# Patient Record
Sex: FEMALE | Race: WHITE | NOT HISPANIC OR LATINO | Employment: UNEMPLOYED | URBAN - METROPOLITAN AREA
[De-identification: names, ages, dates, MRNs, and addresses within clinical notes are randomized per-mention and may not be internally consistent; named-entity substitution may affect disease eponyms.]

---

## 2017-01-11 ENCOUNTER — OFFICE VISIT (OUTPATIENT)
Dept: ENDOCRINOLOGY | Facility: CLINIC | Age: 22
End: 2017-01-11
Payer: COMMERCIAL

## 2017-01-11 VITALS
RESPIRATION RATE: 18 BRPM | HEIGHT: 66 IN | SYSTOLIC BLOOD PRESSURE: 106 MMHG | WEIGHT: 142 LBS | DIASTOLIC BLOOD PRESSURE: 62 MMHG | BODY MASS INDEX: 22.82 KG/M2 | HEART RATE: 74 BPM

## 2017-01-11 DIAGNOSIS — E10.9 TYPE 1 DIABETES MELLITUS WITHOUT COMPLICATION (H): Primary | ICD-10-CM

## 2017-01-11 DIAGNOSIS — Z96.41 INSULIN PUMP IN PLACE: ICD-10-CM

## 2017-01-11 DIAGNOSIS — Z79.4 ENCOUNTER FOR LONG-TERM (CURRENT) USE OF INSULIN (H): ICD-10-CM

## 2017-01-11 DIAGNOSIS — O09.613 HIGH-RISK PREGNANCY, YOUNG PRIMIGRAVIDA, THIRD TRIMESTER: ICD-10-CM

## 2017-01-11 PROCEDURE — 99214 OFFICE O/P EST MOD 30 MIN: CPT | Performed by: CLINICAL NURSE SPECIALIST

## 2017-01-11 PROCEDURE — 99207 C FOOT EXAM  NO CHARGE: CPT | Performed by: CLINICAL NURSE SPECIALIST

## 2017-01-11 NOTE — PROGRESS NOTES
Name: Fransisca Avila  F/u for Diabetes (Last seen 11/18/2016).  HPI:  Fransisca Avila is a 21 year old female who presents for the evaluation/management of type 1 diabetes.     1. Type 1 DM:  Originally diagnosed at the age of 2.   Induced at 34 weeks on 11/5/2014, preeclampsia.  Baby girl, initially in NICU. Daughter now 2 years old, has also been diagnosed with type 1 diabetes.    Current Regimen:   Insulin pump - Medtronic 630-G  Time Rate (U/hr)   0000 0.85   05:00 0.80             Carbohydrate Ratio -    Time Ratio   0000 12         Sensitivity   47   Active Insulin Time   hours   Basal  %   Bolus   %   Total Carbohydrates/day    Total Insulin/day     Average Blood Sugar    BS Checks  times a day   Care Link Username-   Password-     MA was unable to download pump, was able to download the meter.  .  Average Meter Download: BG avg 214, BG range .    Average BG by time of day:   morning (12a-8a) - avg 233, 76.9% above target range, 0% below.    Midday (8a-4p) - avg 171, 38% above target, 7% below target  Evening (4p-12a) - avg 263, 86% above target, 0% below target    Complications:   Diabetes Complications  Description / Detail    Diabetic Retinopathy  No retinopathy, last exam 2/2016   CAD / PAD  No   Neuropathy  No   Nephropathy / Microalbuminuria  No   Gastroparesis  No   Hypoglycemia Unawareness  No     PMH/PSH:  Past Medical History   Diagnosis Date     Type 1 diabetes (H) 1998     three years at diagnosis     Varicella 1998     Past Surgical History   Procedure Laterality Date     Dental surgery       C root canal therapy 2 canals       Family Hx:  Family History   Problem Relation Age of Onset     DIABETES Father 35     Type 1      Family History Negative Mother      Thyroid disease: No         DM2: NO         Autoimmune: DM1, SLE, RA, Vitiligo Yes - Father has type 1 DM    Social Hx:  Social History     Social History     Marital Status:      Spouse Name: Teja     Number of Children: 1      "Years of Education: N/A     Occupational History           Social History Main Topics     Smoking status: Never Smoker      Smokeless tobacco: Never Used     Alcohol Use: No     Drug Use: No     Sexual Activity:     Partners: Male     Other Topics Concern     Blood Transfusions No     Social History Narrative    Fransisca lives with her spouse           MEDICATIONS:  has a current medication list which includes the following prescription(s): insulin pump, insulin lispro, blood glucose monitoring, insulin glargine, insulin syringe-needle u-100, insulin pen needle, insulin glargine, insulin syringe-needle u-100, insulin lispro, blood glucose monitoring, relion ultima glucose system, relion lancets micro-thin 33g, insulin syringe-needle u-100, blood glucose monitoring, acetone (urine) test, and prenatal multivit-min-fe-fa.    ROS     ROS: 10 point ROS neg other than the symptoms noted above in the HPI.    Physical Exam   VS: /62 mmHg  Pulse 74  Resp 18  Ht 1.676 m (5' 6\")  Wt 64.411 kg (142 lb)  BMI 22.93 kg/m2  GENERAL: NAD, well dressed, answering questions appropriately, appears stated age.  HEENT: OP clear, no exophthalmos, no proptosis, EOMI, no lig lag, no retraction, no scleral icterus  RESPIRATORY: Normal respiratory effort.  CARDIOVASCULAR: No peripheral edema.  EXTREMITIES: no edema, feet with 2+ pulses, 10-gram plantar sensation 6/6 bilaterally, no open lesions or heavy calluses noted.  NEUROLOGY: CN grossly intact, no tremors  MSK: grossly intact, No digital cyanosis. Normal gait and station.  SKIN:warm and dry with good turgor;  no rashes, no lesions, no ulcers  PSYCH: Intact judgment and insight. A&OX3 with a cordial affect.    LABS:  A1c  Component  Latest Ref Rng 11/6/2014 12/2/2015 11/18/2016   Hemoglobin A1C  4.3 - 6.0 % 9.3 (H) >15.0 (H) . . 10.8 (H)     BMP:  !COMPREHENSIVE Latest Ref Rng 11/18/2016   SODIUM 133 - 144 mmol/L 138   POTASSIUM 3.4 - 5.3 mmol/L 4.3   CHLORIDE 94 - 109 mmol/L " 104   BUN 7 - 30 mg/dL 9   Creatinine 0.52 - 1.04 mg/dL 0.61   Glucose 70 - 99 mg/dL 220 (H)   ANION GAP 3 - 14 mmol/L 7   CALCIUM 8.5 - 10.1 mg/dL 9.0   ALBUMIN 3.4 - 5.0 g/dL 3.9     !LIPID/HEPATIC Latest Ref Rng 11/8/2014 12/2/2015 11/18/2016   AST 0 - 45 U/L 15 6 5   ALT 0 - 50 U/L 10 22 15     Urine microalbumin:  Component   Latest Ref Rng 12/2/2015   Creatinine Urine 128   Microalbumin Urine 84   Microalbumin mg/g Cr  0 - 25 mg/g Cr 66.02 (H)     JOSH/C-peptide:  Component    Latest Ref Rng 11/10/2014   Glutamic Acid Decarboxylase Antibody     <5.0    Reference range: 0.0 to 5.0      C-Peptide    0.9 - 6.9 ng/mL <0.1 (L)     TFT:  !THYROID Latest Ref Rng 11/18/2016 12/2/2015 11/13/2014   TSH 0.40 - 4.00 mU/L 1.42 1.89 1.58   T4 FREE 0.76 - 1.46 ng/dL  1.16 1.16     TTG Abs:  Component    Latest Ref Rng 11/13/2014   Tissue Transglutaminase Antibody IgA    0 - 3.9 U/mL 1.1   Tissue Transglutaminase Rosina IgG    0 - 5.9 U/mL 1.0     All pertinent notes, labs, and images personally reviewed by me.     A/P  Ms.Jennica Avila is a 21 year old here for the evaluation/management of diabetes:    1. DM1 - Uncontrolled - substantially improved since resuming pump therapy.  She's been in regular contact with Jenn, diabetes ed for pump adjustments.  Last changes made on 12/23 - Jenn increased the overnight basal from 0.8-->0.85 and decrease correction from 45-->47 due to some post-correction lows.  It's difficult to make pump adjustments without the pump upload however, her blood sugars are definitely running highest in the evening from 4p-12a.  She also notes that blood sugars remain high following corrections during that time.  I'm changing her correction to 1:45 from 6pm to 12a, will continue 1:47 from 12a to 6p.  She has a follow up with Jenn next week for CGM start - further pump adjustments can be made at that time.  RX for glucagon emergency kit and urine ketone strips provided.  RX for lantus and syringes  provided in the event of insulin pump failure.  Prevention  Flu Shot- 10/2016-recommend annually  Pneumovax- no - recommended, she wants to make sure it will be covered by her insurance first  Opthalmology-Yes last exam 3/2016-recommend annual exams  Dental-Yes  ASA-No  Smoking- No    Labs ordered today:   Orders Placed This Encounter   Procedures     FOOT EXAM       Radiology/Consults ordered today: C FOOT EXAM  NO CHARGE    No orders of the defined types were placed in this encounter.       Follow-up:  follow up in 3 months, prn sooner if needed.      Maryann Hammonds NP  Endocrinology  [facility]  CC: No primary care provider on file.      More than 50% of face to face time spent with Ms. Avila on counseling / coordinating her care.  Total face to face time was 15 minutes.      All questions were answered.  The patient indicates understanding of the above issues and agrees with the plan set forth.

## 2017-01-11 NOTE — NURSING NOTE
"Chief Complaint   Patient presents with     Diabetes     recheck       Initial /62 mmHg  Pulse 74  Resp 18  Ht 1.676 m (5' 6\")  Wt 64.411 kg (142 lb)  BMI 22.93 kg/m2 Estimated body mass index is 22.93 kg/(m^2) as calculated from the following:    Height as of this encounter: 1.676 m (5' 6\").    Weight as of this encounter: 64.411 kg (142 lb).  BP completed using cuff size: alfredo Doherty CMA        "

## 2017-01-13 ENCOUNTER — TELEPHONE (OUTPATIENT)
Dept: EDUCATION SERVICES | Facility: CLINIC | Age: 22
End: 2017-01-13

## 2017-01-13 NOTE — TELEPHONE ENCOUNTER
Fransisca is scheduled to start the sensor on 1/19/17    Recommended initial sensor settings:  Alert on Low 70, suspend on low - snooze 20 minutes  Alert on high 240 snooze 60 minutes  Patient may adjust settings after initial start.    Please let me know if you agree with recomeneded settings or indicate alternative plan.    Jenn Chua RN,CDE

## 2017-01-14 PROBLEM — E10.9 TYPE 1 DIABETES MELLITUS WITHOUT COMPLICATION (H): Status: ACTIVE | Noted: 2017-01-14

## 2017-01-14 PROBLEM — Z96.41 INSULIN PUMP IN PLACE: Status: ACTIVE | Noted: 2017-01-14

## 2017-01-14 RX ORDER — BLOOD SUGAR DIAGNOSTIC
STRIP MISCELLANEOUS
Qty: 100 EACH | Refills: 3 | Status: SHIPPED | OUTPATIENT
Start: 2017-01-14 | End: 2017-04-12

## 2017-01-14 RX ORDER — INSULIN GLARGINE 100 [IU]/ML
INJECTION, SOLUTION SUBCUTANEOUS
Qty: 10 ML | Refills: 1 | Status: SHIPPED | OUTPATIENT
Start: 2017-01-14 | End: 2017-04-12

## 2017-01-19 ENCOUNTER — ALLIED HEALTH/NURSE VISIT (OUTPATIENT)
Dept: EDUCATION SERVICES | Facility: CLINIC | Age: 22
End: 2017-01-19
Payer: COMMERCIAL

## 2017-01-19 DIAGNOSIS — E10.9 TYPE 1 DIABETES MELLITUS WITHOUT COMPLICATION (H): Primary | ICD-10-CM

## 2017-01-19 PROCEDURE — G0108 DIAB MANAGE TRN  PER INDIV: HCPCS

## 2017-01-19 NOTE — PROGRESS NOTES
Diabetes Self Management Training: Insulin Pump and Continuous Glucose Monitor    SUBJECTIVE:  Fransisca Cabello presents today for Initiation of personal continuous glucose monitoring related to  Type 1 diabetes   She is accompanied by spouse and daughter  Patient is being treated with insulin pump Medtronic Minimed 630G with Enlite Sensor    Patient concerns: has no specific complaints and has been feeling well.    Pump Report:         Current pump settings:      Symptoms of low blood sugar (hypoglycemia:sweating, shaky, weak, dizzy, blurred vision, confusion)? Yes, not very often, she did have a low today because she did not eat all her lunch that she took insulin for.  Patient carries a carbohydrate source with them regularly: Yes      OBJECTIVE:  A1C     10.8   11/18/2016  GLC      220   11/18/2016    ASSESSMENT:  Pump: average glucose since last week when she saw Maryann is 188mg/dl, average of 2.3 tests a day. Not enough data to make adjustments to settings.      CGM:  Patient completed homework (online training and/or Getting started with CGM guide)  See 1/13/17 telephone encounter for sensor settings orders  Instruction provided on:  CGM system- Enlite sensor, Guardian link transmitter, 630G insulin pump, Keys to success- sensor glucose/blood glucose, trends and graphs, Alarms and Alerts,  Suspend on low, Enlite Sensor Insertion, Taping, calibrating, Carelink software.    CGM initial settings:   Sensor alert? on  Low Alerts;  Suspend on low on 70 mg/dl  Alert on low on set at 70 mg/dl   Alert before low off   Low snooze: 20 minutes  High Alerts:  Alert on high on 240 mg/dl  Alert before high off   Rise alert off   High snooze: 60 minutes  Patient was able to insert sensor and start sensor without difficulty.     Signed orders for Continuous Glucose Monitoring Initiation settings sent to be abstracted into the patient chart.    PLAN:  Upload pump by 1/26/17 and send [employer] message to CDE after  upload.    Jenn Chua RN,CDE   Time Spent: 60 minutes  Encounter Type: Individual    Signed orders for Continuous Glucose Monitoring Initiation settings sent to be abstracted into the patient chart.    Any diabetes medication dose changes were made via the CDE Protocol and Collaborative Practice Agreement with the patient's referring provider. A copy of this encounter was shared with the provider.

## 2017-01-25 ENCOUNTER — MYC MEDICAL ADVICE (OUTPATIENT)
Dept: EDUCATION SERVICES | Facility: CLINIC | Age: 22
End: 2017-01-25

## 2017-01-25 DIAGNOSIS — E10.9 TYPE 1 DIABETES MELLITUS WITHOUT COMPLICATION (H): Primary | ICD-10-CM

## 2017-01-26 ENCOUNTER — VIRTUAL VISIT (OUTPATIENT)
Dept: EDUCATION SERVICES | Facility: CLINIC | Age: 22
End: 2017-01-26

## 2017-01-26 DIAGNOSIS — E10.9 TYPE 1 DIABETES MELLITUS WITHOUT COMPLICATION (H): Primary | ICD-10-CM

## 2017-01-26 NOTE — TELEPHONE ENCOUNTER
Diabetes Self-Management Training: Insulin Pump Telephone Visit    Current Diabetes Management per Patient:  Comments/concerns: see mychart message    Insulin Pump Type: Medtronic Minimed 630G with Enlite Sensor    Taking other diabetes medications? no    Blood Glucose Results and Insulin Use:             Current Pump Settings: See Insulin Pump - Outpatient in Medication List for complete list of settings.     Assessment:  BG values are: Not in goal    Patient would benefit from decrease in basal rate daytime to 0.8.      Intervention/Plan:  See mychart message      Jenn Chua RN,CDE     Any diabetes medication dose changes were made via the CDE Protocol and Collaborative Practice Agreement with the patient's referring provider. A copy of this encounter was shared with the provider.

## 2017-02-01 ENCOUNTER — TELEPHONE (OUTPATIENT)
Dept: ENDOCRINOLOGY | Facility: CLINIC | Age: 22
End: 2017-02-01

## 2017-02-01 NOTE — TELEPHONE ENCOUNTER
[facility] requesting PA for contour test strips, PA initiated. PA approved through 2/1/2018. Any questions call plan @[phone], ref #[id number].Renee BURDEN MA

## 2017-04-12 ENCOUNTER — TELEPHONE (OUTPATIENT)
Dept: FAMILY MEDICINE | Facility: CLINIC | Age: 22
End: 2017-04-12

## 2017-04-12 ENCOUNTER — OFFICE VISIT (OUTPATIENT)
Dept: ENDOCRINOLOGY | Facility: CLINIC | Age: 22
End: 2017-04-12
Payer: COMMERCIAL

## 2017-04-12 VITALS
HEART RATE: 107 BPM | BODY MASS INDEX: 23.72 KG/M2 | SYSTOLIC BLOOD PRESSURE: 108 MMHG | HEIGHT: 66 IN | WEIGHT: 147.6 LBS | DIASTOLIC BLOOD PRESSURE: 68 MMHG

## 2017-04-12 DIAGNOSIS — O09.613 HIGH-RISK PREGNANCY, YOUNG PRIMIGRAVIDA, THIRD TRIMESTER: ICD-10-CM

## 2017-04-12 DIAGNOSIS — Z79.4 ENCOUNTER FOR LONG-TERM (CURRENT) USE OF INSULIN (H): ICD-10-CM

## 2017-04-12 DIAGNOSIS — Z96.41 INSULIN PUMP IN PLACE: ICD-10-CM

## 2017-04-12 DIAGNOSIS — E10.9 TYPE 1 DIABETES MELLITUS WITHOUT COMPLICATION (H): Primary | ICD-10-CM

## 2017-04-12 LAB — HBA1C MFR BLD: 7.6 % (ref 4.3–6)

## 2017-04-12 PROCEDURE — 83036 HEMOGLOBIN GLYCOSYLATED A1C: CPT | Performed by: CLINICAL NURSE SPECIALIST

## 2017-04-12 PROCEDURE — 99214 OFFICE O/P EST MOD 30 MIN: CPT | Performed by: CLINICAL NURSE SPECIALIST

## 2017-04-12 PROCEDURE — 36415 COLL VENOUS BLD VENIPUNCTURE: CPT | Performed by: CLINICAL NURSE SPECIALIST

## 2017-04-12 RX ORDER — INSULIN GLARGINE 100 [IU]/ML
INJECTION, SOLUTION SUBCUTANEOUS
Qty: 10 ML | Refills: 1 | Status: SHIPPED | OUTPATIENT
Start: 2017-04-12 | End: 2018-05-17

## 2017-04-12 RX ORDER — LANCETS
EACH MISCELLANEOUS
Qty: 600 EACH | Refills: 1 | Status: SHIPPED | OUTPATIENT
Start: 2017-04-12 | End: 2018-05-17

## 2017-04-12 RX ORDER — BLOOD SUGAR DIAGNOSTIC
STRIP MISCELLANEOUS
Qty: 100 EACH | Refills: 3 | Status: SHIPPED | OUTPATIENT
Start: 2017-04-12 | End: 2018-05-17

## 2017-04-12 NOTE — TELEPHONE ENCOUNTER
Dr. Hammonds would like the pa done for this brand test strips due to pt's insulin pump    PA NEEDED ON: Latest Medical Contour Next Test Strips  INS IS: Paid/[employer]  Bin: [id number]   Pcn: CALLIE  Group: [id number]  PHONE # IS: [phone]  ID # IS: [id number]  PLEASE LET US KNOW WHEN PA IS GRANTED/DENIED.  THANK YOU!  Arlen Alba, Pharmacy [facility]

## 2017-04-12 NOTE — NURSING NOTE
"Chief Complaint   Patient presents with     Follow Up For     Diabetes       Initial /68 (BP Location: Left arm, Cuff Size: Adult Regular)  Pulse 107  Ht 1.676 m (5' 6\")  Wt 67 kg (147 lb 9.6 oz)  BMI 23.82 kg/m2 Estimated body mass index is 23.82 kg/(m^2) as calculated from the following:    Height as of this encounter: 1.676 m (5' 6\").    Weight as of this encounter: 67 kg (147 lb 9.6 oz).  Medication Reconciliation: rizwana Cates CMA    4/12/2017  10:27 AM      "

## 2017-04-12 NOTE — MR AVS SNAPSHOT
After Visit Summary   4/12/2017    Fransisca Cabello    MRN: [id number]           Patient Information     Date Of Birth          1995        Visit Information        Provider Department      4/12/2017 10:30 AM Maryann Hammonds APRN [facility]        Today's Diagnoses     Type 1 diabetes mellitus without complication (H)    -  1      Care Instructions        Component      Latest Ref Rng & Units 9/24/2014 10/20/2014 11/6/2014 12/2/2015   Hemoglobin A1C      4.3 - 6.0 % 10.5 (H) 9.6 (H) 9.3 (H) >15.0 (H) . . .     Component      Latest Ref Rng & Units 11/18/2016 4/12/2017   Hemoglobin A1C      4.3 - 6.0 % 10.8 (H) 7.6 (H)       Insulin pump - Medtronic 630-G  Time Rate (U/hr)   0000 0.850--> 0.900   08:00 0.800-->0.850   18:00 0.850-->0.900         Carbohydrate Ratio -    Time Ratio   0000 12         Sensitivity   00:00 - 47-->50  17:00 - 45     Contact Jenn as often as once weekly with pump upload/carelink - for pump setting adjustments.    Follow up with me in 3 months.    Maryann Hammonds NP  Endocrinology          Follow-ups after your visit        Additional Services     DIABETES EDUCATOR REFERRAL       DIABETES SELF MANAGEMENT TRAINING (DSMT)      Your provider has referred you to Diabetes Education: [facility]: Diabetes Education - [facility] [phone]   [email or web address]    Type of training and number of hours: Previous Diagnosis: Follow-up DSMT - 2 hours.    Medicare covers: 10 hours of initial DSMT in 12 month period from the time of first visit, plus 2 hours of follow-up DSMT annually, and additional hours as requested for insulin training.    Diabetes Type: Type 1             Diabetes Co-Morbidities: none               A1C Goal:  <7.0       A1C is: Lab Results       Component                Value               Date                       A1C                      7.6                 04/12/2017              If  an urgent visit is needed or A1C is above 12, Care Team to call the Diabetes Education Team at [phone] or send an In Basket message to the Diabetes Education Pool (P DIAB ED-PATIENT CARE).    Diabetes Education Topics:  Insulin Pump Therapy: Current Pump User    Special Educational Needs Requiring Individual DSMT: Additional Insulin Training       MEDICAL NUTRITION THERAPY (MNT) for Diabetes    Medical Nutrition Therapy with a Registered Dietitian can be provided in coordination with Diabetes Self-Management Training to assist in achieving optimal diabetes management.    MNT Type and Hours: Do not initiate MNT at this time - continued insulin pump setting titrations                       Medicare will cover: 3 hours initial MNT in 12 month period after first visit, plus 2 hours of follow-up MNT annually    Please be aware that coverage of these services is subject to the terms and limitations of your health insurance plan.  Call member services at your health plan to determine Diabetes Self-Management Training benefits and ask which blood glucose monitor brands are covered by your plan.      Please bring the following with you to your appointment:    (1)  List of current medications   (2)  List of Blood Glucose Monitor brands that are covered by your insurance plan  (3)  Blood Glucose Monitor and log book  (4)   Food records for the 3 days prior to your visit    The Certified Diabetes Educator may make diabetes medication adjustments per the CDE Protocol and Collaborative Practice Agreement.                  Who to contact     If you have questions or need follow up information about today's clinic visit or your schedule please contact [facility] directly at [phone].  Normal or non-critical lab and imaging results will be communicated to you by MyChart, letter or phone within 4 business days after the clinic has received the results. If you do not hear from us within 7 days, please  "contact the clinic through [employer] or phone. If you have a critical or abnormal lab result, we will notify you by phone as soon as possible.  Submit refill requests through [employer] or call your pharmacy and they will forward the refill request to us. Please allow 3 business days for your refill to be completed.          Additional Information About Your Visit        [facility] Information     [employer] gives you secure access to your electronic health record. If you see a primary care provider, you can also send messages to your care team and make appointments. If you have questions, please call your primary care clinic.  If you do not have a primary care provider, please call [phone] and they will assist you.        Care EveryWhere ID     This is your Care EveryWhere ID. This could be used by other organizations to access your [facility] medical records  [id number]        Your Vitals Were     Pulse Height BMI (Body Mass Index)             107 1.676 m (5' 6\") 23.82 kg/m2          Blood Pressure from Last 3 Encounters:   04/12/17 108/68   01/11/17 106/62   11/18/16 121/85    Weight from Last 3 Encounters:   04/12/17 67 kg (147 lb 9.6 oz)   01/11/17 64.4 kg (142 lb)   11/18/16 64 kg (141 lb)              We Performed the Following     DIABETES EDUCATOR REFERRAL     Hemoglobin A1c        Primary Care Provider    None Specified       No primary provider on file.        Thank you!     Thank you for choosing [facility]  for your care. Our goal is always to provide you with excellent care. Hearing back from our patients is one way we can continue to improve our services. Please take a few minutes to complete the written survey that you may receive in the mail after your visit with us. Thank you!             Your Updated Medication List - Protect others around you: Learn how to safely use, store and throw away your medicines at www.disposemymeds.org.          This list is accurate as of: 4/12/17 11:06 AM. " " Always use your most recent med list.                   Brand Name Dispense Instructions for use    acetone (Urine) test Strp     100 each    Use as directed to test urine ketone if blood sugars are >250 or during illness with fever, nausea, vomiting, or abdominal pain.       blood glucose monitoring test strip    RAYMUNDO CONTOUR NEXT    200 each    Use to test blood sugar 6 times daily or as directed.       glucagon 1 MG kit    GLUCAGON EMERGENCY    1 each    Inject 1 mg into the muscle once for 1 dose       insulin glargine 100 UNIT/ML injection    LANTUS VIAL    10 mL    Inject 19 u sq every 24 hours in the event of insulin pump therapy.  DO NOT FILL RX until requested by patient.       * insulin lispro 100 UNIT/ML injection    humaLOG    6 vial    To be used in insulin pump total daily dose 60 units       * insulin lispro 100 UNIT/ML injection    humaLOG    20 mL    Sq via insulin pump.  Total daily dose 60 units.       insulin pump infusion      Date last updated:  12/23/16 Medtronic Minimed: Model 630G BASAL RATES and times: 12   AM (midnight): 0.85 units/hour   8     AM: 0.8 units/hour  6pm: 0.85 Basal Pattern A:  Fransisca Irineo will use when NA. CARB RATIO and times: 12   AM (midnight): 12 Corection Factor (Sensitivity) and times: 12   AM (midnight): 47 mg/dL BLOOD GLUCOSE TARGET and times: 12   AM (midnight): 100 - 120 5     AM:  90 - 110 9:30    PM:  100 - 120 Active Insulin Time:  3 hours Sensor:  No Carelink / Diasend username: [place] Carelink / Diasend Password:  [email or web address]       insulin syringe-needle U-100 31G X 5/16\" 0.3 ML    BD insulin syringe ultrafine    100 each    Use one syringe 4 daily or as directed in the event of insulin pump failure.  DO NOT DISPENSE until requested by patient       PRENATAL VITAMINS PO          * Notice:  This list has 2 medication(s) that are the same as other medications prescribed for you. Read the directions carefully, and ask your doctor or other care " provider to review them with you.

## 2017-04-12 NOTE — PROGRESS NOTES
Name: Fransisca Avila  F/u for Diabetes (Last seen 1/11/2017).  HPI:  Fransisca Avila is a 22 year old female who presents for the evaluation/management of type 1 diabetes.     1. Type 1 DM:  Originally diagnosed at the age of 2.   Induced at 34 weeks on 11/5/2014, preeclampsia.  Baby girl, initially in NICU. Daughter now 2 years old, has also been diagnosed with type 1 diabetes.    Current Regimen:   Insulin pump - Medtronic 630-G  Time Rate (U/hr)   0000 0.850   08:00 0.800   18:00 0.850         Carbohydrate Ratio -    Time Ratio   0000 12         Sensitivity   00:00 - 47  17:00 - 45   Active Insulin Time   hours   Basal  44% (19.7 units)   Bolus   56% (24.86 units)   Total Carbohydrates/day 232   Total Insulin/day  44.56   Average Blood Sugar  Sensor Average  226  199   BS Checks  3.8 times a day   Care Link Username-   Password-   Target range:  0:00  100-120  5:00     21:30  100-120  MA was unable to download pump, was able to download the meter.  .  Average Meter Download: BG avg 214, BG range .    Average BG by time of day:   morning (12a-8a) - avg 233, 76.9% above target range, 0% below.    Midday (8a-4p) - avg 171, 38% above target, 7% below target  Evening (4p-12a) - avg 263, 86% above target, 0% below target    Complications:   Diabetes Complications  Description / Detail    Diabetic Retinopathy  No retinopathy, last exam 2/2016   CAD / PAD  No   Neuropathy  No   Nephropathy / Microalbuminuria  No   Gastroparesis  No   Hypoglycemia Unawareness  No     PMH/PSH:  Past Medical History:   Diagnosis Date     Type 1 diabetes (H) 1998    three years at diagnosis     Varicella 1998     Past Surgical History:   Procedure Laterality Date     C ROOT CANAL THERAPY 2 CANALS       DENTAL SURGERY       Family Hx:  Family History   Problem Relation Age of Onset     DIABETES Father 35     Type 1      Family History Negative Mother      Thyroid disease: No         DM2: NO         Autoimmune: DM1, SLE, RA,  "Vitiligo Yes - Father has type 1 DM    Social Hx:  Social History     Social History     Marital status:      Spouse name: Teja     Number of children: 1     Years of education: N/A     Occupational History      [employer]     Social History Main Topics     Smoking status: Never Smoker     Smokeless tobacco: Never Used     Alcohol use No     Drug use: No     Sexual activity: Yes     Partners: Male     Other Topics Concern     Blood Transfusions No     Social History Narrative    Fransisca lives with her spouse           MEDICATIONS:  has a current medication list which includes the following prescription(s): blood glucose monitoring, insulin lispro, microlet lancets, insulin syringe-needle u-100, acetone (urine) test, glucagon, insulin glargine, insulin pump, and prenatal multivit-min-fe-fa.    ROS     ROS: 10 point ROS neg other than the symptoms noted above in the HPI.    Physical Exam   VS: /68 (BP Location: Left arm, Cuff Size: Adult Regular)  Pulse 107  Ht 1.676 m (5' 6\")  Wt 67 kg (147 lb 9.6 oz)  BMI 23.82 kg/m2  GENERAL: NAD, well dressed, answering questions appropriately, appears stated age.  HEENT: OP clear, no exophthalmos, no proptosis, EOMI, no lig lag, no retraction, no scleral icterus  RESPIRATORY: Normal respiratory effort.  CARDIOVASCULAR: No peripheral edema.  EXTREMITIES: no edema  NEUROLOGY: CN grossly intact, no tremors  MSK: grossly intact, No digital cyanosis. Normal gait and station.  SKIN:warm and dry with good turgor;  no rashes, no lesions, no ulcers  PSYCH: Intact judgment and insight. A&OX3 with a cordial affect.    LABS:  A1c  Component      Latest Ref Rng & Units 11/6/2014 12/2/2015 11/18/2016 4/12/2017   Hemoglobin A1C      4.3 - 6.0 % 9.3 (H) >15.0 (H) . . . 10.8 (H) 7.6 (H)     BMP:  !COMPREHENSIVE Latest Ref Rng 11/18/2016   SODIUM 133 - 144 mmol/L 138   POTASSIUM 3.4 - 5.3 mmol/L 4.3   CHLORIDE 94 - 109 mmol/L 104   BUN 7 - 30 mg/dL 9   Creatinine 0.52 - 1.04 mg/dL " 0.61   Glucose 70 - 99 mg/dL 220 (H)   ANION GAP 3 - 14 mmol/L 7   CALCIUM 8.5 - 10.1 mg/dL 9.0   ALBUMIN 3.4 - 5.0 g/dL 3.9     !LIPID/HEPATIC Latest Ref Rng 11/8/2014 12/2/2015 11/18/2016   AST 0 - 45 U/L 15 6 5   ALT 0 - 50 U/L 10 22 15     Urine microalbumin:  Component   Latest Ref Rng 12/2/2015   Creatinine Urine 128   Microalbumin Urine 84   Microalbumin mg/g Cr  0 - 25 mg/g Cr 66.02 (H)     JOSH/C-peptide:  Component    Latest Ref Rng 11/10/2014   Glutamic Acid Decarboxylase Antibody     <5.0    Reference range: 0.0 to 5.0      C-Peptide    0.9 - 6.9 ng/mL <0.1 (L)     TFT:  !THYROID Latest Ref Rng 11/18/2016 12/2/2015 11/13/2014   TSH 0.40 - 4.00 mU/L 1.42 1.89 1.58   T4 FREE 0.76 - 1.46 ng/dL  1.16 1.16     TTG Abs:  Component    Latest Ref Rng 11/13/2014   Tissue Transglutaminase Antibody IgA    0 - 3.9 U/mL 1.1   Tissue Transglutaminase Rosina IgG    0 - 5.9 U/mL 1.0     All pertinent notes, labs, and images personally reviewed by me.     A/P  Ms.Jennica Avila is a 21 year old here for the evaluation/management of diabetes:    1. DM1 - Uncontrolled - substantially improved since resuming pump therapy.  Today's A1c improved to 7.6%.    She appears to need more basal insulin.  She also notes a consistent blood sugar drop [id number] while she's at work.  She generally wakes up with elevated blood sugars and does a correction only in the morning (usually doesn't eat breakfast).  Her sensor starts alarming about 2 hours after the correction while she is at work.  I'll start by changing correction 47-->50 to see if that helps.  It might be the increased activity and not the correction that's causing the BG drop but it's difficult to assess right now because BG is always high every morning requiring a correction dose.  Reassess once fasting am blood sugars are not requiring a correction.  Insulin pump - Lolaboxtronic 630-G.  Changes made and confirmed in clinic  Time Rate (U/hr)   0000 0.850--> 0.900   08:00  "0.800-->0.850   18:00 0.850-->0.900         Sensitivity   00:00 - 47-->50  17:00 - 45     RX for glucagon emergency kit and urine ketone strips provided.  RX for lantus and syringes provided in the event of insulin pump failure.  She and her  would like to have another baby.  I recommend she get her A1c below 7.0% before becoming pregnant.  Diabetes referral provided for continued insulin pump adjustments, she'll upload to [employer] and contact Jenn with diabetes ed up to once weekly until blood sugars are in optimal control.  Prevention  Flu Shot- 10/2016-recommend annually  Pneumovax- no - recommended, she wants to make sure it will be covered by her insurance first  Opthalmology-Yes last exam 3/2016-recommend annual exams  Dental-Yes  ASA-No  Smoking- No    Labs ordered today:   Orders Placed This Encounter   Procedures     Hemoglobin A1c     DIABETES EDUCATOR REFERRAL       Radiology/Consults ordered today: DIABETES EDUCATOR REFERRAL    Orders Placed This Encounter   Medications     blood glucose monitoring (RAYMUNDO CONTOUR NEXT) test strip     Sig: Use to test blood sugar 6 times daily or as directed.     Dispense:  550 each     Refill:  1     insulin lispro (HUMALOG) 100 UNIT/ML injection     Sig: To be used in insulin pump total daily dose 60 units     Dispense:  6 vial     Refill:  1     MICROLET LANCETS MISC     Sig: Use one lancet 6 times daily for blood sugar testing     Dispense:  600 each     Refill:  1     insulin syringe-needle U-100 (BD INSULIN SYRINGE ULTRAFINE) 31G X 5/16\" 0.3 ML     Sig: Use one syringe 4 daily or as directed in the event of insulin pump failure.  DO NOT DISPENSE until requested by patient     Dispense:  100 each     Refill:  3     acetone, Urine, test STRP     Sig: Use as directed to test urine ketone if blood sugars are >250 or during illness with fever, nausea, vomiting, or abdominal pain.     Dispense:  100 each     Refill:  prn     Do not dispense, keep on file     " glucagon (GLUCAGON EMERGENCY) 1 MG kit     Sig: Inject 1 mg into the muscle once for 1 dose     Dispense:  1 each     Refill:  0     Do not dispense, keep on file     insulin glargine (LANTUS VIAL) 100 UNIT/ML injection     Sig: Inject 19 u sq every 24 hours in the event of insulin pump therapy.  DO NOT FILL RX until requested by patient.     Dispense:  10 mL     Refill:  1       Follow-up:  follow up in 3 months, prn sooner if needed.      Maryann Hammonds NP  Endocrinology  [facility]  CC: No primary care provider on file.      More than 50% of face to face time spent with Ms. Avila on counseling / coordinating her care.  Total face to face time was 25 minutes.      All questions were answered.  The patient indicates understanding of the above issues and agrees with the plan set forth.

## 2017-04-12 NOTE — PATIENT INSTRUCTIONS
Component      Latest Ref Rng & Units 9/24/2014 10/20/2014 11/6/2014 12/2/2015   Hemoglobin A1C      4.3 - 6.0 % 10.5 (H) 9.6 (H) 9.3 (H) >15.0 (H) . . .     Component      Latest Ref Rng & Units 11/18/2016 4/12/2017   Hemoglobin A1C      4.3 - 6.0 % 10.8 (H) 7.6 (H)       Insulin pump - CHEQROOM 630-G  Time Rate (U/hr)   0000 0.850--> 0.900   08:00 0.800-->0.850   18:00 0.850-->0.900         Carbohydrate Ratio -    Time Ratio   0000 12         Sensitivity   00:00 - 47-->50  17:00 - 45     Contact Jenn as often as once weekly with pump upload/carelink - for pump setting adjustments.    Follow up with me in 3 months.    Maryann Hammonds NP  Endocrinology

## 2017-04-13 NOTE — PROGRESS NOTES
Fransisca,  Here's a copy of your most recent A1c for your records.  Congratulations again!! Keep up the good work.  Please contact Jenn regularly for continued pump adjustments.  Maryann Hammonds NP  Endocrinology

## 2017-04-17 NOTE — TELEPHONE ENCOUNTER
Please submit a PA - this is the only meter that communicates with her insulin pump (medtronic).  Maryann Hammonds NP  Endocrinology

## 2017-04-18 NOTE — TELEPHONE ENCOUNTER
PA approved from 4/18/17 - 4/18/18. Pharmacy informed.  They will inform pt. Shari Schoenberger, CMA

## 2017-04-24 ENCOUNTER — MYC MEDICAL ADVICE (OUTPATIENT)
Dept: ENDOCRINOLOGY | Facility: CLINIC | Age: 22
End: 2017-04-24

## 2017-05-09 ENCOUNTER — MYC MEDICAL ADVICE (OUTPATIENT)
Dept: ENDOCRINOLOGY | Facility: CLINIC | Age: 22
End: 2017-05-09

## 2017-05-11 ENCOUNTER — VIRTUAL VISIT (OUTPATIENT)
Dept: EDUCATION SERVICES | Facility: CLINIC | Age: 22
End: 2017-05-11

## 2017-05-11 DIAGNOSIS — E10.9 TYPE 1 DIABETES MELLITUS WITHOUT COMPLICATION (H): Primary | ICD-10-CM

## 2017-05-11 NOTE — PROGRESS NOTES
Diabetes Self-Management Training: Insulin Pump Telephone Visit    Current Diabetes Management per Patient:  Comments/concerns: blood sugar starts to rise in the morning even when she isn't eating ,     Insulin Pump Type: Medtronic Minimed 630G with Enlite Sensor    Taking other diabetes medications? no    Blood Glucose Results and Insulin Use:           Current Pump Settings: See Insulin Pump - Outpatient in Medication List for complete list of settings.     Assessment:  BG values are: Not in goal  Some overnight low and early morning rise. She did have a day of high readings, recommend she carry extra infusion set use twice in doubt rule. Some highs are rebound from low, lows when working or more active.    Patient would benefit from decrease in basal rate overnight and increase basal in the morning  Make sure to use temp basal for activity.      Intervention/Plan:  Changes made to pump settings:  basal rate:   12am: 0.9  6am: 0.95  10am: 0.85  6pm: 0.9    Make sure to use temp basal for activity.    Follow-up:  Chart routed to referring provider.      Jenn Chua RN,CDE     Any diabetes medication dose changes were made via the CDE Protocol and Collaborative Practice Agreement with the patient's referring provider. A copy of this encounter was shared with the provider.

## 2017-05-11 NOTE — MR AVS SNAPSHOT
After Visit Summary   5/11/2017    Fransisca Cabello    MRN: [id number]           Patient Information     Date Of Birth          1995        Visit Information        Provider Department      5/11/2017 10:30 AM Jenn Chua RN [facility]        Today's Diagnoses     Type 1 diabetes mellitus without complication (H)    -  1       Follow-ups after your visit        Your next 10 appointments already scheduled     Jul 12, 2017 10:30 AM CDT   Return Visit with HORTENSIA Rosales CNP   [facility]    [address] [facility] [id number] [phone]              Who to contact     If you have questions or need follow up information about today's clinic visit or your schedule please contact [facility] directly at [phone].  Normal or non-critical lab and imaging results will be communicated to you by MyChart, letter or phone within 4 business days after the clinic has received the results. If you do not hear from us within 7 days, please contact the clinic through bMobilized[employer] or phone. If you have a critical or abnormal lab result, we will notify you by phone as soon as possible.  Submit refill requests through [employer] or call your pharmacy and they will forward the refill request to us. Please allow 3 business days for your refill to be completed.          Additional Information About Your Visit        MyChart Information     [employer] gives you secure access to your electronic health record. If you see a primary care provider, you can also send messages to your care team and make appointments. If you have questions, please call your primary care clinic.  If you do not have a primary care provider, please call [phone] and they will assist you.        Care EveryWhere ID     This is your Care EveryWhere ID. This could be used by other organizations to access your [facility]  records  [id number]         Blood Pressure from Last 3 Encounters:   04/12/17 108/68   01/11/17 106/62   11/18/16 121/85    Weight from Last 3 Encounters:   04/12/17 67 kg (147 lb 9.6 oz)   01/11/17 64.4 kg (142 lb)   11/18/16 64 kg (141 lb)              Today, you had the following     No orders found for display       Primary Care Provider    None Specified       No primary provider on file.        Thank you!     Thank you for choosing [facility]  for your care. Our goal is always to provide you with excellent care. Hearing back from our patients is one way we can continue to improve our services. Please take a few minutes to complete the written survey that you may receive in the mail after your visit with us. Thank you!             Your Updated Medication List - Protect others around you: Learn how to safely use, store and throw away your medicines at www.disposemymeds.org.          This list is accurate as of: 5/11/17 11:59 PM.  Always use your most recent med list.                   Brand Name Dispense Instructions for use    acetone (Urine) test Strp     100 each    Use as directed to test urine ketone if blood sugars are >250 or during illness with fever, nausea, vomiting, or abdominal pain.       blood glucose monitoring test strip    RAYMUNDO CONTOUR NEXT    550 each    Use to test blood sugar 6 times daily or as directed.       insulin glargine 100 UNIT/ML injection    LANTUS VIAL    10 mL    Inject 19 u sq every 24 hours in the event of insulin pump therapy.  DO NOT FILL RX until requested by patient.       insulin lispro 100 UNIT/ML injection    humaLOG    6 vial    To be used in insulin pump total daily dose 60 units       insulin pump infusion      Date last updated:  12/23/16 Medtronic Minimed: Model 630G BASAL RATES and times: 12   AM (midnight): 0.85 units/hour   8     AM: 0.8 units/hour  6pm: 0.85 Basal Pattern A:  Fransisca Cabello will use when NA. CARB RATIO and times: 12    "AM (midnight): 12 Corection Factor (Sensitivity) and times: 12   AM (midnight): 47 mg/dL BLOOD GLUCOSE TARGET and times: 12   AM (midnight): 100 - 120 5     AM:  90 - 110 9:30    PM:  100 - 120 Active Insulin Time:  3 hours Sensor:  No Carelink / Diasend username: [email or web address] Carelink / Diasend Password:  [email or web address]       insulin syringe-needle U-100 31G X 5/16\" 0.3 ML    BD insulin syringe ultrafine    100 each    Use one syringe 4 daily or as directed in the event of insulin pump failure.  DO NOT DISPENSE until requested by patient       MICROLET LANCETS Misc     600 each    Use one lancet 6 times daily for blood sugar testing       PRENATAL VITAMINS PO            "

## 2017-05-22 ENCOUNTER — TELEPHONE (OUTPATIENT)
Dept: FAMILY MEDICINE | Facility: CLINIC | Age: 22
End: 2017-05-22

## 2017-05-22 NOTE — TELEPHONE ENCOUNTER
Pt needs pa due to having insulin pump    PA NEEDED ON: Andrew Contour Next Strips  INS IS: Paid/[employer]  Bin: [id number]  Id: [id number]  Group: [id number]  Pcn: marixa  PHONE # IS: [phone]    PLEASE LET US KNOW WHEN PA IS GRANTED/DENIED.  THANK YOU!  Arlen Alba, Pharmacy [facility]

## 2017-05-22 NOTE — TELEPHONE ENCOUNTER
Please submit PA.  Andrew contour test strips necessary due to pt being on an insulin pump and this is the only meter that communicates with her insulin pump, there are no alternative.  Maryann Hammonds NP  Endocrinology

## 2017-05-23 NOTE — TELEPHONE ENCOUNTER
LAUREEN - GERALDINEI  - After being transferred and on hold and the call dropped and starting over, I tried again.  Only to have the same exact process repeated.  I was given the # [phone] to call for a PA.  Arlen or anyone in pharmacy, would you be able to give this a try to see if you can get through to someone??  If not, I will try to come back to this again this afternoon if I get time.  Thanks. Shari Schoenberger, [facility]

## 2017-05-30 NOTE — TELEPHONE ENCOUNTER
LMOM for pt to cb to [employer]. Need to verify which insurance co this pt has. I called # below and got [employer] [place]!  Pt has [employer].  I attempted calling that # and was on hold for a very long time, finally had to give up and hang up. I spoke w/ Arlen in Pharmacy and was told that pt did get her test strips and that we can let this be for the time being.   Shari Schoenberger, [place]    Pt did call back and told me that she only has [employer] insurance, which is the insurance that covered the test strips.  In speaking with Arlen though, Arlen states that pt does have 2 insurances and that we will probably have to re-attempt the PA the next time that pt is due for a refill.  Due to the EXTREME frustration of trying to get through to the correct insurance co and not being able to, I will wait and try to attempt this next month if need be.  Arlen was just trying to be proactive. Shari Schoenberger, CMA

## 2017-06-05 DIAGNOSIS — Z32.01 PREGNANCY TEST POSITIVE: Primary | ICD-10-CM

## 2017-06-15 ENCOUNTER — PRENATAL OFFICE VISIT (OUTPATIENT)
Dept: NURSING | Facility: CLINIC | Age: 22
End: 2017-06-15
Payer: COMMERCIAL

## 2017-06-15 DIAGNOSIS — Z32.01 PREGNANCY TEST POSITIVE: ICD-10-CM

## 2017-06-15 DIAGNOSIS — Z34.80 SUPERVISION OF OTHER NORMAL PREGNANCY, ANTEPARTUM: Primary | ICD-10-CM

## 2017-06-15 LAB
ABO + RH BLD: NORMAL
ABO + RH BLD: NORMAL
BETA HCG QUAL IFA URINE: POSITIVE
BLD GP AB SCN SERPL QL: NORMAL
BLOOD BANK CMNT PATIENT-IMP: NORMAL
ERYTHROCYTE [DISTWIDTH] IN BLOOD BY AUTOMATED COUNT: 13.1 % (ref 10–15)
HCT VFR BLD AUTO: 39.6 % (ref 35–47)
HGB BLD-MCNC: 13.2 G/DL (ref 11.7–15.7)
MCH RBC QN AUTO: 30.2 PG (ref 26.5–33)
MCHC RBC AUTO-ENTMCNC: 33.3 G/DL (ref 31.5–36.5)
MCV RBC AUTO: 91 FL (ref 78–100)
PLATELET # BLD AUTO: 278 10E9/L (ref 150–450)
RBC # BLD AUTO: 4.37 10E12/L (ref 3.8–5.2)
SPECIMEN EXP DATE BLD: NORMAL
WBC # BLD AUTO: 8.7 10E9/L (ref 4–11)

## 2017-06-15 PROCEDURE — 86780 TREPONEMA PALLIDUM: CPT | Performed by: OBSTETRICS & GYNECOLOGY

## 2017-06-15 PROCEDURE — 87389 HIV-1 AG W/HIV-1&-2 AB AG IA: CPT | Performed by: OBSTETRICS & GYNECOLOGY

## 2017-06-15 PROCEDURE — 86762 RUBELLA ANTIBODY: CPT | Performed by: OBSTETRICS & GYNECOLOGY

## 2017-06-15 PROCEDURE — 85027 COMPLETE CBC AUTOMATED: CPT | Performed by: OBSTETRICS & GYNECOLOGY

## 2017-06-15 PROCEDURE — 99207 ZZC NO CHARGE NURSE ONLY: CPT

## 2017-06-15 PROCEDURE — 84703 CHORIONIC GONADOTROPIN ASSAY: CPT | Performed by: OBSTETRICS & GYNECOLOGY

## 2017-06-15 PROCEDURE — 87086 URINE CULTURE/COLONY COUNT: CPT | Performed by: OBSTETRICS & GYNECOLOGY

## 2017-06-15 PROCEDURE — 36415 COLL VENOUS BLD VENIPUNCTURE: CPT | Performed by: OBSTETRICS & GYNECOLOGY

## 2017-06-15 PROCEDURE — 86901 BLOOD TYPING SEROLOGIC RH(D): CPT | Performed by: OBSTETRICS & GYNECOLOGY

## 2017-06-15 PROCEDURE — 87340 HEPATITIS B SURFACE AG IA: CPT | Performed by: OBSTETRICS & GYNECOLOGY

## 2017-06-15 PROCEDURE — 86900 BLOOD TYPING SEROLOGIC ABO: CPT | Performed by: OBSTETRICS & GYNECOLOGY

## 2017-06-15 PROCEDURE — 86850 RBC ANTIBODY SCREEN: CPT | Performed by: OBSTETRICS & GYNECOLOGY

## 2017-06-15 NOTE — MR AVS SNAPSHOT
After Visit Summary   6/15/2017    Fransisca Cabello    MRN: [id number]           Patient Information     Date Of Birth          1995        Visit Information        Provider Department      6/15/2017 9:00 AM RI PRENATAL NURSE [facility]        Today's Diagnoses     Supervision of other normal pregnancy, antepartum    -  1    Pregnancy test positive           Follow-ups after your visit        Your next 10 appointments already scheduled     Jun 29, 2017  2:00 PM CDT   US OB < 14 WEEKS WITH TRANSVAGINAL SINGLE with RIUS1   [facility]    [address]  [address]  [facility] [id number] [phone]           Please bring a list of your medicines (including vitamins, minerals and over-the-counter drugs). Also, tell your doctor about any allergies you may have. Wear comfortable clothes and leave your valuables at home.  If you re less than 20 weeks drink four 8-ounce glasses of fluid an hour before your exam. If you need to empty your bladder before your exam, try to release only a little urine. Then, drink another glass of fluid.  You may have up to two family members in the exam room. If you bring a small child, an adult must be there to care for him or her.  Please call the Imaging Department at your exam site with any questions.            Jun 30, 2017  9:00 AM CDT   New Prenatal with Cheyanne Silva MD   [facility] ([facility])    [address] [place] [id number]   [phone]            Jul 12, 2017 10:30 AM CDT   Return Visit with HORTENSIA Rosales [facility] ([facility])    [address] [place] [address]   [phone]              Future tests that were ordered for you today     Open Future Orders        Priority Expected Expires Ordered    US OB <14 Weeks w Transvaginal Single Routine   6/15/2018 6/15/2017            Who to contact     If you have questions or need follow up information about today's clinic visit or your schedule please contact [facility] directly at [phone].  Normal or non-critical lab and imaging results will be communicated to you by MyChart, letter or phone within 4 business days after the clinic has received the results. If you do not hear from us within 7 days, please contact the clinic through "[employer]hart or phone. If you have a critical or abnormal lab result, we will notify you by phone as soon as possible.  Submit refill requests through [employer] or call your pharmacy and they will forward the refill request to us. Please allow 3 business days for your refill to be completed.          Additional Information About Your Visit        "[employer] Information     [employer] gives you secure access to your electronic health record. If you see a primary care provider, you can also send messages to your care team and make appointments. If you have questions, please call your primary care clinic.  If you do not have a primary care provider, please call [phone] and they will assist you.        Care EveryWhere ID     This is your Care EveryWhere ID. This could be used by other organizations to access your [facility] medical records  [id number]        Your Vitals Were     Last Period                   04/21/2017 (Exact Date)            Blood Pressure from Last 3 Encounters:   04/12/17 108/68   01/11/17 106/62   11/18/16 121/85    Weight from Last 3 Encounters:   04/12/17 147 lb 9.6 oz (67 kg)   01/11/17 142 lb (64.4 kg)   11/18/16 141 lb (64 kg)              We Performed the Following     ABO/Rh type and screen     Anti Treponema     Beta HCG qual IFA urine     CBC with platelets     Hepatitis B surface antigen     HIV Antigen Antibody Combo     Rubella Antibody IgG Quantitative     Urine Culture Aerobic Bacterial        Primary Care Provider    None Specified       No  primary provider on file.        Thank you!     Thank you for choosing [facility]  for your care. Our goal is always to provide you with excellent care. Hearing back from our patients is one way we can continue to improve our services. Please take a few minutes to complete the written survey that you may receive in the mail after your visit with us. Thank you!             Your Updated Medication List - Protect others around you: Learn how to safely use, store and throw away your medicines at www.disposemymeds.org.          This list is accurate as of: 6/15/17 10:53 AM.  Always use your most recent med list.                   Brand Name Dispense Instructions for use    acetone (Urine) test Strp     100 each    Use as directed to test urine ketone if blood sugars are >250 or during illness with fever, nausea, vomiting, or abdominal pain.       blood glucose monitoring test strip    RAYMUNDO CONTOUR NEXT    550 each    Use to test blood sugar 6 times daily or as directed.       glucagon 1 MG kit    GLUCAGON EMERGENCY    1 each    Inject 1 mg into the muscle once for 1 dose       insulin glargine 100 UNIT/ML injection    LANTUS VIAL    10 mL    Inject 19 u sq every 24 hours in the event of insulin pump therapy.  DO NOT FILL RX until requested by patient.       insulin lispro 100 UNIT/ML injection    humaLOG    6 vial    To be used in insulin pump total daily dose 60 units       insulin pump infusion      Date last updated:  12/23/16 Drivy Minimed: Model 630G BASAL RATES and times: 12   AM (midnight): 0.85 units/hour   8     AM: 0.8 units/hour  6pm: 0.85 Basal Pattern A:  Fransisca Perdomokimi will use when NA. CARB RATIO and times: 12   AM (midnight): 12 Corection Factor (Sensitivity) and times: 12   AM (midnight): 47 mg/dL BLOOD GLUCOSE TARGET and times: 12   AM (midnight): 100 - 120 5     AM:  90 - 110 9:30    PM:  100 - 120 Active Insulin Time:  3 hours Sensor:  No Carelink / Diasend username:  "[email or web address] Carelink / Diasend Password:  [email or web address]       insulin syringe-needle U-100 31G X 5/16\" 0.3 ML    BD insulin syringe ultrafine    100 each    Use one syringe 4 daily or as directed in the event of insulin pump failure.  DO NOT DISPENSE until requested by patient       MICROLET LANCETS Misc     600 each    Use one lancet 6 times daily for blood sugar testing       PRENATAL VITAMINS PO            "

## 2017-06-15 NOTE — NURSING NOTE
NPN nurse visit. 1st dr visit scheduled for 6/30/17 with Cheyanne Silva M.D. Pap due. Last pap unsure.  7w6d    MADELEINE Nayak, RN

## 2017-06-16 LAB
BACTERIA SPEC CULT: NORMAL
HBV SURFACE AG SERPL QL IA: NONREACTIVE
HIV 1+2 AB+HIV1 P24 AG SERPL QL IA: NORMAL
MICRO REPORT STATUS: NORMAL
RUBV IGG SERPL IA-ACNC: 42 IU/ML
SPECIMEN SOURCE: NORMAL
T PALLIDUM IGG+IGM SER QL: NEGATIVE

## 2017-06-29 ENCOUNTER — RADIANT APPOINTMENT (OUTPATIENT)
Dept: ULTRASOUND IMAGING | Facility: CLINIC | Age: 22
End: 2017-06-29
Attending: OBSTETRICS & GYNECOLOGY
Payer: COMMERCIAL

## 2017-06-29 DIAGNOSIS — Z34.80 SUPERVISION OF OTHER NORMAL PREGNANCY, ANTEPARTUM: ICD-10-CM

## 2017-06-29 PROCEDURE — 76815 OB US LIMITED FETUS(S): CPT | Performed by: OBSTETRICS & GYNECOLOGY

## 2017-06-30 ENCOUNTER — PRENATAL OFFICE VISIT (OUTPATIENT)
Dept: OBGYN | Facility: CLINIC | Age: 22
End: 2017-06-30
Payer: COMMERCIAL

## 2017-06-30 VITALS
WEIGHT: 159.6 LBS | SYSTOLIC BLOOD PRESSURE: 120 MMHG | TEMPERATURE: 97.9 F | BODY MASS INDEX: 25.76 KG/M2 | DIASTOLIC BLOOD PRESSURE: 80 MMHG

## 2017-06-30 DIAGNOSIS — O09.891 SUPERVISION OF OTHER HIGH RISK PREGNANCIES, FIRST TRIMESTER: Primary | ICD-10-CM

## 2017-06-30 DIAGNOSIS — E10.9 TYPE 1 DIABETES MELLITUS WITHOUT COMPLICATION (H): ICD-10-CM

## 2017-06-30 DIAGNOSIS — O09.891 SUPERVISION OF OTHER HIGH RISK PREGNANCY, ANTEPARTUM, FIRST TRIMESTER: ICD-10-CM

## 2017-06-30 DIAGNOSIS — Z96.41 INSULIN PUMP IN PLACE: ICD-10-CM

## 2017-06-30 PROBLEM — O09.899 SUPERVISION OF OTHER HIGH RISK PREGNANCY, ANTEPARTUM: Status: ACTIVE | Noted: 2017-06-30

## 2017-06-30 LAB
CREAT UR-MCNC: 123 MG/DL
HBA1C MFR BLD: 6.8 % (ref 4.3–6)
PROT UR-MCNC: 0.18 G/L
PROT/CREAT 24H UR: 0.14 G/G CR (ref 0–0.2)

## 2017-06-30 PROCEDURE — 83036 HEMOGLOBIN GLYCOSYLATED A1C: CPT | Performed by: OBSTETRICS & GYNECOLOGY

## 2017-06-30 PROCEDURE — 82565 ASSAY OF CREATININE: CPT | Performed by: OBSTETRICS & GYNECOLOGY

## 2017-06-30 PROCEDURE — 99207 ZZC FIRST OB VISIT: CPT | Performed by: OBSTETRICS & GYNECOLOGY

## 2017-06-30 PROCEDURE — 84460 ALANINE AMINO (ALT) (SGPT): CPT | Performed by: OBSTETRICS & GYNECOLOGY

## 2017-06-30 PROCEDURE — 87491 CHLMYD TRACH DNA AMP PROBE: CPT | Performed by: OBSTETRICS & GYNECOLOGY

## 2017-06-30 PROCEDURE — 87591 N.GONORRHOEAE DNA AMP PROB: CPT | Performed by: OBSTETRICS & GYNECOLOGY

## 2017-06-30 PROCEDURE — 36415 COLL VENOUS BLD VENIPUNCTURE: CPT | Performed by: OBSTETRICS & GYNECOLOGY

## 2017-06-30 PROCEDURE — 84156 ASSAY OF PROTEIN URINE: CPT | Performed by: OBSTETRICS & GYNECOLOGY

## 2017-06-30 PROCEDURE — 84443 ASSAY THYROID STIM HORMONE: CPT | Performed by: OBSTETRICS & GYNECOLOGY

## 2017-06-30 PROCEDURE — G0145 SCR C/V CYTO,THINLAYER,RESCR: HCPCS | Performed by: OBSTETRICS & GYNECOLOGY

## 2017-06-30 PROCEDURE — 84450 TRANSFERASE (AST) (SGOT): CPT | Performed by: OBSTETRICS & GYNECOLOGY

## 2017-06-30 NOTE — PROGRESS NOTES
This is a 22 year old female patient,   who presents for her first obstetrical visit. This pregnancy is Planned, Desired. Her medical history is significant for type I DM since age 2, now well-controlled on an insulin pump. She follows regularly with CDE and endocrinology.  Reports BS values between  since diagnosis of pregnancy. She is taking a PNV.    EDC 2018 by Previous US which makes her 9w1d today.  Her cycles are regular but her last LMP was late. Given the 6 day discrepancy between her US and LMP dates, I chose to use the US dates as she will very likely be delivered early, certainly at/by 39 weeks gestation.  Since her LMP, she has had no complaints.  She denies emesis, abdominal pain, vaginal discharge and vaginal bleeding. Ultrasound in the 1st trimester showed EDC inconsistent with dates by LMP.     She has a history of  pre-term delivery at 34 weeks and preeclampsia    Since her last LMP she denies use of alcohol, tobacco and street drugs.    HISTORY:  Obstetric History       T0      L1     SAB0   TAB0   Ectopic0   Multiple0   Live Births1       # Outcome Date GA Lbr Marcellus/2nd Weight Sex Delivery Anes PTL Lv   2 Current            1  14 34w2d 07:13 / :56 7 lb 9.7 oz (3.45 kg) F Vag-Spont EPI  VINNY      Name: Cinthia      Complications: Preeclampsia/Hypertension      Apgar1:  7                Apgar5: 8        Past Medical History:   Diagnosis Date     Type 1 diabetes (H)     three years at diagnosis     Varicella      Past Surgical History:   Procedure Laterality Date     C ROOT CANAL THERAPY 2 CANALS       DENTAL SURGERY       Family History   Problem Relation Age of Onset     DIABETES Father 35     Type 1      Family History Negative Mother      Social History     Social History     Marital status:      Spouse name: Teja     Number of children: 1     Years of education: N/A     Occupational History      [employer]     Social History Main Topics  "    Smoking status: Never Smoker     Smokeless tobacco: Never Used     Alcohol use No     Drug use: No     Sexual activity: Yes     Partners: Male     Other Topics Concern     Blood Transfusions No     Social History Narrative    Fransisca lives with her spouse      Current Outpatient Prescriptions   Medication Sig     blood glucose monitoring (RAYMUNDO CONTOUR NEXT) test strip Use to test blood sugar 6 times daily or as directed.     insulin lispro (HUMALOG) 100 UNIT/ML injection To be used in insulin pump total daily dose 60 units     MICROLET LANCETS MISC Use one lancet 6 times daily for blood sugar testing     INSULIN PUMP - OUTPATIENT Date last updated:  12/23/16  Medtronic Minimed: Model 630G  BASAL RATES and times:  12   AM (midnight): 0.85 units/hour    8     AM: 0.8 units/hour   6pm: 0.85  Basal Pattern A:  Fransisca Cabello will use when NA.  CARB RATIO and times:  12   AM (midnight): 12  Corection Factor (Sensitivity) and times:  12   AM (midnight): 47 mg/dL  BLOOD GLUCOSE TARGET and times:  12   AM (midnight): 100 - 120  5     AM:  90 - 110  9:30    PM:  100 - 120  Active Insulin Time:  3 hours  Sensor:  No  Carelink / Diasend username: [email or web address]  Carelink / Diasend Password:  [email or web address]     Prenatal Multivit-Min-Fe-FA (PRENATAL VITAMINS PO)      insulin syringe-needle U-100 (BD INSULIN SYRINGE ULTRAFINE) 31G X 5/16\" 0.3 ML Use one syringe 4 daily or as directed in the event of insulin pump failure.  DO NOT DISPENSE until requested by patient (Patient not taking: Reported on 6/30/2017)     acetone, Urine, test STRP Use as directed to test urine ketone if blood sugars are >250 or during illness with fever, nausea, vomiting, or abdominal pain. (Patient not taking: Reported on 6/30/2017)     glucagon (GLUCAGON EMERGENCY) 1 MG kit Inject 1 mg into the muscle once for 1 dose     insulin glargine (LANTUS VIAL) 100 UNIT/ML injection Inject 19 u sq every 24 hours in the event of insulin pump therapy.  DO NOT FILL " RX until requested by patient. (Patient not taking: Reported on 6/30/2017)     No current facility-administered medications for this visit.      Allergies   Allergen Reactions     Cefzil [Cefprozil] Swelling     Phenergan [Promethazine] Anxiety and Swelling       Past medical, surgical, social and family history were reviewed and updated in EPIC.    ROS:   12 point review of systems negative other than symptoms noted below.    EXAM:  /80 (BP Location: Left arm, Patient Position: Chair, Cuff Size: Adult Regular)  Temp 97.9  F (36.6  C) (Oral)  Wt 159 lb 9.6 oz (72.4 kg)  LMP 04/21/2017 (Exact Date)  BMI 25.76 kg/m2   BMI: Body mass index is 25.76 kg/(m^2).    EXAM:  Constitutional: Appearance: Well nourished, well developed alert, in no acute distress  Chest:  Respiratory Effort:  Breathing unlabored  Cardiovascular:Heart    Auscultation:  Regular rate, normal rhythm, no murmurs present  Gastrointestinal:  Abdominal Examination:  Abdomen nontender to palpation, tone normal without     rigidity or guarding, no masses present, umbilicus without lesions    Liver and speen:  No hepatomegaly present, liver nontender to palpation    Hernias:  No hernias present  Skin:  General Inspection:  No rashes present, no lesions present, no areas of  discoloration.  Neurologic/Psychiatric:    Mental Status:  Oriented X3       Pelvis: External genitalia, Bartholin, urethral, and Loma glands within normal limits. Urethra is without lesion and nontender to palpation. Bladder is nontender. On speculum exam, cervix is without lesion and vagina is normal without lesion or discharge. Pap smear obtained without incident.    ASSESSMENT:      ICD-10-CM    1. Supervision of other high risk pregnancies O09.891 PAP imaged thin layer screen     Chlamydia trachomatis PCR     Neisseria gonorrhoeae PCR     Creatinine     AST     ALT     Protein  random urine     Hemoglobin A1c     TSH with free T4 reflex     Creatinine urine calculation  only   2. Type 1 diabetes mellitus without complication (H) E10.9    3. Insulin pump in place Z96.41    4. Supervision of other high risk pregnancy, antepartum, first trimester O09.891        PLAN:    Prenatal labs reviewed. She has no questions.    Given her diabetes, will check A1C today, TSH, and baseline liver and kidney function due to history of preeclampsia at 34 weeks. She will start a baby ASA daily at 12 weeks for prevention of preeclampsia. She is due for eye exam and will schedule. Understands that Level II US will be scheduled at 18-20 weeks, and fetal echo after that. She will need close follow up with endocrine team and will need  surveillance in the third trimester as well.    Discussed options for screening for and diagnosis of chromosomal anomalies, including first trimester screen, noninvasive prenatal testing/cell-free fetal DNA testing, CVS/amniocentesis, quad screen, and ultrasound or comprehensive Level II US at 18-20 weeks. She is electing ultrasound at 18-20 weeks only.    Reviewed early pregnancy education, diet, exercise, prenatal vitamins, intercourse. Reviewed the call schedule, labor and delivery, and the schedule of prenatal visits.    Follow up in 4 weeks. She is encouraged to call sooner with questions or concerns.      Cheyanne Silva MD

## 2017-06-30 NOTE — NURSING NOTE
"Chief Complaint   Patient presents with     Prenatal Care     10 weeks 0 days, no questions or concerns       Initial /80 (BP Location: Left arm, Patient Position: Chair, Cuff Size: Adult Regular)  Temp 97.9  F (36.6  C) (Oral)  Wt 159 lb 9.6 oz (72.4 kg)  LMP 04/21/2017 (Exact Date)  BMI 25.76 kg/m2 Estimated body mass index is 25.76 kg/(m^2) as calculated from the following:    Height as of 4/12/17: 5' 6\" (1.676 m).    Weight as of this encounter: 159 lb 9.6 oz (72.4 kg).  Medication Reconciliation: complete     Emily Yin CMA      "

## 2017-06-30 NOTE — MR AVS SNAPSHOT
After Visit Summary   6/30/2017    Fransisca Cabello    MRN: [id number]           Patient Information     Date Of Birth          1995        Visit Information        Provider Department      6/30/2017 9:00 AM Cheyanne Silva MD [facility]        Today's Diagnoses     Supervision of other high risk pregnancies    -  1    Type 1 diabetes mellitus without complication (H)        Insulin pump in place        Supervision of other high risk pregnancy, antepartum, first trimester           Follow-ups after your visit        Your next 10 appointments already scheduled     Jul 12, 2017 10:30 AM CDT   Return Visit with HORTENSIA Rosales [facility] ([facility])    [address] [place] [id number] [phone]            Jul 28, 2017 11:45 AM CDT   ESTABLISHED PRENATAL with Cheyanne Silva MD   [facility] ([facility])    [address] [place] [id number] [phone]              Who to contact     If you have questions or need follow up information about today's clinic visit or your schedule please contact [facility] directly at [phone].  Normal or non-critical lab and imaging results will be communicated to you by MyChart, letter or phone within 4 business days after the clinic has received the results. If you do not hear from us within 7 days, please contact the clinic through [employer] or phone. If you have a critical or abnormal lab result, we will notify you by phone as soon as possible.  Submit refill requests through [employer] or call your pharmacy and they will forward the refill request to us. Please allow 3 business days for your refill to be completed.          Additional Information About Your Visit        MyChart Information     [employer] gives you secure access to your electronic health record. If you see a primary care provider,  you can also send messages to your care team and make appointments. If you have questions, please call your primary care clinic.  If you do not have a primary care provider, please call [phone] and they will assist you.        Care EveryWhere ID     This is your Care EveryWhere ID. This could be used by other organizations to access your [facility] medical records  [id number]        Your Vitals Were     Temperature Last Period BMI (Body Mass Index)             97.9  F (36.6  C) (Oral) 04/21/2017 (Exact Date) 25.76 kg/m2          Blood Pressure from Last 3 Encounters:   06/30/17 120/80   04/12/17 108/68   01/11/17 106/62    Weight from Last 3 Encounters:   06/30/17 159 lb 9.6 oz (72.4 kg)   04/12/17 147 lb 9.6 oz (67 kg)   01/11/17 142 lb (64.4 kg)              We Performed the Following     ALT     AST     Chlamydia trachomatis PCR     Creatinine urine calculation only     Creatinine     Hemoglobin A1c     Neisseria gonorrhoeae PCR     PAP imaged thin layer screen     Protein  random urine     TSH with free T4 reflex        Primary Care Provider    None Specified       No primary provider on file.        Equal Access to Services     EDWIGE VALLADARES : Hadcarri Quintana, martha rivera, hernan lundberg . So [facility] [phone].    ATENCIÓN: Si habla español, tiene a boateng disposición servicios gratuitos de asistencia lingüística. Llame al [phone].    We comply with applicable federal civil rights laws and [place] laws. We do not discriminate on the basis of race, color, national origin, age, disability sex, sexual orientation or gender identity.            Thank you!     Thank you for choosing [facility]  for your care. Our goal is always to provide you with excellent care. Hearing back from our patients is one way we can continue to improve our services. Please take a few minutes to complete the written survey that you may receive  in the mail after your visit with us. Thank you!             Your Updated Medication List - Protect others around you: Learn how to safely use, store and throw away your medicines at www.disposemymeds.org.          This list is accurate as of: 6/30/17  2:39 PM.  Always use your most recent med list.                   Brand Name Dispense Instructions for use Diagnosis    acetone (Urine) test Strp     100 each    Use as directed to test urine ketone if blood sugars are >250 or during illness with fever, nausea, vomiting, or abdominal pain.    High-risk pregnancy, young primigravida, third trimester       blood glucose monitoring test strip    RAYMUNDO CONTOUR NEXT    550 each    Use to test blood sugar 6 times daily or as directed.    Uncontrolled type 1 diabetes mellitus without complication (H), Encounter for long-term (current) use of insulin (H)       glucagon 1 MG kit    GLUCAGON EMERGENCY    1 each    Inject 1 mg into the muscle once for 1 dose    Type 1 diabetes mellitus without complication (H), Insulin pump in place       insulin glargine 100 UNIT/ML injection    LANTUS VIAL    10 mL    Inject 19 u sq every 24 hours in the event of insulin pump therapy.  DO NOT FILL RX until requested by patient.    Type 1 diabetes mellitus without complication (H), Insulin pump in place, Encounter for long-term (current) use of insulin (H), High-risk pregnancy, young primigravida, third trimester       insulin lispro 100 UNIT/ML injection    humaLOG    6 vial    To be used in insulin pump total daily dose 60 units    Uncontrolled type 1 diabetes mellitus without complication (H), Encounter for long-term (current) use of insulin (H)       insulin pump infusion      Date last updated:  12/23/16 Medtronic Minimed: Model 630G BASAL RATES and times: 12   AM (midnight): 0.85 units/hour   8     AM: 0.8 units/hour  6pm: 0.85 Basal Pattern A:  Fransisca Cabello will use when NA. CARB RATIO and times: 12   AM (midnight): 12 Corection  "Factor (Sensitivity) and times: 12   AM (midnight): 47 mg/dL BLOOD GLUCOSE TARGET and times: 12   AM (midnight): 100 - 120 5     AM:  90 - 110 9:30    PM:  100 - 120 Active Insulin Time:  3 hours Sensor:  No Carelink / Diasend username: [email or web address] Carelink / Diasend Password:  [email or web address]        insulin syringe-needle U-100 31G X 5/16\" 0.3 ML    BD insulin syringe ultrafine    100 each    Use one syringe 4 daily or as directed in the event of insulin pump failure.  DO NOT DISPENSE until requested by patient    Encounter for long-term (current) use of insulin (H)       MICROLET LANCETS Misc     600 each    Use one lancet 6 times daily for blood sugar testing    Type 1 diabetes mellitus without complication (H), Insulin pump in place       PRENATAL VITAMINS PO             "

## 2017-07-01 LAB
ALT SERPL W P-5'-P-CCNC: 20 U/L (ref 0–50)
AST SERPL W P-5'-P-CCNC: 12 U/L (ref 0–45)
CREAT SERPL-MCNC: 0.59 MG/DL (ref 0.52–1.04)
GFR SERPL CREATININE-BSD FRML MDRD: NORMAL ML/MIN/1.7M2
TSH SERPL DL<=0.005 MIU/L-ACNC: 1.46 MU/L (ref 0.4–4)

## 2017-07-02 LAB
C TRACH DNA SPEC QL NAA+PROBE: NORMAL
N GONORRHOEA DNA SPEC QL NAA+PROBE: NORMAL
SPECIMEN SOURCE: NORMAL
SPECIMEN SOURCE: NORMAL

## 2017-07-07 LAB
COPATH REPORT: NORMAL
PAP: NORMAL

## 2017-07-12 ENCOUNTER — OFFICE VISIT (OUTPATIENT)
Dept: ENDOCRINOLOGY | Facility: CLINIC | Age: 22
End: 2017-07-12
Payer: COMMERCIAL

## 2017-07-12 VITALS
BODY MASS INDEX: 25.89 KG/M2 | DIASTOLIC BLOOD PRESSURE: 87 MMHG | RESPIRATION RATE: 16 BRPM | SYSTOLIC BLOOD PRESSURE: 126 MMHG | OXYGEN SATURATION: 98 % | TEMPERATURE: 98.1 F | HEART RATE: 100 BPM | WEIGHT: 160.4 LBS

## 2017-07-12 DIAGNOSIS — Z79.4 ENCOUNTER FOR LONG-TERM (CURRENT) USE OF INSULIN (H): ICD-10-CM

## 2017-07-12 DIAGNOSIS — Z96.41 INSULIN PUMP IN PLACE: ICD-10-CM

## 2017-07-12 DIAGNOSIS — O24.011 PRE-EXISTING TYPE 1 DIABETES MELLITUS DURING PREGNANCY IN FIRST TRIMESTER: Primary | ICD-10-CM

## 2017-07-12 PROCEDURE — 99214 OFFICE O/P EST MOD 30 MIN: CPT | Performed by: CLINICAL NURSE SPECIALIST

## 2017-07-12 NOTE — MR AVS SNAPSHOT
After Visit Summary   7/12/2017    Fransisca Cabello    MRN: [id number]           Patient Information     Date Of Birth          1995        Visit Information        Provider Department      7/12/2017 10:30 AM Maryann Hammonds APRN [facility]        Today's Diagnoses     Pre-existing type 1 diabetes mellitus during pregnancy in first trimester    -  1    Uncontrolled type 1 diabetes mellitus without complication (H)        Insulin pump in place        Encounter for long-term (current) use of insulin (H)           Follow-ups after your visit        Additional Services     DIABETES EDUCATOR REFERRAL       DIABETES SELF MANAGEMENT TRAINING (DSMT)      Your provider has referred you to Diabetes Education: FMG: Diabetes Education - All [facility] [phone]   [email or web address]    Type of training and number of hours: Previous Diagnosis: Follow-up DSMT - 2 hours.    Medicare covers: 10 hours of initial DSMT in 12 month period from the time of first visit, plus 2 hours of follow-up DSMT annually, and additional hours as requested for insulin training.    Diabetes Type: Type 1 and Pregnant             Diabetes Co-Morbidities: none               A1C Goal:  < 6.0%       A1C is: Lab Results       Component                Value               Date                       A1C                      6.8                 06/30/2017              If an urgent visit is needed or A1C is above 12, Care Team to call the Diabetes Education Team at [phone] or send an In Basket message to the Diabetes Education Pool (P DIAB ED-PATIENT CARE).    Diabetes Education Topics: Insulin Pump Therapy: Current Pump User and Other: pregnant, follow up weekly until blood sugars controlled in target range for pregnancy.    Special Educational Needs Requiring Individual DSMT: None       MEDICAL NUTRITION THERAPY (MNT) for Diabetes    Medical  Nutrition Therapy with a Registered Dietitian can be provided in coordination with Diabetes Self-Management Training to assist in achieving optimal diabetes management.    MNT Type and Hours: Do not initiate MNT at this time.                       Medicare will cover: 3 hours initial MNT in 12 month period after first visit, plus 2 hours of follow-up MNT annually    Please be aware that coverage of these services is subject to the terms and limitations of your health insurance plan.  Call member services at your health plan to determine Diabetes Self-Management Training benefits and ask which blood glucose monitor brands are covered by your plan.      Please bring the following with you to your appointment:    (1)  List of current medications   (2)  List of Blood Glucose Monitor brands that are covered by your insurance plan  (3)  Blood Glucose Monitor and log book  (4)   Food records for the 3 days prior to your visit    The Certified Diabetes Educator may make diabetes medication adjustments per the CDE Protocol and Collaborative Practice Agreement.            OPHTHALMOLOGY ADULT REFERRAL       Your provider has referred you to:  [facility]: [facility] - [place]  [phone]  [email or web address]      Please be aware that coverage of these services is subject to the terms and limitations of your health insurance plan.  Call member services at your health plan with any benefit or coverage questions.      Please bring the following to your appointment:  >>   Any x-rays, CTs or MRIs which have been performed.  Contact the facility where they were done to arrange for  prior to your scheduled appointment.  Any new CT, MRI or other procedures ordered by your specialist must be performed at a [facility] facility or coordinated by your clinic's referral office.    >>   List of current medications   >>   This referral request   >>   Any documents/labs given to you for this referral                   Your next 10 appointments already scheduled     Jul 13, 2017 10:30 AM CDT   Diabetic Education with Jenn Chua RN   [facility]    [id number] [place] [id number] [phone]            Jul 28, 2017 11:45 AM CDT   ESTABLISHED PRENATAL with Cheyanne Silva MD   [facility]    [address] [facility] [address] [phone]              Future tests that were ordered for you today     Open Standing Orders        Priority Remaining Interval Expires Ordered    Hemoglobin A1c Routine 10/10 monthly 7/12/2018 7/12/2017            Who to contact     If you have questions or need follow up information about today's clinic visit or your schedule please contact [facility] directly at [phone].  Normal or non-critical lab and imaging results will be communicated to you by MyChart, letter or phone within 4 business days after the clinic has received the results. If you do not hear from us within 7 days, please contact the clinic through [facility] or phone. If you have a critical or abnormal lab result, we will notify you by phone as soon as possible.  Submit refill requests through Click Contact or call your pharmacy and they will forward the refill request to us. Please allow 3 business days for your refill to be completed.          Additional Information About Your Visit        MyChart Information     Click Contact gives you secure access to your electronic health record. If you see a primary care provider, you can also send messages to your care team and make appointments. If you have questions, please call your primary care clinic.  If you do not have a primary care provider, please call [phone] and they will assist you.        Care EveryWhere ID     This is your Care EveryWhere ID. This could be used by other organizations to access your [facility]  records  [id number]        Your Vitals Were     Pulse Temperature Respirations Last Period Pulse Oximetry BMI (Body Mass Index)    100 98.1  F (36.7  C) (Oral) 16 04/21/2017 (Exact Date) 98% 25.89 kg/m2       Blood Pressure from Last 3 Encounters:   07/12/17 126/87   06/30/17 120/80   04/12/17 108/68    Weight from Last 3 Encounters:   07/12/17 72.8 kg (160 lb 6.4 oz)   06/30/17 72.4 kg (159 lb 9.6 oz)   04/12/17 67 kg (147 lb 9.6 oz)              We Performed the Following     DIABETES EDUCATOR REFERRAL     OPHTHALMOLOGY ADULT REFERRAL          Today's Medication Changes          These changes are accurate as of: 7/12/17 11:17 AM.  If you have any questions, ask your nurse or doctor.               These medicines have changed or have updated prescriptions.        Dose/Directions    insulin lispro 100 UNIT/ML injection   Commonly known as:  humaLOG   This may have changed:  additional instructions   Used for:  Uncontrolled type 1 diabetes mellitus without complication (H), Encounter for long-term (current) use of insulin (H)   Changed by:  Maryann Hammonds APRN CNP        As directed via insulin pump - up to 100 units daily, adjusting pump settings due to increased insulin requirements and pregnancy   Quantity:  3 vial   Refills:  5            Where to get your medicines      These medications were sent to [facility] [address] [address]     Phone:  [phone]     insulin lispro 100 UNIT/ML injection                Primary Care Provider    None Specified       No primary provider on file.        Equal Access to Services     EDWIGE VALLADARES : Hadcarri Quintana, waisma luqadaha, qaybta kaalmahernan yu. So [facility] [phone].    ATENCIÓN: Si habla español, tiene a boateng disposición servicios gratuitos de asistencia lingüística. Llame al [phone].    We comply with applicable federal  civil rights laws and [place] laws. We do not discriminate on the basis of race, color, national origin, age, disability sex, sexual orientation or gender identity.            Thank you!     Thank you for choosing [facility]  for your care. Our goal is always to provide you with excellent care. Hearing back from our patients is one way we can continue to improve our services. Please take a few minutes to complete the written survey that you may receive in the mail after your visit with us. Thank you!             Your Updated Medication List - Protect others around you: Learn how to safely use, store and throw away your medicines at www.disposemymeds.org.          This list is accurate as of: 7/12/17 11:17 AM.  Always use your most recent med list.                   Brand Name Dispense Instructions for use Diagnosis    acetone (Urine) test Strp     100 each    Use as directed to test urine ketone if blood sugars are >250 or during illness with fever, nausea, vomiting, or abdominal pain.    High-risk pregnancy, young primigravida, third trimester       blood glucose monitoring test strip    RAYMUNDO CONTOUR NEXT    550 each    Use to test blood sugar 6 times daily or as directed.    Uncontrolled type 1 diabetes mellitus without complication (H), Encounter for long-term (current) use of insulin (H)       glucagon 1 MG kit    GLUCAGON EMERGENCY    1 each    Inject 1 mg into the muscle once for 1 dose    Type 1 diabetes mellitus without complication (H), Insulin pump in place       insulin glargine 100 UNIT/ML injection    LANTUS VIAL    10 mL    Inject 19 u sq every 24 hours in the event of insulin pump therapy.  DO NOT FILL RX until requested by patient.    Type 1 diabetes mellitus without complication (H), Insulin pump in place, Encounter for long-term (current) use of insulin (H), High-risk pregnancy, young primigravida, third trimester       insulin lispro 100 UNIT/ML injection    humaLOG    3 vial  "   As directed via insulin pump - up to 100 units daily, adjusting pump settings due to increased insulin requirements and pregnancy    Uncontrolled type 1 diabetes mellitus without complication (H), Encounter for long-term (current) use of insulin (H)       insulin pump infusion      Date last updated:  12/23/16 MedTrovix Minimed: Model 630G BASAL RATES and times: 12   AM (midnight): 0.85 units/hour   8     AM: 0.8 units/hour  6pm: 0.85 Basal Pattern A:  Fransisca Irineo will use when NA. CARB RATIO and times: 12   AM (midnight): 12 Corection Factor (Sensitivity) and times: 12   AM (midnight): 47 mg/dL BLOOD GLUCOSE TARGET and times: 12   AM (midnight): 100 - 120 5     AM:  90 - 110 9:30    PM:  100 - 120 Active Insulin Time:  3 hours Sensor:  No Carelink / Diasend username: [email or web address] Carelink / Diasend Password:  [email or web address]        insulin syringe-needle U-100 31G X 5/16\" 0.3 ML    BD insulin syringe ultrafine    100 each    Use one syringe 4 daily or as directed in the event of insulin pump failure.  DO NOT DISPENSE until requested by patient    Encounter for long-term (current) use of insulin (H)       MICROLET LANCETS Misc     600 each    Use one lancet 6 times daily for blood sugar testing    Type 1 diabetes mellitus without complication (H), Insulin pump in place       PRENATAL VITAMINS PO             "

## 2017-07-12 NOTE — NURSING NOTE
"Chief Complaint   Patient presents with     Diabetes     12 weeks pregnant       Initial /87 (BP Location: Left arm, Patient Position: Chair, Cuff Size: Adult Regular)  Pulse 100  Temp 98.1  F (36.7  C) (Oral)  Resp 16  Wt 160 lb 6.4 oz (72.8 kg)  LMP 04/21/2017 (Exact Date)  SpO2 98%  BMI 25.89 kg/m2 Estimated body mass index is 25.89 kg/(m^2) as calculated from the following:    Height as of 4/12/17: 5' 6\" (1.676 m).    Weight as of this encounter: 160 lb 6.4 oz (72.8 kg).  Medication Reconciliation: complete  Emely Mullins M.A.  "

## 2017-07-12 NOTE — PROGRESS NOTES
Name: Fransisca Avila  F/u for Diabetes (Last seen 4/12/2017).  HPI:  Fransisca Avila is a 22 year old female who presents for the evaluation/management of type 1 diabetes.     1. Type 1 DM:  Originally diagnosed at the age of 2.   Induced at 34 weeks on 11/5/2014, preeclampsia.  Baby girl, initially in NICU. Daughter now 2 years old, has also been diagnosed with type 1 diabetes.    Currently pregnant, 10w6d.    LMP - 4/19/2017    Current Regimen: humalog  Insulin pump - Medtronic 630-G.  Has the 670 but waiting for the sensors.  Time Rate (U/hr)   00:00 1.00   03:00 0.975   06:00 1.05   10:00 1.15   14:00 1.45   18:00 1.35   21:30 1.05     Carbohydrate Ratio -    Time Ratio   0000 11         Sensitivity  00:00 - 50  17:00 - 45   Active Insulin Time 3.00 hours   Basal  51% (27.46 units)   Bolus   49% (26.25 units)   Total Carbohydrates/day 273   Total Insulin/day  53.70   Average Blood Sugar  Sensor Average 135  199   BS Checks  5.1 times a day   Care Link Username-   Password-   Target range:  0:00  100-120  5:00     21:30  100-120    Complications:   Diabetes Complications  Description / Detail    Diabetic Retinopathy  No retinopathy, last exam 2/2016   CAD / PAD  No   Neuropathy  No   Nephropathy / Microalbuminuria  No   Gastroparesis  No   Hypoglycemia Unawareness  No     PMH/PSH:  Past Medical History:   Diagnosis Date     Type 1 diabetes (H) 1998    three years at diagnosis     Varicella 1998     Past Surgical History:   Procedure Laterality Date     C ROOT CANAL THERAPY 2 CANALS       DENTAL SURGERY       Family Hx:  Family History   Problem Relation Age of Onset     Family History Negative Mother      DIABETES Father 35     Type 1      Thyroid disease: No         DM2: NO         Autoimmune: DM1, SLE, RA, Vitiligo Yes - Father has type 1 DM    Social Hx:  Social History     Social History     Marital status:      Spouse name: Teja     Number of children: 1     Years of education: N/A      Occupational History      [employer]     Social History Main Topics     Smoking status: Never Smoker     Smokeless tobacco: Never Used     Alcohol use No     Drug use: No     Sexual activity: Yes     Partners: Male     Other Topics Concern     Blood Transfusions No     Social History Narrative    Fransisca lives with her spouse           MEDICATIONS:  has a current medication list which includes the following prescription(s): insulin lispro, blood glucose monitoring, microlet lancets, insulin syringe-needle u-100, acetone (urine) test, glucagon, insulin glargine, insulin pump, and prenatal multivit-min-fe-fa.    ROS     ROS: 10 point ROS neg other than the symptoms noted above in the HPI.    Physical Exam   VS: /87 (BP Location: Left arm, Patient Position: Chair, Cuff Size: Adult Regular)  Pulse 100  Temp 98.1  F (36.7  C) (Oral)  Resp 16  Wt 72.8 kg (160 lb 6.4 oz)  LMP 04/21/2017 (Exact Date)  SpO2 98%  BMI 25.89 kg/m2  GENERAL: NAD, well dressed, answering questions appropriately, appears stated age.  HEENT: OP clear, no exophthalmos, no proptosis, EOMI, no lig lag, no retraction, no scleral icterus  RESPIRATORY: Normal respiratory effort.  CARDIOVASCULAR: No peripheral edema.  EXTREMITIES: no edema  NEUROLOGY: CN grossly intact, no tremors  MSK: grossly intact, No digital cyanosis. Normal gait and station.  SKIN:warm and dry with good turgor;  no rashes, no lesions, no ulcers  PSYCH: Intact judgment and insight. A&OX3 with a cordial affect.    LABS:  A1c  Component    Latest Ref Rng & Units 12/2/2015 11/18/2016 4/12/2017 6/30/2017   Hemoglobin A1C    4.3 - 6.0 % >15.0 (H) .  10.8 (H) 7.6 (H) 6.8 (H)     BMP:  !COMPREHENSIVE Latest Ref Rng 11/18/2016   SODIUM 133 - 144 mmol/L 138   POTASSIUM 3.4 - 5.3 mmol/L 4.3   CHLORIDE 94 - 109 mmol/L 104   BUN 7 - 30 mg/dL 9   Creatinine 0.52 - 1.04 mg/dL 0.61   Glucose 70 - 99 mg/dL 220 (H)   ANION GAP 3 - 14 mmol/L 7   CALCIUM 8.5 - 10.1 mg/dL 9.0   ALBUMIN 3.4 -  5.0 g/dL 3.9     !LIPID/HEPATIC Latest Ref Rng 11/8/2014 12/2/2015 11/18/2016   AST 0 - 45 U/L 15 6 5   ALT 0 - 50 U/L 10 22 15     Urine microalbumin:  Component    Latest Ref Rng & Units 6/30/2017   Protein Random Urine    g/L 0.18   Protein Total Urine g/gr Creatinine    0 - 0.2 g/g Cr 0.14   Creatinine Urine    mg/dL 123     JOSH/C-peptide:  Component    Latest Ref Rng 11/10/2014   Glutamic Acid Decarboxylase Antibody     <5.0    Reference range: 0.0 to 5.0      C-Peptide    0.9 - 6.9 ng/mL <0.1 (L)     TFT:  !THYROID Latest Ref Rng & Units 6/30/2017 11/18/2016 12/2/2015   TSH 0.40 - 4.00 mU/L 1.46 1.42 1.89   T4 FREE 0.76 - 1.46 ng/dL   1.16     !THYROID Latest Ref Rng & Units 11/13/2014   TSH 0.40 - 4.00 mU/L 1.58   T4 FREE 0.76 - 1.46 ng/dL 1.16     TTG Abs:  Component    Latest Ref Rng 11/13/2014   Tissue Transglutaminase Antibody IgA    0 - 3.9 U/mL 1.1   Tissue Transglutaminase Rosina IgG    0 - 5.9 U/mL 1.0     All pertinent notes, labs, and images personally reviewed by me.     A/P  Ms.Jennica Avila is a 22 year old here for the evaluation/management of diabetes:    1. DM1 - Uncontrolled - substantially improved since resuming pump therapy.  A1c goal for pregnancy < 6.0%.  Currently pregnant, first trimester. She has the Medtronic 670, is awaiting sensors, however I think she will have to use the 670 in manual mode due to pregnancy.  Fasting blood sugars are variable - one fasting low of 56 one week ago then in target range the next 3 days, above target range the past 3 days.  However, her predinner/postdinner/bedtime readings are well above target and this could be carrying over, causing the elevated fasting glucose readings.  I'm increasing lunch and dinner carb ratio first.  Will reassess basal rates after effects of this change are known.  Consider tightening target blood sugar range; correction factor may need to be more aggressive as well.    Carbohydrate Ratio -    Time Ratio   0000 11-->10   11:30 9       Diabetes in pregnant women may be pregestational (type 1 or type 2 diabetes) diagnosed before pregnancy, or gestational (diabetes diagnosed during pregnancy).   Frequent measurements of blood glucose during pregnancy are advised for women with either type 1 or type 2 diabetes to help prevent or treat both hyper and hypoglycemia. Euglycemia is important because it decreases the likelihood of miscarriage, congenital anomalies, macrosomia, fetal death, and  morbidity.  Intensive therapy is now recommended for all patients with diabetes. There are two potential hazards from strict glycemic control: hypoglycemia and worsening of diabetic retinopathy.  It is recommend self-glucose monitoring before and one hour after meals, at bedtime, and occasionally during the night to assess for  nocturnal hypoglycemia. Urine ketones are recommended for woman with type 1 diabetes that are ill .  ADAGuidelines:  -Preprandial, bedtime, and overnight glucose concentrations 60 to 95 mg/dL   -Peak 2-hour postprandial glucose concentrations 100 to 120 mg/dL   -A1C less than 6.0 percent  Diet and exercise is the primary therapy for patients with gestational diabetes.  For type 1 diabetes and patients requiring insulin I suggest a combination of lispro/aspart insulin and NPH insulin for therapy in pregnancy.   Total daily insulin requirements usually vary considerably during gestation. There is an early rise in insulin requirements between weeks 3 and 7, there often a decline between weeks 7 and 15, followed by a rise during the remainder of pregnancy, especially between weeks 28 and 32.    The average insulin requirement in pregnant women with type 1 diabetes is 0.7 units/kg in the first trimester, often increasing to 0.8 U/kg for weeks 13 to 28, 0.9 U/kg for weeks 29 to 34, and 1.0 U/kg for weeks 35 to term.   Meal Insulin (Lispro/Aspart) are 50 % of the insulin requirement.  As an example, an 80 kilogram pregnant diabetic  woman would take 12 units of lispro or aspart before each meal. The other 50 percent of her insulin dose should cover basal needs. The dose is calculated as 0.45 times the patient's weight in kilograms. For example, our 80 kilogram women would take 12 units of NPH three times a day.   Plan:  1. A1C monthly   2. self-glucose monitoring pre-meals and one hour after breakfast, lunch, dinner, and at bedtime   3. check glucose concentration during the night if nocturnal hypoglycemia is suspected   4. measure urinary ketones during illness or when any blood glucose value is > 180 mg/dL in a type 1 diabetic pregnant woman.     RX for glucagon emergency kit and urine ketone strips provided 4/2017.  RX for lantus and syringes provided in the event of insulin pump failure provided 4/2017.  Opthalmology-Yes last exam 3/2016-recommend she schedule an exam, referral provided.    Labs ordered today:   Orders Placed This Encounter   Procedures     Hemoglobin A1c     OPHTHALMOLOGY ADULT REFERRAL     DIABETES EDUCATOR REFERRAL       Radiology/Consults ordered today: OPHTHALMOLOGY ADULT REFERRAL  DIABETES EDUCATOR REFERRAL    Orders Placed This Encounter   Medications     insulin lispro (HUMALOG) 100 UNIT/ML injection     Sig: As directed via insulin pump - up to 100 units daily, adjusting pump settings due to increased insulin requirements and pregnancy     Dispense:  3 vial     Refill:  5       Follow-up:  F/u with diabetes ed scheduled for tomorrow.  follow up in 1 month.      Maryann Hammonds NP  Endocrinology  [facility]  CC: No primary care provider on file.      More than 50% of face to face time spent with Ms. Avila on counseling / coordinating her care.  Total face to face time was 25 minutes.      All questions were answered.  The patient indicates understanding of the above issues and agrees with the plan set forth.

## 2017-07-13 ENCOUNTER — ALLIED HEALTH/NURSE VISIT (OUTPATIENT)
Dept: EDUCATION SERVICES | Facility: CLINIC | Age: 22
End: 2017-07-13
Payer: COMMERCIAL

## 2017-07-13 ENCOUNTER — MYC MEDICAL ADVICE (OUTPATIENT)
Dept: EDUCATION SERVICES | Facility: CLINIC | Age: 22
End: 2017-07-13

## 2017-07-13 DIAGNOSIS — O24.011 PRE-EXISTING TYPE 1 DIABETES MELLITUS DURING PREGNANCY IN FIRST TRIMESTER: Primary | ICD-10-CM

## 2017-07-13 PROCEDURE — G0108 DIAB MANAGE TRN  PER INDIV: HCPCS

## 2017-07-13 NOTE — PROGRESS NOTES
Diabetes Self Management Training: Insulin Pump upgrade    SUBJECTIVE:  Patient presents for an insulin pump upgrade to Medtronic 670G pump and sensor in Manual Mode.  Current Infusion set: Medtronic Taconite  Patient has completed pre-work: read through Getting Started with Minimed 670 insulin pump Yes   Patient currently using carelink personal: Yes    username: [email or web address]    Password:  [email or web address]    OBJECTIVE:  Blood sugar at the end of pump start appointment: 125 mg/dL    670 sensor initiation orders per verbal order from Maryann Hammonds.    ASSESSMENT / INTERVENTION:  Education provided on patient using manual Mode  Pump:  Patient instructed on basic pump features including button functions, menu changes, audio options, home screen icons, status screen access, display options.  Additional pump features reviewed  Meter and guardian transmitter linked to pump.  Settings transferred from current pump, verified all settings.    Sensor:  Sensor alerts:  High settings:  12am: 180, alert on high turned on: Yes     Low settings:  12am: 60, alert on low turned on: Yes   New feature of Suspend before low, feature turned on: Yes   Suspend on Low feature turned on: No   Patient instructed on Sensor warm up time 40 minutes to 2 hours,  Reviewed calibration schedule and tips    Patient was able to successfully start pump and sensor without difficulty: Yes        Review of DKA prevention, rule of 15 for treating hypoglycemia, emergency back up plan,infusion sit change frequency and site rotation      FOLLOW-UP:  Follow up scheduled 1 week  Recommend patient read Getting Started with Minimed 670G Smartguard Auto Mode prior to appointment       Jenn Chua RN,CDE   Time Spent: 60  Visit Type: Individual      Any diabetes medication dose changes were made via the CDE Protocol and Collaborative Practice Agreement with the patient's referring provider. A copy of this encounter was shared with the provider.      I agree with the plan as  documented in this encounter and as previously discussed with ELI Mccann.  Maryann Hammonds NP  Endocrinology

## 2017-07-13 NOTE — MR AVS SNAPSHOT
After Visit Summary   7/13/2017    Fransisca Cabello    MRN: [id number]           Patient Information     Date Of Birth          1995        Visit Information        Provider Department      7/13/2017 10:30 AM Jenn Chua RN [facility]        Today's Diagnoses     Pre-existing type 1 diabetes mellitus during pregnancy in first trimester    -  1       Follow-ups after your visit        Your next 10 appointments already scheduled     Jul 20, 2017 10:30 AM CDT   Diabetic Education with Jenn Chua RN   [address] [address]   [phone]            Jul 28, 2017 11:45 AM CDT   ESTABLISHED PRENATAL with Cheyanne Silva MD   [address] [address]   [phone]            Aug 16, 2017 10:30 AM CDT   Return Visit with HORTENSIA Rosales CNP   [address] [address]   [phone]              Future tests that were ordered for you today     Open Standing Orders        Priority Remaining Interval Expires Ordered    Hemoglobin A1c Routine 10/10 monthly 7/12/2018 7/12/2017            Who to contact     If you have questions or need follow up information about today's clinic visit or your schedule please contact [facility] directly at [phone].  Normal or non-critical lab and imaging results will be communicated to you by MyChart, letter or phone within 4 business days after the clinic has received the results. If you do not hear from us within 7 days, please contact the clinic through MyChart or phone. If you have a critical or abnormal lab result, we will notify you by phone as soon as possible.  Submit refill requests through Phoodeez or call your pharmacy and they will  forward the refill request to us. Please allow 3 business days for your refill to be completed.          Additional Information About Your Visit        T[employer] Information     [employer] gives you secure access to your electronic health record. If you see a primary care provider, you can also send messages to your care team and make appointments. If you have questions, please call your primary care clinic.  If you do not have a primary care provider, please call [phone] and they will assist you.        Care EveryWhere ID     This is your Care EveryWhere ID. This could be used by other organizations to access your [facility] medical records  [id number]        Your Vitals Were     Last Period                   04/21/2017 (Exact Date)            Blood Pressure from Last 3 Encounters:   07/12/17 126/87   06/30/17 120/80   04/12/17 108/68    Weight from Last 3 Encounters:   07/12/17 72.8 kg (160 lb 6.4 oz)   06/30/17 72.4 kg (159 lb 9.6 oz)   04/12/17 67 kg (147 lb 9.6 oz)              We Performed the Following     DIABETES EDUCATION - Individual  []        Primary Care Provider    None Specified       No primary provider on file.        Equal Access to Services     [facility]: Hadii isidoro Quintana, fátimada nicole, qadominick dinero, hernan spicer . So [facility] [phone].    ATENCIÓN: Si habla español, tiene a boateng disposición servicios gratuitos de asistencia lingüística. Llame al [phone].    We comply with applicable federal civil rights laws and [place] laws. We do not discriminate on the basis of race, color, national origin, age, disability sex, sexual orientation or gender identity.            Thank you!     Thank you for choosing [facility]  for your care. Our goal is always to provide you with excellent care. Hearing back from our patients is one way we can continue to improve our services. Please take a few minutes to complete the  written survey that you may receive in the mail after your visit with us. Thank you!             Your Updated Medication List - Protect others around you: Learn how to safely use, store and throw away your medicines at www.disposemymeds.org.          This list is accurate as of: 7/13/17  1:36 PM.  Always use your most recent med list.                   Brand Name Dispense Instructions for use Diagnosis    acetone (Urine) test Strp     100 each    Use as directed to test urine ketone if blood sugars are >250 or during illness with fever, nausea, vomiting, or abdominal pain.    High-risk pregnancy, young primigravida, third trimester       blood glucose monitoring test strip    RAYMUNDO CONTOUR NEXT    550 each    Use to test blood sugar 6 times daily or as directed.    Uncontrolled type 1 diabetes mellitus without complication (H), Encounter for long-term (current) use of insulin (H)       glucagon 1 MG kit    GLUCAGON EMERGENCY    1 each    Inject 1 mg into the muscle once for 1 dose    Type 1 diabetes mellitus without complication (H), Insulin pump in place       insulin glargine 100 UNIT/ML injection    LANTUS VIAL    10 mL    Inject 19 u sq every 24 hours in the event of insulin pump therapy.  DO NOT FILL RX until requested by patient.    Type 1 diabetes mellitus without complication (H), Insulin pump in place, Encounter for long-term (current) use of insulin (H), High-risk pregnancy, young primigravida, third trimester       insulin lispro 100 UNIT/ML injection    humaLOG    3 vial    As directed via insulin pump - up to 100 units daily, adjusting pump settings due to increased insulin requirements and pregnancy    Uncontrolled type 1 diabetes mellitus without complication (H), Encounter for long-term (current) use of insulin (H)       insulin pump infusion      Date last updated:  12/23/16 Medtronic Minimed: Model 630G BASAL RATES and times: 12   AM (midnight): 0.85 units/hour   8     AM: 0.8 units/hour  6pm: 0.85  "Basal Pattern A:  Fransisca Cabello will use when NA. CARB RATIO and times: 12   AM (midnight): 12 Corection Factor (Sensitivity) and times: 12   AM (midnight): 47 mg/dL BLOOD GLUCOSE TARGET and times: 12   AM (midnight): 100 - 120 5     AM:  90 - 110 9:30    PM:  100 - 120 Active Insulin Time:  3 hours Sensor:  No Carelink / Diasend username: madison Carelink / Diasend Password:  [email or web address]        insulin syringe-needle U-100 31G X 5/16\" 0.3 ML    BD insulin syringe ultrafine    100 each    Use one syringe 4 daily or as directed in the event of insulin pump failure.  DO NOT DISPENSE until requested by patient    Encounter for long-term (current) use of insulin (H)       MICROLET LANCETS Misc     600 each    Use one lancet 6 times daily for blood sugar testing    Type 1 diabetes mellitus without complication (H), Insulin pump in place       PRENATAL VITAMINS PO             "

## 2017-07-14 ENCOUNTER — MYC REFILL (OUTPATIENT)
Dept: ENDOCRINOLOGY | Facility: CLINIC | Age: 22
End: 2017-07-14

## 2017-07-14 ENCOUNTER — TELEPHONE (OUTPATIENT)
Dept: EDUCATION SERVICES | Facility: CLINIC | Age: 22
End: 2017-07-14

## 2017-07-14 ENCOUNTER — VIRTUAL VISIT (OUTPATIENT)
Dept: EDUCATION SERVICES | Facility: CLINIC | Age: 22
End: 2017-07-14

## 2017-07-14 DIAGNOSIS — O24.011 PRE-EXISTING TYPE 1 DIABETES MELLITUS DURING PREGNANCY IN FIRST TRIMESTER: Primary | ICD-10-CM

## 2017-07-14 DIAGNOSIS — O09.899 SUPERVISION OF OTHER HIGH RISK PREGNANCY, ANTEPARTUM: Primary | ICD-10-CM

## 2017-07-14 NOTE — TELEPHONE ENCOUNTER
Message from Ceonhart:  Original authorizing provider: HORTENSIA Rosales CNP    Fransisca Tobiaslalit would like a refill of the following medications:  blood glucose monitoring (RAYMUNDO CONTOUR NEXT) test strip [HORTENSIA Rosales CNP]    Preferred pharmacy: [facility] [id number]    Comment:  I have been checking more frequently with this new pump and also all of the changes made. I do not have enough test strips as I went through 30 in three days.

## 2017-07-14 NOTE — TELEPHONE ENCOUNTER
Diabetes Self-Management Training: Insulin Pump Telephone Visit    Current Diabetes Management per Patient:  Comments/concerns: hypoglycemia all night and this morning so far, pump is suspending frequently and she is consumming carbohydrates without insulin to keep her blood sugar up.  Blood sugars drop after meals. Not comfortable with running low blood sugars    Insulin Pump Type: Medtronic 670G    Taking other diabetes medications? no    Blood Glucose Results and Insulin Use:         Current Pump Settings: See Insulin Pump - Outpatient in Medication List for complete list of settings.       Assessment:  Patient experiencing frequent hypoglycemia, pump is uspending andshe is consumming additional carbohydrates without insulin.     Intervention/Plan:  Minimal sensor data available but patient is having frequent hypoglycemia with insulin pump suspend, recommend she decrease her basal rates by  10% 0.1 u/hr at each time block. Post meal drop in glucose , increase carbohydrate ratio for lunch and dinner from 9 --> 10    Changes made to pump settings:  basal rate:   12a,: 1.0 --> 0.9  3am: 0.975 --> 0.875  6am: 1.05 --> 0.95  10am: 1.15 --> 1.05  2pm: 1.45 --> 1.35  6pm: 1.35 --> 1.25  9:30pm: 1.05 --> 0.95      carb ratio: 11:30am: 9 --> 10    Patient instructed if she is still having consistentl hypoglycemia over the weekend she can decrease basal rates by 0.1 u/hr for each of the time blocks she is experiencing the hypoglycemia or she can set a temp basal from 75%- 90% depending on blood sugars and her comfort level. If post meal glucose below goal she can increase carbohydrate ratio from 10 --> 11. If any changes are needed she is to call on Monday.     Follow-up:  7/20/17 at 10:30 am. Call sooner with questions or concerns      Jenn Chua RN,CDE       Any diabetes medication dose changes were made via the CDE Protocol and Collaborative Practice Agreement with the patient's referring provider. A copy of this  encounter was shared with the provider.

## 2017-07-14 NOTE — TELEPHONE ENCOUNTER
Call to patient, she is testing about 10-12 times a day due to getting new insulin pump and pregnant.     Changed sig per standing order to reflect current testing frequency, this will allow for pharmacy to run through insurance and fill today.     TEST STRIPS      Last Written Prescription Date: 4/12/17  Last Fill Quantity: 550,  # refills: 1   Last Office Visit with G, [facility] or [facility] prescribing provider: 7/12/17                                         Next 5 appointments (look out 90 days)     Jul 28, 2017 11:45 AM CDT   [facility]ABL[facility] PR[facility]ATAL [facility] Cheyanen [facility]   [facility]    [address] [place] [address]   [phone]            Aug 16, 2017 10:30 AM CDT   Return Visit with HORTENSIA Rosales CNP   [facility]    [address] [place] [address]   [phone]

## 2017-07-14 NOTE — MR AVS SNAPSHOT
After Visit Summary   7/14/2017    Fransisca Cabello    MRN: [id number]           Patient Information     Date Of Birth          1995        Visit Information        Provider Department      7/14/2017 10:45 AM Jenn Chua RN [facility]        Today's Diagnoses     Pre-existing type 1 diabetes mellitus during pregnancy in first trimester    -  1       Follow-ups after your visit        Your next 10 appointments already scheduled     Jul 20, 2017 10:30 AM CDT   Diabetic Education with Jenn Chua RN   [address] [address]   [phone]            Jul 28, 2017 11:45 AM CDT   ESTABLISHED PRENATAL with Cheyanne Silva MD   [address] [address]   [phone]            Aug 16, 2017 10:30 AM CDT   Return Visit with HORTENSIA Rosales CNP   [address] [address]   [phone]              Who to contact     If you have questions or need follow up information about today's clinic visit or your schedule please contact [facility] directly at [phone].  Normal or non-critical lab and imaging results will be communicated to you by MyChart, letter or phone within 4 business days after the clinic has received the results. If you do not hear from us within 7 days, please contact the clinic through MyChart or phone. If you have a critical or abnormal lab result, we will notify you by phone as soon as possible.  Submit refill requests through [employer] or call your pharmacy and they will forward the refill request to us. Please allow 3 business days for your refill to be completed.          Additional Information About Your Visit        Red [employer] Information     [employer] gives you  secure access to your electronic health record. If you see a primary care provider, you can also send messages to your care team and make appointments. If you have questions, please call your primary care clinic.  If you do not have a primary care provider, please call [phone] and they will assist you.        Care EveryWhere ID     This is your Care EveryWhere ID. This could be used by other organizations to access your [facility] medical records  [id number]        Your Vitals Were     Last Period                   04/21/2017 (Exact Date)            Blood Pressure from Last 3 Encounters:   07/12/17 126/87   06/30/17 120/80   04/12/17 108/68    Weight from Last 3 Encounters:   07/12/17 72.8 kg (160 lb 6.4 oz)   06/30/17 72.4 kg (159 lb 9.6 oz)   04/12/17 67 kg (147 lb 9.6 oz)              Today, you had the following     No orders found for display         Today's Medication Changes          These changes are accurate as of: 7/14/17  2:10 PM.  If you have any questions, ask your nurse or doctor.               These medicines have changed or have updated prescriptions.        Dose/Directions    blood glucose monitoring test strip   Commonly known as:  [employer] CONTOUR NEXT   This may have changed:  additional instructions   Used for:  Uncontrolled type 1 diabetes mellitus without complication (H), Supervision of other high risk pregnancy, antepartum   Changed by:  Maryann Hammonds APRN CNP        Use to test blood sugar 10-12 times daily or as directed.   Quantity:  1000 each   Refills:  3            Where to get your medicines      These medications were sent to [facility] [address]  [address] [facility] [address]     Phone:  [phone]     blood glucose monitoring test strip                Primary Care Provider    None Specified       No primary provider on file.        Equal Access to Services     EDWIGE [facility]ALLADAR[facility] AH: martha Burgess  aniketgwyn trevino waxay idiin hayaan adeeg kharash la'aan ah. So [facility] [phone].    ATENCIÓN: Si marc yanez, tiene a boateng disposición servicios gratuitos de asistencia lingüística. Llame al [phone].    We comply with applicable federal civil rights laws and [place] laws. We do not discriminate on the basis of race, color, national origin, age, disability sex, sexual orientation or gender identity.            Thank you!     Thank you for choosing [facility]  for your care. Our goal is always to provide you with excellent care. Hearing back from our patients is one way we can continue to improve our services. Please take a few minutes to complete the written survey that you may receive in the mail after your visit with us. Thank you!             Your Updated Medication List - Protect others around you: Learn how to safely use, store and throw away your medicines at www.disposemymeds.org.          This list is accurate as of: 7/14/17  2:10 PM.  Always use your most recent med list.                   Brand Name Dispense Instructions for use Diagnosis    acetone (Urine) test Strp     100 each    Use as directed to test urine ketone if blood sugars are >250 or during illness with fever, nausea, vomiting, or abdominal pain.    High-risk pregnancy, young primigravida, third trimester       blood glucose monitoring test strip    RAYMUNDO CONTOUR NEXT    1000 each    Use to test blood sugar 10-12 times daily or as directed.    Uncontrolled type 1 diabetes mellitus without complication (H), Supervision of other high risk pregnancy, antepartum       glucagon 1 MG kit    GLUCAGON EMERGENCY    1 each    Inject 1 mg into the muscle once for 1 dose    Type 1 diabetes mellitus without complication (H), Insulin pump in place       insulin glargine 100 UNIT/ML injection    LANTUS VIAL    10 mL    Inject 19 u sq every 24 hours in the event of insulin pump therapy.  DO NOT FILL RX until  "requested by patient.    Type 1 diabetes mellitus without complication (H), Insulin pump in place, Encounter for long-term (current) use of insulin (H), High-risk pregnancy, young primigravida, third trimester       insulin lispro 100 UNIT/ML injection    humaLOG    3 vial    As directed via insulin pump - up to 100 units daily, adjusting pump settings due to increased insulin requirements and pregnancy    Uncontrolled type 1 diabetes mellitus without complication (H), Encounter for long-term (current) use of insulin (H)       insulin pump infusion      Date last updated:  12/23/16 Medtronic Minimed: Model 630G BASAL RATES and times: 12   AM (midnight): 0.85 units/hour   8     AM: 0.8 units/hour  6pm: 0.85 Basal Pattern A:  Fransisca Cabello will use when NA. CARB RATIO and times: 12   AM (midnight): 12 Corection Factor (Sensitivity) and times: 12   AM (midnight): 47 mg/dL BLOOD GLUCOSE TARGET and times: 12   AM (midnight): 100 - 120 5     AM:  90 - 110 9:30    PM:  100 - 120 Active Insulin Time:  3 hours Sensor:  No Carelink / Diasend username: [email or web address] Carelink / Diasend Password:  [email or web address]        insulin syringe-needle U-100 31G X 5/16\" 0.3 ML    BD insulin syringe ultrafine    100 each    Use one syringe 4 daily or as directed in the event of insulin pump failure.  DO NOT DISPENSE until requested by patient    Encounter for long-term (current) use of insulin (H)       MICROLET LANCETS Misc     600 each    Use one lancet 6 times daily for blood sugar testing    Type 1 diabetes mellitus without complication (H), Insulin pump in place       PRENATAL VITAMINS PO             "

## 2017-07-20 ENCOUNTER — ALLIED HEALTH/NURSE VISIT (OUTPATIENT)
Dept: EDUCATION SERVICES | Facility: CLINIC | Age: 22
End: 2017-07-20
Payer: COMMERCIAL

## 2017-07-20 DIAGNOSIS — O24.011 PRE-EXISTING TYPE 1 DIABETES MELLITUS DURING PREGNANCY IN FIRST TRIMESTER: Primary | ICD-10-CM

## 2017-07-20 PROCEDURE — G0108 DIAB MANAGE TRN  PER INDIV: HCPCS

## 2017-07-20 NOTE — MR AVS SNAPSHOT
After Visit Summary   7/20/2017    Fransisca Cabello    MRN: [id number]           Patient Information     Date Of Birth          1995        Visit Information        Provider Department      7/20/2017 10:30 AM Jenn Chua RN [facility]        Today's Diagnoses     Pre-existing type 1 diabetes mellitus during pregnancy in first trimester    -  1       Follow-ups after your visit        Your next 10 appointments already scheduled     Jul 24, 2017  3:30 PM CDT   Diabetic Education with Jenn Chua RN   [address] [address]   [phone]            Jul 28, 2017 11:45 AM CDT   ESTABLISHED PRENATAL with Cheyanne Silva MD   [address] [address]   [phone]            Aug 16, 2017 10:30 AM CDT   Return Visit with HORTENSIA Rosales CNP   [address] [address]   [phone]              Who to contact     If you have questions or need follow up information about today's clinic visit or your schedule please contact [facility] directly at [phone].  Normal or non-critical lab and imaging results will be communicated to you by MyChart, letter or phone within 4 business days after the clinic has received the results. If you do not hear from us within 7 days, please contact the clinic through MyChart or phone. If you have a critical or abnormal lab result, we will notify you by phone as soon as possible.  Submit refill requests through [employer] or call your pharmacy and they will forward the refill request to us. Please allow 3 business days for your refill to be completed.          Additional Information About Your Visit        [employer] Information     [employer] gives you  secure access to your electronic health record. If you see a primary care provider, you can also send messages to your care team and make appointments. If you have questions, please call your primary care clinic.  If you do not have a primary care provider, please call [phone] and they will assist you.        Care EveryWhere ID     This is your Care EveryWhere ID. This could be used by other organizations to access your [facility] medical records  [id number]        Your Vitals Were     Last Period                   04/21/2017 (Exact Date)            Blood Pressure from Last 3 Encounters:   07/12/17 126/87   06/30/17 120/80   04/12/17 108/68    Weight from Last 3 Encounters:   07/12/17 72.8 kg (160 lb 6.4 oz)   06/30/17 72.4 kg (159 lb 9.6 oz)   04/12/17 67 kg (147 lb 9.6 oz)              We Performed the Following     DIABETES EDUCATION - Individual  []        Primary Care Provider    None Specified       No primary provider on file.        Equal Access to Services     [facility] : Hadii aad ku hadasho Soomaali, waaxda luqadaha, qaybta kaalmada adeegyada, hernan spicer . So [facility] [phone].    ATENCIÓN: Si habla español, tiene a boateng disposición servicios gratuitos de asistencia lingüística. Llame al [phone].    We comply with applicable federal civil rights laws and [place] laws. We do not discriminate on the basis of race, color, national origin, age, disability sex, sexual orientation or gender identity.            Thank you!     Thank you for choosing [facility]  for your care. Our goal is always to provide you with excellent care. Hearing back from our patients is one way we can continue to improve our services. Please take a few minutes to complete the written survey that you may receive in the mail after your visit with us. Thank you!             Your Updated Medication List - Protect others around you: Learn how to safely use, store and throw  away your medicines at www.disposemymeds.org.          This list is accurate as of: 7/20/17  5:00 PM.  Always use your most recent med list.                   Brand Name Dispense Instructions for use Diagnosis    acetone (Urine) test Strp     100 each    Use as directed to test urine ketone if blood sugars are >250 or during illness with fever, nausea, vomiting, or abdominal pain.    High-risk pregnancy, young primigravida, third trimester       blood glucose monitoring test strip    RAYMUNDO CONTOUR NEXT    1000 each    Use to test blood sugar 10-12 times daily or as directed.    Uncontrolled type 1 diabetes mellitus without complication (H), Supervision of other high risk pregnancy, antepartum       glucagon 1 MG kit    GLUCAGON EMERGENCY    1 each    Inject 1 mg into the muscle once for 1 dose    Type 1 diabetes mellitus without complication (H), Insulin pump in place       insulin glargine 100 UNIT/ML injection    LANTUS VIAL    10 mL    Inject 19 u sq every 24 hours in the event of insulin pump therapy.  DO NOT FILL RX until requested by patient.    Type 1 diabetes mellitus without complication (H), Insulin pump in place, Encounter for long-term (current) use of insulin (H), High-risk pregnancy, young primigravida, third trimester       insulin lispro 100 UNIT/ML injection    humaLOG    3 vial    As directed via insulin pump - up to 100 units daily, adjusting pump settings due to increased insulin requirements and pregnancy    Uncontrolled type 1 diabetes mellitus without complication (H), Encounter for long-term (current) use of insulin (H)       insulin pump infusion      Date last updated:  12/23/16 Medtronic Minimed: Model 630G BASAL RATES and times: 12   AM (midnight): 0.85 units/hour   8     AM: 0.8 units/hour  6pm: 0.85 Basal Pattern A:  Fransisca Cabello will use when NA. CARB RATIO and times: 12   AM (midnight): 12 Corection Factor (Sensitivity) and times: 12   AM (midnight): 47 mg/dL BLOOD GLUCOSE  "TARGET and times: 12   AM (midnight): 100 - 120 5     AM:  90 - 110 9:30    PM:  100 - 120 Active Insulin Time:  3 hours Sensor:  No Carelink / Diasend username: [place] Carelink / Diasend Password:  [email or web address]        insulin syringe-needle U-100 31G X 5/16\" 0.3 ML    BD insulin syringe ultrafine    100 each    Use one syringe 4 daily or as directed in the event of insulin pump failure.  DO NOT DISPENSE until requested by patient    Encounter for long-term (current) use of insulin (H)       MICROLET LANCETS Misc     600 each    Use one lancet 6 times daily for blood sugar testing    Type 1 diabetes mellitus without complication (H), Insulin pump in place       PRENATAL VITAMINS PO             "

## 2017-07-20 NOTE — PROGRESS NOTES
"Diabetes Self Management Training: Insulin Pump Follow-up Visit    Fransisca Cabello presents today for education and evaluation of glucose control related to Type 1 diabetes.    She is accompanied by self    Patient's diabetes management related comments/concerns: has not been feeling good, feels symptomatic like DKA when BG's are in the 140's, does not like the ups and downs.  Self adjusted carbohydrate ratio last night at dinner from 10 --> 9    Patient's emotional response to diabetes: expresses readiness to learn    Patient would like this visit to be focused around the following diabetes-related behaviors and goals: insulin pump management    ASSESSMENT:  Patient Problem List and Family Medical History reviewed for relevant medical history, current medical status, and diabetes risk factors.    Current Diabetes Management per Patient:  Insulin Pump Type: Medtronic 670    BG meter: Contour Next USB Link (with Medtronic Pump) meter    Taking other diabetes medications? no    Pump Problem Solving: Patient understands DKA prevention: Yes. Patient has ketone test strips: Yes. Patient has an insulin multiple daily injection back-up plan: Yes.    Blood Glucose Results and Insulin Use:             Current Pump Settings: See Insulin Pump - Outpatient in Medication List for complete list of settings.         Nutrition:  Patient counts carbohydrates in grams      Physical Activity:    No specific activity   Patient uses temporary basal during exercise, consumes carbohydrate around time of exercise as needed    Diabetes Complications:  Acute Complications: At risk for hypoglycemia? yes  Symptoms of low blood sugar? yes  Symptoms of hyperglycemia? yes    Vitals:  LMP 04/21/2017 (Exact Date)  Estimated body mass index is 25.89 kg/(m^2) as calculated from the following:    Height as of 4/12/17: 1.676 m (5' 6\").    Weight as of 7/12/17: 72.8 kg (160 lb 6.4 oz).   Last 3 BP:   BP Readings from Last 3 Encounters:   07/12/17 " 126/87   06/30/17 120/80   04/12/17 108/68       History   Smoking Status     Never Smoker   Smokeless Tobacco     Never Used       Labs:  Lab Results   Component Value Date    A1C 6.8 06/30/2017     Lab Results   Component Value Date     11/18/2016     No results found for: LDL  No results found for: HDL]  GFR Estimate   Date Value Ref Range Status   06/30/2017 >90  Non  GFR Calc   >60 mL/min/1.7m2 Final     GFR Estimate If Black   Date Value Ref Range Status   06/30/2017 >90   GFR Calc   >60 mL/min/1.7m2 Final     Lab Results   Component Value Date    CR 0.59 06/30/2017     No results found for: MICROALBUMIN      INTERVENTION:  Patient is pregnant so parameters for tight control. Patient pump has been suspending before low which has contributed to some of the high glucose readings.  Recommend change suspend before low to suspend on low and alert before low. Also adjust glucose targets to tighten control. lower target range to allow a little more correction for high readings.   Overnight glucose has been high but some of the high readings may be from the pump suspending before low and the higher readings after dinner.  Recommend monitor changes made today and if overnight continues to be above goal she can self adjust  Overnight basal from 0.95 to 1.0.  She just changed evening carbohydrate ratio last night so will monitor post dinner glucose rise.     Changes made to pump settings:  BG target range:   12am   5am: 70-90  9:30:     Changes made to sensor settings:   Alert before low turned on, suspend before low -off, suspend on low - on    Education provided today on:  AADE Self-Care Behaviors:  Problem Solving: DKA prevention and ketone monitoring, troubleshooting highs    Education specific to insulin pump provided today on:   pump button pressing and differences between 630 and 670 pump, the suspend before low vs suspend on low      Opportunities for ongoing  education and support in diabetes-self management were discussed.    Pt verbalized understanding of concepts discussed and recommendations provided today.       Education Materials Provided:  No new materials provided    PLAN:  Above changes made to settings.  Patient instructed to change basal rate over night if she continues to wake with high numbers and her overnight trend is above goal at 12am from 0.95 to 1.00 and 9:30pm: from 0.95 to 1.00.   Patient not feeling well and request closer follow up.     FOLLOW-UP:  Follow-up appointment scheduled on Monday 7/24/17 at 3:30pm.  Chart routed to referring provider.      Jenn Chua RN,CDE   Time Spent: 45 minutes  Encounter Type: Individual      Any diabetes medication dose changes were made via the CDE Protocol and Collaborative Practice Agreement with the patient's referring provider. A copy of this encounter was shared with the provider.

## 2017-07-24 ENCOUNTER — ALLIED HEALTH/NURSE VISIT (OUTPATIENT)
Dept: EDUCATION SERVICES | Facility: CLINIC | Age: 22
End: 2017-07-24
Payer: COMMERCIAL

## 2017-07-24 ENCOUNTER — TELEPHONE (OUTPATIENT)
Dept: FAMILY MEDICINE | Facility: CLINIC | Age: 22
End: 2017-07-24

## 2017-07-24 DIAGNOSIS — O24.011 PRE-EXISTING TYPE 1 DIABETES MELLITUS DURING PREGNANCY IN FIRST TRIMESTER: Primary | ICD-10-CM

## 2017-07-24 PROCEDURE — G0108 DIAB MANAGE TRN  PER INDIV: HCPCS

## 2017-07-24 NOTE — PROGRESS NOTES
"Diabetes Self Management Training: Insulin Pump Follow-up Visit    Fransisca Cabello presents today for evaluation of glucose control related to Type 1 diabetes with pregnancy  She is accompanied by young daughter    Patient's diabetes management related comments/concerns: feeling better since the last changes - not feeling like DKA symptoms but having more fluctuations, lows and highs.  Self adjusted basal rates yesterday    Patient would like this visit to be focused around the following diabetes-related behaviors and goals: insulin pump management    ASSESSMENT:  Patient Problem List and Family Medical History reviewed for relevant medical history, current medical status, and diabetes risk factors.    Current Diabetes Management per Patient:  Insulin Pump Type: Medtronic Minimed 670G with Gaurdian Sensor    BG meter: Contour Next USB Link (with Medtronic Pump) meter      Blood Glucose Results and Insulin Use:               Current Pump Settings: See Insulin Pump - Outpatient in Medication List for complete list of settings.         Patient's most recent   Lab Results   Component Value Date    A1C 6.8 06/30/2017     Nutrition:  Patient counts carbohydrates in grams      Physical Activity:    Not discussed    Diabetes Complications:  Acute Complications: At risk for hypoglycemia? yes  Symptoms of low blood sugar? yes  Symptoms of hyperglycemia? Occasionally but much better since last changes to pump settings    Vitals:  LMP 04/21/2017 (Exact Date)  Estimated body mass index is 25.89 kg/(m^2) as calculated from the following:    Height as of 4/12/17: 1.676 m (5' 6\").    Weight as of 7/12/17: 72.8 kg (160 lb 6.4 oz).   Last 3 BP:   BP Readings from Last 3 Encounters:   07/12/17 126/87   06/30/17 120/80   04/12/17 108/68       History   Smoking Status     Never Smoker   Smokeless Tobacco     Never Used       Labs:  Lab Results   Component Value Date    A1C 6.8 06/30/2017     Lab Results   Component Value Date    GLC " 220 11/18/2016     No results found for: LDL  No results found for: HDL]  GFR Estimate   Date Value Ref Range Status   06/30/2017 >90  Non  GFR Calc   >60 mL/min/1.7m2 Final     GFR Estimate If Black   Date Value Ref Range Status   06/30/2017 >90   GFR Calc   >60 mL/min/1.7m2 Final     Lab Results   Component Value Date    CR 0.59 06/30/2017       INTERVENTION:  Significant rise in glucose in the evening, patient would benefit from decrease in carbohydrate ratio in the evening, she would also benefit from  treat hypoglycemia correctly, address glucose before low when alert before low occurs this may prevent some of the rebound hyperglycemia that is occuring.   Do not adjust basal rates instead use a temp basal at + 10-20% for 1-2 hours when additional glucose is needed    Target range needs tight control for pregnancy but may be too aggressive. Recommend change in BG target    Changes made to pump settings:  carb ratio: added ratio at 6pm of 8    BG target range:   12am:   5am: 80-90  9:30pm:       Education provided today on:  AADE Self-Care Behaviors:    Education specific to insulin pump provided today on: adjusting insulin dosing, watching for patterns and trends, how to use a temporary basal rate, treating hypoglycemia correctly (Rule of 15) and treating glucose with small amount of glucose such as 1 glucose tablet or a few skittles or gummy bears (around 5 grams of carbohydrates) when alert before low occurs. Prevent the low from happening which may     Opportunities for ongoing education and support in diabetes-self management were discussed.    Pt verbalized understanding of concepts discussed and recommendations provided today.         PLAN:  Changes made to pump settings. Upload in a week if changes needed otherwise when she returns from vacation on 8/15/17. Call sooner with questions or concerns       Jenn Chua RN,CDE   Time Spent: 30 minutes  Encounter  Type: Individual      Any diabetes medication dose changes were made via the CDE Protocol and Collaborative Practice Agreement with the patient's referring provider. A copy of this encounter was shared with the provider.

## 2017-07-24 NOTE — TELEPHONE ENCOUNTER
Previous PA was only for qty of 6 strips per day; new directions say up to 12 times per day.  A new PA will have to be done for that qty    PA NEEDED ON: Andrew Contour Next Test Strips ** Qty per day **  INS IS: [employer]  Bin: [id number]  PHONE # IS: [phone] or [phone]  ID # IS: [id number]  Group: [id number]  Pcn: [id number]    PLEASE LET US KNOW WHEN PA IS GRANTED/DENIED.  THANK YOU!  Arlen Alba, Pharmacy [facility]

## 2017-07-24 NOTE — TELEPHONE ENCOUNTER
Original PA was changed from 6 a day to 12 x per day - good through 4/18/18. Shari Schoenberger, CMA

## 2017-07-24 NOTE — MR AVS SNAPSHOT
After Visit Summary   7/24/2017    Fransisca Cabello    MRN: [id number]           Patient Information     Date Of Birth          1995        Visit Information        Provider Department      7/24/2017 3:30 PM Jenn Chua RN [facility]        Today's Diagnoses     Pre-existing type 1 diabetes mellitus during pregnancy in first trimester    -  1       Follow-ups after your visit        Your next 10 appointments already scheduled     Jul 28, 2017 11:45 AM CDT   ESTABLISHED PRENATAL with Cheyanne Silva MD   [address] [address] [phone]            Aug 16, 2017 10:30 AM CDT   Return Visit with HORTENSIA Rosales CNP   [address] [address] [phone]              Who to contact     If you have questions or need follow up information about today's clinic visit or your schedule please contact [facility] directly at [phone].  Normal or non-critical lab and imaging results will be communicated to you by THYMEhart, letter or phone within 4 business days after the clinic has received the results. If you do not hear from us within 7 days, please contact the clinic through THYMEhart or phone. If you have a critical or abnormal lab result, we will notify you by phone as soon as possible.  Submit refill requests through FindTheBest or call your pharmacy and they will forward the refill request to us. Please allow 3 business days for your refill to be completed.          Additional Information About Your Visit        THYMEhart Information     FindTheBest gives you secure access to your electronic health record. If you see a primary care provider, you can also send messages to your care team and make appointments. If you have questions, please call your primary care clinic.  If  you do not have a primary care provider, please call [phone] and they will assist you.        Care EveryWhere ID     This is your Care EveryWhere ID. This could be used by other organizations to access your [place] medical records  [id number]        Your Vitals Were     Last Period                   04/21/2017 (Exact Date)            Blood Pressure from Last 3 Encounters:   07/12/17 126/87   06/30/17 120/80   04/12/17 108/68    Weight from Last 3 Encounters:   07/12/17 72.8 kg (160 lb 6.4 oz)   06/30/17 72.4 kg (159 lb 9.6 oz)   04/12/17 67 kg (147 lb 9.6 oz)              We Performed the Following     DIABETES EDUCATION - Individual  []        Primary Care Provider    None Specified       No primary provider on file.        Equal Access to Services     [facility]: Hadcarri Quintana, martha rivera, gwyn laguerrealhanna dinero, hernan spicer . So [facility] [phone].    ATENCIÓN: Si habla español, tiene a boateng disposición servicios gratuitos de asistencia lingüística. Llame al [phone].    We comply with applicable federal civil rights laws and [place] laws. We do not discriminate on the basis of race, color, national origin, age, disability sex, sexual orientation or gender identity.            Thank you!     Thank you for choosing [facility]  for your care. Our goal is always to provide you with excellent care. Hearing back from our patients is one way we can continue to improve our services. Please take a few minutes to complete the written survey that you may receive in the mail after your visit with us. Thank you!             Your Updated Medication List - Protect others around you: Learn how to safely use, store and throw away your medicines at www.disposemymeds.org.          This list is accurate as of: 7/24/17  4:49 PM.  Always use your most recent med list.                   Brand Name Dispense Instructions for use Diagnosis     acetone (Urine) test Strp     100 each    Use as directed to test urine ketone if blood sugars are >250 or during illness with fever, nausea, vomiting, or abdominal pain.    High-risk pregnancy, young primigravida, third trimester       blood glucose monitoring test strip    RAYMUNDO CONTOUR NEXT    1000 each    Use to test blood sugar 10-12 times daily or as directed.    Uncontrolled type 1 diabetes mellitus without complication (H), Supervision of other high risk pregnancy, antepartum       glucagon 1 MG kit    GLUCAGON EMERGENCY    1 each    Inject 1 mg into the muscle once for 1 dose    Type 1 diabetes mellitus without complication (H), Insulin pump in place       insulin glargine 100 UNIT/ML injection    LANTUS VIAL    10 mL    Inject 19 u sq every 24 hours in the event of insulin pump therapy.  DO NOT FILL RX until requested by patient.    Type 1 diabetes mellitus without complication (H), Insulin pump in place, Encounter for long-term (current) use of insulin (H), High-risk pregnancy, young primigravida, third trimester       insulin lispro 100 UNIT/ML injection    humaLOG    3 vial    As directed via insulin pump - up to 100 units daily, adjusting pump settings due to increased insulin requirements and pregnancy    Uncontrolled type 1 diabetes mellitus without complication (H), Encounter for long-term (current) use of insulin (H)       insulin pump infusion      Date last updated:  12/23/16 [employer] Minimed: Model 630G BASAL RATES and times: 12   AM (midnight): 0.85 units/hour   8     AM: 0.8 units/hour  6pm: 0.85 Basal Pattern A:  Fransisca Cabello will use when NA. CARB RATIO and times: 12   AM (midnight): 12 Corection Factor (Sensitivity) and times: 12   AM (midnight): 47 mg/dL BLOOD GLUCOSE TARGET and times: 12   AM (midnight): 100 - 120 5     AM:  90 - 110 9:30    PM:  100 - 120 Active Insulin Time:  3 hours Sensor:  No Carelink / Diasend username: [email or web address] Carelink / Diasend Password:  [email or web address]     "    insulin syringe-needle U-100 31G X 5/16\" 0.3 ML    BD insulin syringe ultrafine    100 each    Use one syringe 4 daily or as directed in the event of insulin pump failure.  DO NOT DISPENSE until requested by patient    Encounter for long-term (current) use of insulin (H)       MICROLET LANCETS Misc     600 each    Use one lancet 6 times daily for blood sugar testing    Type 1 diabetes mellitus without complication (H), Insulin pump in place       PRENATAL VITAMINS PO             "

## 2017-07-28 ENCOUNTER — PRENATAL OFFICE VISIT (OUTPATIENT)
Dept: OBGYN | Facility: CLINIC | Age: 22
End: 2017-07-28
Payer: COMMERCIAL

## 2017-07-28 VITALS
BODY MASS INDEX: 25.91 KG/M2 | SYSTOLIC BLOOD PRESSURE: 132 MMHG | DIASTOLIC BLOOD PRESSURE: 74 MMHG | HEART RATE: 112 BPM | WEIGHT: 160.5 LBS

## 2017-07-28 DIAGNOSIS — E10.9 TYPE 1 DIABETES MELLITUS WITHOUT COMPLICATION (H): ICD-10-CM

## 2017-07-28 DIAGNOSIS — O21.9 NAUSEA/VOMITING IN PREGNANCY: Primary | ICD-10-CM

## 2017-07-28 DIAGNOSIS — Z96.41 INSULIN PUMP IN PLACE: ICD-10-CM

## 2017-07-28 PROCEDURE — 99207 ZZC PRENATAL VISIT: CPT | Performed by: OBSTETRICS & GYNECOLOGY

## 2017-07-28 RX ORDER — ONDANSETRON 4 MG/1
4-8 TABLET, ORALLY DISINTEGRATING ORAL EVERY 8 HOURS PRN
Qty: 30 TABLET | Refills: 1 | Status: SHIPPED | OUTPATIENT
Start: 2017-07-28 | End: 2017-10-13

## 2017-07-28 NOTE — MR AVS SNAPSHOT
After Visit Summary   7/28/2017    Fransisca Cabello    MRN: [id number]           Patient Information     Date Of Birth          1995        Visit Information        Provider Department      7/28/2017 11:45 AM Cheyanne Silva MD [facility]        Today's Diagnoses     Nausea/vomiting in pregnancy    -  1    Type 1 diabetes mellitus without complication (H)        Insulin pump in place           Follow-ups after your visit        Your next 10 appointments already scheduled     Aug 16, 2017 10:30 AM CDT   Return Visit with HORTENSIA Rosales [facility]note 3[facility] ([facility])    [address] [place] [id number] [phone]            Aug 25, 2017  3:15 PM CDT   ESTABLISHED PRENATAL with Cheyanne Silva MD   [facility] ([facility]    [address] [place] [address] [phone]              Who to contact     If you have questions or need follow up information about today's clinic visit or your schedule please contact [facility] directly at [phone].  Normal or non-critical lab and imaging results will be communicated to you by Ce[employer] BioPharmahart, letter or phone within 4 business days after the clinic has received the results. If you do not hear from us within 7 days, please contact the clinic through [employer] BioPh[employer]hart or phone. If you have a critical or abnormal lab result, we will notify you by phone as soon as possible.  Submit refill requests through MyTwinPlace or call your pharmacy and they will forward the refill request to us. Please allow 3 business days for your refill to be completed.          Additional Information About Your Visit        MyChart Information     MyTwinPlace gives you secure access to your electronic health record. If you see a primary care provider, you can also send messages to your care team and make appointments. If you have questions,  please call your primary care clinic.  If you do not have a primary care provider, please call [phone] and they will assist you.        Care EveryWhere ID     This is your Care EveryWhere ID. This could be used by other organizations to access your [facility] medical records  [id number]        Your Vitals Were     Pulse Last Period BMI (Body Mass Index)             112 04/21/2017 (Exact Date) 25.91 kg/m2          Blood Pressure from Last 3 Encounters:   07/28/17 132/74   07/12/17 126/87   06/30/17 120/80    Weight from Last 3 Encounters:   07/28/17 160 lb 8 oz (72.8 kg)   07/12/17 160 lb 6.4 oz (72.8 kg)   06/30/17 159 lb 9.6 oz (72.4 kg)              Today, you had the following     No orders found for display         Today's Medication Changes          These changes are accurate as of: 7/28/17  4:20 PM.  If you have any questions, ask your nurse or doctor.               Start taking these medicines.        Dose/Directions    ondansetron 4 MG ODT tab   Commonly known as:  ZOFRAN ODT   Used for:  Nausea/vomiting in pregnancy   Started by:  Cheyanne Silva MD        Dose:  4-8 mg   Take 1-2 tablets (4-8 mg) by mouth every 8 hours as needed for nausea   Quantity:  30 tablet   Refills:  1            Where to get your medicines      These medications were sent to [facility] [address] [address]     Phone:  [phone]     ondansetron 4 MG ODT tab                Primary Care Provider    None Specified       No primary provider on file.        Equal Access to Services     [facility] AH: Hadii isidoro barnes Sodario, waaxda luqadaha, qaybta kaalmahernan yu. So [facility] [phone].    ATENCIÓN: Si habla español, tiene a boateng disposición servicios gratuitos de asistencia lingüística. Llame al [phone].    We comply with applicable federal civil rights laws and [place] laws. We do not  discriminate on the basis of race, color, national origin, age, disability sex, sexual orientation or gender identity.            Thank you!     Thank you for choosing [facility]  for your care. Our goal is always to provide you with excellent care. Hearing back from our patients is one way we can continue to improve our services. Please take a few minutes to complete the written survey that you may receive in the mail after your visit with us. Thank you!             Your Updated Medication List - Protect others around you: Learn how to safely use, store and throw away your medicines at www.disposemymeds.org.          This list is accurate as of: 7/28/17  4:20 PM.  Always use your most recent med list.                   Brand Name Dispense Instructions for use Diagnosis    acetone (Urine) test Strp     100 each    Use as directed to test urine ketone if blood sugars are >250 or during illness with fever, nausea, vomiting, or abdominal pain.    High-risk pregnancy, young primigravida, third trimester       blood glucose monitoring test strip    RAYMUNDO CONTOUR NEXT    1000 each    Use to test blood sugar 10-12 times daily or as directed.    Uncontrolled type 1 diabetes mellitus without complication (H), Supervision of other high risk pregnancy, antepartum       glucagon 1 MG kit    GLUCAGON EMERGENCY    1 each    Inject 1 mg into the muscle once for 1 dose    Type 1 diabetes mellitus without complication (H), Insulin pump in place       insulin glargine 100 UNIT/ML injection    LANTUS VIAL    10 mL    Inject 19 u sq every 24 hours in the event of insulin pump therapy.  DO NOT FILL RX until requested by patient.    Type 1 diabetes mellitus without complication (H), Insulin pump in place, Encounter for long-term (current) use of insulin (H), High-risk pregnancy, young primigravida, third trimester       insulin lispro 100 UNIT/ML injection    humaLOG    3 vial    As directed via insulin pump - up to  "100 units daily, adjusting pump settings due to increased insulin requirements and pregnancy    Uncontrolled type 1 diabetes mellitus without complication (H), Encounter for long-term (current) use of insulin (H)       insulin pump infusion      Date last updated:  12/23/16 [facility] Minim[facility]: Model 630G BASAL RATES and times: 12   AM (midnight): 0.85 units/hour   8     AM: 0.8 units/hour  6pm: 0.85 Basal Pattern A:  Fransisca Perdomokimi will use when NA. CARB RATIO and times: 12   AM (midnight): 12 Corection Factor (Sensitivity) and times: 12   AM (midnight): 47 mg/dL BLOOD GLUCOSE TARGET and times: 12   AM (midnight): 100 - 120 5     AM:  90 - 110 9:30    PM:  100 - 120 Active Insulin Time:  3 hours Sensor:  No Carelink / Diasend username: [place] Carelink / Diasend Password:  [email or web address]        insulin syringe-needle U-100 31G X 5/16\" 0.3 ML    BD insulin syringe ultrafine    100 each    Use one syringe 4 daily or as directed in the event of insulin pump failure.  DO NOT DISPENSE until requested by patient    Encounter for long-term (current) use of insulin (H)       MICROLET LANCETS Misc     600 each    Use one lancet 6 times daily for blood sugar testing    Type 1 diabetes mellitus without complication (H), Insulin pump in place       ondansetron 4 MG ODT tab    ZOFRAN ODT    30 tablet    Take 1-2 tablets (4-8 mg) by mouth every 8 hours as needed for nausea    Nausea/vomiting in pregnancy       PRENATAL VITAMINS PO             "

## 2017-07-28 NOTE — NURSING NOTE
"Chief Complaint   Patient presents with     Prenatal Care     13 weeks 1 day, no questions or concerns       Initial /74 (BP Location: Right arm, Patient Position: Chair, Cuff Size: Adult Regular)  Pulse 112  Wt 160 lb 8 oz (72.8 kg)  LMP 04/21/2017 (Exact Date)  BMI 25.91 kg/m2 Estimated body mass index is 25.91 kg/(m^2) as calculated from the following:    Height as of 4/12/17: 5' 6\" (1.676 m).    Weight as of this encounter: 160 lb 8 oz (72.8 kg).  Medication Reconciliation: complete     Emily Yin CMA      "

## 2017-07-28 NOTE — PROGRESS NOTES
PROBLEM LIST  LABS: Opos/RI/    1.  Type I DM on pump: followed by endocrine. Plan: eye exam, TSH qTM, level II US, fetal echo, twice weekly  testing at 30-32 weeks.  2.  History severe preeclampsia: baby ASA daily. Baseline labs normal (p/c ratio 0.14).    BS have been harder to control as she is feeling sick--would like to try Zofran, worked for her in last pregnancy. Rx sent. Follow up with endocrine is now two times weekly while sorting out basal insulin. blood pressure initally elevated today, will watch closely. She is currently being seen 2x/week. Needs to notify us for anything higher than 160/110.    Cheyanne Silva MD

## 2017-08-16 ENCOUNTER — OFFICE VISIT (OUTPATIENT)
Dept: ENDOCRINOLOGY | Facility: CLINIC | Age: 22
End: 2017-08-16
Payer: COMMERCIAL

## 2017-08-16 VITALS
TEMPERATURE: 98 F | OXYGEN SATURATION: 98 % | SYSTOLIC BLOOD PRESSURE: 123 MMHG | WEIGHT: 165.3 LBS | HEART RATE: 107 BPM | BODY MASS INDEX: 26.68 KG/M2 | DIASTOLIC BLOOD PRESSURE: 85 MMHG | RESPIRATION RATE: 16 BRPM

## 2017-08-16 DIAGNOSIS — O24.011 PRE-EXISTING TYPE 1 DIABETES MELLITUS DURING PREGNANCY IN FIRST TRIMESTER: ICD-10-CM

## 2017-08-16 LAB — HBA1C MFR BLD: 5.7 % (ref 4.3–6)

## 2017-08-16 PROCEDURE — 83036 HEMOGLOBIN GLYCOSYLATED A1C: CPT | Performed by: CLINICAL NURSE SPECIALIST

## 2017-08-16 PROCEDURE — 36415 COLL VENOUS BLD VENIPUNCTURE: CPT | Performed by: CLINICAL NURSE SPECIALIST

## 2017-08-16 PROCEDURE — 99214 OFFICE O/P EST MOD 30 MIN: CPT | Performed by: CLINICAL NURSE SPECIALIST

## 2017-08-16 NOTE — PROGRESS NOTES
Name: Fransisca Avila  F/u for Diabetes (Last seen 7/12/2017).  HPI:  Fransisca Avila is a 22 year old female who presents for the evaluation/management of type 1 diabetes.     1. Type 1 DM:  Originally diagnosed at the age of 2.   Induced at 34 weeks on 11/5/2014, preeclampsia.  Baby girl, initially in NICU. Daughter now 2 years old, has also been diagnosed with type 1 diabetes.    Currently pregnant, 15w6d.    LMP - 4/19/2017    Last diabetes ed appointment: 7/24/2017.    [facility].  No significant low BG's.  Was having more frequent low before the last basal rate changes - has since resolved.    Current Regimen: humalog  Insulin pump - Medtronic 670-G.   Time Rate (U/hr)   00:00 0.900   03:00 0.975   06:00 1.250   10:00 1.350   14:00 1.550   18:00 1.400   21:30 1.150     Carbohydrate Ratio -    Time Ratio   0000 9.5   11:30 9.0   14:00 9.0   18:00 8.0     Sensitivity  00:00 - 48  17:30 - 45   Active Insulin Time 3.00 hours   Basal  54% (27 units)   Bolus   46% (23 units)   Total Carbohydrates/day 184   Total Insulin/day  50   Average Blood Sugar  Sensor Average 139  109   BS Checks  6.6 times a day   Care Link Username-   Password-   Target range:  0:00    5:00   80-90  21:30      Complications:   Diabetes Complications  Description / Detail    Diabetic Retinopathy  No retinopathy, last exam 2/2016 - reminded to schedule an eye exam due to pregnancy,   CAD / PAD  No   Neuropathy  No   Nephropathy / Microalbuminuria  No   Gastroparesis  No   Hypoglycemia Unawareness  No     PMH/PSH:  Past Medical History:   Diagnosis Date     Type 1 diabetes (H) 1998    three years at diagnosis     Varicella 1998     Past Surgical History:   Procedure Laterality Date     C ROOT CANAL THERAPY 2 CANALS       DENTAL SURGERY       Family Hx:  Family History   Problem Relation Age of Onset     Family History Negative Mother      DIABETES Father 35     Type 1      Thyroid disease:  No         DM2: NO         Autoimmune: DM1, SLE, RA, Vitiligo Yes - Father has type 1 DM    Social Hx:  Social History     Social History     Marital status:      Spouse name: Teja     Number of children: 1     Years of education: N/A     Occupational History      [employer]     Social History Main Topics     Smoking status: Never Smoker     Smokeless tobacco: Never Used     Alcohol use No     Drug use: No     Sexual activity: Yes     Partners: Male     Other Topics Concern     Blood Transfusions No     Social History Narrative    Fransisca lives with her spouse           MEDICATIONS:  has a current medication list which includes the following prescription(s): ondansetron, blood glucose monitoring, insulin lispro, microlet lancets, insulin syringe-needle u-100, acetone (urine) test, insulin glargine, insulin pump, prenatal multivit-min-fe-fa, and glucagon.    ROS     ROS: 10 point ROS neg other than the symptoms noted above in the HPI.    Physical Exam   VS: /85 (BP Location: Left arm, Patient Position: Chair, Cuff Size: Adult Regular)  Pulse 107  Temp 98  F (36.7  C) (Oral)  Resp 16  Wt 75 kg (165 lb 4.8 oz)  LMP 04/21/2017 (Exact Date)  SpO2 98%  Breastfeeding? No  BMI 26.68 kg/m2  GENERAL: NAD, well dressed, answering questions appropriately, appears stated age.  HEENT: OP clear, no exophthalmos, no proptosis, EOMI, no lig lag, no retraction, no scleral icterus  RESPIRATORY: Clear.  Normal respiratory effort.  CARDIOVASCULAR: RRR. No peripheral edema.  EXTREMITIES: no edema  NEUROLOGY: CN grossly intact, no tremors  MSK: grossly intact, No digital cyanosis. Normal gait and station.  SKIN:warm and dry with good turgor;  no rashes, no lesions, no ulcers  PSYCH: Intact judgment and insight. A&OX3 with a cordial affect.    LABS:  A1c  Component    Latest Ref Rng & Units 4/12/2017 6/30/2017 8/16/2017   Hemoglobin A1C    4.3 - 6.0 % 7.6 (H) 6.8 (H) 5.7     BMP:  !COMPREHENSIVE Latest Ref Rng & Units  11/18/2016 6/30/2017   SODIUM 133 - 144 mmol/L 138    POTASSIUM 3.4 - 5.3 mmol/L 4.3    CHLORIDE 94 - 109 mmol/L 104    BUN 7 - 30 mg/dL 9    Creatinine 0.52 - 1.04 mg/dL 0.61 0.59   Glucose 70 - 99 mg/dL 220 (H)    ANION GAP 3 - 14 mmol/L 7    CALCIUM 8.5 - 10.1 mg/dL 9.0    ALBUMIN 3.4 - 5.0 g/dL 3.9    PROTEIN, TOTAL 6.8 - 8.8 g/dL 7.0      !LIPID/HEPATIC Latest Ref Rng 11/8/2014 12/2/2015 11/18/2016   AST 0 - 45 U/L 15 6 5   ALT 0 - 50 U/L 10 22 15     Urine microalbumin:  Component    Latest Ref Rng & Units 6/30/2017   Protein Random Urine    g/L 0.18   Protein Total Urine g/gr Creatinine    0 - 0.2 g/g Cr 0.14   Creatinine Urine    mg/dL 123     JOSH/C-peptide:  Component    Latest Ref Rng 11/10/2014   Glutamic Acid Decarboxylase Antibody     <5.0    Reference range: 0.0 to 5.0      C-Peptide    0.9 - 6.9 ng/mL <0.1 (L)     TFT:  !THYROID Latest Ref Rng & Units 6/30/2017 11/18/2016 12/2/2015   TSH 0.40 - 4.00 mU/L 1.46 1.42 1.89   T4 FREE 0.76 - 1.46 ng/dL   1.16     !THYROID Latest Ref Rng & Units 11/13/2014   TSH 0.40 - 4.00 mU/L 1.58   T4 FREE 0.76 - 1.46 ng/dL 1.16     TTG Abs:  Component    Latest Ref Rng 11/13/2014   Tissue Transglutaminase Antibody IgA    0 - 3.9 U/mL 1.1   Tissue Transglutaminase Rosina IgG    0 - 5.9 U/mL 1.0     All pertinent notes, labs, and images personally reviewed by me.     A/P  Ms.Jennica Avila is a 22 year old here for the evaluation/management of diabetes:    1. DM1 - Controlled, today's A1c 5.7%.  A1c goal for pregnancy < 6.0%.  Currently pregnant, second trimester.   No changes in pump settings today.  Continue weekly pump upload and review.  Continue checking A1c monthly.  She was reminded to schedule an eye exam.  Rx for glucagon emergency kit, urine ketone strips provided 4/2017.     Diabetes in pregnant women may be pregestational (type 1 or type 2 diabetes) diagnosed before pregnancy, or gestational (diabetes diagnosed during pregnancy).   Frequent measurements of blood  glucose during pregnancy are advised for women with either type 1 or type 2 diabetes to help prevent or treat both hyper and hypoglycemia. Euglycemia is important because it decreases the likelihood of miscarriage, congenital anomalies, macrosomia, fetal death, and  morbidity.  Intensive therapy is now recommended for all patients with diabetes. There are two potential hazards from strict glycemic control: hypoglycemia and worsening of diabetic retinopathy.  It is recommend self-glucose monitoring before and one hour after meals, at bedtime, and occasionally during the night to assess for  nocturnal hypoglycemia. Urine ketones are recommended for woman with type 1 diabetes that are ill .  ADAGuidelines:  -Preprandial, bedtime, and overnight glucose concentrations 60 to 95 mg/dL   -Peak 2-hour postprandial glucose concentrations 100 to 120 mg/dL   -A1C less than 6.0 percent  Diet and exercise is the primary therapy for patients with gestational diabetes.  For type 1 diabetes and patients requiring insulin I suggest a combination of lispro/aspart insulin and NPH insulin for therapy in pregnancy.   Total daily insulin requirements usually vary considerably during gestation. There is an early rise in insulin requirements between weeks 3 and 7, there often a decline between weeks 7 and 15, followed by a rise during the remainder of pregnancy, especially between weeks 28 and 32.    The average insulin requirement in pregnant women with type 1 diabetes is 0.7 units/kg in the first trimester, often increasing to 0.8 U/kg for weeks 13 to 28, 0.9 U/kg for weeks 29 to 34, and 1.0 U/kg for weeks 35 to term.   Meal Insulin (Lispro/Aspart) are 50 % of the insulin requirement.  As an example, an 80 kilogram pregnant diabetic woman would take 12 units of lispro or aspart before each meal. The other 50 percent of her insulin dose should cover basal needs. The dose is calculated as 0.45 times the patient's weight in  kilograms. For example, our 80 kilogram women would take 12 units of NPH three times a day.   Plan:  1. A1C monthly   2. self-glucose monitoring pre-meals and one hour after breakfast, lunch, dinner, and at bedtime   3. check glucose concentration during the night if nocturnal hypoglycemia is suspected   4. measure urinary ketones during illness or when any blood glucose value is > 180 mg/dL in a type 1 diabetic pregnant woman.     RX for glucagon emergency kit and urine ketone strips provided 4/2017.  RX for lantus and syringes provided in the event of insulin pump failure provided 4/2017.  Opthalmology-Yes last exam 3/2016-recommend she schedule an exam, referral provided.    Labs ordered today:   No orders of the defined types were placed in this encounter.      Radiology/Consults ordered today: None    No orders of the defined types were placed in this encounter.      Follow-up:  3 months as long as blood sugars remain controlled.  If any changes or A1c increases > 6.0%, will plan to have her f/u monthly.      Maryann Hammonds NP  Endocrinology  [facility]  CC: Cheyanne Silva      More than 50% of face to face time spent with Ms. Avila on counseling / coordinating her care.  Total face to face time was 25 minutes.      All questions were answered.  The patient indicates understanding of the above issues and agrees with the plan set forth.

## 2017-08-16 NOTE — MR AVS SNAPSHOT
After Visit Summary   8/16/2017    Fransisca Cabello    MRN: [id number]           Patient Information     Date Of Birth          1995        Visit Information        Provider Department      8/16/2017 10:30 AM Maryann Hammonds APRN CNP [facility]        Today's Diagnoses     Pre-existing type 1 diabetes mellitus during pregnancy in first trimester        Uncontrolled type 1 diabetes mellitus without complication (H)          Care Instructions          Component      Latest Ref Rng & Units 4/12/2017 6/30/2017 8/16/2017   Hemoglobin A1C      4.3 - 6.0 % 7.6 (H) 6.8 (H) 5.7       Next A1c:  9/16  10/16    Follow up with me 11/16 (approximately).    Maryann Hammonds NP  Endocrinology            Follow-ups after your visit        Your next 10 appointments already scheduled     Aug 25, 2017  3:15 PM CDT   ESTABLISHED PRENATAL with Cheyanne Silva MD   [facility] ([facility]    [address] [place] [id number] [phone]              Who to contact     If you have questions or need follow up information about today's clinic visit or your schedule please contact [facility] directly at [phone].  Normal or non-critical lab and imaging results will be communicated to you by MyChart, letter or phone within 4 business days after the clinic has received the results. If you do not hear from us within 7 days, please contact the clinic through MyChart or phone. If you have a critical or abnormal lab result, we will notify you by phone as soon as possible.  Submit refill requests through [employer] or call your pharmacy and they will forward the refill request to us. Please allow 3 business days for your refill to be completed.          Additional Information About Your Visit        Storefronthart Information     [employer] gives you secure access to your electronic health record. If you see a primary care provider, you  can also send messages to your care team and make appointments. If you have questions, please call your primary care clinic.  If you do not have a primary care provider, please call [phone] and they will assist you.        Care EveryWhere ID     This is your Care EveryWhere ID. This could be used by other organizations to access your [facility] medical records  [id number]        Your Vitals Were     Pulse Temperature Respirations Last Period Pulse Oximetry Breastfeeding?    107 98  F (36.7  C) (Oral) 16 04/21/2017 (Exact Date) 98% No    BMI (Body Mass Index)                   26.68 kg/m2            Blood Pressure from Last 3 Encounters:   08/16/17 123/85   07/28/17 132/74   07/12/17 126/87    Weight from Last 3 Encounters:   08/16/17 165 lb 4.8 oz (75 kg)   07/28/17 160 lb 8 oz (72.8 kg)   07/12/17 160 lb 6.4 oz (72.8 kg)              We Performed the Following     Hemoglobin A1c        Primary Care Provider    None Specified       No primary provider on file.        Equal Access to Services     FIDELIA [facility] CenterJENISE : Hadii isidoro Quintana, waaxda luqadaha, qaybta kaalhanna dinero, hernan spicer . So [facility] [phone].    ATENCIÓN: Si habla español, tiene a boateng disposición servicios gratuitos de asistencia lingüística. Llame al [phone].    We comply with applicable federal civil rights laws and [place] laws. We do not discriminate on the basis of race, color, national origin, age, disability sex, sexual orientation or gender identity.            Thank you!     Thank you for choosing [facility]  for your care. Our goal is always to provide you with excellent care. Hearing back from our patients is one way we can continue to improve our services. Please take a few minutes to complete the written survey that you may receive in the mail after your visit with us. Thank you!             Your Updated Medication List - Protect others around you: Learn how to safely  use, store and throw away your medicines at www.disposemymeds.org.          This list is accurate as of: 8/16/17 10:42 AM.  Always use your most recent med list.                   Brand Name Dispense Instructions for use Diagnosis    acetone (Urine) test Strp     100 each    Use as directed to test urine ketone if blood sugars are >250 or during illness with fever, nausea, vomiting, or abdominal pain.    High-risk pregnancy, young primigravida, third trimester       blood glucose monitoring test strip    RAYMUNDO CONTOUR NEXT    1000 each    Use to test blood sugar 10-12 times daily or as directed.    Uncontrolled type 1 diabetes mellitus without complication (H), Supervision of other high risk pregnancy, antepartum       glucagon 1 MG kit    GLUCAGON EMERGENCY    1 each    Inject 1 mg into the muscle once for 1 dose    Type 1 diabetes mellitus without complication (H), Insulin pump in place       insulin glargine 100 UNIT/ML injection    LANTUS VIAL    10 mL    Inject 19 u sq every 24 hours in the event of insulin pump therapy.  DO NOT FILL RX until requested by patient.    Type 1 diabetes mellitus without complication (H), Insulin pump in place, Encounter for long-term (current) use of insulin (H), High-risk pregnancy, young primigravida, third trimester       insulin lispro 100 UNIT/ML injection    humaLOG    3 vial    As directed via insulin pump - up to 100 units daily, adjusting pump settings due to increased insulin requirements and pregnancy    Uncontrolled type 1 diabetes mellitus without complication (H), Encounter for long-term (current) use of insulin (H)       insulin pump infusion      Date last updated:  12/23/16 Medtronic Minimed: Model 630G BASAL RATES and times: 12   AM (midnight): 0.85 units/hour   8     AM: 0.8 units/hour  6pm: 0.85 Basal Pattern A:  Fransisca Irineo will use when NA. CARB RATIO and times: 12   AM (midnight): 12 Corection Factor (Sensitivity) and times: 12   AM (midnight): 47  "mg/dL BLOOD GLUCOSE TARGET and times: 12   AM (midnight): 100 - 120 5     AM:  90 - 110 9:30    PM:  100 - 120 Active Insulin Time:  3 hours Sensor:  No Carelink / Diasend username: [place] Carelink / Diasend Password:  [email or web address]        insulin syringe-needle U-100 31G X 5/16\" 0.3 ML    BD insulin syringe ultrafine    100 each    Use one syringe 4 daily or as directed in the event of insulin pump failure.  DO NOT DISPENSE until requested by patient    Encounter for long-term (current) use of insulin (H)       MICROLET LANCETS Misc     600 each    Use one lancet 6 times daily for blood sugar testing    Type 1 diabetes mellitus without complication (H), Insulin pump in place       ondansetron 4 MG ODT tab    ZOFRAN ODT    30 tablet    Take 1-2 tablets (4-8 mg) by mouth every 8 hours as needed for nausea    Nausea/vomiting in pregnancy       PRENATAL VITAMINS PO             "

## 2017-08-16 NOTE — NURSING NOTE
"Chief Complaint   Patient presents with     Diabetes       Initial /85 (BP Location: Left arm, Patient Position: Chair, Cuff Size: Adult Regular)  Pulse 107  Temp 98  F (36.7  C) (Oral)  Resp 16  Wt 165 lb 4.8 oz (75 kg)  LMP 04/21/2017 (Exact Date)  SpO2 98%  Breastfeeding? No  BMI 26.68 kg/m2 Estimated body mass index is 26.68 kg/(m^2) as calculated from the following:    Height as of 4/12/17: 5' 6\" (1.676 m).    Weight as of this encounter: 165 lb 4.8 oz (75 kg).  Medication Reconciliation: complete  Emely Mullins M.A.  "

## 2017-08-16 NOTE — PATIENT INSTRUCTIONS
Component      Latest Ref Rng & Units 4/12/2017 6/30/2017 8/16/2017   Hemoglobin A1C      4.3 - 6.0 % 7.6 (H) 6.8 (H) 5.7       Next A1c:  9/16  10/16    Follow up with me 11/16 (approximately).    Maryann Hammonds NP  Endocrinology

## 2017-08-17 NOTE — PROGRESS NOTES
Fransisca,  Here's the official copy of your A1c result for your records.  Congratulations again, keep up the great work!  Maryann Hammonds NP  Endocrinology

## 2017-08-25 ENCOUNTER — PRENATAL OFFICE VISIT (OUTPATIENT)
Dept: OBGYN | Facility: CLINIC | Age: 22
End: 2017-08-25
Payer: COMMERCIAL

## 2017-08-25 VITALS
DIASTOLIC BLOOD PRESSURE: 82 MMHG | WEIGHT: 166 LBS | BODY MASS INDEX: 26.06 KG/M2 | HEART RATE: 58 BPM | SYSTOLIC BLOOD PRESSURE: 124 MMHG | HEIGHT: 67 IN

## 2017-08-25 DIAGNOSIS — E10.9 TYPE 1 DIABETES MELLITUS WITHOUT COMPLICATION (H): ICD-10-CM

## 2017-08-25 DIAGNOSIS — O09.92 SUPERVISION OF HIGH-RISK PREGNANCY, SECOND TRIMESTER: Primary | ICD-10-CM

## 2017-08-25 DIAGNOSIS — Z96.41 INSULIN PUMP IN PLACE: ICD-10-CM

## 2017-08-25 PROCEDURE — 99207 ZZC PRENATAL VISIT: CPT | Performed by: OBSTETRICS & GYNECOLOGY

## 2017-08-25 NOTE — PROGRESS NOTES
PROBLEM LIST  LABS: Opos/RI/    1.  Type I DM on pump: followed by endocrine. Plan: eye exam, TSH qTM, level II US, fetal echo, growth scans, twice weekly  testing at 30-32 weeks or per [facility].  2.  History severe preeclampsia: baby ASA daily. Baseline labs normal (p/c ratio 0.14).    BS have been normal. Feeling good. No fetal movement yet. Level II US and consult with MFM ordered. Further ultrasound and plans to follow.    Cheyanne Silva MD

## 2017-08-25 NOTE — MR AVS SNAPSHOT
After Visit Summary   8/25/2017    Fransisca Cabello    MRN: [id number]           Patient Information     Date Of Birth          1995        Visit Information        Provider Department      8/25/2017 3:15 PM Cheyanne Silva MD [facility]        Today's Diagnoses     Supervision of high-risk pregnancy, second trimester    -  1    Type 1 diabetes mellitus without complication (H)        Insulin pump in place           Follow-ups after your visit        Additional Services     MAT FETAL MED CTR REFERRAL-PREGNANCY       >> Patient may proceed with recommendations for further testing as directed by the Maternal Fetal Medicine Specialist >>    >> If requesting Fetal Echo: MFM will determine appropriate location for exam due to indication.    >> If requesting Lung Maturity Amnio:  If results indicate fetal lung maturity, induction or C/S is recommended within 36 hours.  Please schedule accordingly.     Dear Patient:   Please be aware that coverage of these services is subject to the terms and limitations of your health insurance plan.  Call member services at your health plan with any benefit or coverage questions.      Please bring the following to your appointment:    >>  Any x-rays, CTs or MRIs which have been performed.  Contact the facility where they were done to arrange for  prior to your scheduled appointment.  Any new CT, MRI or other procedures ordered by your specialist must be performed at a [place] facility or coordinated by your clinic's referral office.  >>  List of current medications   >>  This referral request   >>  Any documents/labs given to you for this referral                  Your next 10 appointments already scheduled     Sep 22, 2017  3:45 PM CDT   ESTABLISHED PRENATAL with Cheyanne Silva MD   [facility] ([facility])    [address] [place] [id number] [phone]            Nov 16,  "2017 11:00 AM CST   Return Visit with HORTENSIA Rosales CNP   [facility]    [address]  [facility] [address] [phone]              Who to contact     If you have questions or need follow up information about today's clinic visit or your schedule please contact [facility] directly at [phone].  Normal or non-critical lab and imaging results will be communicated to you by Meicanhart, letter or phone within 4 business days after the clinic has received the results. If you do not hear from us within 7 days, please contact the clinic through [employer] or phone. If you have a critical or abnormal lab result, we will notify you by phone as soon as possible.  Submit refill requests through TOPSEC or call your pharmacy and they will forward the refill request to us. Please allow 3 business days for your refill to be completed.          Additional Information About Your Visit        MeicanharPlaySay Information     TOPSEC gives you secure access to your electronic health record. If you see a primary care provider, you can also send messages to your care team and make appointments. If you have questions, please call your primary care clinic.  If you do not have a primary care provider, please call [phone] and they will assist you.        Care EveryWhere ID     This is your Care EveryWhere ID. This could be used by other organizations to access your [facility] medical records  [id number]        Your Vitals Were     Pulse Height Last Period BMI (Body Mass Index)          58 5' 7\" (1.702 m) 04/21/2017 (Exact Date) 26 kg/m2         Blood Pressure from Last 3 Encounters:   08/25/17 124/82   08/16/17 123/85   07/28/17 132/74    Weight from Last 3 Encounters:   08/25/17 166 lb (75.3 kg)   08/16/17 165 lb 4.8 oz (75 kg)   07/28/17 160 lb 8 oz (72.8 kg)              We Performed the Following     MAT FETAL MED CTR REFERRAL-PREGNANCY        " Primary Care Provider    None Specified       No primary provider on file.        Equal Access to Services     EDWIGE VALLDAARES : Hadii aad ku hadalexisstephon Quintana, waholgeryadi rivera, britkylee laguerreruthyyadi dinero, hernan humphreysankitaammy yeung. So [facility] [phone].    ATENCIÓN: Si habla español, tiene a boateng disposición servicios gratuitos de asistencia lingüística. Llame al [phone].    We comply with applicable federal civil rights laws and [place] laws. We do not discriminate on the basis of race, color, national origin, age, disability sex, sexual orientation or gender identity.            Thank you!     Thank you for choosing [facility]  for your care. Our goal is always to provide you with excellent care. Hearing back from our patients is one way we can continue to improve our services. Please take a few minutes to complete the written survey that you may receive in the mail after your visit with us. Thank you!             Your Updated Medication List - Protect others around you: Learn how to safely use, store and throw away your medicines at www.disposemymeds.org.          This list is accurate as of: 8/25/17  3:29 PM.  Always use your most recent med list.                   Brand Name Dispense Instructions for use Diagnosis    acetone (Urine) test Strp     100 each    Use as directed to test urine ketone if blood sugars are >250 or during illness with fever, nausea, vomiting, or abdominal pain.    High-risk pregnancy, young primigravida, third trimester       blood glucose monitoring test strip    RAYMUNDO CONTOUR NEXT    1000 each    Use to test blood sugar 10-12 times daily or as directed.    Uncontrolled type 1 diabetes mellitus without complication (H), Supervision of other high risk pregnancy, antepartum       glucagon 1 MG kit    GLUCAGON EMERGENCY    1 each    Inject 1 mg into the muscle once for 1 dose    Type 1 diabetes mellitus without complication (H), Insulin pump in place     "   insulin glargine 100 UNIT/ML injection    LANTUS VIAL    10 mL    Inject 19 u sq every 24 hours in the event of insulin pump therapy.  DO NOT FILL RX until requested by patient.    Type 1 diabetes mellitus without complication (H), Insulin pump in place, Encounter for long-term (current) use of insulin (H), High-risk pregnancy, young primigravida, third trimester       insulin lispro 100 UNIT/ML injection    humaLOG    3 vial    As directed via insulin pump - up to 100 units daily, adjusting pump settings due to increased insulin requirements and pregnancy    Uncontrolled type 1 diabetes mellitus without complication (H), Encounter for long-term (current) use of insulin (H)       insulin pump infusion      Date last updated:  12/23/16 Medtronic Minimed: Model 630G BASAL RATES and times: 12   AM (midnight): 0.85 units/hour   8     AM: 0.8 units/hour  6pm: 0.85 Basal Pattern A:  Fransisca Cabello will use when NA. CARB RATIO and times: 12   AM (midnight): 12 Corection Factor (Sensitivity) and times: 12   AM (midnight): 47 mg/dL BLOOD GLUCOSE TARGET and times: 12   AM (midnight): 100 - 120 5     AM:  90 - 110 9:30    PM:  100 - 120 Active Insulin Time:  3 hours Sensor:  No Carelink / Diasend username: [email or web address] Carelink / Diasend Password:  [email or web address]        insulin syringe-needle U-100 31G X 5/16\" 0.3 ML    BD insulin syringe ultrafine    100 each    Use one syringe 4 daily or as directed in the event of insulin pump failure.  DO NOT DISPENSE until requested by patient    Encounter for long-term (current) use of insulin (H)       MICROLET LANCETS Misc     600 each    Use one lancet 6 times daily for blood sugar testing    Type 1 diabetes mellitus without complication (H), Insulin pump in place       ondansetron 4 MG ODT tab    ZOFRAN ODT    30 tablet    Take 1-2 tablets (4-8 mg) by mouth every 8 hours as needed for nausea    Nausea/vomiting in pregnancy       PRENATAL VITAMINS PO             "

## 2017-08-25 NOTE — NURSING NOTE
"Chief Complaint   Patient presents with     Prenatal Care       Initial /82 (BP Location: Right arm, Patient Position: Sitting, Cuff Size: Adult Regular)  Pulse 58  Ht 5' 7\" (1.702 m)  Wt 166 lb (75.3 kg)  LMP 04/21/2017 (Exact Date)  BMI 26 kg/m2 Estimated body mass index is 26 kg/(m^2) as calculated from the following:    Height as of this encounter: 5' 7\" (1.702 m).    Weight as of this encounter: 166 lb (75.3 kg).  Medication Reconciliation: complete     17w1d, Reports normal blood sugars.  - FM noted yet  + R sided pelvic pain - random  - bleeding  - headache  - heartburn  María Elena Arce LPN      "

## 2017-08-30 DIAGNOSIS — O24.012 PRE-EXISTING TYPE 1 DIABETES MELLITUS DURING PREGNANCY IN SECOND TRIMESTER: Primary | ICD-10-CM

## 2017-09-01 ENCOUNTER — PRE VISIT (OUTPATIENT)
Dept: MATERNAL FETAL MEDICINE | Facility: CLINIC | Age: 22
End: 2017-09-01

## 2017-09-05 ENCOUNTER — OFFICE VISIT (OUTPATIENT)
Dept: MATERNAL FETAL MEDICINE | Facility: CLINIC | Age: 22
End: 2017-09-05
Attending: OBSTETRICS & GYNECOLOGY
Payer: COMMERCIAL

## 2017-09-05 ENCOUNTER — HOSPITAL ENCOUNTER (OUTPATIENT)
Dept: ULTRASOUND IMAGING | Facility: CLINIC | Age: 22
Discharge: HOME | End: 2017-09-05
Attending: OBSTETRICS & GYNECOLOGY | Admitting: OBSTETRICS & GYNECOLOGY
Payer: COMMERCIAL

## 2017-09-05 DIAGNOSIS — O26.90 PREGNANCY RELATED CONDITION, UNSPECIFIED TRIMESTER: ICD-10-CM

## 2017-09-05 DIAGNOSIS — O24.012 TYPE 1 DIABETES MELLITUS IN PREGNANCY, SECOND TRIMESTER: Primary | ICD-10-CM

## 2017-09-05 PROCEDURE — 76811 OB US DETAILED SNGL FETUS: CPT

## 2017-09-05 NOTE — MR AVS SNAPSHOT
After Visit Summary   9/5/2017    Fransisca Cabello    MRN: [id number]           Patient Information     Date Of Birth          1995        Visit Information        Provider Department      9/5/2017 2:15 PM Mani Vazquez MD [facility] Maternal Fetal Medicine [facility]        Today's Diagnoses     Type 1 diabetes mellitus in pregnancy, second trimester    -  1    Pregnancy related condition, unspecified trimester           Follow-ups after your visit        Your next 10 appointments already scheduled     Sep 22, 2017  3:45 PM CDT   ESTABLISHED PRENATAL with Cheyanne Silva MD   [facility]    [address] [facility] [address]   [phone]            Sep 26, 2017  1:00 PM CDT   Ech Fetal Complete* with [id number]   [facility] ([facility]    [address] [facility] [address]               Oct 03, 2017  1:30 PM CDT   MFM US COMPRE SINGLE F/U with [id number]   [facility] Maternal Fetal Medicine Ultrasound - [facility] [place])    [address]  [facility] [address] [phone]           Wear comfortable clothes and leave your valuables at home.            Oct 03, 2017  2:00 PM CDT   Radiology MD with  MFDOMINIK BETTENCOURT   [facility] Maternal Fetal Medicine [facility])    [address]  [facility] [id number] [phone]           Please arrive at the time given for your first appointment. This visit is used internally to schedule the physician's time during your ultrasound.            Nov 16, 2017 11:00 AM CST   Return Visit with HORTENSIA Rosales CNP   [facility])    [address] [place] [address]. [facility] [id number] [phone]              Future tests that were ordered for you today     Open Future Orders        Priority Expected Expires Ordered    MFM US  Comprehensive Single F/U Routine  9/5/2018 9/5/2017    Echo Fetal Complete-Peds Cardiology Routine 9/26/2017 9/5/2018 9/5/2017            Who to contact     If you have questions or need follow up information about today's clinic visit or your schedule please contact [employer] MATERNAL FETAL MEDICINE [facility] directly at [phone].  Normal or non-critical lab and imaging results will be communicated to you by Tutameehart, letter or phone within 4 business days after the clinic has received the results. If you do not hear from us within 7 days, please contact the clinic through Tutameehart or phone. If you have a critical or abnormal lab result, we will notify you by phone as soon as possible.  Submit refill requests through invi or call your pharmacy and they will forward the refill request to us. Please allow 3 business days for your refill to be completed.          Additional Information About Your Visit        Tutameehart Information     invi gives you secure access to your electronic health record. If you see a primary care provider, you can also send messages to your care team and make appointments. If you have questions, please call your primary care clinic.  If you do not have a primary care provider, please call [phone] and they will assist you.        Care EveryWhere ID     This is your Care EveryWhere ID. This could be used by other organizations to access your [place] medical records  [id number]        Your Vitals Were     Last Period                   04/21/2017 (Exact Date)            Blood Pressure from Last 3 Encounters:   08/25/17 124/82   08/16/17 123/85   07/28/17 132/74    Weight from Last 3 Encounters:   08/25/17 75.3 kg (166 lb)   08/16/17 75 kg (165 lb 4.8 oz)   07/28/17 72.8 kg (160 lb 8 oz)              We Performed the Following     MFM Office Visit        Primary Care Provider    None Specified       No primary provider on file.        Equal Access to Services     EDWIGE SANTIAGO: Floridalma  isidoro Quintana, waisma aniketuriel, qaanthonyta kaondina dinero, hernan vegain hayaabuddy balsmith julianndesirae lasinabuddy gamal. So [facility] [phone].    ATENCIÓN: Si habla español, tiene a boateng disposición servicios gratuitos de asistencia lingüística. Llame al [phone].    We comply with applicable federal civil rights laws and [place] laws. We do not discriminate on the basis of race, color, national origin, age, disability sex, sexual orientation or gender identity.            Thank you!     Thank you for choosing [facility]ERNAL FETAL MEDIC[facility]  for your care. Our goal is always to provide you with excellent care. Hearing back from our patients is one way we can continue to improve our services. Please take a few minutes to complete the written survey that you may receive in the mail after your visit with us. Thank you!             Your Updated Medication List - Protect others around you: Learn how to safely use, store and throw away your medicines at www.disposemymeds.org.          This list is accurate as of: 9/5/17  4:37 PM.  Always use your most recent med list.                   Brand Name Dispense Instructions for use Diagnosis    acetone (Urine) test Strp     100 each    Use as directed to test urine ketone if blood sugars are >250 or during illness with fever, nausea, vomiting, or abdominal pain.    High-risk pregnancy, young primigravida, third trimester       blood glucose monitoring test strip    RAYMUNDO CONTOUR NEXT    1000 each    Use to test blood sugar 10-12 times daily or as directed.    Uncontrolled type 1 diabetes mellitus without complication (H), Supervision of other high risk pregnancy, antepartum       glucagon 1 MG kit    GLUCAGON EMERGENCY    1 each    Inject 1 mg into the muscle once for 1 dose    Type 1 diabetes mellitus without complication (H), Insulin pump in place       insulin glargine 100 UNIT/ML injection    LANTUS VIAL    10 mL    Inject 19 u sq every 24 hours in the event of insulin  "pump therapy.  DO NOT FILL RX until requested by patient.    Type 1 diabetes mellitus without complication (H), Insulin pump in place, Encounter for long-term (current) use of insulin (H), High-risk pregnancy, young primigravida, third trimester       insulin lispro 100 UNIT/ML injection    humaLOG    3 vial    As directed via insulin pump - up to 100 units daily, adjusting pump settings due to increased insulin requirements and pregnancy    Uncontrolled type 1 diabetes mellitus without complication (H), Encounter for long-term (current) use of insulin (H)       insulin pump infusion      Date last updated:  12/23/16 Medtronic Minimed: Model 630G BASAL RATES and times: 12   AM (midnight): 0.85 units/hour   8     AM: 0.8 units/hour  6pm: 0.85 Basal Pattern A:  Fransisca Cabello will use when NA. CARB RATIO and times: 12   AM (midnight): 12 Corection Factor (Sensitivity) and times: 12   AM (midnight): 47 mg/dL BLOOD GLUCOSE TARGET and times: 12   AM (midnight): 100 - 120 5     AM:  90 - 110 9:30    PM:  100 - 120 Active Insulin Time:  3 hours Sensor:  No Carelink / Diasend username: [email or web address] Carelink / Diasend Password:  [email or web address]        insulin syringe-needle U-100 31G X 5/16\" 0.3 ML    BD insulin syringe ultrafine    100 each    Use one syringe 4 daily or as directed in the event of insulin pump failure.  DO NOT DISPENSE until requested by patient    Encounter for long-term (current) use of insulin (H)       MICROLET LANCETS Misc     600 each    Use one lancet 6 times daily for blood sugar testing    Type 1 diabetes mellitus without complication (H), Insulin pump in place       ondansetron 4 MG ODT tab    ZOFRAN ODT    30 tablet    Take 1-2 tablets (4-8 mg) by mouth every 8 hours as needed for nausea    Nausea/vomiting in pregnancy       PRENATAL VITAMINS PO             "

## 2017-09-05 NOTE — PROGRESS NOTES
Please refer to ultrasound report under 'Imaging' Studies of 'Chart Review' tabs.    Main Vazquez M.D.    Dear Dr. Silva,  Thank you for referring Ms. Fransisca Cabello for Maternal Fetal Medicine Consultation regarding the management of pregnancy complicated by a history of severe preeclampsia in her previous pregnancy, and Type I DM using a closed loop subcutaneous insulin pump for glucose management since .  HPI:  I met with Ms. Cabello today. Ms. Cabello is a very pleasant 22 year old  who is at 19w4d today by early first trimester ultrasound performed at 9 weeks and 6 days. She has an BETTY of 2018.   Ms. Cabello was diagnosed with Type DM at age 2 and during her first pregnancy, prior to the current pregnancy, was diagnosed with preeclampsia with severe features at 34weeks and 2 days. She underwent induction of labor and a vaginal delivery. Her  female infant daughter weighed 3450 grams. She required antihypertensive medications for 4 months after delivery.  She is currently taking LDA for preeclampsia prevention.  Her diabetes was diagnosed at age [age]. She was been using intermittent subcutaneous insulin until 2016, when she was started on a closed loop insulin pump, which she manages manually during pregnancy due to the lower levels required during pregnancy. She manages her pump under the supervision of Dr. Maryann Hammonds (Endocrinology), she sends in her BS values weekly and makes the corresponding adjustments.  Her daily insulin dose has gone from 19 units per day at the beginning of pregnancy up to current dose of 36.5 unites per day basal infusion. Her basal rates are 12-3:1u, 3-6: 1u, 6-10: 1.35u, 10-14: 1.6u, 14-18: 1.95u, 18-21.30: 1.85u and 21.30-24: 1.7u. Her current carb ratio is breakfast 1:9, lunch 1:8 and dinner 1:7 grams of carbs. Hemoglobin A1c was 5.7% on -. It was 6.8% on 2017.  Initial recommendations you have made for  "management include:  1.  Type I DM on pump: followed by endocrine. Plan: eye exam, TSH qTM, level II US, fetal echo, growth scans, twice weekly  testing at 30-32 weeks or per [facility].  2.  History severe preeclampsia: baby ASA daily. Baseline labs normal (p/c ratio 0.14).     Since her last LMP she denies use of alcohol, tobacco and street drugs.                         PAST MEDICAL HISTORY:   Past Medical History:  Diagnosis Date    Type 1 diabetes (H)    three years at diagnosis    Varicella     Obstetric History      T0      L1    SAB0   TAB0   Ectopic0   Multiple0   Live Births1     # Outcome Date GA Lbr Marcellus/2nd Weight Sex Delivery Anes PTL Lv  2 Current           1  14 34w2d 07:13 / 01:56 3.45 kg (7 lb 9.7 oz) F Vag-Spont EPI  VINNY     Name: Cinthia     Complications: Preeclampsia/Hypertension     Apgar1:  7                Apgar5: 8    PAST SURGICAL HISTORY:   Past Surgical History:  Procedure Laterality Date    C ROOT CANAL THERAPY 2 CANALS      DENTAL SURGERY      FAMILY HISTORY:   Family History  Problem Relation Age of Onset    Family History Negative Mother     DIABETES Father 35    Type 1     SOCIAL HISTORY:   Social History  Substance Use Topics    Smoking status: Never Smoker    Smokeless tobacco: Never Used    Alcohol use No    PHYSICAL EXAM:  Estimated body mass index is 26 kg/(m^2) as calculated from the following:  Height as of 17: 1.702 m (5' 7\").  Weight as of 17: 75.3 kg (166 lb).  Pleasant adult female oriented x 3.    Recommendations:    Diabetes Care:  1. Establish a regimen for blood sugar monitoring that will be adjusted according to needs in early pregnancy. Insulin requirements tend to decrease during the first half of pregnancy secondary to decreased carbohydrate intake.  2. In addition to standard prenatal labs we recommend obtaining HbA1c and thyroid function assessment which should be repeated every 3 months or as clinically " indicated.  3. Recommend maternal ophthalmological exam, maternal EKG and baseline proteinuria (this last was already done)  4. Recommend 18-22 week scan for fetal anatomy surveillance (done today). Among women with pre-conception diabetes, the risk for structural fetal anomalies is higher. Furthermore, we recommend a fetal ECHO at 22-24 weeks with pediatric cardiology secondary to increased risk for fetal structural heart disease. ECHO has been scheduled.  5. Begin fetal surveillance at 32 weeks with twice a week BPP until delivery.  6. Recommend fetal growth scans every 3-4 weeks until delivery.  7. Recommend delivery by 37 to 39 weeks depending on patient blood pressure, and degree of blood sugar control.  8. Recommend surveillance for development of hypertensive disease of pregnancy which has an increased incidence among women with pre-gestational and gestational diabetes.  9. We discussed increasing insulin resistance with advancing pregnancy and with increasing weight gain. I have recommended mild to moderate physical activity 3-5 times a week and maintaining weight gain to less than 7-12 kg for the entire pregnancy. These interventions have been shown to improve outcomes for mother and baby by improving blood sugar control and the progression of hypertensive complications during pregnancy.  History of preeclampsia:  1. Evaluation for development of disease is based on clinical and laboratory findings.   Clinically severity is based on neurological, respiratory, cardiovascular, renal and abdominal findings. I have reviewed available lab results from today with normal creatinine and liver enzymes.     2. Surveillance is performed both on maternal evolution of disease with development of new symptoms or worsening of current symptoms. The laboratory assessment should be performed at baseline as well as time of development of new symptoms or signs of disease.   3. Surveillance of blood pressure should include  regular blood pressure measurements as well as medication in the event severe range values at or greater than 160 systolic or 110 mmHg diastolic pressures.   4. Recommend continuation of LDA 81 mg a day which has been shown to decrease risk for development of preeclampsia. We discussed increased risk secondary to DM.  At the conclusion of our conversation, the patient appeared to have a clear grasp and understanding of the content of our conversation based on the appropriate questions that she has asked.  I spent a total of 30 minutes face-to-face with this patient counseling and coordinating care as described above. More than 50% of the time was in coordination of care and discussion of management of care.  A copy of this consultation is being faxed to your office.  Sincerely,  Mani Vazquez M.D.  Maternal Fetal Medicine

## 2017-09-22 ENCOUNTER — PRENATAL OFFICE VISIT (OUTPATIENT)
Dept: OBGYN | Facility: CLINIC | Age: 22
End: 2017-09-22
Payer: COMMERCIAL

## 2017-09-22 ENCOUNTER — TRANSFERRED RECORDS (OUTPATIENT)
Dept: HEALTH INFORMATION MANAGEMENT | Facility: CLINIC | Age: 22
End: 2017-09-22

## 2017-09-22 VITALS
HEART RATE: 79 BPM | HEIGHT: 67 IN | BODY MASS INDEX: 27.31 KG/M2 | WEIGHT: 174 LBS | SYSTOLIC BLOOD PRESSURE: 130 MMHG | DIASTOLIC BLOOD PRESSURE: 86 MMHG

## 2017-09-22 DIAGNOSIS — O09.892 SUPERVISION OF OTHER HIGH RISK PREGNANCY, ANTEPARTUM, SECOND TRIMESTER: Primary | ICD-10-CM

## 2017-09-22 DIAGNOSIS — Z23 NEED FOR PROPHYLACTIC VACCINATION AND INOCULATION AGAINST INFLUENZA: ICD-10-CM

## 2017-09-22 DIAGNOSIS — Z96.41 INSULIN PUMP IN PLACE: ICD-10-CM

## 2017-09-22 DIAGNOSIS — E10.9 TYPE 1 DIABETES MELLITUS WITHOUT COMPLICATION (H): ICD-10-CM

## 2017-09-22 PROCEDURE — 90686 IIV4 VACC NO PRSV 0.5 ML IM: CPT | Performed by: OBSTETRICS & GYNECOLOGY

## 2017-09-22 PROCEDURE — 36415 COLL VENOUS BLD VENIPUNCTURE: CPT | Performed by: OBSTETRICS & GYNECOLOGY

## 2017-09-22 PROCEDURE — 84443 ASSAY THYROID STIM HORMONE: CPT | Performed by: OBSTETRICS & GYNECOLOGY

## 2017-09-22 PROCEDURE — 90471 IMMUNIZATION ADMIN: CPT | Performed by: OBSTETRICS & GYNECOLOGY

## 2017-09-22 PROCEDURE — 99207 ZZC PRENATAL VISIT: CPT | Performed by: OBSTETRICS & GYNECOLOGY

## 2017-09-22 NOTE — PROGRESS NOTES
PROBLEM LIST  LABS: Opos/RI/    1.  Type I DM on pump: followed by endocrine. Plan: eye exam, TSH qTM, level II US (normal), fetal echo (scheduled), growth scans q3-4w, twice weekly  testing at 32 weeks, delivery at 37-39 weeks based on glucose control/other indications.  2.  History severe preeclampsia: baby ASA daily. Baseline labs normal (p/c ratio 0.14).    Level II US and MFM recommendations reviewed. Echo scheduled. BS control is good. TSH today. Follow up in 1 month.    Cheyanne Silva MD

## 2017-09-22 NOTE — MR AVS SNAPSHOT
After Visit Summary   9/22/2017    Fransisca Cabello    MRN: [id number]           Patient Information     Date Of Birth          1995        Visit Information        Provider Department      9/22/2017 3:45 PM Cheyanne Silva MD [facility]        Today's Diagnoses     Supervision of other high risk pregnancy, antepartum, second trimester    -  1    Type 1 diabetes mellitus without complication (H)        Insulin pump in place        Need for prophylactic vaccination and inoculation against influenza           Follow-ups after your visit        Your next 10 appointments already scheduled     Oct 13, 2017 11:30 AM CDT   ESTABLISHED PRENATAL with Cheyanne Silva MD   [facility] ([facility])    [address] [facility] [id number] [phone]            Nov 03, 2017  1:30 PM CDT   MFM US COMPRE SINGLE F/U with MFMUSR2   [facility] Maternal Fetal Medicine Ultrasound - [facility])    [address] [place] [address] [phone]           Wear comfortable clothes and leave your valuables at home.            Nov 03, 2017  2:00 PM CDT   [facility]   [facility] Maternal Fetal Medicine - [facility])    [address]  [facility] [address] [phone]           Please arrive at the time given for your first appointment. This visit is used internally to schedule the physician's time during your ultrasound.            Nov 16, 2017 11:00 AM CST   Return Visit with HORTENSIA Rosales [facility] ([facility])    [address] [place] [id number] [phone]              Future tests that were ordered for you today     Open Future Orders        Priority Expected Expires Ordered    MFM US Comprehensive Single F/U Routine  10/3/2018 10/3/2017            Who to contact   "   If you have questions or need follow up information about today's clinic visit or your schedule please contact [facility] directly at [phone].  Normal or non-critical lab and imaging results will be communicated to you by MyChart, letter or phone within 4 business days after the clinic has received the results. If you do not hear from us within 7 days, please contact the clinic through [employer] or phone. If you have a critical or abnormal lab result, we will notify you by phone as soon as possible.  Submit refill requests through [employer] or call your pharmacy and they will forward the refill request to us. Please allow 3 business days for your refill to be completed.          Additional Information About Your Visit        [employer] Information     [employer] gives you secure access to your electronic health record. If you see a primary care provider, you can also send messages to your care team and make appointments. If you have questions, please call your primary care clinic.  If you do not have a primary care provider, please call [phone] and they will assist you.        Care EveryWhere ID     This is your Care EveryWhere ID. This could be used by other organizations to access your [place] medical records  [id number]        Your Vitals Were     Pulse Height Last Period BMI (Body Mass Index)          79 5' 7\" (1.702 m) 04/21/2017 (Exact Date) 27.25 kg/m2         Blood Pressure from Last 3 Encounters:   09/22/17 130/86   08/25/17 124/82   08/16/17 123/85    Weight from Last 3 Encounters:   09/22/17 174 lb (78.9 kg)   08/25/17 166 lb (75.3 kg)   08/16/17 165 lb 4.8 oz (75 kg)              We Performed the Following     FLU VAC, SPLIT VIRUS IM > 3 YO (QUADRIVALENT) [63154]     TSH with free T4 reflex     Vaccine Administration, Initial [20218]        Primary Care Provider    None Specified       No primary provider on file.        Equal Access to Services     EDWIGE SANTIAGO: Floridalma plummer " molly Quintana, fátimada luvikkiuriel, qaanthonyta kaondina dinero, hernan johnsonbuddy gamal. So [facility] [phone].    ATENCIÓN: Si habla demetriañol, tiene a boateng disposición servicios gratuitos de asistencia lingüística. Titus al [phone].    We comply with applicable federal civil rights laws and [place] laws. We do not discriminate on the basis of race, color, national origin, age, disability, sex, sexual orientation, or gender identity.            Thank you!     Thank you for choosing [facility]  for your care. Our goal is always to provide you with excellent care. Hearing back from our patients is one way we can continue to improve our services. Please take a few minutes to complete the written survey that you may receive in the mail after your visit with us. Thank you!             Your Updated Medication List - Protect others around you: Learn how to safely use, store and throw away your medicines at www.disposemymeds.org.          This list is accurate as of: 9/22/17 11:59 PM.  Always use your most recent med list.                   Brand Name Dispense Instructions for use Diagnosis    acetone (Urine) test Strp     100 each    Use as directed to test urine ketone if blood sugars are >250 or during illness with fever, nausea, vomiting, or abdominal pain.    High-risk pregnancy, young primigravida, third trimester       blood glucose monitoring test strip    RAYMUNDO CONTOUR NEXT    1000 each    Use to test blood sugar 10-12 times daily or as directed.    Uncontrolled type 1 diabetes mellitus without complication (H), Supervision of other high risk pregnancy, antepartum       glucagon 1 MG kit    GLUCAGON EMERGENCY    1 each    Inject 1 mg into the muscle once for 1 dose    Type 1 diabetes mellitus without complication (H), Insulin pump in place       insulin glargine 100 UNIT/ML injection    LANTUS VIAL    10 mL    Inject 19 u sq every 24 hours in the event of insulin pump therapy.   "DO NOT FILL RX until requested by patient.    Type 1 diabetes mellitus without complication (H), Insulin pump in place, Encounter for long-term (current) use of insulin (H), High-risk pregnancy, young primigravida, third trimester       insulin lispro 100 UNIT/ML injection    humaLOG    3 vial    As directed via insulin pump - up to 100 units daily, adjusting pump settings due to increased insulin requirements and pregnancy    Uncontrolled type 1 diabetes mellitus without complication (H), Encounter for long-term (current) use of insulin (H)       insulin pump infusion      Date last updated:  12/23/16 Medtronic Minimed: Model 630G BASAL RATES and times: 12   AM (midnight): 0.85 units/hour   8     AM: 0.8 units/hour  6pm: 0.85 Basal Pattern A:  Fransisca Cabello will use when NA. CARB RATIO and times: 12   AM (midnight): 12 Corection Factor (Sensitivity) and times: 12   AM (midnight): 47 mg/dL BLOOD GLUCOSE TARGET and times: 12   AM (midnight): 100 - 120 5     AM:  90 - 110 9:30    PM:  100 - 120 Active Insulin Time:  3 hours Sensor:  No Carelink / Diasend username: [email or web address] Carelink / Diasend Password:  [email or web address]        insulin syringe-needle U-100 31G X 5/16\" 0.3 ML    BD insulin syringe ultrafine    100 each    Use one syringe 4 daily or as directed in the event of insulin pump failure.  DO NOT DISPENSE until requested by patient    Encounter for long-term (current) use of insulin (H)       MICROLET LANCETS Misc     600 each    Use one lancet 6 times daily for blood sugar testing    Type 1 diabetes mellitus without complication (H), Insulin pump in place       ondansetron 4 MG ODT tab    ZOFRAN ODT    30 tablet    Take 1-2 tablets (4-8 mg) by mouth every 8 hours as needed for nausea    Nausea/vomiting in pregnancy       PRENATAL VITAMINS PO             "

## 2017-09-22 NOTE — PROGRESS NOTES
Injectable Influenza Immunization Documentation    1.  Is the person to be vaccinated sick today?   No    2. Does the person to be vaccinated have an allergy to a component   of the vaccine?   No    3. Has the person to be vaccinated ever had a serious reaction   to influenza vaccine in the past?   No    4. Has the person to be vaccinated ever had Guillain-Barré syndrome?   No    Form completed by María Elena Arce LPN

## 2017-09-23 LAB — TSH SERPL DL<=0.005 MIU/L-ACNC: 1.41 MU/L (ref 0.4–4)

## 2017-09-26 ENCOUNTER — HOSPITAL ENCOUNTER (OUTPATIENT)
Dept: CARDIOLOGY | Facility: CLINIC | Age: 22
Discharge: HOME | End: 2017-09-26
Attending: OBSTETRICS & GYNECOLOGY | Admitting: OBSTETRICS & GYNECOLOGY
Payer: COMMERCIAL

## 2017-09-26 DIAGNOSIS — O24.012 TYPE 1 DIABETES MELLITUS IN PREGNANCY, SECOND TRIMESTER: ICD-10-CM

## 2017-09-26 PROCEDURE — 76825 ECHO EXAM OF FETAL HEART: CPT

## 2017-10-03 ENCOUNTER — OFFICE VISIT (OUTPATIENT)
Dept: MATERNAL FETAL MEDICINE | Facility: CLINIC | Age: 22
End: 2017-10-03
Attending: OBSTETRICS & GYNECOLOGY
Payer: COMMERCIAL

## 2017-10-03 ENCOUNTER — HOSPITAL ENCOUNTER (OUTPATIENT)
Dept: ULTRASOUND IMAGING | Facility: CLINIC | Age: 22
Discharge: HOME | End: 2017-10-03
Attending: OBSTETRICS & GYNECOLOGY | Admitting: OBSTETRICS & GYNECOLOGY
Payer: COMMERCIAL

## 2017-10-03 DIAGNOSIS — O24.012 TYPE 1 DIABETES MELLITUS IN PREGNANCY, SECOND TRIMESTER: ICD-10-CM

## 2017-10-03 DIAGNOSIS — O24.012 TYPE 1 DIABETES MELLITUS IN PREGNANCY, SECOND TRIMESTER: Primary | ICD-10-CM

## 2017-10-03 PROCEDURE — 76816 OB US FOLLOW-UP PER FETUS: CPT

## 2017-10-03 NOTE — MR AVS SNAPSHOT
After Visit Summary   10/3/2017    Fransisca Cabello    MRN: [id number]           Patient Information     Date Of Birth          1995        Visit Information        Provider Department      10/3/2017 2:00 PM Sridevi Tabor MD [facility] M[facility] Fetal [facility]        Today's Diagnoses     Type 1 diabetes mellitus in pregnancy, second trimester    -  1       Follow-ups after your visit        Your next 10 appointments already scheduled     Oct 13, 2017 11:30 AM CDT   ESTABLISHED PRENATAL with Cheyanne Silva MD   [address] [address] [phone]            Nov 16, 2017 11:00 AM CST   Return Visit with HORTENSIA Rosales [address] [address] [phone]              Who to contact     If you have questions or need follow up information about today's clinic visit or your schedule please contact [facility] MATERNAL FETAL MEDICINE [facility] directly at [phone].  Normal or non-critical lab and imaging results will be communicated to you by [employer], letter or phone within 4 business days after the clinic has received the results. If you do not hear from us within 7 days, please contact the clinic through [employer] or phone. If you have a critical or abnormal lab result, we will notify you by phone as soon as possible.  Submit refill requests through [employer] or call your pharmacy and they will forward the refill request to us. Please allow 3 business days for your refill to be completed.          Additional Information About Your Visit        MyChart Information     [employer] gives you secure access to your electronic health record. If you see a primary care provider, you can also send messages to your care team and make appointments. If you have questions, please call your primary care  clinic.  If you do not have a primary care provider, please call [phone] and they will assist you.        Care EveryWhere ID     This is your Care EveryWhere ID. This could be used by other organizations to access your [place] medical records  [id number]        Your Vitals Were     Last Period                   04/21/2017 (Exact Date)            Blood Pressure from Last 3 Encounters:   09/22/17 130/86   08/25/17 124/82   08/16/17 123/85    Weight from Last 3 Encounters:   09/22/17 78.9 kg (174 lb)   08/25/17 75.3 kg (166 lb)   08/16/17 75 kg (165 lb 4.8 oz)              Today, you had the following     No orders found for display       Primary Care Provider    None Specified       No primary provider on file.        Equal Access to Services     EDWIGE VALLADARES : Floridalma Quintana, martha rivera, gwyn kaalhanna dinero, hernan spicer . So [facility] [phone].    ATENCIÓN: Si habla español, tiene a boateng disposición servicios gratuitos de asistencia lingüística. Llame al [phone].    We comply with applicable federal civil rights laws and [place] laws. We do not discriminate on the basis of race, color, national origin, age, disability, sex, sexual orientation, or gender identity.            Thank you!     Thank you for choosing [facility]  for your care. Our goal is always to provide you with excellent care. Hearing back from our patients is one way we can continue to improve our services. Please take a few minutes to complete the written survey that you may receive in the mail after your visit with us. Thank you!             Your Updated Medication List - Protect others around you: Learn how to safely use, store and throw away your medicines at www.disposemymeds.org.          This list is accurate as of: 10/3/17  2:19 PM.  Always use your most recent med list.                   Brand Name Dispense Instructions for use Diagnosis     acetone (Urine) test Strp     100 each    Use as directed to test urine ketone if blood sugars are >250 or during illness with fever, nausea, vomiting, or abdominal pain.    High-risk pregnancy, young primigravida, third trimester       blood glucose monitoring test strip    RAYMUNDO CONTOUR NEXT    1000 each    Use to test blood sugar 10-12 times daily or as directed.    Uncontrolled type 1 diabetes mellitus without complication (H), Supervision of other high risk pregnancy, antepartum       glucagon 1 MG kit    GLUCAGON EMERGENCY    1 each    Inject 1 mg into the muscle once for 1 dose    Type 1 diabetes mellitus without complication (H), Insulin pump in place       insulin glargine 100 UNIT/ML injection    LANTUS VIAL    10 mL    Inject 19 u sq every 24 hours in the event of insulin pump therapy.  DO NOT FILL RX until requested by patient.    Type 1 diabetes mellitus without complication (H), Insulin pump in place, Encounter for long-term (current) use of insulin (H), High-risk pregnancy, young primigravida, third trimester       insulin lispro 100 UNIT/ML injection    humaLOG    3 vial    As directed via insulin pump - up to 100 units daily, adjusting pump settings due to increased insulin requirements and pregnancy    Uncontrolled type 1 diabetes mellitus without complication (H), Encounter for long-term (current) use of insulin (H)       insulin pump infusion      Date last updated:  12/23/16 [employer] Minimed: Model 630G BASAL RATES and times: 12   AM (midnight): 0.85 units/hour   8     AM: 0.8 units/hour  6pm: 0.85 Basal Pattern A:  Fransisca Cabello will use when NA. CARB RATIO and times: 12   AM (midnight): 12 Corection Factor (Sensitivity) and times: 12   AM (midnight): 47 mg/dL BLOOD GLUCOSE TARGET and times: 12   AM (midnight): 100 - 120 5     AM:  90 - 110 9:30    PM:  100 - 120 Active Insulin Time:  3 hours Sensor:  No Carelink / Diasend username: [email or web address] Carelink / Diasend Password:  [email or web address]     "    insulin syringe-needle U-100 31G X 5/16\" 0.3 ML    BD insulin syringe ultrafine    100 each    Use one syringe 4 daily or as directed in the event of insulin pump failure.  DO NOT DISPENSE until requested by patient    Encounter for long-term (current) use of insulin (H)       MICROLET LANCETS Misc     600 each    Use one lancet 6 times daily for blood sugar testing    Type 1 diabetes mellitus without complication (H), Insulin pump in place       ondansetron 4 MG ODT tab    ZOFRAN ODT    30 tablet    Take 1-2 tablets (4-8 mg) by mouth every 8 hours as needed for nausea    Nausea/vomiting in pregnancy       PRENATAL VITAMINS PO             "

## 2017-10-03 NOTE — PROGRESS NOTES
"Please see \"Imaging\" tab under \"Chart Review\" for details of today's visit.    Sridevi Tabor    "

## 2017-10-13 ENCOUNTER — PRENATAL OFFICE VISIT (OUTPATIENT)
Dept: OBGYN | Facility: CLINIC | Age: 22
End: 2017-10-13
Payer: COMMERCIAL

## 2017-10-13 VITALS
HEART RATE: 84 BPM | HEIGHT: 67 IN | SYSTOLIC BLOOD PRESSURE: 112 MMHG | BODY MASS INDEX: 28.09 KG/M2 | DIASTOLIC BLOOD PRESSURE: 60 MMHG | WEIGHT: 179 LBS

## 2017-10-13 DIAGNOSIS — E10.9 TYPE 1 DIABETES MELLITUS WITHOUT COMPLICATION (H): ICD-10-CM

## 2017-10-13 DIAGNOSIS — O21.9 NAUSEA/VOMITING IN PREGNANCY: ICD-10-CM

## 2017-10-13 DIAGNOSIS — O09.899 SUPERVISION OF OTHER HIGH RISK PREGNANCY, ANTEPARTUM: ICD-10-CM

## 2017-10-13 DIAGNOSIS — Z96.41 INSULIN PUMP IN PLACE: ICD-10-CM

## 2017-10-13 DIAGNOSIS — Z82.79 FAMILY HISTORY OF COARCTATION OF AORTA: Primary | ICD-10-CM

## 2017-10-13 PROCEDURE — 99207 ZZC PRENATAL VISIT: CPT | Performed by: OBSTETRICS & GYNECOLOGY

## 2017-10-13 PROCEDURE — 93005 ELECTROCARDIOGRAM TRACING: CPT | Performed by: OBSTETRICS & GYNECOLOGY

## 2017-10-13 RX ORDER — ONDANSETRON 4 MG/1
4-8 TABLET, ORALLY DISINTEGRATING ORAL EVERY 8 HOURS PRN
Qty: 30 TABLET | Refills: 1 | Status: ON HOLD | OUTPATIENT
Start: 2017-10-13 | End: 2018-01-07

## 2017-10-13 NOTE — PROGRESS NOTES
PROBLEM LIST  LABS: Opos/RI/    1.  Type I DM on pump: followed by endocrine. Plan: eye exam, TSH qTM, level II US (normal), fetal echo (scheduled), growth scans q3-4w, twice weekly  testing at 32 weeks, delivery at 37-39 weeks based on glucose control/other indications.  2.  History severe preeclampsia: baby ASA daily. Baseline labs normal (p/c ratio 0.14).      Overall BS are good, but high today--she is getting a cold, stuffy nose and sore throat (slight) this am. No cough or fevers, no chest pain. Discussed recommendations for ECG and for echo for her due to daughter with mild coarctation as well as MGM with coarctation. ECG ordered for today, echo ordered for future. She has fetal echo scheduled with M. Will follow up with me in 4 weeks or sooner if needed. Renewed prescription for zofran.    Advised close follow up with her diabetes team if her blood sugars are not responding to her usual plan for illness after she adjusts her insulin dose.    Cheyanne Silva MD

## 2017-10-13 NOTE — MR AVS SNAPSHOT
After Visit Summary   10/13/2017    Fransisca Cabello    MRN: [id number]           Patient Information     Date Of Birth          1995        Visit Information        Provider Department      10/13/2017 11:30 AM Cheyanne Silva MD [facility]        Today's Diagnoses     Family history of coarctation of aorta    -  1    Supervision of other high risk pregnancy, antepartum        Type 1 diabetes mellitus without complication (H)        Insulin pump in place        Nausea/vomiting in pregnancy           Follow-ups after your visit        Your next 10 appointments already scheduled     Nov 03, 2017  1:30 PM CDT   MFM US COMPRE SINGLE F/U with MFMUSR2   [facility] Maternal Fetal Medicine Ultrasound - [facility])    [address]  [facility] [address] [phone]           Wear comfortable clothes and leave your valuables at home.            Nov 03, 2017  2:00 PM CDT   [facility]   [facility] Maternal Fetal Medicine - [facility])    [address]  [facility] [address] [phone]           Please arrive at the time given for your first appointment. This visit is used internally to schedule the physician's time during your ultrasound.            Nov 16, 2017 11:00 AM CST   Return Visit with HORTENSIA Rosales CNP   [facility]    [address]  [facility] [address] [phone]              Future tests that were ordered for you today     Open Future Orders        Priority Expected Expires Ordered    Echocardiogram Complete Routine  10/13/2018 10/13/2017            Who to contact     If you have questions or need follow up information about today's clinic visit or your schedule please contact [facility] directly at [phone].  Normal or non-critical lab and imaging results will be  "communicated to you by MyChart, letter or phone within 4 business days after the clinic has received the results. If you do not hear from us within 7 days, please contact the clinic through [employer] or phone. If you have a critical or abnormal lab result, we will notify you by phone as soon as possible.  Submit refill requests through [facility] or call your pharmacy and they will forward the refill request to us. Please allow 3 business days for your refill to be completed.          Additional Information About Your Visit        [facility] Information     [facility] gives you secure access to your electronic health record. If you see a primary care provider, you can also send messages to your care team and make appointments. If you have questions, please call your primary care clinic.  If you do not have a primary care provider, please call [phone] and they will assist you.        Care EveryWhere ID     This is your Care EveryWhere ID. This could be used by other organizations to access your [place] medical records  [id number]        Your Vitals Were     Pulse Height Last Period Breastfeeding? BMI (Body Mass Index)       84 5' 7\" (1.702 m) 04/21/2017 (Exact Date) No 28.04 kg/m2        Blood Pressure from Last 3 Encounters:   10/13/17 112/60   09/22/17 130/86   08/25/17 124/82    Weight from Last 3 Encounters:   10/13/17 179 lb (81.2 kg)   09/22/17 174 lb (78.9 kg)   08/25/17 166 lb (75.3 kg)              We Performed the Following     EKG 12-lead complete w/read - Clinics          Where to get your medicines      These medications were sent to [facility] [address]  [address] [facility] [address]     Phone:  [phone]     ondansetron 4 MG ODT tab          Primary Care Provider    None Specified       No primary provider on file.        Equal Access to Services     EDWIGE VALLADARES AH: Floridalma Quintana, fátimada lulashae, qaybta kaondina dinero, hernan " layla balsmith franklinaan ah. So [facility] [phone].    ATENCIÓN: Si habla renata, tiene a boateng disposición servicios gratuitos de asistencia lingüística. Titus al [phone].    We comply with applicable federal civil rights laws and [place] laws. We do not discriminate on the basis of race, color, national origin, age, disability, sex, sexual orientation, or gender identity.            Thank you!     Thank you for choosing [facility]  for your care. Our goal is always to provide you with excellent care. Hearing back from our patients is one way we can continue to improve our services. Please take a few minutes to complete the written survey that you may receive in the mail after your visit with us. Thank you!             Your Updated Medication List - Protect others around you: Learn how to safely use, store and throw away your medicines at www.disposemymeds.org.          This list is accurate as of: 10/13/17 11:49 AM.  Always use your most recent med list.                   Brand Name Dispense Instructions for use Diagnosis    acetone (Urine) test Strp     100 each    Use as directed to test urine ketone if blood sugars are >250 or during illness with fever, nausea, vomiting, or abdominal pain.    High-risk pregnancy, young primigravida, third trimester       blood glucose monitoring test strip    RAYMUNDO CONTOUR NEXT    1000 each    Use to test blood sugar 10-12 times daily or as directed.    Uncontrolled type 1 diabetes mellitus without complication (H), Supervision of other high risk pregnancy, antepartum       glucagon 1 MG kit    GLUCAGON EMERGENCY    1 each    Inject 1 mg into the muscle once for 1 dose    Type 1 diabetes mellitus without complication (H), Insulin pump in place       insulin glargine 100 UNIT/ML injection    LANTUS VIAL    10 mL    Inject 19 u sq every 24 hours in the event of insulin pump therapy.  DO NOT FILL RX until requested by patient.    Type 1 diabetes  "mellitus without complication (H), Insulin pump in place, Encounter for long-term (current) use of insulin (H), High-risk pregnancy, young primigravida, third trimester       insulin lispro 100 UNIT/ML injection    humaLOG    3 vial    As directed via insulin pump - up to 100 units daily, adjusting pump settings due to increased insulin requirements and pregnancy    Uncontrolled type 1 diabetes mellitus without complication (H), Encounter for long-term (current) use of insulin (H)       insulin pump infusion      Date last updated:  12/23/16 Medtronic Minimed: Model 630G BASAL RATES and times: 12   AM (midnight): 0.85 units/hour   8     AM: 0.8 units/hour  6pm: 0.85 Basal Pattern A:  Fransisca Cabello will use when NA. CARB RATIO and times: 12   AM (midnight): 12 Corection Factor (Sensitivity) and times: 12   AM (midnight): 47 mg/dL BLOOD GLUCOSE TARGET and times: 12   AM (midnight): 100 - 120 5     AM:  90 - 110 9:30    PM:  100 - 120 Active Insulin Time:  3 hours Sensor:  No Carelink / Diasend username: [email or web address] Carelink / Diasend Password:  [email or web address]        insulin syringe-needle U-100 31G X 5/16\" 0.3 ML    BD insulin syringe ultrafine    100 each    Use one syringe 4 daily or as directed in the event of insulin pump failure.  DO NOT DISPENSE until requested by patient    Encounter for long-term (current) use of insulin (H)       MICROLET LANCETS Misc     600 each    Use one lancet 6 times daily for blood sugar testing    Type 1 diabetes mellitus without complication (H), Insulin pump in place       ondansetron 4 MG ODT tab    ZOFRAN ODT    30 tablet    Take 1-2 tablets (4-8 mg) by mouth every 8 hours as needed for nausea    Nausea/vomiting in pregnancy       PRENATAL VITAMINS PO             "

## 2017-10-13 NOTE — NURSING NOTE
"Chief Complaint   Patient presents with     Prenatal Care       Initial /60 (BP Location: Right arm, Patient Position: Sitting, Cuff Size: Adult Regular)  Pulse 84  Ht 5' 7\" (1.702 m)  Wt 179 lb (81.2 kg)  LMP 04/21/2017 (Exact Date)  Breastfeeding? No  BMI 28.04 kg/m2 Estimated body mass index is 28.04 kg/(m^2) as calculated from the following:    Height as of this encounter: 5' 7\" (1.702 m).    Weight as of this encounter: 179 lb (81.2 kg).  Medication Reconciliation: complete     24w1d  + FM daily  - cramping or bleeding  + heartburn  - headache  + SHORTNESS OF BREATH  María Elena Arce LPN      "

## 2017-11-03 ENCOUNTER — OFFICE VISIT (OUTPATIENT)
Dept: MATERNAL FETAL MEDICINE | Facility: CLINIC | Age: 22
End: 2017-11-03
Attending: OBSTETRICS & GYNECOLOGY
Payer: COMMERCIAL

## 2017-11-03 ENCOUNTER — HOSPITAL ENCOUNTER (OUTPATIENT)
Dept: ULTRASOUND IMAGING | Facility: CLINIC | Age: 22
Discharge: HOME | End: 2017-11-03
Attending: OBSTETRICS & GYNECOLOGY | Admitting: OBSTETRICS & GYNECOLOGY
Payer: COMMERCIAL

## 2017-11-03 DIAGNOSIS — O24.013 TYPE 1 DIABETES MELLITUS IN PREGNANCY, THIRD TRIMESTER: Primary | ICD-10-CM

## 2017-11-03 DIAGNOSIS — O24.012 TYPE 1 DIABETES MELLITUS IN PREGNANCY, SECOND TRIMESTER: ICD-10-CM

## 2017-11-03 PROCEDURE — 76816 OB US FOLLOW-UP PER FETUS: CPT

## 2017-11-03 NOTE — PROGRESS NOTES
"Please see \"Imaging\" tab under \"Chart Review\" for details of today's US at the [facility].    Suleiman Scott MD  Maternal-Fetal Medicine    "

## 2017-11-03 NOTE — MR AVS SNAPSHOT
After Visit Summary   11/3/2017    Fransisca Cabello    MRN: [id number]           Patient Information     Date Of Birth          1995        Visit Information        Provider Department      11/3/2017 2:00 PM Suleiman Scott MD [facility] Maternal Fetal Medicine [facility]        Today's Diagnoses     Type 1 diabetes mellitus in pregnancy, third trimester    -  1       Follow-ups after your visit        Your next 10 appointments already scheduled     Nov 03, 2017  2:00 PM CDT   Radiology MD with Suleiman Scott MD   [facility] Maternal Fetal Medicine [facility] ([facility])    [address] [facility] [address]  [facility] [id number] [phone]           Please arrive at the time given for your first appointment. This visit is used internally to schedule the physician's time during your ultrasound.            Nov 07, 2017  1:00 PM CST   Ech Complete with [address] ([facility])    [address] [facility] [address]  [facility] [address] [phone]           1. Please bring or wear a comfortable two-piece outfit. 2. You may eat, drink and take your normal medicines. 3. For any questions that cannot be answered, please contact the ordering physician ***Please check-in at the Central Registration Office located in [address] in the [facility] [place] building. When you are finished registering, please go to [address] and have a seat. The technician will call your name for the test.            Nov 10, 2017 11:15 AM CST   ESTABLISHED PRENATAL with Cheyanne Silva MD   [facility]    [address] [place] [address] [phone]            Nov 16, 2017 11:00 AM CST   Return Visit with HORTENSIA Rosales [address] [address] [phone]               Future tests that were ordered for you today     Open Future Orders        Priority Expected Expires Ordered    MFM US Comprehensive Single F/U Routine 12/1/2017 11/3/2018 11/3/2017            Who to contact     If you have questions or need follow up information about today's clinic visit or your schedule please contact [employer] [facility] directly at [phone].  Normal or non-critical lab and imaging results will be communicated to you by BPeSAhart, letter or phone within 4 business days after the clinic has received the results. If you do not hear from us within 7 days, please contact the clinic through BPeSAhart or phone. If you have a critical or abnormal lab result, we will notify you by phone as soon as possible.  Submit refill requests through Virtualmin or call your pharmacy and they will forward the refill request to us. Please allow 3 business days for your refill to be completed.          Additional Information About Your Visit        BPeSAhart Information     Virtualmin gives you secure access to your electronic health record. If you see a primary care provider, you can also send messages to your care team and make appointments. If you have questions, please call your primary care clinic.  If you do not have a primary care provider, please call [phone] and they will assist you.        Care EveryWhere ID     This is your Care EveryWhere ID. This could be used by other organizations to access your [facility] medical records  [id number]        Your Vitals Were     Last Period                   04/21/2017 (Exact Date)            Blood Pressure from Last 3 Encounters:   10/13/17 112/60   09/22/17 130/86   08/25/17 124/82    Weight from Last 3 Encounters:   10/13/17 81.2 kg (179 lb)   09/22/17 78.9 kg (174 lb)   08/25/17 75.3 kg (166 lb)               Primary Care Provider    None Specified       No primary provider on file.        Equal Access to Services     EDWIGE SANTIAGO: Floridalma plummer  molly Quintana, fátimada luvikkiuriel, qaanthonyta kaondina dinero, hernan campbell lasinabuddy gamal. So [facility] [phone].    ATENCIÓN: Si habla español, tiene a boateng disposición servicios gratuitos de asistencia lingüística. Titus al [phone].    We comply with applicable federal civil rights laws and [place] laws. We do not discriminate on the basis of race, color, national origin, age, disability, sex, sexual orientation, or gender identity.            Thank you!     Thank you for choosing [facility]  for your care. Our goal is always to provide you with excellent care. Hearing back from our patients is one way we can continue to improve our services. Please take a few minutes to complete the written survey that you may receive in the mail after your visit with us. Thank you!             Your Updated Medication List - Protect others around you: Learn how to safely use, store and throw away your medicines at www.disposemymeds.org.          This list is accurate as of: 11/3/17  1:55 PM.  Always use your most recent med list.                   Brand Name Dispense Instructions for use Diagnosis    acetone (Urine) test Strp     100 each    Use as directed to test urine ketone if blood sugars are >250 or during illness with fever, nausea, vomiting, or abdominal pain.    High-risk pregnancy, young primigravida, third trimester       blood glucose monitoring test strip    RAYMUNDO CONTOUR NEXT    1000 each    Use to test blood sugar 10-12 times daily or as directed.    Uncontrolled type 1 diabetes mellitus without complication (H), Supervision of other high risk pregnancy, antepartum       glucagon 1 MG kit    GLUCAGON EMERGENCY    1 each    Inject 1 mg into the muscle once for 1 dose    Type 1 diabetes mellitus without complication (H), Insulin pump in place       insulin glargine 100 UNIT/ML injection    LANTUS VIAL    10 mL    Inject 19 u sq every 24 hours in the event of insulin  "pump therapy.  DO NOT FILL RX until requested by patient.    Type 1 diabetes mellitus without complication (H), Insulin pump in place, Encounter for long-term (current) use of insulin (H), High-risk pregnancy, young primigravida, third trimester       insulin lispro 100 UNIT/ML injection    humaLOG    3 vial    As directed via insulin pump - up to 100 units daily, adjusting pump settings due to increased insulin requirements and pregnancy    Uncontrolled type 1 diabetes mellitus without complication (H), Encounter for long-term (current) use of insulin (H)       insulin pump infusion      Date last updated:  12/23/16 Medtronic Minimed: Model 630G BASAL RATES and times: 12   AM (midnight): 0.85 units/hour   8     AM: 0.8 units/hour  6pm: 0.85 Basal Pattern A:  Fransisca Cabello will use when NA. CARB RATIO and times: 12   AM (midnight): 12 Corection Factor (Sensitivity) and times: 12   AM (midnight): 47 mg/dL BLOOD GLUCOSE TARGET and times: 12   AM (midnight): 100 - 120 5     AM:  90 - 110 9:30    PM:  100 - 120 Active Insulin Time:  3 hours Sensor:  No Carelink / Diasend username: [email or web address] Carelink / Diasend Password:  [email or web address]        insulin syringe-needle U-100 31G X 5/16\" 0.3 ML    BD insulin syringe ultrafine    100 each    Use one syringe 4 daily or as directed in the event of insulin pump failure.  DO NOT DISPENSE until requested by patient    Encounter for long-term (current) use of insulin (H)       MICROLET LANCETS Misc     600 each    Use one lancet 6 times daily for blood sugar testing    Type 1 diabetes mellitus without complication (H), Insulin pump in place       ondansetron 4 MG ODT tab    ZOFRAN ODT    30 tablet    Take 1-2 tablets (4-8 mg) by mouth every 8 hours as needed for nausea    Nausea/vomiting in pregnancy       PRENATAL VITAMINS PO             "

## 2017-11-07 ENCOUNTER — HOSPITAL ENCOUNTER (OUTPATIENT)
Dept: CARDIOLOGY | Facility: CLINIC | Age: 22
Discharge: HOME | End: 2017-11-07
Attending: OBSTETRICS & GYNECOLOGY | Admitting: OBSTETRICS & GYNECOLOGY
Payer: COMMERCIAL

## 2017-11-07 DIAGNOSIS — O09.899 SUPERVISION OF OTHER HIGH RISK PREGNANCY, ANTEPARTUM: ICD-10-CM

## 2017-11-07 DIAGNOSIS — E10.9 TYPE 1 DIABETES MELLITUS WITHOUT COMPLICATION (H): ICD-10-CM

## 2017-11-07 DIAGNOSIS — Z82.79 FAMILY HISTORY OF COARCTATION OF AORTA: ICD-10-CM

## 2017-11-07 PROCEDURE — 93306 TTE W/DOPPLER COMPLETE: CPT

## 2017-11-07 PROCEDURE — 93306 TTE W/DOPPLER COMPLETE: CPT | Mod: 26 | Performed by: INTERNAL MEDICINE

## 2017-11-10 ENCOUNTER — PRENATAL OFFICE VISIT (OUTPATIENT)
Dept: OBGYN | Facility: CLINIC | Age: 22
End: 2017-11-10
Payer: COMMERCIAL

## 2017-11-10 VITALS
WEIGHT: 185 LBS | DIASTOLIC BLOOD PRESSURE: 70 MMHG | SYSTOLIC BLOOD PRESSURE: 126 MMHG | HEIGHT: 67 IN | HEART RATE: 84 BPM | BODY MASS INDEX: 29.03 KG/M2

## 2017-11-10 DIAGNOSIS — O09.899 SUPERVISION OF OTHER HIGH RISK PREGNANCY, ANTEPARTUM: ICD-10-CM

## 2017-11-10 DIAGNOSIS — Z96.41 INSULIN PUMP IN PLACE: ICD-10-CM

## 2017-11-10 DIAGNOSIS — E10.9 TYPE 1 DIABETES MELLITUS WITHOUT COMPLICATION (H): ICD-10-CM

## 2017-11-10 LAB — HBA1C MFR BLD: 5.3 % (ref 4.3–6)

## 2017-11-10 PROCEDURE — 83036 HEMOGLOBIN GLYCOSYLATED A1C: CPT | Performed by: OBSTETRICS & GYNECOLOGY

## 2017-11-10 PROCEDURE — 84443 ASSAY THYROID STIM HORMONE: CPT | Performed by: OBSTETRICS & GYNECOLOGY

## 2017-11-10 PROCEDURE — 90715 TDAP VACCINE 7 YRS/> IM: CPT | Performed by: OBSTETRICS & GYNECOLOGY

## 2017-11-10 PROCEDURE — 90471 IMMUNIZATION ADMIN: CPT | Performed by: OBSTETRICS & GYNECOLOGY

## 2017-11-10 PROCEDURE — 86780 TREPONEMA PALLIDUM: CPT | Performed by: OBSTETRICS & GYNECOLOGY

## 2017-11-10 PROCEDURE — 99207 ZZC PRENATAL VISIT: CPT | Performed by: OBSTETRICS & GYNECOLOGY

## 2017-11-10 NOTE — MR AVS SNAPSHOT
After Visit Summary   11/10/2017    Fransisca Cabello    MRN: [id number]           Patient Information     Date Of Birth          1995        Visit Information        Provider Department      11/10/2017 11:15 AM Cheyanne Silva MD [facility]        Today's Diagnoses     Supervision of other high risk pregnancy, antepartum        Type 1 diabetes mellitus without complication (H)        Insulin pump in place           Follow-ups after your visit        Your next 10 appointments already scheduled     Nov 16, 2017 11:00 AM CST   Return Visit with HORTENSIA Rosales [facility] ([facility])    [id number] [place] [address]   [phone]            Nov 28, 2017 11:00 AM CST   ESTABLISHED PRENATAL with [facility] MD   [facility]    [address] [place]  [facility] [address]   [phone]            Dec 01, 2017 11:45 AM CST   MFM US COMPRE SINGLE F/U with MFMUSINDIO   [facility] Maternal Fetal Medicine Ultrasound - [facility])    [address]  [facility] [address]   [phone]           Wear comfortable clothes and leave your valuables at home.            Dec 01, 2017 12:15 PM CST   Radiology MD with  LAWRENCE BETTENCOURT   [facility] Maternal Fetal Medicine [facility])    [address]  [facility] [address]   [phone]           Please arrive at the time given for your first appointment. This visit is used internally to schedule the physician's time during your ultrasound.            Dec 08, 2017 11:30 AM CST   ESTABLISHED PRENATAL with Cheyanne Silva MD   [facility] ([facility])    [address] [facility] [address]   [phone]            Dec 22, 2017 11:15 AM CST   ESTABLISHED PRENATAL with [facility]    [address] [facility] [address] [phone]            Jan 05, 2018 11:15 AM CST   ESTABLISHED PRENATAL [facility]    [address] [facility] [address] [phone]              Who to contact     If you have questions or need follow up information about today's clinic visit or your schedule please contact [facility] directly at [phone].  Normal or non-critical lab and imaging results will be communicated to you by MyChart, letter or phone within 4 business days after the clinic has received the results. If you do not hear from us within 7 days, please contact the clinic through Openbuildst or phone. If you have a critical or abnormal lab result, we will notify you by phone as soon as possible.  Submit refill requests through Haztucesta or call your pharmacy and they will forward the refill request to us. Please allow 3 business days for your refill to be completed.          Additional Information About Your Visit        MyChart Information     Haztucesta gives you secure access to your electronic health record. If you see a primary care provider, you can also send messages to your care team and make appointments. If you have questions, please call your primary care clinic.  If you do not have a primary care provider, please call [phone] and they will assist you.        Care EveryWhere ID     This is your Care EveryWhere ID. This could be used by other organizations to access your [facility] medical records  [id number]        Your Vitals Were     Pulse Height Last Period BMI (Body Mass Index)          84 5' 7\" (1.702 m) 04/21/2017 (Exact Date) 28.98 kg/m2         Blood Pressure from Last 3 Encounters:   11/10/17 126/70   10/13/17 112/60   09/22/17 130/86    Weight from Last 3 Encounters:   11/10/17 185 lb (83.9 " kg)   10/13/17 179 lb (81.2 kg)   09/22/17 174 lb (78.9 kg)              We Performed the Following     Anti Treponema     CBC with platelets     Hemoglobin A1c     TDAP VACCINE (ADACEL)     TSH with free T4 reflex        Primary Care Provider    Physician No Ref-Primary       NO REF-PRIMARY PHYSICIAN        Equal Access to Services     EDWIGE VALLADARES : Hadii aad ku hadalexiso Soomaali, waaxda luqadaha, qaybta kaalmada adeegyada, waxpayton humphreysankitaammy spicer . So [facility] [phone].    ATENCIÓN: Si habla español, tiene a boateng disposición servicios gratuitos de asistencia lingüística. Llame al [phone].    We comply with applicable federal civil rights laws and [place] laws. We do not discriminate on the basis of race, color, national origin, age, disability, sex, sexual orientation, or gender identity.            Thank you!     Thank you for choosing [facility]  for your care. Our goal is always to provide you with excellent care. Hearing back from our patients is one way we can continue to improve our services. Please take a few minutes to complete the written survey that you may receive in the mail after your visit with us. Thank you!             Your Updated Medication List - Protect others around you: Learn how to safely use, store and throw away your medicines at www.disposemymeds.org.          This list is accurate as of: 11/10/17 12:23 PM.  Always use your most recent med list.                   Brand Name Dispense Instructions for use Diagnosis    acetone (Urine) test Strp     100 each    Use as directed to test urine ketone if blood sugars are >250 or during illness with fever, nausea, vomiting, or abdominal pain.    High-risk pregnancy, young primigravida, third trimester       blood glucose monitoring test strip    RAYMUNDO CONTOUR NEXT    1000 each    Use to test blood sugar 10-12 times daily or as directed.    Uncontrolled type 1 diabetes mellitus without complication (H),  "Supervision of other high risk pregnancy, antepartum       glucagon 1 MG kit    GLUCAGON EMERGENCY    1 each    Inject 1 mg into the muscle once for 1 dose    Type 1 diabetes mellitus without complication (H), Insulin pump in place       insulin glargine 100 UNIT/ML injection    LANTUS VIAL    10 mL    Inject 19 u sq every 24 hours in the event of insulin pump therapy.  DO NOT FILL RX until requested by patient.    Type 1 diabetes mellitus without complication (H), Insulin pump in place, Encounter for long-term (current) use of insulin (H), High-risk pregnancy, young primigravida, third trimester       insulin lispro 100 UNIT/ML injection    humaLOG    3 vial    As directed via insulin pump - up to 100 units daily, adjusting pump settings due to increased insulin requirements and pregnancy    Uncontrolled type 1 diabetes mellitus without complication (H), Encounter for long-term (current) use of insulin (H)       insulin pump infusion      Date last updated:  12/23/16 Medtronic Minimed: Model 630G BASAL RATES and times: 12   AM (midnight): 0.85 units/hour   8     AM: 0.8 units/hour  6pm: 0.85 Basal Pattern A:  Fransisca Cabello will use when NA. CARB RATIO and times: 12   AM (midnight): 12 Corection Factor (Sensitivity) and times: 12   AM (midnight): 47 mg/dL BLOOD GLUCOSE TARGET and times: 12   AM (midnight): 100 - 120 5     AM:  90 - 110 9:30    PM:  100 - 120 Active Insulin Time:  3 hours Sensor:  No Carelink / Diasend username: [email or web address] Carelink / Diasend Password:  [email or web address]        insulin syringe-needle U-100 31G X 5/16\" 0.3 ML    BD insulin syringe ultrafine    100 each    Use one syringe 4 daily or as directed in the event of insulin pump failure.  DO NOT DISPENSE until requested by patient    Encounter for long-term (current) use of insulin (H)       MICROLET LANCETS Misc     600 each    Use one lancet 6 times daily for blood sugar testing    Type 1 diabetes mellitus without complication (H), Insulin " pump in place       ondansetron 4 MG ODT tab    ZOFRAN ODT    30 tablet    Take 1-2 tablets (4-8 mg) by mouth every 8 hours as needed for nausea    Nausea/vomiting in pregnancy       PRENATAL VITAMINS PO

## 2017-11-10 NOTE — PROGRESS NOTES
PROBLEM LIST  LABS: Opos/RI/    1.  Type I DM on pump: followed by endocrine. Plan: eye exam, TSH qTM, level II US (normal), fetal echo (normal), growth scans q3-4w, twice weekly  testing at 32 weeks, delivery at 37-39 weeks based on glucose control/other indications.  2.  History severe preeclampsia: baby ASA daily. Baseline labs normal (p/c ratio 0.14).    She did increase her insulin slightly this past week and BS control is good. Check A1C again today. She has regular follow up with endocrine team. Her echocardiogram was normal, slight increase in gradient in aorta but no sign of coarctation.     Tdap today, RPR. A1C, cbc, TSH ordered as well, follow up in 2 weeks.    Cheyanne Silva MD

## 2017-11-10 NOTE — NURSING NOTE
"Chief Complaint   Patient presents with     Prenatal Care       Initial /70 (BP Location: Right arm, Patient Position: Sitting, Cuff Size: Adult Regular)  Pulse 84  Ht 5' 7\" (1.702 m)  Wt 185 lb (83.9 kg)  LMP 04/21/2017 (Exact Date)  BMI 28.98 kg/m2 Estimated body mass index is 28.98 kg/(m^2) as calculated from the following:    Height as of this encounter: 5' 7\" (1.702 m).    Weight as of this encounter: 185 lb (83.9 kg).  Medication Reconciliation: complete     28w1d  + FM noted  - cramping or bleeding  + HB - sarita cielotzer  - headache  - nausea and vomiting   María Elena Arce LPN         "

## 2017-11-11 LAB
T PALLIDUM IGG+IGM SER QL: NEGATIVE
TSH SERPL DL<=0.005 MIU/L-ACNC: 1.27 MU/L (ref 0.4–4)

## 2017-11-16 ENCOUNTER — OFFICE VISIT (OUTPATIENT)
Dept: ENDOCRINOLOGY | Facility: CLINIC | Age: 22
End: 2017-11-16
Payer: COMMERCIAL

## 2017-11-16 VITALS
SYSTOLIC BLOOD PRESSURE: 116 MMHG | TEMPERATURE: 97.6 F | RESPIRATION RATE: 16 BRPM | HEART RATE: 114 BPM | DIASTOLIC BLOOD PRESSURE: 80 MMHG | BODY MASS INDEX: 28.99 KG/M2 | OXYGEN SATURATION: 99 % | WEIGHT: 185.1 LBS

## 2017-11-16 DIAGNOSIS — E10.9 TYPE 1 DIABETES MELLITUS WITHOUT COMPLICATION (H): Primary | ICD-10-CM

## 2017-11-16 PROCEDURE — 82043 UR ALBUMIN QUANTITATIVE: CPT | Performed by: CLINICAL NURSE SPECIALIST

## 2017-11-16 PROCEDURE — 99214 OFFICE O/P EST MOD 30 MIN: CPT | Performed by: CLINICAL NURSE SPECIALIST

## 2017-11-16 NOTE — MR AVS SNAPSHOT
After Visit Summary   11/16/2017    Fransisca Cabello    MRN: [id number]           Patient Information     Date Of Birth          1995        Visit Information        Provider Department      11/16/2017 11:00 AM Maryann Hammonds APRN CNP [facility]        Today's Diagnoses     Type 1 diabetes mellitus without complication (H)    -  1       Follow-ups after your visit        Follow-up notes from your care team     Return in about 3 months (around 2/16/2018).      Your next 10 appointments already scheduled     Nov 28, 2017 11:00 AM CST   ESTABLISHED PRENATAL with Cheyanne Silva MD   [facility] ([facility]    [address] [place]  [facility] [address]   [phone]            Dec 01, 2017 11:45 AM CST   MFM US COMPRE SINGLE F/U with [id number]   [facility] Maternal Fetal Medicine Ultrasound - [facility])    [address] [facility] [address]  [facility] [address] [phone]           Wear comfortable clothes and leave your valuables at home.            Dec 01, 2017 12:15 PM CST   Radiology MD with  LAWRENCE BETTENCOURT   [facility] Maternal Fetal Medicine - [facility])    [address]  [facility] [id number] [phone]           Please arrive at the time given for your first appointment. This visit is used internally to schedule the physician's time during your ultrasound.            Dec 08, 2017 11:30 AM CST   ESTABLISHED PRENATAL with Cheyanne Silva MD   [facility] ([facility])    [address] [facility] [address]   [phone]            Dec 22, 2017 11:15 AM CST   ESTABLISHED PRENATAL with Cheyanne Silva MD   [facility] ([facility])    [address] [facility] [address]   [phone]            Jan 05, 2018 11:15 AM CST   ESTABLISHED PRENATAL with Cheyanne ZHU  MD Ricardo   [facility] ([facility])    [address] [facility] [address] [phone]              Future tests that were ordered for you today     Open Standing Orders        Priority Remaining Interval Expires Ordered    Nurse Prac  IP Consult Routine  ONE TIME  2017    CBC with platelets Timed 45/49 EVERY 8 HOURS  2017    AST Timed 45/49 EVERY 8 HOURS  2017    ALT Timed 45/49 EVERY 8 HOURS  2017    Creatinine Timed 45/49 EVERY 8 HOURS  2017            Who to contact     If you have questions or need follow up information about today's clinic visit or your schedule please contact [facility] directly at [phone].  Normal or non-critical lab and imaging results will be communicated to you by [employer], letter or phone within 4 business days after the clinic has received the results. If you do not hear from us within 7 days, please contact the clinic through [employer] or phone. If you have a critical or abnormal lab result, we will notify you by phone as soon as possible.  Submit refill requests through [employer] or call your pharmacy and they will forward the refill request to us. Please allow 3 business days for your refill to be completed.          Additional Information About Your Visit        [employer] Information     [employer] gives you secure access to your electronic health record. If you see a primary care provider, you can also send messages to your care team and make appointments. If you have questions, please call your primary care clinic.  If you do not have a primary care provider, please call [phone] and they will assist you.        Care EveryWhere ID     This is your Care EveryWhere ID. This could be used by other organizations to access your [place] medical records  [id number]        Your Vitals Were     Pulse Temperature Respirations Last Period Pulse Oximetry BMI (Body Mass  Index)    114 97.6  F (36.4  C) (Oral) 16 04/21/2017 (Exact Date) 99% 28.99 kg/m2       Blood Pressure from Last 3 Encounters:   11/18/17 112/69   11/16/17 116/80   11/10/17 126/70    Weight from Last 3 Encounters:   11/17/17 83.9 kg (185 lb)   11/16/17 84 kg (185 lb 1.6 oz)   11/10/17 83.9 kg (185 lb)              We Performed the Following     Albumin Random Urine Quantitative with Creat Ratio        Primary Care Provider    Physician No Ref-Primary       NO REF-PRIMARY PHYSICIAN        Equal Access to Services     FIDELIA VALLADARES : Hadcarri Quintana, martha rivera, gwyn dinero, hernan spicer . So [facility] [phone].    ATENCIÓN: Si habla español, tiene a boateng disposición servicios gratuitos de asistencia lingüística. Llame al [phone].    We comply with applicable federal civil rights laws and [place] laws. We do not discriminate on the basis of race, color, national origin, age, disability, sex, sexual orientation, or gender identity.            Thank you!     Thank you for choosing [facility]  for your care. Our goal is always to provide you with excellent care. Hearing back from our patients is one way we can continue to improve our services. Please take a few minutes to complete the written survey that you may receive in the mail after your visit with us. Thank you!             Your Updated Medication List - Protect others around you: Learn how to safely use, store and throw away your medicines at www.disposemymeds.org.          This list is accurate as of: 11/16/17 11:59 PM.  Always use your most recent med list.                   Brand Name Dispense Instructions for use Diagnosis    acetone (Urine) test Strp     100 each    Use as directed to test urine ketone if blood sugars are >250 or during illness with fever, nausea, vomiting, or abdominal pain.    High-risk pregnancy, young primigravida, third trimester       blood glucose  "monitoring test strip    RAYMUNDO CONTOUR NEXT    1000 each    Use to test blood sugar 10-12 times daily or as directed.    Uncontrolled type 1 diabetes mellitus without complication (H), Supervision of other high risk pregnancy, antepartum       glucagon 1 MG kit    GLUCAGON EMERGENCY    1 each    Inject 1 mg into the muscle once for 1 dose    Type 1 diabetes mellitus without complication (H), Insulin pump in place       insulin glargine 100 UNIT/ML injection    LANTUS VIAL    10 mL    Inject 19 u sq every 24 hours in the event of insulin pump therapy.  DO NOT FILL RX until requested by patient.    Type 1 diabetes mellitus without complication (H), Insulin pump in place, Encounter for long-term (current) use of insulin (H), High-risk pregnancy, young primigravida, third trimester       insulin lispro 100 UNIT/ML injection    humaLOG    3 vial    As directed via insulin pump - up to 100 units daily, adjusting pump settings due to increased insulin requirements and pregnancy    Uncontrolled type 1 diabetes mellitus without complication (H), Encounter for long-term (current) use of insulin (H)       insulin pump infusion      Date last updated:  12/23/16 Medtronic Minimed: Model 630G BASAL RATES and times: 12   AM (midnight): 0.85 units/hour   8     AM: 0.8 units/hour  6pm: 0.85 Basal Pattern A:  Fransisca Cabello will use when NA. CARB RATIO and times: 12   AM (midnight): 12 Corection Factor (Sensitivity) and times: 12   AM (midnight): 47 mg/dL BLOOD GLUCOSE TARGET and times: 12   AM (midnight): 100 - 120 5     AM:  90 - 110 9:30    PM:  100 - 120 Active Insulin Time:  3 hours Sensor:  No Carelink / Diasend username: madison Carelink / Diasend Password:  [email or web address]        insulin syringe-needle U-100 31G X 5/16\" 0.3 ML    BD insulin syringe ultrafine    100 each    Use one syringe 4 daily or as directed in the event of insulin pump failure.  DO NOT DISPENSE until requested by patient    Encounter for long-term " (current) use of insulin (H)       MICROLET LANCETS Misc     600 each    Use one lancet 6 times daily for blood sugar testing    Type 1 diabetes mellitus without complication (H), Insulin pump in place       ondansetron 4 MG ODT tab    ZOFRAN ODT    30 tablet    Take 1-2 tablets (4-8 mg) by mouth every 8 hours as needed for nausea    Nausea/vomiting in pregnancy       PRENATAL VITAMINS PO

## 2017-11-16 NOTE — NURSING NOTE
"Chief Complaint   Patient presents with     Diabetes       Initial /80 (BP Location: Left arm, Patient Position: Chair, Cuff Size: Adult Large)  Pulse 114  Temp 97.6  F (36.4  C) (Oral)  Resp 16  Wt 84 kg (185 lb 1.6 oz)  LMP 04/21/2017 (Exact Date)  SpO2 99%  BMI 28.99 kg/m2 Estimated body mass index is 28.99 kg/(m^2) as calculated from the following:    Height as of 11/10/17: 1.702 m (5' 7\").    Weight as of this encounter: 84 kg (185 lb 1.6 oz).  Medication Reconciliation: complete  "

## 2017-11-17 ENCOUNTER — NURSE TRIAGE (OUTPATIENT)
Dept: NURSING | Facility: CLINIC | Age: 22
End: 2017-11-17

## 2017-11-17 ENCOUNTER — HOSPITAL ENCOUNTER (INPATIENT)
Facility: CLINIC | Age: 22
LOS: 2 days | Discharge: HOME | End: 2017-11-19
Attending: OBSTETRICS & GYNECOLOGY | Admitting: OBSTETRICS & GYNECOLOGY
Payer: COMMERCIAL

## 2017-11-17 PROBLEM — O16.9 HYPERTENSION IN PREGNANCY: Status: ACTIVE | Noted: 2017-11-17

## 2017-11-17 LAB
ALT SERPL W P-5'-P-CCNC: 14 U/L (ref 0–50)
AST SERPL W P-5'-P-CCNC: 10 U/L (ref 0–45)
CREAT SERPL-MCNC: 0.62 MG/DL (ref 0.52–1.04)
CREAT UR-MCNC: 124 MG/DL
CREAT UR-MCNC: 90 MG/DL
ERYTHROCYTE [DISTWIDTH] IN BLOOD BY AUTOMATED COUNT: 13.8 % (ref 10–15)
GFR SERPL CREATININE-BSD FRML MDRD: >90 ML/MIN/1.7M2
HCT VFR BLD AUTO: 34.3 % (ref 35–47)
HGB BLD-MCNC: 11.4 G/DL (ref 11.7–15.7)
MCH RBC QN AUTO: 30.9 PG (ref 26.5–33)
MCHC RBC AUTO-ENTMCNC: 33.2 G/DL (ref 31.5–36.5)
MCV RBC AUTO: 93 FL (ref 78–100)
MICROALBUMIN UR-MCNC: 8 MG/L
MICROALBUMIN/CREAT UR: 6.81 MG/G CR (ref 0–25)
PLATELET # BLD AUTO: 280 10E9/L (ref 150–450)
PROT UR-MCNC: 0.16 G/L
PROT/CREAT 24H UR: 0.18 G/G CR (ref 0–0.2)
RBC # BLD AUTO: 3.69 10E12/L (ref 3.8–5.2)
URATE SERPL-MCNC: 3.7 MG/DL (ref 2.6–6)
WBC # BLD AUTO: 11.3 10E9/L (ref 4–11)

## 2017-11-17 PROCEDURE — 84156 ASSAY OF PROTEIN URINE: CPT | Performed by: OBSTETRICS & GYNECOLOGY

## 2017-11-17 PROCEDURE — 85027 COMPLETE CBC AUTOMATED: CPT | Performed by: OBSTETRICS & GYNECOLOGY

## 2017-11-17 PROCEDURE — 99233 SBSQ HOSP IP/OBS HIGH 50: CPT | Performed by: OBSTETRICS & GYNECOLOGY

## 2017-11-17 PROCEDURE — 12000029 ZZH R&B OB INTERMEDIATE

## 2017-11-17 PROCEDURE — 25000128 H RX IP 250 OP 636: Performed by: OBSTETRICS & GYNECOLOGY

## 2017-11-17 PROCEDURE — 84450 TRANSFERASE (AST) (SGOT): CPT | Performed by: OBSTETRICS & GYNECOLOGY

## 2017-11-17 PROCEDURE — 82565 ASSAY OF CREATININE: CPT | Performed by: OBSTETRICS & GYNECOLOGY

## 2017-11-17 PROCEDURE — 84550 ASSAY OF BLOOD/URIC ACID: CPT | Performed by: OBSTETRICS & GYNECOLOGY

## 2017-11-17 PROCEDURE — 84460 ALANINE AMINO (ALT) (SGPT): CPT | Performed by: OBSTETRICS & GYNECOLOGY

## 2017-11-17 PROCEDURE — 36415 COLL VENOUS BLD VENIPUNCTURE: CPT | Performed by: OBSTETRICS & GYNECOLOGY

## 2017-11-17 PROCEDURE — 99215 OFFICE O/P EST HI 40 MIN: CPT

## 2017-11-17 RX ORDER — DOCUSATE SODIUM 100 MG/1
100 CAPSULE, LIQUID FILLED ORAL 2 TIMES DAILY
Status: DISCONTINUED | OUTPATIENT
Start: 2017-11-17 | End: 2017-11-19 | Stop reason: HOSPADM

## 2017-11-17 RX ORDER — MAGNESIUM SULFATE IN WATER 40 MG/ML
2 INJECTION, SOLUTION INTRAVENOUS CONTINUOUS
Status: DISCONTINUED | OUTPATIENT
Start: 2017-11-17 | End: 2017-11-19

## 2017-11-17 RX ORDER — MAGNESIUM SULFATE HEPTAHYDRATE 40 MG/ML
4 INJECTION, SOLUTION INTRAVENOUS ONCE
Status: COMPLETED | OUTPATIENT
Start: 2017-11-17 | End: 2017-11-18

## 2017-11-17 RX ORDER — MAGNESIUM SULFATE HEPTAHYDRATE 40 MG/ML
4 INJECTION, SOLUTION INTRAVENOUS
Status: DISCONTINUED | OUTPATIENT
Start: 2017-11-17 | End: 2017-11-19 | Stop reason: HOSPADM

## 2017-11-17 RX ORDER — ACETAMINOPHEN 325 MG/1
325-975 TABLET ORAL EVERY 4 HOURS PRN
Status: DISCONTINUED | OUTPATIENT
Start: 2017-11-17 | End: 2017-11-19 | Stop reason: HOSPADM

## 2017-11-17 RX ORDER — CALCIUM GLUCONATE 94 MG/ML
1 INJECTION, SOLUTION INTRAVENOUS
Status: DISCONTINUED | OUTPATIENT
Start: 2017-11-17 | End: 2017-11-19 | Stop reason: HOSPADM

## 2017-11-17 RX ORDER — LORAZEPAM 2 MG/ML
2 INJECTION INTRAMUSCULAR
Status: DISCONTINUED | OUTPATIENT
Start: 2017-11-17 | End: 2017-11-19 | Stop reason: HOSPADM

## 2017-11-17 RX ORDER — BETAMETHASONE SODIUM PHOSPHATE AND BETAMETHASONE ACETATE 3; 3 MG/ML; MG/ML
12 INJECTION, SUSPENSION INTRA-ARTICULAR; INTRALESIONAL; INTRAMUSCULAR; SOFT TISSUE EVERY 24 HOURS
Status: COMPLETED | OUTPATIENT
Start: 2017-11-17 | End: 2017-11-18

## 2017-11-17 RX ORDER — LABETALOL HYDROCHLORIDE 5 MG/ML
20 INJECTION, SOLUTION INTRAVENOUS EVERY 10 MIN PRN
Status: DISCONTINUED | OUTPATIENT
Start: 2017-11-17 | End: 2017-11-19 | Stop reason: HOSPADM

## 2017-11-17 RX ORDER — BISACODYL 10 MG
10 SUPPOSITORY, RECTAL RECTAL DAILY PRN
Status: DISCONTINUED | OUTPATIENT
Start: 2017-11-19 | End: 2017-11-19 | Stop reason: HOSPADM

## 2017-11-17 RX ORDER — ONDANSETRON 2 MG/ML
4 INJECTION INTRAMUSCULAR; INTRAVENOUS EVERY 6 HOURS PRN
Status: DISCONTINUED | OUTPATIENT
Start: 2017-11-17 | End: 2017-11-19 | Stop reason: HOSPADM

## 2017-11-17 RX ORDER — NIFEDIPINE 10 MG/1
10 CAPSULE ORAL
Status: DISCONTINUED | OUTPATIENT
Start: 2017-11-17 | End: 2017-11-19 | Stop reason: HOSPADM

## 2017-11-17 RX ORDER — SIMETHICONE 80 MG
80-160 TABLET,CHEWABLE ORAL EVERY 4 HOURS PRN
Status: DISCONTINUED | OUTPATIENT
Start: 2017-11-17 | End: 2017-11-19 | Stop reason: HOSPADM

## 2017-11-17 RX ORDER — LABETALOL HYDROCHLORIDE 5 MG/ML
80 INJECTION, SOLUTION INTRAVENOUS EVERY 10 MIN PRN
Status: DISCONTINUED | OUTPATIENT
Start: 2017-11-17 | End: 2017-11-19 | Stop reason: HOSPADM

## 2017-11-17 RX ORDER — LABETALOL HYDROCHLORIDE 5 MG/ML
40 INJECTION, SOLUTION INTRAVENOUS EVERY 10 MIN PRN
Status: DISCONTINUED | OUTPATIENT
Start: 2017-11-17 | End: 2017-11-19 | Stop reason: HOSPADM

## 2017-11-17 RX ORDER — METOCLOPRAMIDE HYDROCHLORIDE 5 MG/ML
10 INJECTION INTRAMUSCULAR; INTRAVENOUS EVERY 6 HOURS PRN
Status: DISCONTINUED | OUTPATIENT
Start: 2017-11-17 | End: 2017-11-19 | Stop reason: HOSPADM

## 2017-11-17 RX ORDER — LIDOCAINE 40 MG/G
CREAM TOPICAL
Status: DISCONTINUED | OUTPATIENT
Start: 2017-11-17 | End: 2017-11-19 | Stop reason: HOSPADM

## 2017-11-17 RX ORDER — SODIUM CHLORIDE, SODIUM LACTATE, POTASSIUM CHLORIDE, CALCIUM CHLORIDE 600; 310; 30; 20 MG/100ML; MG/100ML; MG/100ML; MG/100ML
10-125 INJECTION, SOLUTION INTRAVENOUS CONTINUOUS
Status: DISCONTINUED | OUTPATIENT
Start: 2017-11-17 | End: 2017-11-19 | Stop reason: HOSPADM

## 2017-11-17 RX ORDER — MAGNESIUM SULFATE HEPTAHYDRATE 500 MG/ML
4 INJECTION, SOLUTION INTRAMUSCULAR; INTRAVENOUS
Status: DISCONTINUED | OUTPATIENT
Start: 2017-11-17 | End: 2017-11-19 | Stop reason: HOSPADM

## 2017-11-17 RX ADMIN — BETAMETHASONE SODIUM PHOSPHATE AND BETAMETHASONE ACETATE 12 MG: 3; 3 INJECTION, SUSPENSION INTRA-ARTICULAR; INTRALESIONAL; INTRAMUSCULAR at 23:28

## 2017-11-17 NOTE — IP AVS SNAPSHOT
[facility] Labor and Delivery    [address]    [facility] [address]    Phone:  [phone]    Fax:  [phone]                                       After Visit Summary   11/17/2017    Fransisca Cabello    MRN: [id number]           After Visit Summary Signature Page     I have received my discharge instructions, and my questions have been answered. I have discussed any challenges I see with this plan with the nurse or doctor.    ..........................................................................................................................................  Patient/Patient Representative Signature      ..........................................................................................................................................  Patient Representative Print Name and Relationship to Patient    ..................................................               ................................................  Date                                            Time    ..........................................................................................................................................  Reviewed by Signature/Title    ...................................................              ..............................................  Date                                                            Time

## 2017-11-17 NOTE — IP AVS SNAPSHOT
MRN:[id number]                      After Visit Summary   11/17/2017    Fransisca Cabello    MRN: [id number]           Thank you!     Thank you for choosing [facility] for your care. Our goal is always to provide you with excellent care. Hearing back from our patients is one way we can continue to improve our services. Please take a few minutes to complete the written survey that you may receive in the mail after you visit. If you would like to speak to someone directly about your visit please contact Patient Relations at [phone]. Thank you!          Patient Information     Date Of Birth          1995        About your hospital stay     You were admitted on:  November 17, 2017 You last received care in the:  [facility] Labor and Delivery    You were discharged on:  November 19, 2017       Who to Call     For medical emergencies, please call 911.  For non-urgent questions about your medical care, please call your primary care provider or clinic, None          Attending Provider     Provider Specialty    Cheyanne Silva MD OB/Gyn    Shailesh Hayward MD OB/Gyn       Primary Care Provider    Physician No Ref-Primary      Your next 10 appointments already scheduled     Nov 28, 2017 11:00 AM CST   ESTABLISHED PRENATAL with Cheyanne Silva MD   [facility] ([facility])    [address] [place]  [facility] [id number] [phone]            Dec 01, 2017 11:45 AM CST   MFM US COMPRE SINGLE F/U with [id number]   [facility] Maternal Fetal Medicine Ultrasound - [facility])    [address]  [facility] [address] [phone]           Wear comfortable clothes and leave your valuables at home.            Dec 01, 2017 12:15 PM CST   Radiology MD with  LAWRENCE BETTENCOURT   [facility] Maternal [facility]etal Medicine - [facility])    [address]  [facility] [address]  "  [phone]           Please arrive at the time given for your first appointment. This visit is used internally to schedule the physician's time during your ultrasound.            Dec 08, 2017 11:30 AM CST   ESTABLISHED PRENATAL with Cheyanne Silva MD   [facility] ([facility])    [id number] [place] [address]   [phone]            Dec 22, 2017 11:15 AM CST   ESTABLISHED PRENATAL with Cheyanne Silva MD   [facility] ([facility])    [address] [facility] [address]   [phone]            Jan 05, 2018 11:15 AM CST   ESTABLISHED PRENATAL with Cheyanne Silva MD   [facility] ([facility])    [address] [facility] [address]   [phone]              Further instructions from your care team       Discharge Instruction for Undelivered Patients      You were seen for: Blood pressure elevation  We Consulted: Dr Johnson  You had (Test or Medicine):admission     Diet:   Drink 8 to 12 glasses of liquids (milk, juice, water) every day.  You may eat meals and snacks.  To manager your diabetes, follow the guidelines for eating and drinking given to you by your Clinic Provider or Diabetes Educator.       Activity:  Call your doctor or nurse midwife if your baby is moving less than usual.   \"Take it easy\", rest as much as possible.    Call your provider if you notice:  Swelling in your face or increased swelling in your hands or legs.  Headaches that are not relieved by Tylenol (acetaminophen).  Changes in your vision (blurring: seeing spots or stars.)  Nausea (sick to your stomach) and vomiting (throwing up).   Weight gain of 5 pounds or more per week.  Heartburn that doesn't go away.  Signs of bladder infection: pain when you urinate (use the toilet), need to go more often and more urgently.  The bag of mccann (rupture of membranes) breaks, or " "you notice leaking in your underwear.  Bright red blood in your underwear.  Abdominal (lower belly) or stomach pain.  For first baby: Contractions (tightening) less than 5 minutes apart for one hour or more.  Second (plus) baby: Contractions (tightening) less than 10 minutes apart and getting stronger.  *If less than 34 weeks: Contractions (tightenings) more than 6 times in one hour.  Increase or change in vaginal discharge (note the color and amount)  Other: see below    Follow-up:  Make an appointment to be seen on tomorrow at 10:30 or 2:30      Call the office if you develop worsening headache, vision changes, abdominal pain, vomiting, shortness of breath, chest pain.    Continue daily baby Aspirin.    Call office in AM to confirm office visit with Dr. Lise Johnson OB/GYN at [facility] on 11/20/2017. Your labs will be repeated tomorrow. We will schedule you for twice weekly testing with Non-Stress Tests and or Biophysical Profiles.         Pending Results     No orders found from 11/15/2017 to 11/18/2017.            Admission Information     Date & Time Provider Department Dept. Phone    11/17/2017 Shailesh Hayward MD [facility] Labor and Delivery [phone]      Your Vitals Were     Blood Pressure Pulse Temperature Respirations Height Weight    127/70 116 98.1  F (36.7  C) (Oral) 18 1.702 m (5' 7\") 83.9 kg (185 lb)    Last Period Pulse Oximetry BMI (Body Mass Index)             04/21/2017 (Exact Date) 96% 28.98 kg/m2         [employer] Information     [employer] gives you secure access to your electronic health record. If you see a primary care provider, you can also send messages to your care team and make appointments. If you have questions, please call your primary care clinic.  If you do not have a primary care provider, please call [phone] and they will assist you.        Care EveryWhere ID     This is your Care EveryWhere ID. This could be used by other organizations to access your " [place] medical records  [id number]        Equal Access to Services     EDWIGE LOVELLJENISE : Hadii aad kavitha ashleystephon Soshannanali, waaxda luqadaha, qaybta kaalmada deandreeleazaryadi, hernan humphreysankitaammy yeung. So [facility] [phone].    ATENCIÓN: Si habla español, tiene a boateng disposición servicios gratuitos de asistencia lingüística. Llame al [phone].    We comply with applicable federal civil rights laws and [place] laws. We do not discriminate on the basis of race, color, national origin, age, disability, sex, sexual orientation, or gender identity.               Review of your medicines      UNREVIEWED medicines. Ask your doctor about these medicines        Dose / Directions    glucagon 1 MG kit   Commonly known as:  GLUCAGON EMERGENCY   Used for:  Type 1 diabetes mellitus without complication (H), Insulin pump in place        Dose:  1 mg   Inject 1 mg into the muscle once for 1 dose   Quantity:  1 each   Refills:  0       insulin glargine 100 UNIT/ML injection   Commonly known as:  LANTUS VIAL   Used for:  Type 1 diabetes mellitus without complication (H), Insulin pump in place, Encounter for long-term (current) use of insulin (H), High-risk pregnancy, young primigravida, third trimester        Inject 19 u sq every 24 hours in the event of insulin pump therapy.  DO NOT FILL RX until requested by patient.   Quantity:  10 mL   Refills:  1       insulin lispro 100 UNIT/ML injection   Commonly known as:  humaLOG   Used for:  Uncontrolled type 1 diabetes mellitus without complication (H), Encounter for long-term (current) use of insulin (H)        As directed via insulin pump - up to 100 units daily, adjusting pump settings due to increased insulin requirements and pregnancy   Quantity:  3 vial   Refills:  5       ondansetron 4 MG ODT tab   Commonly known as:  ZOFRAN ODT   Used for:  Nausea/vomiting in pregnancy        Dose:  4-8 mg   Take 1-2 tablets (4-8 mg) by mouth every 8 hours as needed for nausea   Quantity:  " 30 tablet   Refills:  1       PRENATAL VITAMINS PO        Refills:  0         CONTINUE these medicines which have NOT CHANGED        Dose / Directions    acetone (Urine) test Strp   Used for:  High-risk pregnancy, young primigravida, third trimester        Use as directed to test urine ketone if blood sugars are >250 or during illness with fever, nausea, vomiting, or abdominal pain.   Quantity:  100 each   Refills:  prn       blood glucose monitoring test strip   Commonly known as:  [employer] CONTOUR NEXT   Used for:  Uncontrolled type 1 diabetes mellitus without complication (H), Supervision of other high risk pregnancy, antepartum        Use to test blood sugar 10-12 times daily or as directed.   Quantity:  1000 each   Refills:  3       insulin pump infusion        Date last updated:  12/23/16 [employer] Minimed: Model 630G BASAL RATES and times: 12   AM (midnight): 0.85 units/hour   8     AM: 0.8 units/hour  6pm: 0.85 Basal Pattern A:  Fransisca Tobiaslalit will use when NA. CARB RATIO and times: 12   AM (midnight): 12 Corection Factor (Sensitivity) and times: 12   AM (midnight): 47 mg/dL BLOOD GLUCOSE TARGET and times: 12   AM (midnight): 100 - 120 5     AM:  90 - 110 9:30    PM:  100 - 120 Active Insulin Time:  3 hours Sensor:  No Carelink / Diasend username: madison Carelink / [employer] Password:  [email or web address]   Refills:  0       insulin syringe-needle U-100 31G X 5/16\" 0.3 ML   Commonly known as:  BD insulin syringe ultrafine   Used for:  Encounter for long-term (current) use of insulin (H)        Use one syringe 4 daily or as directed in the event of insulin pump failure.  DO NOT DISPENSE until requested by patient   Quantity:  100 each   Refills:  3       MICROLET LANCETS Misc   Used for:  Type 1 diabetes mellitus without complication (H), Insulin pump in place        Use one lancet 6 times daily for blood sugar testing   Quantity:  600 each   Refills:  1                Protect others around you: Learn how to " "safely use, store and throw away your medicines at www.disposemymeds.org.             Medication List: This is a list of all your medications and when to take them. Check marks below indicate your daily home schedule. Keep this list as a reference.      Medications           Morning Afternoon Evening Bedtime As Needed    acetone (Urine) test Strp   Use as directed to test urine ketone if blood sugars are >250 or during illness with fever, nausea, vomiting, or abdominal pain.                                blood glucose monitoring test strip   Commonly known as:  Sandbox CONTOUR NEXT   Use to test blood sugar 10-12 times daily or as directed.                                glucagon 1 MG kit   Commonly known as:  GLUCAGON EMERGENCY   Inject 1 mg into the muscle once for 1 dose                                insulin glargine 100 UNIT/ML injection   Commonly known as:  LANTUS VIAL   Inject 19 u sq every 24 hours in the event of insulin pump therapy.  DO NOT FILL RX until requested by patient.                                insulin lispro 100 UNIT/ML injection   Commonly known as:  humaLOG   As directed via insulin pump - up to 100 units daily, adjusting pump settings due to increased insulin requirements and pregnancy                                insulin pump infusion   Date last updated:  12/23/16 Medtronic Minimed: Model 630G BASAL RATES and times: 12   AM (midnight): 0.85 units/hour   8     AM: 0.8 units/hour  6pm: 0.85 Basal Pattern A:  Fransisca Cabello will use when NA. CARB RATIO and times: 12   AM (midnight): 12 Corection Factor (Sensitivity) and times: 12   AM (midnight): 47 mg/dL BLOOD GLUCOSE TARGET and times: 12   AM (midnight): 100 - 120 5     AM:  90 - 110 9:30    PM:  100 - 120 Active Insulin Time:  3 hours Sensor:  No Carelink / Diasend username: madison Carelink / Di[employer]end Password:  [email or web address]                                insulin syringe-needle U-100 31G X 5/16\" 0.3 ML   Commonly known as:  BD " insulin syringe ultrafine   Use one syringe 4 daily or as directed in the event of insulin pump failure.  DO NOT DISPENSE until requested by patient                                MICROLET LANCETS Misc   Use one lancet 6 times daily for blood sugar testing                                ondansetron 4 MG ODT tab   Commonly known as:  ZOFRAN ODT   Take 1-2 tablets (4-8 mg) by mouth every 8 hours as needed for nausea                                PRENATAL VITAMINS PO

## 2017-11-18 LAB
ABO + RH BLD: NORMAL
ABO + RH BLD: NORMAL
ALT SERPL W P-5'-P-CCNC: 12 U/L (ref 0–50)
ALT SERPL W P-5'-P-CCNC: 12 U/L (ref 0–50)
ALT SERPL W P-5'-P-CCNC: 13 U/L (ref 0–50)
AST SERPL W P-5'-P-CCNC: 10 U/L (ref 0–45)
AST SERPL W P-5'-P-CCNC: 11 U/L (ref 0–45)
AST SERPL W P-5'-P-CCNC: 9 U/L (ref 0–45)
BLD GP AB SCN SERPL QL: NORMAL
BLOOD BANK CMNT PATIENT-IMP: NORMAL
CREAT SERPL-MCNC: 0.57 MG/DL (ref 0.52–1.04)
CREAT SERPL-MCNC: 0.6 MG/DL (ref 0.52–1.04)
CREAT SERPL-MCNC: 0.62 MG/DL (ref 0.52–1.04)
ERYTHROCYTE [DISTWIDTH] IN BLOOD BY AUTOMATED COUNT: 14 % (ref 10–15)
GFR SERPL CREATININE-BSD FRML MDRD: >90 ML/MIN/1.7M2
GLUCOSE SERPL-MCNC: 161 MG/DL (ref 70–99)
HCT VFR BLD AUTO: 31.1 % (ref 35–47)
HCT VFR BLD AUTO: 31.6 % (ref 35–47)
HCT VFR BLD AUTO: 32.7 % (ref 35–47)
HGB BLD-MCNC: 10.2 G/DL (ref 11.7–15.7)
HGB BLD-MCNC: 10.4 G/DL (ref 11.7–15.7)
HGB BLD-MCNC: 10.9 G/DL (ref 11.7–15.7)
KETONES UR STRIP-MCNC: 5 MG/DL
MAGNESIUM SERPL-MCNC: 4.5 MG/DL (ref 1.6–2.3)
MAGNESIUM SERPL-MCNC: 5.8 MG/DL (ref 1.6–2.3)
MCH RBC QN AUTO: 30.5 PG (ref 26.5–33)
MCH RBC QN AUTO: 30.7 PG (ref 26.5–33)
MCH RBC QN AUTO: 31.1 PG (ref 26.5–33)
MCHC RBC AUTO-ENTMCNC: 32.8 G/DL (ref 31.5–36.5)
MCHC RBC AUTO-ENTMCNC: 32.9 G/DL (ref 31.5–36.5)
MCHC RBC AUTO-ENTMCNC: 33.3 G/DL (ref 31.5–36.5)
MCV RBC AUTO: 93 FL (ref 78–100)
PLATELET # BLD AUTO: 258 10E9/L (ref 150–450)
PLATELET # BLD AUTO: 267 10E9/L (ref 150–450)
PLATELET # BLD AUTO: 273 10E9/L (ref 150–450)
RBC # BLD AUTO: 3.34 10E12/L (ref 3.8–5.2)
RBC # BLD AUTO: 3.39 10E12/L (ref 3.8–5.2)
RBC # BLD AUTO: 3.51 10E12/L (ref 3.8–5.2)
SPECIMEN EXP DATE BLD: NORMAL
URATE SERPL-MCNC: 3.4 MG/DL (ref 2.6–6)
URATE SERPL-MCNC: 3.4 MG/DL (ref 2.6–6)
WBC # BLD AUTO: 12.9 10E9/L (ref 4–11)
WBC # BLD AUTO: 13.7 10E9/L (ref 4–11)
WBC # BLD AUTO: 13.9 10E9/L (ref 4–11)

## 2017-11-18 PROCEDURE — 84460 ALANINE AMINO (ALT) (SGPT): CPT | Performed by: OBSTETRICS & GYNECOLOGY

## 2017-11-18 PROCEDURE — 25000132 ZZH RX MED GY IP 250 OP 250 PS 637: Performed by: OBSTETRICS & GYNECOLOGY

## 2017-11-18 PROCEDURE — 82565 ASSAY OF CREATININE: CPT | Performed by: OBSTETRICS & GYNECOLOGY

## 2017-11-18 PROCEDURE — 82947 ASSAY GLUCOSE BLOOD QUANT: CPT | Performed by: OBSTETRICS & GYNECOLOGY

## 2017-11-18 PROCEDURE — 84450 TRANSFERASE (AST) (SGOT): CPT | Performed by: OBSTETRICS & GYNECOLOGY

## 2017-11-18 PROCEDURE — 84550 ASSAY OF BLOOD/URIC ACID: CPT | Performed by: OBSTETRICS & GYNECOLOGY

## 2017-11-18 PROCEDURE — 36415 COLL VENOUS BLD VENIPUNCTURE: CPT | Performed by: OBSTETRICS & GYNECOLOGY

## 2017-11-18 PROCEDURE — 99232 SBSQ HOSP IP/OBS MODERATE 35: CPT | Performed by: OBSTETRICS & GYNECOLOGY

## 2017-11-18 PROCEDURE — 12000029 ZZH R&B OB INTERMEDIATE

## 2017-11-18 PROCEDURE — 81003 URINALYSIS AUTO W/O SCOPE: CPT | Performed by: OBSTETRICS & GYNECOLOGY

## 2017-11-18 PROCEDURE — 86901 BLOOD TYPING SEROLOGIC RH(D): CPT | Performed by: OBSTETRICS & GYNECOLOGY

## 2017-11-18 PROCEDURE — 86850 RBC ANTIBODY SCREEN: CPT | Performed by: OBSTETRICS & GYNECOLOGY

## 2017-11-18 PROCEDURE — 85027 COMPLETE CBC AUTOMATED: CPT | Performed by: OBSTETRICS & GYNECOLOGY

## 2017-11-18 PROCEDURE — 83735 ASSAY OF MAGNESIUM: CPT | Performed by: OBSTETRICS & GYNECOLOGY

## 2017-11-18 PROCEDURE — 99215 OFFICE O/P EST HI 40 MIN: CPT

## 2017-11-18 PROCEDURE — 25000128 H RX IP 250 OP 636: Performed by: OBSTETRICS & GYNECOLOGY

## 2017-11-18 PROCEDURE — 86900 BLOOD TYPING SEROLOGIC ABO: CPT | Performed by: OBSTETRICS & GYNECOLOGY

## 2017-11-18 RX ORDER — OXYCODONE HYDROCHLORIDE 5 MG/1
5 TABLET ORAL EVERY 4 HOURS PRN
Status: DISCONTINUED | OUTPATIENT
Start: 2017-11-18 | End: 2017-11-19 | Stop reason: HOSPADM

## 2017-11-18 RX ORDER — ASPIRIN 81 MG/1
81 TABLET, CHEWABLE ORAL DAILY
Status: DISCONTINUED | OUTPATIENT
Start: 2017-11-18 | End: 2017-11-19 | Stop reason: HOSPADM

## 2017-11-18 RX ORDER — PRENATAL VIT/IRON FUM/FOLIC AC 27MG-0.8MG
1 TABLET ORAL DAILY
Status: DISCONTINUED | OUTPATIENT
Start: 2017-11-18 | End: 2017-11-19 | Stop reason: HOSPADM

## 2017-11-18 RX ORDER — CALCIUM CARBONATE 500 MG/1
1000 TABLET, CHEWABLE ORAL 2 TIMES DAILY PRN
Status: DISCONTINUED | OUTPATIENT
Start: 2017-11-18 | End: 2017-11-19 | Stop reason: HOSPADM

## 2017-11-18 RX ADMIN — MAGNESIUM SULFATE IN WATER 2 G/HR: 40 INJECTION, SOLUTION INTRAVENOUS at 00:55

## 2017-11-18 RX ADMIN — ACETAMINOPHEN 975 MG: 325 TABLET, FILM COATED ORAL at 07:10

## 2017-11-18 RX ADMIN — CALCIUM CARBONATE (ANTACID) CHEW TAB 500 MG 1000 MG: 500 CHEW TAB at 23:35

## 2017-11-18 RX ADMIN — DOCUSATE SODIUM 100 MG: 100 CAPSULE, LIQUID FILLED ORAL at 21:05

## 2017-11-18 RX ADMIN — ACETAMINOPHEN 975 MG: 325 TABLET, FILM COATED ORAL at 23:47

## 2017-11-18 RX ADMIN — MAGNESIUM SULFATE IN WATER 2 G/HR: 40 INJECTION, SOLUTION INTRAVENOUS at 21:10

## 2017-11-18 RX ADMIN — SODIUM CHLORIDE, POTASSIUM CHLORIDE, SODIUM LACTATE AND CALCIUM CHLORIDE 75 ML/HR: 600; 310; 30; 20 INJECTION, SOLUTION INTRAVENOUS at 23:50

## 2017-11-18 RX ADMIN — ASPIRIN 81 MG 81 MG: 81 TABLET ORAL at 21:13

## 2017-11-18 RX ADMIN — MAGNESIUM SULFATE IN WATER 4 G: 40 INJECTION, SOLUTION INTRAVENOUS at 00:25

## 2017-11-18 RX ADMIN — SODIUM CHLORIDE, POTASSIUM CHLORIDE, SODIUM LACTATE AND CALCIUM CHLORIDE 75 ML/HR: 600; 310; 30; 20 INJECTION, SOLUTION INTRAVENOUS at 11:21

## 2017-11-18 RX ADMIN — SODIUM CHLORIDE, POTASSIUM CHLORIDE, SODIUM LACTATE AND CALCIUM CHLORIDE 75 ML/HR: 600; 310; 30; 20 INJECTION, SOLUTION INTRAVENOUS at 00:24

## 2017-11-18 RX ADMIN — DOCUSATE SODIUM 100 MG: 100 CAPSULE, LIQUID FILLED ORAL at 01:05

## 2017-11-18 RX ADMIN — ACETAMINOPHEN 975 MG: 325 TABLET, FILM COATED ORAL at 15:19

## 2017-11-18 RX ADMIN — MAGNESIUM SULFATE IN WATER 2 G/HR: 40 INJECTION, SOLUTION INTRAVENOUS at 11:20

## 2017-11-18 RX ADMIN — ONDANSETRON 4 MG: 2 INJECTION INTRAMUSCULAR; INTRAVENOUS at 23:34

## 2017-11-18 RX ADMIN — PRENATAL VIT W/ FE FUMARATE-FA TAB 27-0.8 MG 1 TABLET: 27-0.8 TAB at 21:04

## 2017-11-18 RX ADMIN — ACETAMINOPHEN 975 MG: 325 TABLET, FILM COATED ORAL at 01:04

## 2017-11-18 RX ADMIN — BETAMETHASONE SODIUM PHOSPHATE AND BETAMETHASONE ACETATE 12 MG: 3; 3 INJECTION, SUSPENSION INTRA-ARTICULAR; INTRALESIONAL; INTRAMUSCULAR at 23:35

## 2017-11-18 RX ADMIN — DOCUSATE SODIUM 100 MG: 100 CAPSULE, LIQUID FILLED ORAL at 08:51

## 2017-11-18 NOTE — H&P
[facility]    History and Physical  Obstetrics and Gynecology     Date of Admission:  2017    Assessment & Plan   Fransisca Cabello is a 22 year old female  (vaginal delivery) who presents with headache and visual changes as well as elevated blood pressures in the setting of type I DM on insulin pump and a history of severe preeclampsia at 34 weeks in her last pregnancy.    ASSESSMENT:   IUP @ 29w1d   Type I DM on insulin pump  Preeclampsia versus gestational hypertension with concomitant headache     PLAN:   -Admit - see IP orders  -Discussed with Dr. James of Maternal Fetal Medicine at the [facility]: plan is:   -BMZ 12 mg Im q 24 h times 2 doses   -Mag sulfate given headache with visual changes. This can be discontinued at 48h if blood pressure remains stable and the headache and visual changes resolve.   -labs every 8 hours for at least 24 hours.   -Add uric acid to the labs per Dr. James, to help elucidate whether this is truly preeclampsia and thus decide if the headache and visual changes constitute a criterion for preeclampsia with severe features.   -If she meets severe criteria on the basis of BPs, lab changes, or continued/worsening neurologic changes, re-consult MFM for likely transfer to the [facility] for delivery.   -Consider CT head if all else is stable but headache and visual changes worsen.    -Type I DM: she has been well controlled on her insulin pump. She is aware that the steroids will likely increase her insulin requirements short term, and feels equipped to manage this herself as she usually does. Can consult endocrinology if needed for further management advice, but patient strongly prefers to handle it on her own.    Cheyanne Silva MD    History of Present Illness   Fransisca Cabello is a 22 year old female  29w1d  Estimated Date of Delivery: 18 who called in with headache and visual changes. Her blood pressure at home was in the 150s systolic. I  asked her to come in for evaluation given her type I DM and her history of preeclampsia at 34 weeks in her last pregnancy.    PRENATAL COURSE  Prenatal course was complicated by type I DM, well controlled on insulin pump.      Recent Labs   Lab Test  06/15/17   0950   ABO  O   RH   Pos   AS  Neg     Rhogam not indicated   Recent Labs   Lab Test  06/15/17   0951   HEPBANG  Nonreactive   HIAGAB  Nonreactive   HIV-1 p24 Ag & HIV-1/HIV-2 Ab Not Detected     RUQIGG  42       Past Medical History    I have reviewed this patient's medical history and updated it with pertinent information if needed.   Past Medical History:   Diagnosis Date     Postpartum depression 2014    On medication for 6 months-      Type 1 diabetes (H) 1998    three years at diagnosis     Varicella 1998       Past Surgical History   I have reviewed this patient's surgical history and updated it with pertinent information if needed.  Past Surgical History:   Procedure Laterality Date     C ROOT CANAL THERAPY 2 CANALS       DENTAL SURGERY         Prior to Admission Medications   Prior to Admission Medications   Prescriptions Last Dose Informant Patient Reported? Taking?   INSULIN PUMP - OUTPATIENT 11/17/2017 at Unknown time  Yes Yes   Sig: Date last updated:  12/23/16  Medtronic Minimed: Model 630G  BASAL RATES and times:  12   AM (midnight): 0.85 units/hour    8     AM: 0.8 units/hour   6pm: 0.85  Basal Pattern A:  Fransisca Cabello will use when NA.  CARB RATIO and times:  12   AM (midnight): 12  Corection Factor (Sensitivity) and times:  12   AM (midnight): 47 mg/dL  BLOOD GLUCOSE TARGET and times:  12   AM (midnight): 100 - 120  5     AM:  90 - 110  9:30    PM:  100 - 120  Active Insulin Time:  3 hours  Sensor:  No  Carelink / Diasend username: [email or web address]  Carelink / Diasend Password:  [email or web address]   MICROLET LANCETS MISC 11/17/2017 at Unknown time  No Yes   Sig: Use one lancet 6 times daily for blood sugar testing   Prenatal Multivit-Min-Fe-FA  "(PRENATAL VITAMINS PO) 11/16/2017 at 2100  Yes Yes   acetone, Urine, test STRP 11/17/2017 at Unknown time  No Yes   Sig: Use as directed to test urine ketone if blood sugars are >250 or during illness with fever, nausea, vomiting, or abdominal pain.   blood glucose monitoring (RAYMUNDO CONTOUR NEXT) test strip 11/17/2017 at Unknown time  No Yes   Sig: Use to test blood sugar 10-12 times daily or as directed.   glucagon (GLUCAGON EMERGENCY) 1 MG kit   No No   Sig: Inject 1 mg into the muscle once for 1 dose   insulin glargine (LANTUS VIAL) 100 UNIT/ML injection 11/17/2017 at Unknown time  No Yes   Sig: Inject 19 u sq every 24 hours in the event of insulin pump therapy.  DO NOT FILL RX until requested by patient.   insulin lispro (HUMALOG) 100 UNIT/ML injection 11/17/2017 at Unknown time  No Yes   Sig: As directed via insulin pump - up to 100 units daily, adjusting pump settings due to increased insulin requirements and pregnancy   insulin syringe-needle U-100 (BD INSULIN SYRINGE ULTRAFINE) 31G X 5/16\" 0.3 ML 11/17/2017 at Unknown time  No Yes   Sig: Use one syringe 4 daily or as directed in the event of insulin pump failure.  DO NOT DISPENSE until requested by patient   ondansetron (ZOFRAN ODT) 4 MG ODT tab Past Month at Unknown time  No Yes   Sig: Take 1-2 tablets (4-8 mg) by mouth every 8 hours as needed for nausea      Facility-Administered Medications: None     Allergies   Allergies   Allergen Reactions     Cefzil [Cefprozil] Swelling     Phenergan [Promethazine] Anxiety and Swelling       Social History   I have reviewed this patient's social history and updated it with pertinent information if needed. Fransisca Cadettio  reports that she has never smoked. She has never used smokeless tobacco. She reports that she does not drink alcohol or use illicit drugs.    Family History   I have reviewed this patient's family history and updated it with pertinent information if needed.   Family History   Problem Relation " Age of Onset     Family History Negative Mother      DIABETES Father 35     Type 1        Immunization History   Immunizations are up to date    Physical Exam   Temp: 98.7  F (37.1  C) Temp src: Oral BP: 146/88 Pulse: 116   Resp: 20        Vital Signs with Ranges  Temp:  [98.7  F (37.1  C)] 98.7  F (37.1  C)  Pulse:  [116] 116  Resp:  [20] 20  BP: (136-176)/(74-88) 146/88    Abdomen: gravid, nontender, no RUQ or epigastric tenderness.  Cervical Exam: not done.  Fetal Heart Tones: normal baseline, moderate variablility, + accels, no decels and Category I  TOCO:   Occasional contractions, but she is not feeling most of them.    Constitutional: healthy, alert and active   Respiratory: No increased work of breathing, good air exchange, clear to auscultation bilaterally, no crackles or wheezing  Cardiovascular: Normal apical impulse, regular rate and rhythm, normal S1 and S2, no S3 or S4, and no murmur noted  Skin/Extremites: no bruising or bleeding and normal skin color, texture, turgor  Neurologic: Awake, alert, oriented to name, place and time.  Cranial nerves II-XII are grossly intact.  Reflexes 2+ bilaterally, no clonus.  Neuropsychiatric: General: normal, calm and normal eye contact  Level of consciousness: alert / normal  Affect: normal and pleasant

## 2017-11-18 NOTE — TELEPHONE ENCOUNTER
"30 weeks, OB Dr Silva at [facility]. \"I have a really bad headache and spots in my eyes since yesterday\".  No loss of vision. Denies bleeding or contractions. Good fetal movement like usual movement. Denies hx migraine headaches \"I only had a headache with my last pregnancy and I had preeclampsia that time\". Paged on-call Dr Silva at 7:31 pm  to call pt at phone # [phone] . Advised pt call back if no call from provider within 20-30 minutes. Pt voiced understanding and agreement. Ele Cramer RN/FNA      Reason for Disposition    [1] SEVERE headache (e.g., excruciating) AND [2] not improved after 2 hours of pain medicine (Exception: similar to previous migraines)    Additional Information    Negative: Difficult to awaken or acting confused  (e.g., disoriented, slurred speech)    Negative: [1] Weakness of the face, arm or leg on one side of the body AND [2] new onset    Negative: [1] Numbness of the face, arm or leg on one side of the body AND [2] new onset    Negative: [1] Loss of speech or garbled speech AND [2] new onset    Negative: Sounds like a life-threatening emergency to the triager    Negative: Followed a head injury within last 3 days    Negative: Unable to walk, or can only walk with assistance (e.g., requires support)    Negative: Stiff neck (can't touch chin to chest)    Negative: Severe pain in one eye    Negative: [1] Other family members (or roommates) with headaches AND [2] possibility of carbon monoxide exposure    Negative: [1] SEVERE headache (e.g., excruciating) AND [2] \"worst headache\" of life    Negative: [1] SEVERE headache AND [2] sudden-onset (i.e., reaching maximum intensity within seconds)    Negative: [1] SEVERE headache AND [2] fever    Negative: Loss of vision or double vision (Exception: similar to previous migraines)    Negative: [1] Fever > 100.5 F (38.1 C) AND [2] diabetes mellitus or weak immune system (e.g., HIV positive, splenectomy, organ transplant, chronic steroids)    " Negative: Patient sounds very sick or weak to the triager    Negative: [1] Pregnant > 20 weeks AND [2] new hand or face swelling    Negative: [1] Pregnant > 20 weeks AND [2] blurred vision    Negative: [1] Pregnant > 20 weeks AND [2] sudden weight gain (i.e., more than 3 lbs or 1.4 kg in one week)    Negative: [1] Pregnant 23 or more weeks AND [2] baby is moving less today (e.g., kick count < 5 in 1 hour or < 10 in 2 hours)    Protocols used: PREGNANCY - HEADACHE-ADULT-AH

## 2017-11-18 NOTE — PROGRESS NOTES
[facility] Labor and Delivery History and Physical    Fransisca Cabello MRN# [id number]   Age: 22 year old YOB: 1995     Date of Admission:  2017    Primary care provider: No Ref-Primary, Physician           Chief Complaint:   Fransisca Cabello is a 22 year old white female  who is 29w2d pregnant and being admitted for observation, Pre-eclampsia and headaches with visual changes now on MgSO4.  Pt had BMZ at 2330 last pm  Plan of Dr Silva and Jacob from [facility] rev.          Pregnancy history:     OBSTETRIC HISTORY:    Obstetric History       T0      L1     SAB0   TAB0   Ectopic0   Multiple0   Live Births1       # Outcome Date GA Lbr Marcellus/2nd Weight Sex Delivery Anes PTL Lv   2 Current            1  14 34w2d 07:13 / 01:56 3.45 kg (7 lb 9.7 oz) F Vag-Spont EPI  VINNY      Name: Cinthia      Complications: Preeclampsia/Hypertension      Apgar1:  7                Apgar5: 8          EDC: Estimated Date of Delivery: 18    Prenatal Labs:   Lab Results   Component Value Date    ABO O 2017    RH Pos 2017    AS Neg 2017    HEPBANG Nonreactive 06/15/2017    CHPCRT  2017     Negative   Negative for C. trachomatis rRNA by transcription mediated amplification.   A negative result by transcription mediated amplification does not preclude the   presence of C. trachomatis infection because results are dependent on proper   and adequate collection, absence of inhibitors, and sufficient rRNA to be   detected.      GCPCRT  2017     Negative   Negative for N. gonorrhoeae rRNA by transcription mediated amplification.   A negative result by transcription mediated amplification does not preclude the   presence of N. gonorrhoeae infection because results are dependent on proper   and adequate collection, absence of inhibitors, and sufficient rRNA to be   detected.      TREPAB Negative 11/10/2017    HGB 10.9 (L) 2017       GBS  "Status:   No results found for: GBS    Active Problem List  Patient Active Problem List   Diagnosis     Type 1 diabetes mellitus, uncontrolled (H)     Encounter for long-term (current) use of insulin (H)     Type 1 diabetes mellitus without complication (H)     Insulin pump in place     Supervision of other high risk pregnancy, antepartum     Pre-existing type 1 diabetes mellitus during pregnancy in first trimester     Indication for care in labor or delivery     Hypertension in pregnancy       Medication Prior to Admission  Prescriptions Prior to Admission   Medication Sig Dispense Refill Last Dose     ondansetron (ZOFRAN ODT) 4 MG ODT tab Take 1-2 tablets (4-8 mg) by mouth every 8 hours as needed for nausea 30 tablet 1 Past Month at Unknown time     blood glucose monitoring (RAYMUNDO CONTOUR NEXT) test strip Use to test blood sugar 10-12 times daily or as directed. 1000 each 3 11/17/2017 at Unknown time     insulin lispro (HUMALOG) 100 UNIT/ML injection As directed via insulin pump - up to 100 units daily, adjusting pump settings due to increased insulin requirements and pregnancy 3 vial 5 11/17/2017 at Unknown time     MICROLET LANCETS MISC Use one lancet 6 times daily for blood sugar testing 600 each 1 11/17/2017 at Unknown time     insulin syringe-needle U-100 (BD INSULIN SYRINGE ULTRAFINE) 31G X 5/16\" 0.3 ML Use one syringe 4 daily or as directed in the event of insulin pump failure.  DO NOT DISPENSE until requested by patient 100 each 3 11/17/2017 at Unknown time     acetone, Urine, test STRP Use as directed to test urine ketone if blood sugars are >250 or during illness with fever, nausea, vomiting, or abdominal pain. 100 each prn 11/17/2017 at Unknown time     insulin glargine (LANTUS VIAL) 100 UNIT/ML injection Inject 19 u sq every 24 hours in the event of insulin pump therapy.  DO NOT FILL RX until requested by patient. 10 mL 1 11/17/2017 at Unknown time     INSULIN PUMP - OUTPATIENT Date last updated:  " 12/23/16  Medtronic Minimed: Model 630G  BASAL RATES and times:  12   AM (midnight): 0.85 units/hour    8     AM: 0.8 units/hour   6pm: 0.85  Basal Pattern A:  Fransisca Cabello will use when NA.  CARB RATIO and times:  12   AM (midnight): 12  Corection Factor (Sensitivity) and times:  12   AM (midnight): 47 mg/dL  BLOOD GLUCOSE TARGET and times:  12   AM (midnight): 100 - 120  5     AM:  90 - 110  9:30    PM:  100 - 120  Active Insulin Time:  3 hours  Sensor:  No  Carelink / Diasend username: [email or web address]  Carelink / Diasend Password:  [email or web address]   11/17/2017 at Unknown time     Prenatal Multivit-Min-Fe-FA (PRENATAL VITAMINS PO)    11/16/2017 at 2100     glucagon (GLUCAGON EMERGENCY) 1 MG kit Inject 1 mg into the muscle once for 1 dose 1 each 0    .        Maternal Past Medical History:     Past Medical History:   Diagnosis Date     Postpartum depression 2014    On medication for 6 months-      Type 1 diabetes (H) 1998    three years at diagnosis     Varicella 1998                       Family History:   I have reviewed this patient's family history            Social History:   I have reviewed this patient's social history         Review of Systems:   C: NEGATIVE for fever, chills, change in weight  E/M: NEGATIVE for ear, mouth and throat problems  R: NEGATIVE for significant cough or SOB  CV: NEGATIVE for chest pain, palpitations or peripheral edema          Physical Exam:   Vitals were reviewed  All vitals stable  Temp: 97.8  F (36.6  C) Temp src: Oral BP: 121/75 Pulse: 116   Resp: 18          Constitutional:   awake, alert, cooperative, no apparent distress, and appears stated age     Eyes:   Lids and lashes normal, pupils equal, round and reactive to light, extra ocular muscles intact, sclera clear, conjunctiva normal     Back:   Symmetric, no curvature, spinous processes are non-tender on palpation, paraspinous muscles are non-tender on palpation, no costal vertebral tenderness     Lungs:   No increased work  of breathing, good air exchange, clear to auscultation bilaterally, no crackles or wheezing     Cardiovascular:   Normal apical impulse, regular rate and rhythm, normal S1 and S2, no S3 or S4, and no murmur noted     Abdomen:   No scars, normal bowel sounds, soft, non-distended, non-tender, no masses palpated, no hepatosplenomegally     Neurologic:   Awake, alert, oriented to name, place and time.  Cranial nerves II-XII are grossly intact.  Motor is 5 out of 5 bilaterally.  Cerebellar finger to nose, heel to shin intact.  Sensory is intact.  Babinski down going, Romberg negative, and gait is normal.  Deep Tendon Reflexes:  Right Knee:  2+  Left Knee:  2+                    Cervix:   Membranes: intact     Presentation:Cephalic  Fetal Heart Rate Tracing: appropriate for gestational age, Tier 1 (normal)  Tocometer: external monitor                 All labs reviewed and is acceptable at this time  Pt has labs q 8 hrs  BS control good      Assessment:   Fransisca Cabello is a 29w2d pregnant female admitted with observation and continue with MG SO4 until both dosages of BMZ in.  If H/A's worse or labs decline or BP control able consider transfer if < 30 weeks  I have spoken with RN from NICU and rev plan .          Plan:   Continue with present plan  Plan again rev with pt  Admit - see IP orders  Sumanth Hayward MD

## 2017-11-18 NOTE — PROVIDER NOTIFICATION
11/17/17 2150   Provider Notification   Provider Name/Title Dr. Silva   Method of Notification Phone   Request Evaluate - Remote   Notification Reason Patient Arrived;Pain;Maternal Vital Sign Change;Lab/Diagnostic Study;Status Update   MD called regarding lab results, BPs, headache pain, Vision changes. Due date confusion per patient. MD stated she viewed her labs from home and BPs. Notified MD that patient has been in a semi fowlers position, denies any shortness of breath, nausea or vomiting, leaking of fluid, vaginal bleeding, or BS out of normal. Plan per MD is that she is not discharging patient home, MD is coming in to see patient to discuss plan of care.

## 2017-11-18 NOTE — PLAN OF CARE
Pt feeling side effects of magnesium sulfate, does not like the feeling of a heavy chest . VSS, pt dozing off and on. Will add on magnesium level with next blood draw

## 2017-11-18 NOTE — PROVIDER NOTIFICATION
11/18/17 0630   Provider Notification   Provider Name/Title Dr. Silva   Method of Notification Phone   Request Evaluate - Remote   Notification Reason Lab/Diagnostic Study;Status Update   MD called due to patient feeling nauseated, her blood sugar elevated to 186 per patient taking it herself. Patient gave herself insulin per pump. Patient is feeling somewhat anxious about feeling like this and is wanting her ketones checked that she is not spilling ketones into her urine.   Received orders to send a urine down to lab and check for ketones, reassure patient  that she is feeling this way more than likely due to the Mag drip. Received orders to make sure the labs are ordered every 8 hrs.

## 2017-11-18 NOTE — PLAN OF CARE
Patient was called in by Dr. Silva for evaluation of PIH symptoms. Patient has been experiencing high BPs the last couple of days, she has a home BP unit because she had Preeclampsia with her 1st baby which she didn't know she had until she delivered. Headaches for a couple of days in which she is seeing stars and white tunnel like vision, she rates them a 7/10 and ES Tylenol is not helping. Dr. Silva gave orders prior to her coming in.   Patient arrives to unit, External monitors applied per patient's permission, history and physical assessment completed. Urine specimen obtained and sent to lab. Orders placed per  's orders. Patient has also been feeling achy in the left upper quadrant area for a couple of months. Denies any vomiting or nausea, but if she has it has been related to her BS's being low. Patient is a TYPE 1 insulin dependent diabetic with an implanted pump in which she sees Endocrinology for and her BS have been stable, and she handles it. Patient has been feeling fatigued the last few days. She has been seeing MFM once a month, and is starting to see them next week every 2 weeks now. Will call Dr. Silva with serial BPs results and lab results and plan of care.

## 2017-11-18 NOTE — PLAN OF CARE
Pt FBS checked with our units accuchek machine = 128, pt used her monitor at the same time with a result of 167. Pt due for labs at noon, will add on glucose at that time and check values with both units again to compare.

## 2017-11-19 VITALS
HEIGHT: 67 IN | DIASTOLIC BLOOD PRESSURE: 70 MMHG | SYSTOLIC BLOOD PRESSURE: 127 MMHG | WEIGHT: 185 LBS | BODY MASS INDEX: 29.03 KG/M2 | HEART RATE: 116 BPM | OXYGEN SATURATION: 96 % | RESPIRATION RATE: 18 BRPM | TEMPERATURE: 98.1 F

## 2017-11-19 LAB
ALT SERPL W P-5'-P-CCNC: 12 U/L (ref 0–50)
AST SERPL W P-5'-P-CCNC: 7 U/L (ref 0–45)
CREAT SERPL-MCNC: 0.58 MG/DL (ref 0.52–1.04)
ERYTHROCYTE [DISTWIDTH] IN BLOOD BY AUTOMATED COUNT: 14 % (ref 10–15)
GFR SERPL CREATININE-BSD FRML MDRD: >90 ML/MIN/1.7M2
HCT VFR BLD AUTO: 29 % (ref 35–47)
HGB BLD-MCNC: 9.6 G/DL (ref 11.7–15.7)
MCH RBC QN AUTO: 30.9 PG (ref 26.5–33)
MCHC RBC AUTO-ENTMCNC: 33.1 G/DL (ref 31.5–36.5)
MCV RBC AUTO: 93 FL (ref 78–100)
PLATELET # BLD AUTO: 254 10E9/L (ref 150–450)
RBC # BLD AUTO: 3.11 10E12/L (ref 3.8–5.2)
WBC # BLD AUTO: 15.6 10E9/L (ref 4–11)

## 2017-11-19 PROCEDURE — 85027 COMPLETE CBC AUTOMATED: CPT | Performed by: OBSTETRICS & GYNECOLOGY

## 2017-11-19 PROCEDURE — 84450 TRANSFERASE (AST) (SGOT): CPT | Performed by: OBSTETRICS & GYNECOLOGY

## 2017-11-19 PROCEDURE — 36415 COLL VENOUS BLD VENIPUNCTURE: CPT | Performed by: OBSTETRICS & GYNECOLOGY

## 2017-11-19 PROCEDURE — 25000132 ZZH RX MED GY IP 250 OP 250 PS 637: Performed by: OBSTETRICS & GYNECOLOGY

## 2017-11-19 PROCEDURE — 25000128 H RX IP 250 OP 636: Performed by: OBSTETRICS & GYNECOLOGY

## 2017-11-19 PROCEDURE — 99238 HOSP IP/OBS DSCHRG MGMT 30/<: CPT | Performed by: OBSTETRICS & GYNECOLOGY

## 2017-11-19 PROCEDURE — 84460 ALANINE AMINO (ALT) (SGPT): CPT | Performed by: OBSTETRICS & GYNECOLOGY

## 2017-11-19 PROCEDURE — 82565 ASSAY OF CREATININE: CPT | Performed by: OBSTETRICS & GYNECOLOGY

## 2017-11-19 RX ADMIN — MAGNESIUM SULFATE IN WATER 2 G/HR: 40 INJECTION, SOLUTION INTRAVENOUS at 07:53

## 2017-11-19 RX ADMIN — DOCUSATE SODIUM 100 MG: 100 CAPSULE, LIQUID FILLED ORAL at 09:09

## 2017-11-19 NOTE — PROVIDER NOTIFICATION
11/19/17 2474   Provider Notification   Provider Name/Title Schirm   Method of Notification Phone   Notification Reason Other (Comment)   OK for NST qshift at this time since patient will stable off magnesium. Headache-not change, pt states vision has improved and she is over-all feeling better. Will get another reactive NST prior to tentative discharge and update as needed.

## 2017-11-19 NOTE — DISCHARGE INSTRUCTIONS
"Discharge Instruction for Undelivered Patients      You were seen for: Blood pressure elevation  We Consulted: Dr Johnson  You had (Test or Medicine):admission     Diet:   Drink 8 to 12 glasses of liquids (milk, juice, water) every day.  You may eat meals and snacks.  To manager your diabetes, follow the guidelines for eating and drinking given to you by your Clinic Provider or Diabetes Educator.       Activity:  Call your doctor or nurse midwife if your baby is moving less than usual.   \"Take it easy\", rest as much as possible.    Call your provider if you notice:  Swelling in your face or increased swelling in your hands or legs.  Headaches that are not relieved by Tylenol (acetaminophen).  Changes in your vision (blurring: seeing spots or stars.)  Nausea (sick to your stomach) and vomiting (throwing up).   Weight gain of 5 pounds or more per week.  Heartburn that doesn't go away.  Signs of bladder infection: pain when you urinate (use the toilet), need to go more often and more urgently.  The bag of mccann (rupture of membranes) breaks, or you notice leaking in your underwear.  Bright red blood in your underwear.  Abdominal (lower belly) or stomach pain.  For first baby: Contractions (tightening) less than 5 minutes apart for one hour or more.  Second (plus) baby: Contractions (tightening) less than 10 minutes apart and getting stronger.  *If less than 34 weeks: Contractions (tightenings) more than 6 times in one hour.  Increase or change in vaginal discharge (note the color and amount)  Other: see below    Follow-up:  Make an appointment to be seen on tomorrow at 10:30 or 2:30      Call the office if you develop worsening headache, vision changes, abdominal pain, vomiting, shortness of breath, chest pain.    Continue daily baby Aspirin.    Call office in AM to confirm office visit with Dr. Lise Johnson OB/GYN at [facility] on 11/20/2017. Your labs will be repeated tomorrow. We will schedule you for " twice weekly testing with Non-Stress Tests and or Biophysical Profiles.

## 2017-11-19 NOTE — PROVIDER NOTIFICATION
11/18/17 1800   Provider Notification   Provider Name/Title Yesy   Method of Notification In Department   Request Evaluate - Remote   Notification Reason Status Update   updated re: occ UC, FHTs, labs , VSS, side effects of magnesium present. Keep continuous monitoring,both toco and US, no new orders, update after next lab draw(2000)/results.

## 2017-11-19 NOTE — PROGRESS NOTES
Antepartum progress note:    S: Patient doing well today and desires to be discharged to home. States her headache is now 3/10. Tylenol did improve her headache. Vision changes continue to be present (white spots) and feels they are worse while on Magnesium. No VB, LoF, no cramping or contractions.     O:   Vitals:    17 0758 17 1108 17 1113 17 1114   BP:   130/78 125/74   Pulse:       Resp:  16     Temp:  98.1  F (36.7  C)     TempSrc:  Oral     SpO2:       Weight: 83.9 kg (185 lb)      Height:          Gen: resting, no distress  Pulm: non-labored breathing, lungs clear to ausculation bilaterally  CV: regular rate, well perfused  Abdomen: no tenderness  Neuro: +1 DTRs bilateral lower extremities, no clonus bilaterally  Extremities: no edema, SCDs in place  Psych: appropriate affect, mood stable  FHT: baseline 140, moderate variability, + accels, neg decels  [place]: occasional    A/P: Fransisca Cabello is a 22 year old female  at 29w3d here for observation secondary to Preeclampsia vs. Gestational hypertension with headache. Patient has a history of preeclampsia with severe features resulting in  delivery at 34 weeks.  - FWB: Cat 1 tracing, reactive  - Patient has been normotensive for >24 hours with normal labs. Headache is improved with Tylenol. Vision changes continue to be present while on Magnesium. Magnesium infusion was started 17 at 0025 (will be 48 hours this evening). Spoke with Dr. James [facility] (pager [phone]) who recommends discontinuing Magnesium with monitoring for the next 4-6 hours. Will continue to assess for neurological abnormalities and evaluate patient symptoms s/p magnesium. If discharged, follow up in office tomorrow for repeat labs. Plan  testing twice weekly for remainder of pregnancy.       Lise Johnson DO 2017 11:22 AM

## 2017-11-19 NOTE — PLAN OF CARE
"Pt off magnesium sulfate now 4+ hrs, states she feels much better. Still has dull HA rates 2/10 and doesn't feel she needs Tylenol. No vision complaints at this time.VSS, see flow sheet.  Feels baby moving. Monitors reapplied and reactive NST obtained. Ok for pt to be d/c home. Pt instructed to \"take it easy\", attempt to rest as much as possible. Pt to call the clinic in the morning and be worked into the schedule to see Dr Johnson per Dr Johnson. Pt understands all d/c instructions, verbalizes understanding.  "

## 2017-11-19 NOTE — CONSULTS
[facility]  Neonatology Advanced Practice Antepartum Counseling Consult      I was asked to provide antepartum counseling for Fransisca Cabello at the request of Shailesh Hayward MD secondary to  labor. Ms. Cabello is currently 29 3/7 weeks and has a hx significant for  delivery at 34 weeks. Betamethasone was administered on +. Ms. Cabello, accompanied by her , was counseled on the expected hospital course, potential risks, and outcomes associated with an infant born at approximately 29+ weeks gestation. The counseling included:  initial delivery room stabilization, respiratory course, lung development, RDS, retinopathy of prematurity, hyperbilirubinemia, infection , intraventricular hemorrhage, nutrition, growth and development, and long term outcomes. Please feel free to call with any additional questions or concerns.          HORTENSIA Gregorio, CNP 2017 10:07 AM   Advanced Practice Service    Intensive Care Unit  [facility]      Floor Time (min): 10  Face to Face Time (min): 25  Total Time (minutes): 35  More than 50% of my time was spent in direct, face to face, antepartum counseling with the above patient.

## 2017-11-19 NOTE — PROVIDER NOTIFICATION
11/19/17 1126   Provider Notification   Provider Name/Title [facility]   Method of Notification In Department   Request Evaluate - Remote   Notification Reason Status Update   MD consulted MFM and decision made to stop Mag Sulfate at this time and continue to monitor the patient for the next 4-6 hours and then possibly d/c to home after that time if all remains WNL. Will call MD to make decision. Will continue to monitor and update as needed.

## 2017-11-19 NOTE — PLAN OF CARE
1920 bed side report received and assumed cares.  Notified to Dr weller re lab results WNL.Plan is to continue monitoring as per orders.No new orders received.

## 2017-11-19 NOTE — PROVIDER NOTIFICATION
11/19/17 1300   Preeclampsia   Preeclampsia Yes   Symptoms Headache;Visual disturbances  (visual disturbances much improved per patient)

## 2017-11-20 ENCOUNTER — PRENATAL OFFICE VISIT (OUTPATIENT)
Dept: OBGYN | Facility: CLINIC | Age: 22
End: 2017-11-20
Payer: COMMERCIAL

## 2017-11-20 ENCOUNTER — TELEPHONE (OUTPATIENT)
Dept: OBGYN | Facility: CLINIC | Age: 22
End: 2017-11-20

## 2017-11-20 VITALS
HEIGHT: 67 IN | WEIGHT: 191 LBS | BODY MASS INDEX: 29.98 KG/M2 | SYSTOLIC BLOOD PRESSURE: 130 MMHG | DIASTOLIC BLOOD PRESSURE: 80 MMHG | HEART RATE: 100 BPM

## 2017-11-20 DIAGNOSIS — O13.3 GESTATIONAL HYPERTENSION W/O SIGNIFICANT PROTEINURIA IN 3RD TRIMESTER: ICD-10-CM

## 2017-11-20 DIAGNOSIS — Z96.41 INSULIN PUMP IN PLACE: ICD-10-CM

## 2017-11-20 DIAGNOSIS — E10.9 TYPE 1 DIABETES MELLITUS WITHOUT COMPLICATION (H): ICD-10-CM

## 2017-11-20 DIAGNOSIS — Z87.59 H/O SEVERE PRE-ECLAMPSIA: ICD-10-CM

## 2017-11-20 DIAGNOSIS — O09.899 SUPERVISION OF OTHER HIGH RISK PREGNANCY, ANTEPARTUM: Primary | ICD-10-CM

## 2017-11-20 LAB
ALT SERPL W P-5'-P-CCNC: 16 U/L (ref 0–50)
AST SERPL W P-5'-P-CCNC: 11 U/L (ref 0–45)
CREAT UR-MCNC: 58 MG/DL
ERYTHROCYTE [DISTWIDTH] IN BLOOD BY AUTOMATED COUNT: 14.5 % (ref 10–15)
HCT VFR BLD AUTO: 32.4 % (ref 35–47)
HGB BLD-MCNC: 10.4 G/DL (ref 11.7–15.7)
MCH RBC QN AUTO: 31 PG (ref 26.5–33)
MCHC RBC AUTO-ENTMCNC: 32.1 G/DL (ref 31.5–36.5)
MCV RBC AUTO: 96 FL (ref 78–100)
PLATELET # BLD AUTO: 253 10E9/L (ref 150–450)
PROT UR-MCNC: 0.17 G/L
PROT/CREAT 24H UR: 0.29 G/G CR (ref 0–0.2)
RBC # BLD AUTO: 3.36 10E12/L (ref 3.8–5.2)
URATE SERPL-MCNC: 3.5 MG/DL (ref 2.6–6)
WBC # BLD AUTO: 11 10E9/L (ref 4–11)

## 2017-11-20 PROCEDURE — 84450 TRANSFERASE (AST) (SGOT): CPT | Performed by: OBSTETRICS & GYNECOLOGY

## 2017-11-20 PROCEDURE — 84550 ASSAY OF BLOOD/URIC ACID: CPT | Performed by: OBSTETRICS & GYNECOLOGY

## 2017-11-20 PROCEDURE — 84156 ASSAY OF PROTEIN URINE: CPT | Performed by: OBSTETRICS & GYNECOLOGY

## 2017-11-20 PROCEDURE — 84460 ALANINE AMINO (ALT) (SGPT): CPT | Performed by: OBSTETRICS & GYNECOLOGY

## 2017-11-20 PROCEDURE — 99207 ZZC PRENATAL VISIT: CPT | Performed by: OBSTETRICS & GYNECOLOGY

## 2017-11-20 PROCEDURE — 85027 COMPLETE CBC AUTOMATED: CPT | Performed by: OBSTETRICS & GYNECOLOGY

## 2017-11-20 PROCEDURE — 36415 COLL VENOUS BLD VENIPUNCTURE: CPT | Performed by: OBSTETRICS & GYNECOLOGY

## 2017-11-20 NOTE — TELEPHONE ENCOUNTER
Routing to Dr. Hayward and Dr. Silva (covering hospital call next 48 hours) as an FYI:    S/p hospital discharge for gestational hypertension / rule out preeclampsia. + h/o severe pree at 34 weeks. On ASA.    Patient seen in office today for hospital follow-up (see documentation). Blood pressure normotensive at 130/80. No change in symptoms. BPP scheduled for tomorrow AM and again on Friday at 9:30AM. Labs collected.    Update: Labs drawn at visit reviewed. Serum labs within normal limits. Urine protein to creatinine ratio increased to 0.29 with a 6 pound weight gain since left hospital yesterday. Patient has appointment with Dr. Silva on 11/28/17, however given protein increase and noted weight gain, recommended follow up sooner. Patient scheduled for physician appointment on Friday 11/24 at 10:30 AM with myself (Dr. Silva not seeing patients on Friday). Plan discussed with patient. She will notify us if she experiences any change in symptoms in the interim.     Lise Johnson, DO  OB/GYN

## 2017-11-20 NOTE — PROGRESS NOTES
"22 year old  at 29w4d weeks presents to the clinic for prenatal visit and hospital follow up. Patient was admitted to the hospital 2017 through 2017 for gestational hypertension with headache and vision changes VS. Preeclampsia.  Serum labs remained normotensive and urine Pr:Cr ratio within normal limits.   Patient treated with magnesium during which time vision changes became worse. After >30 hours of Magnesium, medication discontinued and vision changes improved. She was discharged to home with a follow up appointment today in the office.     Today: Active fetal movement. No vaginal bleeding, leakage of fluid, or contractions.  Continues to have very dull headache, 2/10. Vision changes very mild, similar to when discharged.   Of note: 6 lb weight gain since .    Blood sugars at home well controlled since discharge. Fasting BS 60 this am.    /80 (BP Location: Right arm, Patient Position: Sitting, Cuff Size: Adult Regular)  Pulse 100  Ht 5' 7\" (1.702 m)  Wt 191 lb (86.6 kg)  LMP 2017 (Exact Date)  Breastfeeding? No  BMI 29.91 kg/m2      1. Supervision of other high risk pregnancy, antepartum  - S/p BMZ ,     2. Gestational hypertension w/o significant proteinuria in 3rd trimester; H/O severe pre-eclampsia at 34 weeks   - On baby ASA  - CBC with platelets  - AST  - ALT  - Uric acid  - Protein  random urine with Creat Ratio  - Currently has twice weekly BPPs scheduled through [facility] starting at 32 weeks. Will start these now based on new recommendation of [facility]. Orders placed. Will schedule upstairs prior to leaving today.  - Signs and symptoms reviewed -- she will call if symptoms increase or change    3. Type 1 diabetes mellitus without complication (H)  - Type I DM on pump: followed by endocrine. Plan per Dr. Silva: eye exam, TSH qTM, level II US (normal), fetal echo (normal), growth scans q3-4w, twice weekly  testing, delivery at 37-39 weeks based on glucose " control/other indications.  - Elevated during hospitalization s/p BMZ. Well controlled at home after discharge.    Follow up scheduled with Dr. Silva in one week.    Lise Johnson, DO  OB/GYN

## 2017-11-20 NOTE — MR AVS SNAPSHOT
After Visit Summary   11/20/2017    Fransisca Cabello    MRN: [id number]           Patient Information     Date Of Birth          1995        Visit Information        Provider Department      11/20/2017 2:30 PM Lise Johnson DO [facility]        Today's Diagnoses     Supervision of other high risk pregnancy, antepartum    -  1    Gestational hypertension w/o significant proteinuria in 3rd trimester        Type 1 diabetes mellitus without complication (H)        Insulin pump in place        H/O severe pre-eclampsia           Follow-ups after your visit        Follow-up notes from your care team     Return in about 1 week (around 11/27/2017).      Your next 10 appointments already scheduled     Nov 21, 2017  7:30 AM CST   MFM BPP SINGLE with [id number]   [facility] Ultrasound - [facility])    [address] [place] [address]   [phone]            Nov 21, 2017  8:00 AM CST   Radiology MD with RH LAWRENCE BETTENCOURT   [facility] Maternal Fetal Medicine [facility])    [address] [place] [address]   [phone]           Please arrive at the time given for your first appointment. This visit is used internally to schedule the physician's time during your ultrasound.            Nov 24, 2017  9:30 AM CST   MFM BPP SINGLE with [id number]   [facility] - [facility])    [address]  [facility] [address]   [phone]            Nov 24, 2017 10:00 AM CST   Radiology MD with RH LAWRENCE BETTENCOURT   [facility])    [address]  [facility] [address]   [phone]           Please arrive at the time given for your first appointment. This visit is used internally to schedule the physician's time during your ultrasound.            Nov 24, 2017  10:30 AM CST   ESTABLISHED PRENATAL with Lise Johnson DO   [facility]    [address]  [facility] [address]   [phone]            Nov 28, 2017 11:00 AM CST   ESTABLISHED PRENATAL with Cheyanne Silva MD   [facility]    [address]  [facility] [address]   [phone]            Nov 28, 2017  3:30 PM CST   MFM BPP SINGLE with [email or web address]   [facility] Maternal Fetal Medicine Ultrasound - [facility] ([facility])    [address]  [facility] [address]   [phone]            Nov 28, 2017  4:00 PM CST   Radiology MD with RH MFDOMINIK BETTENCOURT   [facility] Maternal Fetal Medicine [facility] ([facility])    [address]  [facility] [address]   [phone]           Please arrive at the time given for your first appointment. This visit is used internally to schedule the physician's time during your ultrasound.            Dec 01, 2017 11:45 AM CST   MFM US COMPRE SINGLE F/U with [id number]   [facility] Maternal Fetal Medicine Ultrasound - [facility] ([facility])    [address]  [facility] [address]   [phone]           Wear comfortable clothes and leave your valuables at home.            Dec 01, 2017 12:15 PM CST   Radiology MD with RH MFDOMINIK BETTENCOURT   [facility] Maternal Fetal Medicine [facility] ([facility])    [address]  [facility] [address]   [phone]           Please arrive at the time given for your first appointment. This visit is used internally to schedule the physician's time during your ultrasound.              Future tests that were ordered for you today     Open Future Orders        Priority Expected Expires Ordered    Maternal Fetal BPP Single Routine 11/27/2017 11/20/2018 11/20/2017    Maternal Fetal BPP Single Routine  11/20/2018 11/20/2017    Maternal Fetal BPP Single  "Routine  11/20/2018 11/20/2017    Maternal Fetal BPP Single Routine  11/20/2018 11/20/2017            Who to contact     If you have questions or need follow up information about today's clinic visit or your schedule please contact [facility] directly at [phone].  Normal or non-critical lab and imaging results will be communicated to you by MyChart, letter or phone within 4 business days after the clinic has received the results. If you do not hear from us within 7 days, please contact the clinic through [facility] or phone. If you have a critical or abnormal lab result, we will notify you by phone as soon as possible.  Submit refill requests through [email or web address] or call your pharmacy and they will forward the refill request to us. Please allow 3 business days for your refill to be completed.          Additional Information About Your Visit        MyChart Information     [email or web address] gives you secure access to your electronic health record. If you see a primary care provider, you can also send messages to your care team and make appointments. If you have questions, please call your primary care clinic.  If you do not have a primary care provider, please call [phone] and they will assist you.        Care EveryWhere ID     This is your Care EveryWhere ID. This could be used by other organizations to access your [place] medical records  [id number]        Your Vitals Were     Pulse Height Last Period Breastfeeding? BMI (Body Mass Index)       100 5' 7\" (1.702 m) 04/21/2017 (Exact Date) No 29.91 kg/m2        Blood Pressure from Last 3 Encounters:   11/20/17 130/80   11/19/17 127/70   11/16/17 116/80    Weight from Last 3 Encounters:   11/20/17 191 lb (86.6 kg)   11/19/17 185 lb (83.9 kg)   11/16/17 185 lb 1.6 oz (84 kg)              We Performed the Following     ALT     AST     CBC with platelets     Creatinine urine calculation only     Protein  random urine with Creat Ratio     Uric acid        " Primary Care Provider    Physician No Ref-Primary       NO REF-PRIMARY PHYSICIAN        Equal Access to Services     EDWIGE VALLADARES : Hadii aad ku hadalexisstephon Clarenceali, waholgerda luvikkijusha, qaanthonyta carlottaruthyhernan yu. So [facility] [phone].    ATENCIÓN: Si habla español, tiene a boateng disposición servicios gratuitos de asistencia lingüística. Llame al [phone].    We comply with applicable federal civil rights laws and [place] laws. We do not discriminate on the basis of race, color, national origin, age, disability, sex, sexual orientation, or gender identity.            Thank you!     Thank you for choosing [facility]  for your care. Our goal is always to provide you with excellent care. Hearing back from our patients is one way we can continue to improve our services. Please take a few minutes to complete the written survey that you may receive in the mail after your visit with us. Thank you!             Your Updated Medication List - Protect others around you: Learn how to safely use, store and throw away your medicines at www.disposemymeds.org.          This list is accurate as of: 11/20/17  6:10 PM.  Always use your most recent med list.                   Brand Name Dispense Instructions for use Diagnosis    acetone (Urine) test Strp     100 each    Use as directed to test urine ketone if blood sugars are >250 or during illness with fever, nausea, vomiting, or abdominal pain.    High-risk pregnancy, young primigravida, third trimester       blood glucose monitoring test strip    RAYMUNDO CONTOUR NEXT    1000 each    Use to test blood sugar 10-12 times daily or as directed.    Uncontrolled type 1 diabetes mellitus without complication (H), Supervision of other high risk pregnancy, antepartum       glucagon 1 MG kit    GLUCAGON EMERGENCY    1 each    Inject 1 mg into the muscle once for 1 dose    Type 1 diabetes mellitus without complication (H), Insulin pump in  "place       insulin glargine 100 UNIT/ML injection    LANTUS VIAL    10 mL    Inject 19 u sq every 24 hours in the event of insulin pump therapy.  DO NOT FILL RX until requested by patient.    Type 1 diabetes mellitus without complication (H), Insulin pump in place, Encounter for long-term (current) use of insulin (H), High-risk pregnancy, young primigravida, third trimester       insulin lispro 100 UNIT/ML injection    humaLOG    3 vial    As directed via insulin pump - up to 100 units daily, adjusting pump settings due to increased insulin requirements and pregnancy    Uncontrolled type 1 diabetes mellitus without complication (H), Encounter for long-term (current) use of insulin (H)       insulin pump infusion      Date last updated:  12/23/16 Medtronic Minimed: Model 630G BASAL RATES and times: 12   AM (midnight): 0.85 units/hour   8     AM: 0.8 units/hour  6pm: 0.85 Basal Pattern A:  Fransisca Cabello will use when NA. CARB RATIO and times: 12   AM (midnight): 12 Corection Factor (Sensitivity) and times: 12   AM (midnight): 47 mg/dL BLOOD GLUCOSE TARGET and times: 12   AM (midnight): 100 - 120 5     AM:  90 - 110 9:30    PM:  100 - 120 Active Insulin Time:  3 hours Sensor:  No Carelink / Diasend username: [email or web address] Carelink / Diasend Password:  [email or web address]        insulin syringe-needle U-100 31G X 5/16\" 0.3 ML    BD insulin syringe ultrafine    100 each    Use one syringe 4 daily or as directed in the event of insulin pump failure.  DO NOT DISPENSE until requested by patient    Encounter for long-term (current) use of insulin (H)       MICROLET LANCETS Misc     600 each    Use one lancet 6 times daily for blood sugar testing    Type 1 diabetes mellitus without complication (H), Insulin pump in place       ondansetron 4 MG ODT tab    ZOFRAN ODT    30 tablet    Take 1-2 tablets (4-8 mg) by mouth every 8 hours as needed for nausea    Nausea/vomiting in pregnancy       PRENATAL VITAMINS PO             "

## 2017-11-20 NOTE — NURSING NOTE
"Chief Complaint   Patient presents with     Prenatal Care       Initial /80 (BP Location: Right arm, Patient Position: Sitting, Cuff Size: Adult Regular)  Pulse 100  Ht 5' 7\" (1.702 m)  Wt 191 lb (86.6 kg)  LMP 04/21/2017 (Exact Date)  Breastfeeding? No  BMI 29.91 kg/m2 Estimated body mass index is 29.91 kg/(m^2) as calculated from the following:    Height as of this encounter: 5' 7\" (1.702 m).    Weight as of this encounter: 191 lb (86.6 kg).  Medication Reconciliation: complete   29w4d  + FM daily  + mild headache  + white spots in vision  - cramping or bleeding  María Elena Arce LPN        "

## 2017-11-20 NOTE — DISCHARGE SUMMARY
"[facility] Discharge Summary    Fransisca Cabello MRN# [id number]   Age: 22 year old YOB: 1995     Date of Admission:  11/17/2017  Date of Discharge::  11/19/2017  Admitting Physician:  Cheyanne Silva MD  Discharge Physician:  Lise Johnson DO     Home clinic: [place] - Dr. Cheyanne Silva MD - OBGYN          Admission Diagnoses:   Indication for care in labor or delivery  Hypertension in pregnancy  Preeclampsia vs. Gestational Hypertension with Headache & Vision Changes  History of preeclampsia with severe features  Type 1 DM           Discharge Diagnosis:   Gestational Hypertension  Headache and Vision Changes resolved          Procedures:   Procedure(s): None                Medications Prior to Admission:     Insulin Pump   Aspirin 81mg daily  Same as below        Discharge Medications:     Discharge Medication List as of 11/19/2017  4:38 PM      CONTINUE these medications which have NOT CHANGED    Details   ondansetron (ZOFRAN ODT) 4 MG ODT tab Take 1-2 tablets (4-8 mg) by mouth every 8 hours as needed for nausea, Disp-30 tablet, R-1, E-Prescribe      blood glucose monitoring (RAYMUNDO CONTOUR NEXT) test strip Use to test blood sugar 10-12 times daily or as directed., Disp-1000 each, R-3, E-PrescribeIncreased testing due to new insulin pump and pregnancy.      insulin lispro (HUMALOG) 100 UNIT/ML injection As directed via insulin pump - up to 100 units daily, adjusting pump settings due to increased insulin requirements and pregnancy, Disp-3 vial, R-5, E-Prescribe      MICROLET LANCETS MISC Use one lancet 6 times daily for blood sugar testing, Disp-600 each, R-1, E-Prescribe      insulin syringe-needle U-100 (BD INSULIN SYRINGE ULTRAFINE) 31G X 5/16\" 0.3 ML Use one syringe 4 daily or as directed in the event of insulin pump failure.  DO NOT DISPENSE until requested by patientDisp-100 each, [id number]      acetone, Urine, test STRP Use as directed to test urine ketone if blood " sugars are >250 or during illness with fever, nausea, vomiting, or abdominal pain., Disp-100 each, R-prn, E-PrescribeDo not dispense, keep on file      glucagon (GLUCAGON EMERGENCY) 1 MG kit Inject 1 mg into the muscle once for 1 dose, Disp-1 each, R-0, E-PrescribeDo not dispense, keep on file      insulin glargine (LANTUS VIAL) 100 UNIT/ML injection Inject 19 u sq every 24 hours in the event of insulin pump therapy.  DO NOT FILL RX until requested by patient., Disp-10 mL, R-1, E-Prescribe      INSULIN PUMP - OUTPATIENT Date last updated:  12/23/16  Medtronic Minimed: Model 630G  BASAL RATES and times:  12   AM (midnight): 0.85 units/hour    8     AM: 0.8 units/hour   6pm: 0.85  Basal Pattern A:  Fransisca Cabello will use when NA.  CARB RATIO and times:  12   AM (mid night): 12  Corection Factor (Sensitivity) and times:  12   AM (midnight): 47 mg/dL  BLOOD GLUCOSE TARGET and times:  12   AM (midnight): 100 - 120  5     AM:  90 - 110  9:30    PM:  100 - 120  Active Insulin Time:  3 hours  Sensor:  No  Carelink / Diase nd username: [email or web address]  Carelink / Diasend Password:  [email or web address], Historical      Prenatal Multivit-Min-Fe-FA (PRENATAL VITAMINS PO) Historical                   Consultations:   M -- Dr. James          Brief History of Illness:   Reason for admission: Elevated blood pressures with headache and vision changes, rule out preeclampsia       The patient underwent the following procedure(s):   None              Hospital Course:   Patient admitted on 11/17/19 for elevated blood pressures with headache as well as vision changes. Labs were obtained which were within normal limits including creatinine, liver enzymes, platelets, uric acid, and urine protein:creatinine. Patient was started on Magnesium. Serum labs were repeated every 8 hours and remained normal. She received betamethasone on 11/17 & 11/18. On 11/19/17 her headache was improved from a 8/10 to 3/10. Her vision changes remained and were worse  while on Magnesium than on magnesium. As she remained normotensive with normal labs, the magnesium was discontinued and she was monitored for 5 hours following its discontinuation at the recommendation of M. Her vision changes resolved and she had no other  neurological deficits. She was discharge to home with a plan to follow up in the office tomorrow. Labs will be repeat at that time and she will start twice weekly BPPs.    The patient managed her own blood sugars via her insulin pump during her admission.          Discharge Instructions and Follow-Up:   Discharge diet: Diabetic diet   Discharge activity: Bedrest not indicated or recommended however she was encouraged to have light activity   Discharge follow-up: Appointment with Dr. Johnson tomorrronak in office ([place]), she will continue to follow with Dr. Cheyanne Silva after tomorrow's visit for the remainder of the pregnancy               Discharge Disposition:   Discharge to home      Attestation:  I have reviewed today's vital signs, notes, medications, labs and imaging.  Amount of time performed on this discharge summary: 20 minutes.    Lise Johnson, DO

## 2017-11-21 ENCOUNTER — HOSPITAL ENCOUNTER (OUTPATIENT)
Dept: ULTRASOUND IMAGING | Facility: CLINIC | Age: 22
Discharge: HOME | End: 2017-11-21
Attending: OBSTETRICS & GYNECOLOGY | Admitting: OBSTETRICS & GYNECOLOGY
Payer: COMMERCIAL

## 2017-11-21 ENCOUNTER — OFFICE VISIT (OUTPATIENT)
Dept: MATERNAL FETAL MEDICINE | Facility: CLINIC | Age: 22
End: 2017-11-21
Attending: OBSTETRICS & GYNECOLOGY
Payer: COMMERCIAL

## 2017-11-21 DIAGNOSIS — E10.9 TYPE 1 DIABETES MELLITUS WITHOUT COMPLICATION (H): ICD-10-CM

## 2017-11-21 DIAGNOSIS — O09.899 SUPERVISION OF OTHER HIGH RISK PREGNANCY, ANTEPARTUM: ICD-10-CM

## 2017-11-21 DIAGNOSIS — O13.3 PREGNANCY-INDUCED HYPERTENSION IN THIRD TRIMESTER: Primary | ICD-10-CM

## 2017-11-21 DIAGNOSIS — O13.3 GESTATIONAL HYPERTENSION W/O SIGNIFICANT PROTEINURIA IN 3RD TRIMESTER: ICD-10-CM

## 2017-11-21 DIAGNOSIS — O24.013 TYPE 1 DIABETES MELLITUS IN PREGNANCY, THIRD TRIMESTER: ICD-10-CM

## 2017-11-21 DIAGNOSIS — Z96.41 INSULIN PUMP IN PLACE: ICD-10-CM

## 2017-11-21 PROCEDURE — 76819 FETAL BIOPHYS PROFIL W/O NST: CPT

## 2017-11-21 NOTE — MR AVS SNAPSHOT
After Visit Summary   11/21/2017    Fransisca Cabello    MRN: [id number]           Patient Information     Date Of Birth          1995        Visit Information        Provider Department      11/21/2017 8:00 AM Taryn Phelps MD [facility] Maternal Fetal Medicine - [facility]        Today's Diagnoses     Pregnancy-induced hypertension in third trimester    -  1    Type 1 diabetes mellitus in pregnancy, third trimester           Follow-ups after your visit        Your next 10 appointments already scheduled     Nov 24, 2017  9:30 AM CST   MFM BPP SINGLE with [id number]   [facility] Maternal Fetal Medicine Ultrasound - [facility])    [address]  [facility] [address]   [phone]            Nov 24, 2017 10:00 AM CST   Radiology MD with RH LAWRENCE BETTENCOURT   [facility] Maternal Fetal Medicine [facility])    [address]  [facility] [address]   [phone]           Please arrive at the time given for your first appointment. This visit is used internally to schedule the physician's time during your ultrasound.            Nov 24, 2017 10:30 AM CST   [facility] DO   [facility]    [address]  [facility] [address]   [phone]            Nov 28, 2017 11:00 AM CST   ESTABLISHED PRENATAL with Cheyanne Silva MD   [facility]    [address]  [facility] [address]   [phone]            Nov 28, 2017  3:30 PM CST   MFDOMINIK BPP SINGLE with [id number]   [facility] Maternal Fetal Medicine Ultrasound - [facility])    [address]  [facility] [address]   [phone]            Nov 28, 2017  4:00 PM CST   Radiology MD with RH LAWRENCE BETTENCOURT   [facility] Maternal Fetal Medicine [facility])    [address]  [facility] [address]   [phone]           Please arrive at the time given for your first appointment. This visit is used internally to schedule the physician's time during your ultrasound.            Dec 01, 2017 11:45 AM CST   LAWRENCE US COMPRE SINGLE F/U with [id number]   [facility] Maternal Fetal Medicine Ultrasound - [facility] ([facility])    [address]  [facility] [address]   [phone]           Wear comfortable clothes and leave your valuables at home.            Dec 01, 2017 12:15 PM CST   [facility]   [facility] Maternal Fetal Medicine [facility] ([facility])    [address]  [facility] [address]   [phone]           Please arrive at the time given for your first appointment. This visit is used internally to schedule the physician's time during your ultrasound.            Dec 05, 2017 11:45 AM HEATH BRAVO BPP SINGLE with [id number]   [facility] Maternal Fetal Medicine Ultrasound - [facility] ([facility])    [address]  [facility] [address]   [phone]            Dec 05, 2017 12:15 PM CST   Radiology MD with  LAWRENCE BETTENCOURT   [facility] Maternal Fetal Medicine [facility] ([facility])    [address]  [facility] [address]   [phone]           Please arrive at the time given for your first appointment. This visit is used internally to schedule the physician's time during your ultrasound.              Future tests that were ordered for you today     Open Future Orders        Priority Expected Expires Ordered    Maternal Fetal BPP Single Routine 11/27/2017 11/20/2018 11/20/2017    Maternal Fetal BPP Single Routine  11/20/2018 11/20/2017    Maternal Fetal BPP Single Routine  11/20/2018 11/20/2017    Maternal Fetal BPP Single Routine  11/20/2018 11/20/2017            Who to contact     If you have questions or need follow up information about today's clinic visit  or your schedule please contact [facility] MATERNAL FETAL MEDICINE [facility] directly at [phone].  Normal or non-critical lab and imaging results will be communicated to you by MyChart, letter or phone within 4 business days after the clinic has received the results. If you do not hear from us within 7 days, please contact the clinic through [employer] or phone. If you have a critical or abnormal lab result, we will notify you by phone as soon as possible.  Submit refill requests through W[employer]I or call your pharmacy and they will forward the refill request to us. Please allow 3 business days for your refill to be completed.          Additional Information About Your Visit        [employer] Mobile[employer]Pol Information     WIS[employer]I gives you secure access to your electronic health record. If you see a primary care provider, you can also send messages to your care team and make appointments. If you have questions, please call your primary care clinic.  If you do not have a primary care provider, please call [phone] and they will assist you.        Care EveryWhere ID     This is your Care EveryWhere ID. This could be used by other organizations to access your [place] medical records  [id number]        Your Vitals Were     Last Period                   04/21/2017 (Exact Date)            Blood Pressure from Last 3 Encounters:   11/20/17 130/80   11/19/17 127/70   11/16/17 116/80    Weight from Last 3 Encounters:   11/20/17 86.6 kg (191 lb)   11/19/17 83.9 kg (185 lb)   11/16/17 84 kg (185 lb 1.6 oz)              Today, you had the following     No orders found for display       Primary Care Provider    Physician No Ref-Primary       NO REF-PRIMARY PHYSICIAN        Equal Access to Services     [facility]: Hadii isidoro Quintana, waholgerda luqadaha, qaybta kaalhernan tran . So [facility] [phone].    ATENCIÓN: Si habla español, tiene a boateng disposición servicios gratuitos de asistencia  lingüísticaGiorgio Qureshi al [phone].    We comply with applicable federal civil rights laws and [place] laws. We do not discriminate on the basis of race, color, national origin, age, disability, sex, sexual orientation, or gender identity.            Thank you!     Thank you for choosing [facility]  for your care. Our goal is always to provide you with excellent care. Hearing back from our patients is one way we can continue to improve our services. Please take a few minutes to complete the written survey that you may receive in the mail after your visit with us. Thank you!             Your Updated Medication List - Protect others around you: Learn how to safely use, store and throw away your medicines at www.disposemymeds.org.          This list is accurate as of: 11/21/17  8:54 AM.  Always use your most recent med list.                   Brand Name Dispense Instructions for use Diagnosis    acetone (Urine) test Strp     100 each    Use as directed to test urine ketone if blood sugars are >250 or during illness with fever, nausea, vomiting, or abdominal pain.    High-risk pregnancy, young primigravida, third trimester       blood glucose monitoring test strip    RAYMUNDO CONTOUR NEXT    1000 each    Use to test blood sugar 10-12 times daily or as directed.    Uncontrolled type 1 diabetes mellitus without complication (H), Supervision of other high risk pregnancy, antepartum       glucagon 1 MG kit    GLUCAGON EMERGENCY    1 each    Inject 1 mg into the muscle once for 1 dose    Type 1 diabetes mellitus without complication (H), Insulin pump in place       insulin glargine 100 UNIT/ML injection    LANTUS VIAL    10 mL    Inject 19 u sq every 24 hours in the event of insulin pump therapy.  DO NOT FILL RX until requested by patient.    Type 1 diabetes mellitus without complication (H), Insulin pump in place, Encounter for long-term (current) use of insulin (H), High-risk pregnancy, young  "primigravida, third trimester       insulin lispro 100 UNIT/ML injection    humaLOG    3 vial    As directed via insulin pump - up to 100 units daily, adjusting pump settings due to increased insulin requirements and pregnancy    Uncontrolled type 1 diabetes mellitus without complication (H), Encounter for long-term (current) use of insulin (H)       insulin pump infusion      Date last updated:  12/23/16 [employer] Minimed: Model 630G BASAL RATES and times: 12   AM (midnight): 0.85 units/hour   8     AM: 0.8 units/hour  6pm: 0.85 Basal Pattern A:  Fransisca Irineo will use when NA. CARB RATIO and times: 12   AM (midnight): 12 Corection Factor (Sensitivity) and times: 12   AM (midnight): 47 mg/dL BLOOD GLUCOSE TARGET and times: 12   AM (midnight): 100 - 120 5     AM:  90 - 110 9:30    PM:  100 - 120 Active Insulin Time:  3 hours Sensor:  No Carelink / Diasend username: [email or web address] Carelink / Diasend Password:  [email or web address]        insulin syringe-needle U-100 31G X 5/16\" 0.3 ML    BD insulin syringe ultrafine    100 each    Use one syringe 4 daily or as directed in the event of insulin pump failure.  DO NOT DISPENSE until requested by patient    Encounter for long-term (current) use of insulin (H)       MICROLET LANCETS Misc     600 each    Use one lancet 6 times daily for blood sugar testing    Type 1 diabetes mellitus without complication (H), Insulin pump in place       ondansetron 4 MG ODT tab    ZOFRAN ODT    30 tablet    Take 1-2 tablets (4-8 mg) by mouth every 8 hours as needed for nausea    Nausea/vomiting in pregnancy       PRENATAL VITAMINS PO             "

## 2017-11-21 NOTE — PROGRESS NOTES
Please see ultrasound report under imaging tab for details on ultrasound performed today.    Taryn Phelps MD  , OB/GYN  Maternal-Fetal Medicine  [email or web address]  [phone] (Academic office)  [phone] (Pager)

## 2017-11-24 ENCOUNTER — OFFICE VISIT (OUTPATIENT)
Dept: MATERNAL FETAL MEDICINE | Facility: CLINIC | Age: 22
End: 2017-11-24
Attending: OBSTETRICS & GYNECOLOGY
Payer: COMMERCIAL

## 2017-11-24 ENCOUNTER — PRENATAL OFFICE VISIT (OUTPATIENT)
Dept: OBGYN | Facility: CLINIC | Age: 22
End: 2017-11-24
Payer: COMMERCIAL

## 2017-11-24 ENCOUNTER — HOSPITAL ENCOUNTER (OUTPATIENT)
Dept: ULTRASOUND IMAGING | Facility: CLINIC | Age: 22
Discharge: HOME | End: 2017-11-24
Attending: OBSTETRICS & GYNECOLOGY | Admitting: OBSTETRICS & GYNECOLOGY
Payer: COMMERCIAL

## 2017-11-24 VITALS
DIASTOLIC BLOOD PRESSURE: 72 MMHG | SYSTOLIC BLOOD PRESSURE: 122 MMHG | BODY MASS INDEX: 29.03 KG/M2 | WEIGHT: 185 LBS | HEART RATE: 100 BPM | HEIGHT: 67 IN

## 2017-11-24 DIAGNOSIS — O09.899 SUPERVISION OF OTHER HIGH RISK PREGNANCY, ANTEPARTUM: ICD-10-CM

## 2017-11-24 DIAGNOSIS — Z96.41 INSULIN PUMP IN PLACE: ICD-10-CM

## 2017-11-24 DIAGNOSIS — O16.3 HYPERTENSION AFFECTING PREGNANCY IN THIRD TRIMESTER: ICD-10-CM

## 2017-11-24 DIAGNOSIS — O13.3 GESTATIONAL HYPERTENSION W/O SIGNIFICANT PROTEINURIA IN 3RD TRIMESTER: ICD-10-CM

## 2017-11-24 DIAGNOSIS — E10.9 TYPE 1 DIABETES MELLITUS WITHOUT COMPLICATION (H): ICD-10-CM

## 2017-11-24 DIAGNOSIS — O24.013 TYPE 1 DIABETES MELLITUS IN PREGNANCY, THIRD TRIMESTER: Primary | ICD-10-CM

## 2017-11-24 LAB — FERN CRYSTALLIZATION: NORMAL

## 2017-11-24 PROCEDURE — 99207 ZZC PRENATAL VISIT: CPT | Performed by: OBSTETRICS & GYNECOLOGY

## 2017-11-24 PROCEDURE — 76819 FETAL BIOPHYS PROFIL W/O NST: CPT

## 2017-11-24 NOTE — MR AVS SNAPSHOT
After Visit Summary   11/24/2017    Fransisca Cabello    MRN: [id number]           Patient Information     Date Of Birth          1995        Visit Information        Provider Department      11/24/2017 10:00 AM Mani Vazquez MD [facility] Maternal Fetal Medicine [facility]        Today's Diagnoses     Type 1 diabetes mellitus in pregnancy, third trimester    -  1    Hypertension affecting pregnancy in third trimester           Follow-ups after your visit        Your next 10 appointments already scheduled     Nov 28, 2017 11:00 AM CST   ESTABLISHED PRENATAL with Cheyanne Silva MD   [facility] ([facility])    [address]  [facility] [address]   [phone]            Nov 28, 2017  3:30 PM CST   MFM BPP SINGLE with [id number]   [facility] Maternal Fetal Medicine Ultrasound [facility])    [address]  [facility] [address]   [phone]            Nov 28, 2017  4:00 PM CST   Radiology MD with  LAWRENCE BETTENCOURT   [facility] Maternal Fetal Medicine [facility] ([facility])    [address]  [facility] [address]   [phone]           Please arrive at the time given for your first appointment. This visit is used internally to schedule the physician's time during your ultrasound.            Dec 01, 2017 11:45 AM CST   MFM US COMPRE SINGLE F/U with [id number]   [facility] Maternal Fetal Medicine Ultrasound - [facility] ([facility])    [address]  [facility] [address]   [phone]           Wear comfortable clothes and leave your valuables at home.            Dec 01, 2017 12:15 PM CST   Radiology MD with  LAWRENCE BETTENCOURT   [facility] Maternal Fetal Medicine [facility] ([facility])    [address]  [facility] [address]   [phone]           Please arrive at the time given for your first appointment. This visit is used internally  to schedule the physician's time during your ultrasound.            Dec 05, 2017 11:45 AM CST   MFM BPP SINGLE with [email or web address]   [facility] Maternal Fetal Medicine Ultrasound - [facility] ([facility])    [address]  [facility] [address] [phone]            Dec 05, 2017 12:15 PM CST   Radiology MD with RH LAWRENCE BETTENCOURT   [facility] Maternal Fetal Medicine [facility])    [address]  [facility] [address] [phone]           Please arrive at the time given for your first appointment. This visit is used internally to schedule the physician's time during your ultrasound.            Dec 08, 2017 11:30 AM CST   ESTABLISHED PRENATAL with Cheyanne Silva MD   [facility] ([facility])    [address] [place] [id number] [phone]            Dec 22, 2017 11:15 AM CST   ESTABLISHED PRENATAL with Cheyanne Silva MD   [facility] ([facility])    [address] [place] [id number] [phone]            Jan 05, 2018 11:15 AM CST   ESTABLISHED PRENATAL with Cheyanne Silva MD   [facility] ([facility])    [address] [place] [id number] [phone]              Who to contact     If you have questions or need follow up information about today's clinic visit or your schedule please contact [facility] MATERNAL FETAL MEDICINE [facility] directly at [phone].  Normal or non-critical lab and imaging results will be communicated to you by MyChart, letter or phone within 4 business days after the clinic has received the results. If you do not hear from us within 7 days, please contact the clinic through MyChart or phone. If you have a critical or abnormal lab result, we will notify you by phone as soon as possible.  Submit refill requests through [employer] or call your pharmacy and  they will forward the refill request to us. Please allow 3 business days for your refill to be completed.          Additional Information About Your Visit        Radial Networkhart Information     [employer] gives you secure access to your electronic health record. If you see a primary care provider, you can also send messages to your care team and make appointments. If you have questions, please call your primary care clinic.  If you do not have a primary care provider, please call [phone] and they will assist you.        Care EveryWhere ID     This is your Care EveryWhere ID. This could be used by other organizations to access your [place] medical records  [id number]        Your Vitals Were     Last Period                   04/21/2017 (Exact Date)            Blood Pressure from Last 3 Encounters:   11/24/17 122/72   11/20/17 130/80   11/19/17 127/70    Weight from Last 3 Encounters:   11/24/17 83.9 kg (185 lb)   11/20/17 86.6 kg (191 lb)   11/19/17 83.9 kg (185 lb)              Today, you had the following     No orders found for display       Primary Care Provider    Physician No Ref-Primary       NO REF-PRIMARY PHYSICIAN        Equal Access to Services     [facility]: Hadii isidoro Quintana, waaxda nicole, qaybta maye dinero, hernan spicer . So [facility] [phone].    ATENCIÓN: Si habla español, tiene a boateng disposición servicios gratuitos de asistencia lingüística. Jordiame al [phone].    We comply with applicable federal civil rights laws and [place] laws. We do not discriminate on the basis of race, color, national origin, age, disability, sex, sexual orientation, or gender identity.            Thank you!     Thank you for choosing [facility]  for your care. Our goal is always to provide you with excellent care. Hearing back from our patients is one way we can continue to improve our services. Please take a few minutes to complete the  written survey that you may receive in the mail after your visit with us. Thank you!             Your Updated Medication List - Protect others around you: Learn how to safely use, store and throw away your medicines at www.disposemymeds.org.          This list is accurate as of: 11/24/17 11:14 AM.  Always use your most recent med list.                   Brand Name Dispense Instructions for use Diagnosis    acetone (Urine) test Strp     100 each    Use as directed to test urine ketone if blood sugars are >250 or during illness with fever, nausea, vomiting, or abdominal pain.    High-risk pregnancy, young primigravida, third trimester       blood glucose monitoring test strip    RAYMUNDO CONTOUR NEXT    1000 each    Use to test blood sugar 10-12 times daily or as directed.    Uncontrolled type 1 diabetes mellitus without complication (H), Supervision of other high risk pregnancy, antepartum       glucagon 1 MG kit    GLUCAGON EMERGENCY    1 each    Inject 1 mg into the muscle once for 1 dose    Type 1 diabetes mellitus without complication (H), Insulin pump in place       insulin glargine 100 UNIT/ML injection    LANTUS VIAL    10 mL    Inject 19 u sq every 24 hours in the event of insulin pump therapy.  DO NOT FILL RX until requested by patient.    Type 1 diabetes mellitus without complication (H), Insulin pump in place, Encounter for long-term (current) use of insulin (H), High-risk pregnancy, young primigravida, third trimester       insulin lispro 100 UNIT/ML injection    humaLOG    3 vial    As directed via insulin pump - up to 100 units daily, adjusting pump settings due to increased insulin requirements and pregnancy    Uncontrolled type 1 diabetes mellitus without complication (H), Encounter for long-term (current) use of insulin (H)       insulin pump infusion      Date last updated:  12/23/16 Medtr[employer] Minimed: Model 630G BASAL RATES and times: 12   AM (midnight): 0.85 units/hour   8     AM: 0.8 units/hour   "6pm: 0.85 Basal Pattern A:  Fransisca Cabello will use when NA. CARB RATIO and times: 12   AM (midnight): 12 Corection Factor (Sensitivity) and times: 12   AM (midnight): 47 mg/dL BLOOD GLUCOSE TARGET and times: 12   AM (midnight): 100 - 120 5     AM:  90 - 110 9:30    PM:  100 - 120 Active Insulin Time:  3 hours Sensor:  No Carelink / Diasend username: [email or web address] Carelink / Diasend Password:  [email or web address]        insulin syringe-needle U-100 31G X 5/16\" 0.3 ML    BD insulin syringe ultrafine    100 each    Use one syringe 4 daily or as directed in the event of insulin pump failure.  DO NOT DISPENSE until requested by patient    Encounter for long-term (current) use of insulin (H)       MICROLET LANCETS Misc     600 each    Use one lancet 6 times daily for blood sugar testing    Type 1 diabetes mellitus without complication (H), Insulin pump in place       ondansetron 4 MG ODT tab    ZOFRAN ODT    30 tablet    Take 1-2 tablets (4-8 mg) by mouth every 8 hours as needed for nausea    Nausea/vomiting in pregnancy       PRENATAL VITAMINS PO             "

## 2017-11-24 NOTE — PROGRESS NOTES
"  22 year old  at 30w1d weeks presents to the clinic for prenatal visit and hospital follow up. Patient was admitted to the hospital 2017 through 2017 for gestational hypertension with headache and vision changes VS. Preeclampsia.  Serum labs remained normotensive and urine Pr:Cr ratio within normal limits.   Patient treated with magnesium during which time vision changes became worse. After >30 hours of Magnesium, medication discontinued and vision changes improved. She was discharged to home with close follow up. Post discharge labs this week showed stable serum labs. Urine Pr:Cr increased to 0.29.    Today: Active fetal movement. No vaginal bleeding or contractions/cramping. Notes leakage of fluid past 2 days.  Continues to have very dull headache, 2/10. Vision changes have resolved.    Patient now back down to 185 lbs (had 6 lb weight gain at last visit).    Blood sugars at home well controlled since discharge.     /72 (BP Location: Left arm, Patient Position: Sitting, Cuff Size: Adult Regular)  Pulse 100  Ht 5' 7\" (1.702 m)  Wt 185 lb (83.9 kg)  LMP 2017 (Exact Date)  BMI 28.98 kg/m2      1. Supervision of other high risk pregnancy, antepartum  - S/p BMZ ,   - Ferning negative today    2. Gestational hypertension w/o significant proteinuria in 3rd trimester; H/O severe pre-eclampsia at 34 weeks   - On baby ASA  - Twice weekly BPPs through MFM (Tues/Fri).  BPP 8/8 per patient, fluid level lower than previous scan. Ferning negative in office today.  - Signs and symptoms reviewed -- she will call if symptoms increase or change  - Will place future lab orders for patient to have prior to visit with Dr. Silva early next week  - Discussed activity. Strict bedrest not recommended. Discussed okay to perform activities of daily living if not strenuous. No heavy lifting/pulling.     3. Type 1 diabetes mellitus without complication (H)  - Type I DM on pump: followed by " endocrine. Plan per Dr. Silva: eye exam, TSH qTM, level II US (normal), fetal echo (normal), growth scans q3-4w, twice weekly  testing, delivery at 37-39 weeks based on glucose control/other indications.  - Elevated during hospitalization s/p BMZ. Well controlled at home after discharge.    Follow up scheduled with Dr. Silva on 2017.    Lise Johnson, DO  OB/GYN

## 2017-11-24 NOTE — MR AVS SNAPSHOT
After Visit Summary   11/24/2017    Fransisca Cabello    MRN: [id number]           Patient Information     Date Of Birth          1995        Visit Information        Provider Department      11/24/2017 10:30 AM Lise Johnson DO [facility]        Today's Diagnoses     Supervision of other high risk pregnancy, antepartum        Type 1 diabetes mellitus without complication (H)        Insulin pump in place           Follow-ups after your visit        Your next 10 appointments already scheduled     Nov 28, 2017 11:00 AM CST   ESTABLISHED PRENATAL with Cheyanne Silva MD   [facility] ([facility])    [address] [place]  [facility] [address]   [phone]            Nov 28, 2017  3:30 PM CST   MFM BPP SINGLE with [id number]   [facility] Maternal Fetal Medicine Ultrasound - [facility])    [address]  [facility] [address]   [phone]            Nov 28, 2017  4:00 PM CST   Radiology MD with  MFDOMINIK BETTENCOURT   [facility] Maternal Fetal Medicine [facility])    [address]  [facility] [address]   [phone]           Please arrive at the time given for your first appointment. This visit is used internally to schedule the physician's time during your ultrasound.            Dec 01, 2017 11:45 AM CST   MFM US COMPRE SINGLE F/U with [id number]   [facility] Maternal Fetal Medicine Ultrasound - [facility])    [address]  [facility] [address]   [phone]           Wear comfortable clothes and leave your valuables at home.            Dec 01, 2017 12:15 PM CST   Radiology MD with  MFDOMINIK BETTENCOURT   [facility] Maternal Fetal Medicine [facility] ([facility])    [address]  [facility] [address]   [phone]           Please arrive at the time given for your first appointment. This visit is used  internally to schedule the physician's time during your ultrasound.            Dec 05, 2017 11:45 AM CST   MFM BPP SINGLE with [id number]   [facility] Maternal Fetal Medicine Ultrasound - [facility])    [address] [address]  [facility] [address]   [phone]            Dec 05, 2017 12:15 PM CST   Radiology MD with RH MFDOMINIK BETTENCOURT   [facility] Maternal Fetal Medicine - [facility])    [address] [facility] [address]  [facility] [address] [phone]           Please arrive at the time given for your first appointment. This visit is used internally to schedule the physician's time during your ultrasound.            Dec 08, 2017 11:30 AM CST   ESTABLISHED PRENATAL with Cheyanne Silva MD   [facility] ([facility])    [address] [place] [address]   [phone]            Dec 22, 2017 11:15 AM CST   ESTABLISHED PRENATAL with Cheyanne Silva MD   [facility] ([facility])    [address] [place] [address]   [phone]            Jan 05, 2018 11:15 AM CST   ESTABLISHED PRENATAL with Cheyanne Silva MD   [facility] ([facility])    [address] [place] [address]   [phone]              Future tests that were ordered for you today     Open Future Orders        Priority Expected Expires Ordered    CBC with platelets Routine 11/27/2017 11/24/2018 11/24/2017    AST Routine 11/27/2017 11/24/2018 11/24/2017    ALT Routine 11/27/2017 11/24/2018 11/24/2017    Creatinine Routine 11/27/2017 11/24/2018 11/24/2017    Uric acid Routine 11/27/2017 11/24/2018 11/24/2017            Who to contact     If you have questions or need follow up information about today's clinic visit or your schedule please contact [facility] directly at [phone].  Normal or non-critical lab and imaging  "results will be communicated to you by MyChart, letter or phone within 4 business days after the clinic has received the results. If you do not hear from us within 7 days, please contact the clinic through Tbrickst or phone. If you have a critical or abnormal lab result, we will notify you by phone as soon as possible.  Submit refill requests through [employer] or call your pharmacy and they will forward the refill request to us. Please allow 3 business days for your refill to be completed.          Additional Information About Your Visit        [employer] Information     [employer] gives you secure access to your electronic health record. If you see a primary care provider, you can also send messages to your care team and make appointments. If you have questions, please call your primary care clinic.  If you do not have a primary care provider, please call [phone] and they will assist you.        Care EveryWhere ID     This is your Care EveryWhere ID. This could be used by other organizations to access your [place] medical records  [id number]        Your Vitals Were     Pulse Height Last Period BMI (Body Mass Index)          100 5' 7\" (1.702 m) 04/21/2017 (Exact Date) 28.98 kg/m2         Blood Pressure from Last 3 Encounters:   11/24/17 122/72   11/20/17 130/80   11/19/17 127/70    Weight from Last 3 Encounters:   11/24/17 185 lb (83.9 kg)   11/20/17 191 lb (86.6 kg)   11/19/17 185 lb (83.9 kg)              We Performed the Following     Fern and nitrazine test POCT        Primary Care Provider    Physician No Ref-Primary       NO REF-PRIMARY PHYSICIAN        Equal Access to Services     [facility]: Hadii aad ku hadasho Soomaali, waaxda luqadaha, qaybta kaalmada adesmithyayadi, hernan spicer . So [facility] [phone].    ATENCIÓN: Si habla español, tiene a boateng disposición servicios gratuitos de asistencia lingüística. Llame al [phone].    We comply with applicable federal civil rights laws " and [place] laws. We do not discriminate on the basis of race, color, national origin, age, disability, sex, sexual orientation, or gender identity.            Thank you!     Thank you for choosing [facility]  for your care. Our goal is always to provide you with excellent care. Hearing back from our patients is one way we can continue to improve our services. Please take a few minutes to complete the written survey that you may receive in the mail after your visit with us. Thank you!             Your Updated Medication List - Protect others around you: Learn how to safely use, store and throw away your medicines at www.disposemymeds.org.          This list is accurate as of: 11/24/17 12:52 PM.  Always use your most recent med list.                   Brand Name Dispense Instructions for use Diagnosis    acetone (Urine) test Strp     100 each    Use as directed to test urine ketone if blood sugars are >250 or during illness with fever, nausea, vomiting, or abdominal pain.    High-risk pregnancy, young primigravida, third trimester       blood glucose monitoring test strip    RAYMUNDO CONTOUR NEXT    1000 each    Use to test blood sugar 10-12 times daily or as directed.    Uncontrolled type 1 diabetes mellitus without complication (H), Supervision of other high risk pregnancy, antepartum       glucagon 1 MG kit    GLUCAGON EMERGENCY    1 each    Inject 1 mg into the muscle once for 1 dose    Type 1 diabetes mellitus without complication (H), Insulin pump in place       insulin glargine 100 UNIT/ML injection    LANTUS VIAL    10 mL    Inject 19 u sq every 24 hours in the event of insulin pump therapy.  DO NOT FILL RX until requested by patient.    Type 1 diabetes mellitus without complication (H), Insulin pump in place, Encounter for long-term (current) use of insulin (H), High-risk pregnancy, young primigravida, third trimester       insulin lispro 100 UNIT/ML injection    humaLOG    3 vial    As  "directed via insulin pump - up to 100 units daily, adjusting pump settings due to increased insulin requirements and pregnancy    Uncontrolled type 1 diabetes mellitus without complication (H), Encounter for long-term (current) use of insulin (H)       insulin pump infusion      Date last updated:  12/23/16 [employer] Minimed: Model 630G BASAL RATES and times: 12   AM (midnight): 0.85 units/hour   8     AM: 0.8 units/hour  6pm: 0.85 Basal Pattern A:  Fransisca Irineo will use when NA. CARB RATIO and times: 12   AM (midnight): 12 Corection Factor (Sensitivity) and times: 12   AM (midnight): 47 mg/dL BLOOD GLUCOSE TARGET and times: 12   AM (midnight): 100 - 120 5     AM:  90 - 110 9:30    PM:  100 - 120 Active Insulin Time:  3 hours Sensor:  No Carelink / Diasend username: [email or web address] Carelink / Diasend Password:  [email or web address]        insulin syringe-needle U-100 31G X 5/16\" 0.3 ML    BD insulin syringe ultrafine    100 each    Use one syringe 4 daily or as directed in the event of insulin pump failure.  DO NOT DISPENSE until requested by patient    Encounter for long-term (current) use of insulin (H)       MICROLET LANCETS Misc     600 each    Use one lancet 6 times daily for blood sugar testing    Type 1 diabetes mellitus without complication (H), Insulin pump in place       ondansetron 4 MG ODT tab    ZOFRAN ODT    30 tablet    Take 1-2 tablets (4-8 mg) by mouth every 8 hours as needed for nausea    Nausea/vomiting in pregnancy       PRENATAL VITAMINS PO             "

## 2017-11-24 NOTE — PROGRESS NOTES
Please refer to ultrasound report under 'Imaging' Studies of 'Chart Review' tabs.    Mani Vazquez M.D.

## 2017-11-24 NOTE — NURSING NOTE
"Chief Complaint   Patient presents with     Prenatal Care       Initial /72 (BP Location: Left arm, Patient Position: Sitting, Cuff Size: Adult Regular)  Pulse 100  Ht 5' 7\" (1.702 m)  Wt 185 lb (83.9 kg)  LMP 04/21/2017 (Exact Date)  BMI 28.98 kg/m2 Estimated body mass index is 28.98 kg/(m^2) as calculated from the following:    Height as of this encounter: 5' 7\" (1.702 m).    Weight as of this encounter: 185 lb (83.9 kg).  Medication Reconciliation: complete     30w1d  + FM daily  - contractions, cramping or bleeding  - edema  + dull headache  - vision changes  María Elena Arce LPN      "

## 2017-11-28 ENCOUNTER — PRENATAL OFFICE VISIT (OUTPATIENT)
Dept: OBGYN | Facility: CLINIC | Age: 22
End: 2017-11-28
Payer: COMMERCIAL

## 2017-11-28 ENCOUNTER — OFFICE VISIT (OUTPATIENT)
Dept: MATERNAL FETAL MEDICINE | Facility: CLINIC | Age: 22
End: 2017-11-28
Attending: OBSTETRICS & GYNECOLOGY
Payer: COMMERCIAL

## 2017-11-28 ENCOUNTER — HOSPITAL ENCOUNTER (OUTPATIENT)
Dept: ULTRASOUND IMAGING | Facility: CLINIC | Age: 22
Discharge: HOME | End: 2017-11-28
Attending: OBSTETRICS & GYNECOLOGY | Admitting: OBSTETRICS & GYNECOLOGY
Payer: COMMERCIAL

## 2017-11-28 VITALS
HEART RATE: 80 BPM | BODY MASS INDEX: 29.41 KG/M2 | WEIGHT: 187.8 LBS | DIASTOLIC BLOOD PRESSURE: 76 MMHG | SYSTOLIC BLOOD PRESSURE: 128 MMHG

## 2017-11-28 DIAGNOSIS — E10.9 TYPE 1 DIABETES MELLITUS WITHOUT COMPLICATION (H): ICD-10-CM

## 2017-11-28 DIAGNOSIS — O09.899 SUPERVISION OF OTHER HIGH RISK PREGNANCY, ANTEPARTUM: ICD-10-CM

## 2017-11-28 DIAGNOSIS — Z96.41 INSULIN PUMP IN PLACE: ICD-10-CM

## 2017-11-28 DIAGNOSIS — O24.013 TYPE 1 DIABETES MELLITUS IN PREGNANCY, THIRD TRIMESTER: ICD-10-CM

## 2017-11-28 DIAGNOSIS — O16.3 HYPERTENSION AFFECTING PREGNANCY IN THIRD TRIMESTER: Primary | ICD-10-CM

## 2017-11-28 DIAGNOSIS — O13.3 GESTATIONAL HYPERTENSION WITHOUT SIGNIFICANT PROTEINURIA IN THIRD TRIMESTER: Primary | ICD-10-CM

## 2017-11-28 DIAGNOSIS — O13.3 GESTATIONAL HYPERTENSION W/O SIGNIFICANT PROTEINURIA IN 3RD TRIMESTER: ICD-10-CM

## 2017-11-28 LAB
ERYTHROCYTE [DISTWIDTH] IN BLOOD BY AUTOMATED COUNT: 13.9 % (ref 10–15)
HCT VFR BLD AUTO: 34.5 % (ref 35–47)
HGB BLD-MCNC: 11.1 G/DL (ref 11.7–15.7)
MCH RBC QN AUTO: 30.6 PG (ref 26.5–33)
MCHC RBC AUTO-ENTMCNC: 32.2 G/DL (ref 31.5–36.5)
MCV RBC AUTO: 95 FL (ref 78–100)
PLATELET # BLD AUTO: 304 10E9/L (ref 150–450)
RBC # BLD AUTO: 3.63 10E12/L (ref 3.8–5.2)
WBC # BLD AUTO: 10.7 10E9/L (ref 4–11)

## 2017-11-28 PROCEDURE — 36415 COLL VENOUS BLD VENIPUNCTURE: CPT | Performed by: OBSTETRICS & GYNECOLOGY

## 2017-11-28 PROCEDURE — 84550 ASSAY OF BLOOD/URIC ACID: CPT | Performed by: OBSTETRICS & GYNECOLOGY

## 2017-11-28 PROCEDURE — 84460 ALANINE AMINO (ALT) (SGPT): CPT | Performed by: OBSTETRICS & GYNECOLOGY

## 2017-11-28 PROCEDURE — 82565 ASSAY OF CREATININE: CPT | Performed by: OBSTETRICS & GYNECOLOGY

## 2017-11-28 PROCEDURE — 84450 TRANSFERASE (AST) (SGOT): CPT | Performed by: OBSTETRICS & GYNECOLOGY

## 2017-11-28 PROCEDURE — 84156 ASSAY OF PROTEIN URINE: CPT | Performed by: OBSTETRICS & GYNECOLOGY

## 2017-11-28 PROCEDURE — 99207 ZZC PRENATAL VISIT: CPT | Performed by: OBSTETRICS & GYNECOLOGY

## 2017-11-28 PROCEDURE — 76819 FETAL BIOPHYS PROFIL W/O NST: CPT

## 2017-11-28 PROCEDURE — 85027 COMPLETE CBC AUTOMATED: CPT | Performed by: OBSTETRICS & GYNECOLOGY

## 2017-11-28 NOTE — MR AVS SNAPSHOT
After Visit Summary   11/28/2017    Fransisca Cabello    MRN: [id number]           Patient Information     Date Of Birth          1995        Visit Information        Provider Department      11/28/2017 4:00 PM Sridevi Tabor MD [facility] Maternal Fetal Medicine [facility]        Today's Diagnoses     Hypertension affecting pregnancy in third trimester    -  1    Type 1 diabetes mellitus in pregnancy, third trimester           Follow-ups after your visit        Your next 10 appointments already scheduled     Dec 01, 2017 11:45 AM CST   MFM US COMPRE SINGLE F/U with [id number]   [facility] ([facility])    [address]  [facility] [address] [phone]           Wear comfortable clothes and leave your valuables at home.            Dec 01, 2017 12:15 PM CST   Radiology MD with  MFDOMINIK BETTENCOURT   [facility] Maternal Fetal Medicine [facility] ([facility])    [address]  [facility] [address] [phone]           Please arrive at the time given for your first appointment. This visit is used internally to schedule the physician's time during your ultrasound.            Dec 05, 2017 11:45 AM CST   MFM BPP SINGLE with [id number]   [facility] - [facility] ([facility])    [address]  [facility] [address]   [phone]            Dec 05, 2017 12:15 PM CST   Radiology MD with  MFDOMINIK BETTENCOURT   [facility] Maternal Fetal Medicine [facility] ([facility])    [address]  [facility] [address]   [phone]           Please arrive at the time given for your first appointment. This visit is used internally to schedule the physician's time during your ultrasound.            Dec 08, 2017 11:30 AM CST   ESTABLISHED PRENATAL with [facility] MD   [facility]    [address]  [facility] [address]   [phone]            Dec 08, 2017  3:00 PM CST   MFM BPP SINGLE with [id number]   [facility] - [facility])    [address]  [facility] [address]   [phone]            Dec 08, 2017  3:30 PM CST   Radiology MD with RH MFDOMINIK BETTENCOURT   [facility] Maternal Fetal Medicine [facility])    [address]  [facility] [address]   [phone]           Please arrive at the time given for your first appointment. This visit is used internally to schedule the physician's time during your ultrasound.            Dec 12, 2017 11:00 AM CST   MFM BPP SINGLE with [id number]   [facility] Maternal Fetal Medicine Ultrasound - [facility])    [address]  [facility] [address]   [phone]            Dec 12, 2017 11:30 AM CST   Radiology MD with RH MFDOMINIK BETTENCOURT   [facility] Maternal Fetal Medicine [facility])    [address]  [facility] [address]   [phone]           Please arrive at the time given for your first appointment. This visit is used internally to schedule the physician's time during your ultrasound.            Dec 15, 2017  8:45 AM CST   MFM BPP SINGLE with [id number]   [facility] Maternal Fetal Medicine Ultrasound - [facility])    [address]  [facility] [address]   [phone]              Future tests that were ordered for you today     Open Future Orders        Priority Expected Expires Ordered    MFM BPP Single Routine 12/8/2017 9/28/2018 11/28/2017    MFM BPP Single Routine 12/12/2017 9/28/2018 11/28/2017    MFM BPP Single Routine 12/15/2017 9/28/2018 11/28/2017            Who to contact     If you have questions or need follow up information about today's clinic visit or your schedule please contact [facility]ETAL [facility]IC[facility] -  [facility] directly at [phone].  Normal or non-critical lab and imaging results will be communicated to you by MyChart, letter or phone within 4 business days after the clinic has received the results. If you do not hear from us within 7 days, please contact the clinic through Shopflick[employer] or phone. If you have a critical or abnormal lab result, we will notify you by phone as soon as possible.  Submit refill requests through RightAnswers or call your pharmacy and they will forward the refill request to us. Please allow 3 business days for your refill to be completed.          Additional Information About Your Visit        [employer] Information     [employer] gives you secure access to your electronic health record. If you see a primary care provider, you can also send messages to your care team and make appointments. If you have questions, please call your primary care clinic.  If you do not have a primary care provider, please call [phone] and they will assist you.        Care EveryWhere ID     This is your Care EveryWhere ID. This could be used by other organizations to access your [place] medical records  [id number]        Your Vitals Were     Last Period                   04/21/2017 (Exact Date)            Blood Pressure from Last 3 Encounters:   11/28/17 128/76   11/24/17 122/72   11/20/17 130/80    Weight from Last 3 Encounters:   11/28/17 85.2 kg (187 lb 12.8 oz)   11/24/17 83.9 kg (185 lb)   11/20/17 86.6 kg (191 lb)               Primary Care Provider Fax #    Physician No Ref-Primary [phone]       No address on file        Equal Access to Services     [facility] : Hadii aad ku hadasho Soshannanali, waaxda luqadaha, qaybta kaalmada adeegyada, hernan yeung. So [facility] [phone].    ATENCIÓN: Si habla español, tiene a boateng disposición servicios gratuitos de asistencia lingüística. Llame al [phone].    We comply with applicable federal civil rights laws and [place] laws. We do  not discriminate on the basis of race, color, national origin, age, disability, sex, sexual orientation, or gender identity.            Thank you!     Thank you for choosing [facility]  for your care. Our goal is always to provide you with excellent care. Hearing back from our patients is one way we can continue to improve our services. Please take a few minutes to complete the written survey that you may receive in the mail after your visit with us. Thank you!             Your Updated Medication List - Protect others around you: Learn how to safely use, store and throw away your medicines at www.disposemymeds.org.          This list is accurate as of: 11/28/17  6:08 PM.  Always use your most recent med list.                   Brand Name Dispense Instructions for use Diagnosis    acetone (Urine) test Strp     100 each    Use as directed to test urine ketone if blood sugars are >250 or during illness with fever, nausea, vomiting, or abdominal pain.    High-risk pregnancy, young primigravida, third trimester       blood glucose monitoring test strip    RAYMUNDO CONTOUR NEXT    1000 each    Use to test blood sugar 10-12 times daily or as directed.    Uncontrolled type 1 diabetes mellitus without complication (H), Supervision of other high risk pregnancy, antepartum       glucagon 1 MG kit    GLUCAGON EMERGENCY    1 each    Inject 1 mg into the muscle once for 1 dose    Type 1 diabetes mellitus without complication (H), Insulin pump in place       insulin glargine 100 UNIT/ML injection    LANTUS VIAL    10 mL    Inject 19 u sq every 24 hours in the event of insulin pump therapy.  DO NOT FILL RX until requested by patient.    Type 1 diabetes mellitus without complication (H), Insulin pump in place, Encounter for long-term (current) use of insulin (H), High-risk pregnancy, young primigravida, third trimester       insulin lispro 100 UNIT/ML injection    humaLOG    3 vial    As directed via  "insulin pump - up to 100 units daily, adjusting pump settings due to increased insulin requirements and pregnancy    Uncontrolled type 1 diabetes mellitus without complication (H), Encounter for long-term (current) use of insulin (H)       insulin pump infusion      Date last updated:  12/23/16 [employer] Minimed: Model 630G BASAL RATES and times: 12   AM (midnight): 0.85 units/hour   8     AM: 0.8 units/hour  6pm: 0.85 Basal Pattern A:  Fransisca Cabello will use when NA. CARB RATIO and times: 12   AM (midnight): 12 Corection Factor (Sensitivity) and times: 12   AM (midnight): 47 mg/dL BLOOD GLUCOSE TARGET and times: 12   AM (midnight): 100 - 120 5     AM:  90 - 110 9:30    PM:  100 - 120 Active Insulin Time:  3 hours Sensor:  No Carelink / Diasend username: [email or web address] Carelink / Diasend Password:  [email or web address]        insulin syringe-needle U-100 31G X 5/16\" 0.3 ML    BD insulin syringe ultrafine    100 each    Use one syringe 4 daily or as directed in the event of insulin pump failure.  DO NOT DISPENSE until requested by patient    Encounter for long-term (current) use of insulin (H)       MICROLET LANCETS Misc     600 each    Use one lancet 6 times daily for blood sugar testing    Type 1 diabetes mellitus without complication (H), Insulin pump in place       ondansetron 4 MG ODT tab    ZOFRAN ODT    30 tablet    Take 1-2 tablets (4-8 mg) by mouth every 8 hours as needed for nausea    Nausea/vomiting in pregnancy       PRENATAL VITAMINS PO             "

## 2017-11-28 NOTE — NURSING NOTE
"Chief Complaint   Patient presents with     Prenatal Care     30 weeks 5 days, no questions or concerns       Initial /76 (BP Location: Right arm, Patient Position: Chair, Cuff Size: Adult Regular)  Pulse 80  Wt 187 lb 12.8 oz (85.2 kg)  LMP 04/21/2017 (Exact Date)  BMI 29.41 kg/m2 Estimated body mass index is 29.41 kg/(m^2) as calculated from the following:    Height as of 11/24/17: 5' 7\" (1.702 m).    Weight as of this encounter: 187 lb 12.8 oz (85.2 kg).  Medication Reconciliation: complete     Emily Yin CMA      "

## 2017-11-28 NOTE — MR AVS SNAPSHOT
After Visit Summary   11/28/2017    Fransisca Cabello    MRN: [id number]           Patient Information     Date Of Birth          1995        Visit Information        Provider Department      11/28/2017 11:00 AM Cheyanne Silva MD [facility]        Today's Diagnoses     Gestational hypertension without significant proteinuria in third trimester    -  1    Supervision of other high risk pregnancy, antepartum        Type 1 diabetes mellitus without complication (H)        Insulin pump in place           Follow-ups after your visit        Your next 10 appointments already scheduled     Dec 01, 2017 11:45 AM CST   MFM US COMPRE SINGLE F/U with [email or web address]   [facility] Maternal Fetal Medicine Ultrasound - [facility])    [address]  [facility] [id number] [phone]           Wear comfortable clothes and leave your valuables at home.            Dec 01, 2017 12:15 PM CST   Radiology MD with Cone HealthDOMNIIK BETTENCOURT   [facility] Maternal Fetal Medicine [facility] ([facility])    [address]  [facility] [address]   [phone]           Please arrive at the time given for your first appointment. This visit is used internally to schedule the physician's time during your ultrasound.            Dec 05, 2017 11:45 AM HEATH BRAVO BPP SINGLE with [email or web address]   [facility] Maternal Fetal Medicine Ultrasound - [facility] ([facility])    [address]  [facility] [address]   [phone]            Dec 05, 2017 12:15 PM CST   Radiology MD with Cone HealthM MD   [facility] Maternal Fetal Medicine [facility])    [address]  [facility] [address]   [phone]           Please arrive at the time given for your first appointment. This visit is used internally to schedule the physician's time during your ultrasound.            Dec 08, 2017 11:30 AM CST   ESTABLISHED PRENATAL  with Cheyanne Silva MD   [facility] ([facility])    [address] [facility] [address]   [phone]            Dec 08, 2017  3:00 PM CST   MFM BPP SINGLE with [email or web address]   [facility] - [facility] ([facility])    [address]  [facility] [address]   [phone]            Dec 08, 2017  3:30 PM CST   Radiology MD with RH MFM MD   [facility] Maternal Fetal Medicine [facility] ([facility])    [address]  [facility] [address]   [phone]           Please arrive at the time given for your first appointment. This visit is used internally to schedule the physician's time during your ultrasound.            Dec 12, 2017 11:00 AM CST   MFM BPP SINGLE with [email or web address]   [facility] ([facility])    [address]  [facility] [address]   [phone]            Dec 12, 2017 11:30 AM CST   Radiology MD with RH MFDOMINIK BETTENCOURT   [facility] Maternal Fetal Medicine - [facility] ([facility])    [address]  [facility] [address]   [phone]           Please arrive at the time given for your first appointment. This visit is used internally to schedule the physician's time during your ultrasound.            Dec 15, 2017  8:45 AM CST   MFM BPP SINGLE with [email or web address]   [facility] Maternal Fetal Medicine Ultrasound - [facility] ([facility])    [address]  [facility] [address]   [phone]              Future tests that were ordered for you today     Open Future Orders        Priority Expected Expires Ordered    MFM BPP Single Routine 12/8/2017 9/28/2018 11/28/2017    MFM BPP Single Routine 12/12/2017 9/28/2018 11/28/2017    MFM BPP Single Routine 12/15/2017 9/28/2018 11/28/2017            Who to contact     If you have questions or need follow up information  about today's clinic visit or your schedule please contact [facility] directly at [phone].  Normal or non-critical lab and imaging results will be communicated to you by MyChart, letter or phone within 4 business days after the clinic has received the results. If you do not hear from us within 7 days, please contact the clinic through ikeGPShart or phone. If you have a critical or abnormal lab result, we will notify you by phone as soon as possible.  Submit refill requests through Adfaces or call your pharmacy and they will forward the refill request to us. Please allow 3 business days for your refill to be completed.          Additional Information About Your Visit        ikeGPSharExari Systems Information     Adfaces gives you secure access to your electronic health record. If you see a primary care provider, you can also send messages to your care team and make appointments. If you have questions, please call your primary care clinic.  If you do not have a primary care provider, please call [phone] and they will assist you.        Care EveryWhere ID     This is your Care EveryWhere ID. This could be used by other organizations to access your [place] medical records  [id number]        Your Vitals Were     Pulse Last Period BMI (Body Mass Index)             80 04/21/2017 (Exact Date) 29.41 kg/m2          Blood Pressure from Last 3 Encounters:   11/28/17 128/76   11/24/17 122/72   11/20/17 130/80    Weight from Last 3 Encounters:   11/28/17 187 lb 12.8 oz (85.2 kg)   11/24/17 185 lb (83.9 kg)   11/20/17 191 lb (86.6 kg)              We Performed the Following     Protein  random urine with Creat Ratio        Primary Care Provider Fax #    Physician No Ref-Primary [phone]       No address on file        Equal Access to Services     EDWIGE VALLADARES : Floridalma Quintana, martha rivera, hernan lundberg. So [facility] [phone].    ATENCIÓN: Si  marc yanez, tiene a boateng disposición servicios gratuitos de asistencia lingüística. Titus mcginnis [phone].    We comply with applicable federal civil rights laws and [place] laws. We do not discriminate on the basis of race, color, national origin, age, disability, sex, sexual orientation, or gender identity.            Thank you!     Thank you for choosing [facility]  for your care. Our goal is always to provide you with excellent care. Hearing back from our patients is one way we can continue to improve our services. Please take a few minutes to complete the written survey that you may receive in the mail after your visit with us. Thank you!             Your Updated Medication List - Protect others around you: Learn how to safely use, store and throw away your medicines at www.disposemymeds.org.          This list is accurate as of: 11/28/17 11:59 PM.  Always use your most recent med list.                   Brand Name Dispense Instructions for use Diagnosis    acetone (Urine) test Strp     100 each    Use as directed to test urine ketone if blood sugars are >250 or during illness with fever, nausea, vomiting, or abdominal pain.    High-risk pregnancy, young primigravida, third trimester       blood glucose monitoring test strip    RAYMUNDO CONTOUR NEXT    1000 each    Use to test blood sugar 10-12 times daily or as directed.    Uncontrolled type 1 diabetes mellitus without complication (H), Supervision of other high risk pregnancy, antepartum       glucagon 1 MG kit    GLUCAGON EMERGENCY    1 each    Inject 1 mg into the muscle once for 1 dose    Type 1 diabetes mellitus without complication (H), Insulin pump in place       insulin glargine 100 UNIT/ML injection    LANTUS VIAL    10 mL    Inject 19 u sq every 24 hours in the event of insulin pump therapy.  DO NOT FILL RX until requested by patient.    Type 1 diabetes mellitus without complication (H), Insulin pump in place, Encounter for long-term  "(current) use of insulin (H), High-risk pregnancy, young primigravida, third trimester       insulin lispro 100 UNIT/ML injection    humaLOG    3 vial    As directed via insulin pump - up to 100 units daily, adjusting pump settings due to increased insulin requirements and pregnancy    Uncontrolled type 1 diabetes mellitus without complication (H), Encounter for long-term (current) use of insulin (H)       insulin pump infusion      Date last updated:  12/23/16 [employer]: Model 630G BASAL RATES and times: 12   AM (midnight): 0.85 units/hour   8     AM: 0.8 units/hour  6pm: 0.85 Basal Pattern A:  Fransisca Cabello will use when NA. CARB RATIO and times: 12   AM (midnight): 12 Corection Factor (Sensitivity) and times: 12   AM (midnight): 47 mg/dL BLOOD GLUCOSE TARGET and times: 12   AM (midnight): 100 - 120 5     AM:  90 - 110 9:30    PM:  100 - 120 Active Insulin Time:  3 hours Sensor:  No Carelink / Diasend username: madison Carelink / Diasend Password:  [email or web address]        insulin syringe-needle U-100 31G X 5/16\" 0.3 ML    BD insulin syringe ultrafine    100 each    Use one syringe 4 daily or as directed in the event of insulin pump failure.  DO NOT DISPENSE until requested by patient    Encounter for long-term (current) use of insulin (H)       MICROLET LANCETS Misc     600 each    Use one lancet 6 times daily for blood sugar testing    Type 1 diabetes mellitus without complication (H), Insulin pump in place       ondansetron 4 MG ODT tab    ZOFRAN ODT    30 tablet    Take 1-2 tablets (4-8 mg) by mouth every 8 hours as needed for nausea    Nausea/vomiting in pregnancy       PRENATAL VITAMINS PO             "

## 2017-11-29 LAB
ALT SERPL W P-5'-P-CCNC: 14 U/L (ref 0–50)
AST SERPL W P-5'-P-CCNC: 9 U/L (ref 0–45)
CREAT SERPL-MCNC: 0.6 MG/DL (ref 0.52–1.04)
GFR SERPL CREATININE-BSD FRML MDRD: >90 ML/MIN/1.7M2
PROT UR-MCNC: 0.13 G/L
PROT/CREAT 24H UR: 0.12 G/G CR (ref 0–0.2)
URATE SERPL-MCNC: 4.1 MG/DL (ref 2.6–6)

## 2017-11-29 NOTE — PROGRESS NOTES
PROBLEM LIST  LABS: Opos/RI/    1.  Type I DM on pump: followed by endocrine. Plan: eye exam, TSH qTM, level II US (normal), fetal echo (normal), growth scans q3-4w. Plan delivery at 37 weeks for gHTN and DM unless earlier delivery is indicated.    2. Gestational hypertension w/o significant proteinuria in 3rd trimester  - H/O severe pre-eclampsia at 34 weeks: baseline labs normal (p/c ratio 0.14).  - On baby ASA since 12 weeks.  - Twice weekly BPPs through [facility] (Tues/Fri). Weekly labs with urine pr/cr ratio.  - Signs and symptoms reviewed -- she will call if symptoms increase or change  - Discussed activity. Strict bedrest not recommended. Discussed okay to perform activities of daily living if not strenuous. No heavy lifting/pulling.     No new s/sx. Dull headache at times, good fetal movement, no nausea/vomiting/RUQ or epigastric pain. Labs pending. Recheck urine today. Checking blood pressure at home: 120s-150s/80s, usually. She knows to call for new symptoms, for pain, visual changes, or blood pressure >160 systolic or 105-110 diastolic. BPP later today in [facility] and also Friday, follow up with me weekly for labs as well.    Cheyanne Silva MD

## 2017-12-01 ENCOUNTER — HOSPITAL ENCOUNTER (OUTPATIENT)
Dept: ULTRASOUND IMAGING | Facility: CLINIC | Age: 22
Discharge: HOME | End: 2017-12-01
Attending: OBSTETRICS & GYNECOLOGY | Admitting: OBSTETRICS & GYNECOLOGY
Payer: COMMERCIAL

## 2017-12-01 ENCOUNTER — OFFICE VISIT (OUTPATIENT)
Dept: MATERNAL FETAL MEDICINE | Facility: CLINIC | Age: 22
End: 2017-12-01
Attending: OBSTETRICS & GYNECOLOGY
Payer: COMMERCIAL

## 2017-12-01 DIAGNOSIS — O24.013 TYPE 1 DIABETES MELLITUS IN PREGNANCY, THIRD TRIMESTER: Primary | ICD-10-CM

## 2017-12-01 DIAGNOSIS — O24.013 TYPE 1 DIABETES MELLITUS IN PREGNANCY, THIRD TRIMESTER: ICD-10-CM

## 2017-12-01 PROCEDURE — 76816 OB US FOLLOW-UP PER FETUS: CPT

## 2017-12-01 PROCEDURE — 76819 FETAL BIOPHYS PROFIL W/O NST: CPT | Performed by: OBSTETRICS & GYNECOLOGY

## 2017-12-01 NOTE — MR AVS SNAPSHOT
After Visit Summary   12/1/2017    Fransisca Cabello    MRN: [id number]           Patient Information     Date Of Birth          1995        Visit Information        Provider Department      12/1/2017 12:15 PM Sridevi Tabor MD [facility] Maternal Fetal Medicine - [facility]        Today's Diagnoses     Type 1 diabetes mellitus in pregnancy, third trimester    -  1       Follow-ups after your visit        Your next 10 appointments already scheduled     Dec 05, 2017  3:30 PM CST   (Arrive by 3:15 PM)   MFM BPP SINGLE with [email or web address]   [facility] ([facility])    [address]  [facility] [address]   [phone]            Dec 05, 2017  4:00 PM CST   Radiology MD with RH LAWRENCE BETTENCOURT   [facility] Maternal Fetal Medicine [facility] ([facility])    [address]  [facility] [address]   [phone]           Please arrive at the time given for your first appointment. This visit is used internally to schedule the physician's time during your ultrasound.            Dec 08, 2017 11:30 AM CST   ESTABLISHED PRENATAL with Cheyanne Silva MD   [facility] ([facility])    [address] [place] [id number] [phone]            Dec 08, 2017  3:00 PM CST   MFM BPP SINGLE with [email or web address]   [facility] ([facility])    [address]  [facility] [address]   [phone]            Dec 08, 2017  3:30 PM CST   Radiology MD with RH LAWRENCE BETTENCOURT   [facility] Maternal Fetal Medicine [facility] ([facility])    [address]  [facility] [address]   [phone]           Please arrive at the time given for your first appointment. This visit is used internally to schedule the physician's time during your ultrasound.            Dec 12, 2017 11:00 AM CST   MFM BPP SINGLE  with [email or web address]   [facility] Maternal Fetal Medicine Ultrasound - [facility] ([facility])    [address]  [facility] [address]   [phone]            Dec 12, 2017 11:30 AM CST   Radiology MD with RH LAWRENCE BETTENCOURT   [facility] Fetal [facility] ([facility])    [address]  [facility] [address]   [phone]           Please arrive at the time given for your first appointment. This visit is used internally to schedule the physician's time during your ultrasound.            Dec 15, 2017  8:45 AM CST   LAWRENCE BPP SINGLE with [email or web address]   [facility] Maternal Fetal Medicine Ultrasound - [facility] ([facility])    [address]  [facility] [address]   [phone]            Dec 15, 2017  9:15 AM CST   Radiology MD with  LAWRENCE BETTENCOURT   [facility] ([facility])    [address]  [facility] [address]   [phone]           Please arrive at the time given for your first appointment. This visit is used internally to schedule the physician's time during your ultrasound.            Dec 19, 2017  3:00 PM CST   (Arrive by 2:45 PM)   LAWRENCE BPP SINGLE with [email or web address]   [facility] Maternal Fetal Medicine Ultrasound - [facility] ([facility])    [address]  [facility] [address]   [phone]              Who to contact     If you have questions or need follow up information about today's clinic visit or your schedule please contact [facility]ERNAL FETAL [facility] directly at [phone].  Normal or non-critical lab and imaging results will be communicated to you by MyChart, letter or phone within 4 business days after the clinic has received the results. If you do not hear from us within 7 days, please contact the clinic through MyChart or phone. If you have a critical or abnormal lab result, we will notify you by phone as soon as  possible.  Submit refill requests through Knotice or call your pharmacy and they will forward the refill request to us. Please allow 3 business days for your refill to be completed.          Additional Information About Your Visit        Rent Herehart Information     Knotice gives you secure access to your electronic health record. If you see a primary care provider, you can also send messages to your care team and make appointments. If you have questions, please call your primary care clinic.  If you do not have a primary care provider, please call [phone] and they will assist you.        Care EveryWhere ID     This is your Care EveryWhere ID. This could be used by other organizations to access your [place] medical records  [id number]        Your Vitals Were     Last Period                   04/21/2017 (Exact Date)            Blood Pressure from Last 3 Encounters:   11/28/17 128/76   11/24/17 122/72   11/20/17 130/80    Weight from Last 3 Encounters:   11/28/17 85.2 kg (187 lb 12.8 oz)   11/24/17 83.9 kg (185 lb)   11/20/17 86.6 kg (191 lb)               Primary Care Provider Fax #    Physician No Ref-Primary [phone]       No address on file        Equal Access to Services     EDWIGE VALLADARES : Hadii isidoro ramirezo Sodario, waaxda luqadaha, qaybta kaalmada adesmithyada, hernan yeung. So [facility] [phone].    ATENCIÓN: Si habla español, tiene a boateng disposición servicios gratuitos de asistencia lingüística. Llame al [phone].    We comply with applicable federal civil rights laws and [place] laws. We do not discriminate on the basis of race, color, national origin, age, disability, sex, sexual orientation, or gender identity.            Thank you!     Thank you for choosing [facility]  for your care. Our goal is always to provide you with excellent care. Hearing back from our patients is one way we can continue to improve our services. Please take a  few minutes to complete the written survey that you may receive in the mail after your visit with us. Thank you!             Your Updated Medication List - Protect others around you: Learn how to safely use, store and throw away your medicines at www.disposemymeds.org.          This list is accurate as of: 12/1/17 11:59 PM.  Always use your most recent med list.                   Brand Name Dispense Instructions for use Diagnosis    acetone (Urine) test Strp     100 each    Use as directed to test urine ketone if blood sugars are >250 or during illness with fever, nausea, vomiting, or abdominal pain.    High-risk pregnancy, young primigravida, third trimester       blood glucose monitoring test strip    RAYMUNDO CONTOUR NEXT    1000 each    Use to test blood sugar 10-12 times daily or as directed.    Uncontrolled type 1 diabetes mellitus without complication (H), Supervision of other high risk pregnancy, antepartum       glucagon 1 MG kit    GLUCAGON EMERGENCY    1 each    Inject 1 mg into the muscle once for 1 dose    Type 1 diabetes mellitus without complication (H), Insulin pump in place       insulin glargine 100 UNIT/ML injection    LANTUS VIAL    10 mL    Inject 19 u sq every 24 hours in the event of insulin pump therapy.  DO NOT FILL RX until requested by patient.    Type 1 diabetes mellitus without complication (H), Insulin pump in place, Encounter for long-term (current) use of insulin (H), High-risk pregnancy, young primigravida, third trimester       insulin lispro 100 UNIT/ML injection    humaLOG    3 vial    As directed via insulin pump - up to 100 units daily, adjusting pump settings due to increased insulin requirements and pregnancy    Uncontrolled type 1 diabetes mellitus without complication (H), Encounter for long-term (current) use of insulin (H)       insulin pump infusion      Date last updated:  12/23/16 [employer] Min[employer]ed: Model 630G BASAL RATES and times: 12   AM (midnight): 0.85 units/hour   8   "   AM: 0.8 units/hour  6pm: 0.85 Basal Pattern A:  Fransisca Cabello will use when NA. CARB RATIO and times: 12   AM (midnight): 12 Corection Factor (Sensitivity) and times: 12   AM (midnight): 47 mg/dL BLOOD GLUCOSE TARGET and times: 12   AM (midnight): 100 - 120 5     AM:  90 - 110 9:30    PM:  100 - 120 Active Insulin Time:  3 hours Sensor:  No Carelink / Diasend username: madison Carelink / Diasend Password:  [email or web address]        insulin syringe-needle U-100 31G X 5/16\" 0.3 ML    BD insulin syringe ultrafine    100 each    Use one syringe 4 daily or as directed in the event of insulin pump failure.  DO NOT DISPENSE until requested by patient    Encounter for long-term (current) use of insulin (H)       MICROLET LANCETS Misc     600 each    Use one lancet 6 times daily for blood sugar testing    Type 1 diabetes mellitus without complication (H), Insulin pump in place       ondansetron 4 MG ODT tab    ZOFRAN ODT    30 tablet    Take 1-2 tablets (4-8 mg) by mouth every 8 hours as needed for nausea    Nausea/vomiting in pregnancy       PRENATAL VITAMINS PO             "

## 2017-12-05 ENCOUNTER — OFFICE VISIT (OUTPATIENT)
Dept: MATERNAL FETAL MEDICINE | Facility: CLINIC | Age: 22
End: 2017-12-05
Attending: OBSTETRICS & GYNECOLOGY
Payer: COMMERCIAL

## 2017-12-05 ENCOUNTER — HOSPITAL ENCOUNTER (OUTPATIENT)
Facility: CLINIC | Age: 22
End: 2017-12-05
Admitting: OBSTETRICS & GYNECOLOGY
Payer: COMMERCIAL

## 2017-12-05 ENCOUNTER — TELEPHONE (OUTPATIENT)
Dept: OBGYN | Facility: CLINIC | Age: 22
End: 2017-12-05

## 2017-12-05 ENCOUNTER — HOSPITAL ENCOUNTER (OUTPATIENT)
Facility: CLINIC | Age: 22
Discharge: HOME | End: 2017-12-05
Attending: OBSTETRICS & GYNECOLOGY | Admitting: OBSTETRICS & GYNECOLOGY
Payer: COMMERCIAL

## 2017-12-05 ENCOUNTER — HOSPITAL ENCOUNTER (OUTPATIENT)
Dept: ULTRASOUND IMAGING | Facility: CLINIC | Age: 22
Discharge: HOME | End: 2017-12-05
Attending: OBSTETRICS & GYNECOLOGY | Admitting: OBSTETRICS & GYNECOLOGY
Payer: COMMERCIAL

## 2017-12-05 VITALS
TEMPERATURE: 98 F | DIASTOLIC BLOOD PRESSURE: 68 MMHG | RESPIRATION RATE: 16 BRPM | WEIGHT: 186 LBS | BODY MASS INDEX: 29.19 KG/M2 | HEIGHT: 67 IN | SYSTOLIC BLOOD PRESSURE: 114 MMHG

## 2017-12-05 DIAGNOSIS — O09.899 SUPERVISION OF OTHER HIGH RISK PREGNANCY, ANTEPARTUM: ICD-10-CM

## 2017-12-05 DIAGNOSIS — E10.9 TYPE 1 DIABETES MELLITUS WITHOUT COMPLICATION (H): ICD-10-CM

## 2017-12-05 DIAGNOSIS — Z96.41 INSULIN PUMP IN PLACE: ICD-10-CM

## 2017-12-05 DIAGNOSIS — O24.013 TYPE 1 DIABETES MELLITUS IN PREGNANCY, THIRD TRIMESTER: Primary | ICD-10-CM

## 2017-12-05 DIAGNOSIS — O13.3 GESTATIONAL HYPERTENSION W/O SIGNIFICANT PROTEINURIA IN 3RD TRIMESTER: ICD-10-CM

## 2017-12-05 PROBLEM — O14.90 PRE-ECLAMPSIA: Status: ACTIVE | Noted: 2017-12-05

## 2017-12-05 LAB
ALT SERPL W P-5'-P-CCNC: 17 U/L (ref 0–50)
ANION GAP SERPL CALCULATED.3IONS-SCNC: 10 MMOL/L (ref 3–14)
AST SERPL W P-5'-P-CCNC: 12 U/L (ref 0–45)
BUN SERPL-MCNC: 7 MG/DL (ref 7–30)
CALCIUM SERPL-MCNC: 8.5 MG/DL (ref 8.5–10.1)
CHLORIDE SERPL-SCNC: 105 MMOL/L (ref 94–109)
CO2 SERPL-SCNC: 23 MMOL/L (ref 20–32)
CREAT SERPL-MCNC: 0.56 MG/DL (ref 0.52–1.04)
CREAT UR-MCNC: 127 MG/DL
ERYTHROCYTE [DISTWIDTH] IN BLOOD BY AUTOMATED COUNT: 13.6 % (ref 10–15)
GFR SERPL CREATININE-BSD FRML MDRD: >90 ML/MIN/1.7M2
GLUCOSE SERPL-MCNC: 92 MG/DL (ref 70–99)
HCT VFR BLD AUTO: 33.2 % (ref 35–47)
HGB BLD-MCNC: 11.2 G/DL (ref 11.7–15.7)
MCH RBC QN AUTO: 31.1 PG (ref 26.5–33)
MCHC RBC AUTO-ENTMCNC: 33.7 G/DL (ref 31.5–36.5)
MCV RBC AUTO: 92 FL (ref 78–100)
PLATELET # BLD AUTO: 264 10E9/L (ref 150–450)
POTASSIUM SERPL-SCNC: 3.6 MMOL/L (ref 3.4–5.3)
PROT UR-MCNC: 0.17 G/L
PROT/CREAT 24H UR: 0.13 G/G CR (ref 0–0.2)
RBC # BLD AUTO: 3.6 10E12/L (ref 3.8–5.2)
SODIUM SERPL-SCNC: 138 MMOL/L (ref 133–144)
URATE SERPL-MCNC: 4.2 MG/DL (ref 2.6–6)
WBC # BLD AUTO: 10.3 10E9/L (ref 4–11)

## 2017-12-05 PROCEDURE — 82565 ASSAY OF CREATININE: CPT | Performed by: OBSTETRICS & GYNECOLOGY

## 2017-12-05 PROCEDURE — 59025 FETAL NON-STRESS TEST: CPT | Mod: 26 | Performed by: OBSTETRICS & GYNECOLOGY

## 2017-12-05 PROCEDURE — 76819 FETAL BIOPHYS PROFIL W/O NST: CPT

## 2017-12-05 PROCEDURE — 59025 FETAL NON-STRESS TEST: CPT

## 2017-12-05 PROCEDURE — 84460 ALANINE AMINO (ALT) (SGPT): CPT | Performed by: OBSTETRICS & GYNECOLOGY

## 2017-12-05 PROCEDURE — 84450 TRANSFERASE (AST) (SGOT): CPT | Performed by: OBSTETRICS & GYNECOLOGY

## 2017-12-05 PROCEDURE — 36415 COLL VENOUS BLD VENIPUNCTURE: CPT | Performed by: OBSTETRICS & GYNECOLOGY

## 2017-12-05 PROCEDURE — 84156 ASSAY OF PROTEIN URINE: CPT | Performed by: OBSTETRICS & GYNECOLOGY

## 2017-12-05 PROCEDURE — 80048 BASIC METABOLIC PNL TOTAL CA: CPT | Performed by: OBSTETRICS & GYNECOLOGY

## 2017-12-05 PROCEDURE — 84550 ASSAY OF BLOOD/URIC ACID: CPT | Performed by: OBSTETRICS & GYNECOLOGY

## 2017-12-05 PROCEDURE — 85027 COMPLETE CBC AUTOMATED: CPT | Performed by: OBSTETRICS & GYNECOLOGY

## 2017-12-05 PROCEDURE — 99214 OFFICE O/P EST MOD 30 MIN: CPT | Mod: 25

## 2017-12-05 RX ORDER — ONDANSETRON 2 MG/ML
4 INJECTION INTRAMUSCULAR; INTRAVENOUS EVERY 6 HOURS PRN
Status: DISCONTINUED | OUTPATIENT
Start: 2017-12-05 | End: 2017-12-05 | Stop reason: HOSPADM

## 2017-12-05 NOTE — PROGRESS NOTES
Data: Patient assessed in the [facility] for hypertension disorder of pregnancy.  Cervical exam not examined.  Membranes intact.  Contractions occ, pt does not feel them, not palpable.  Action:  Presumed adequate fetal oxygenation documented (see flow record). Discharge instructions reviewed.  Patient instructed to report change in fetal movement, vaginal leaking of fluid or bleeding, abdominal pain, or any concerns related to the pregnancy to her nurse/physician.    Response: Orders to discharge home per Sandra Solano.  Patient verbalized understanding of education and verbalized agreement with plan. Discharged to home at 1610.

## 2017-12-05 NOTE — NURSING NOTE
Patient c/o headache all day today that is not relieved with tylenol. Denies epigastric pain, visual disturbances or swelling.  BPP today 8/8. B/P 153/96. Dr Silva's office notified. Patient will go to MD office now for further evaluation.  Patient left amb.  Accompanied by  and daughter.

## 2017-12-05 NOTE — PROVIDER NOTIFICATION
"   12/05/17 7404   Provider Notification   Provider Name/Title Russ   Method of Notification Phone   Request Evaluate - Remote   Notification Reason Patient Arrived;Status Update;Maternal Vital Sign Change   Explained that pt had elevated BP at [facility] appointment today 153/96. Patient does not believe this was accurate as all her BPs at home were WNL and they would not recheck it. She believes \"it was elevated because it took longer to get him to move.\" THe nurse then told her they could let her go over to the clonic to have a recheck, but [facility] sent her here. BPs since arrival were WNL--see flow sheets. Orders for PIH labs, and then most likely d/c to home. Will update MD with results  "

## 2017-12-05 NOTE — MR AVS SNAPSHOT
After Visit Summary   12/5/2017    Fransisca Cabello    MRN: [id number]           Patient Information     Date Of Birth          1995        Visit Information        Provider Department      12/5/2017 4:00 PM Sridevi Tabor MD [facility] Maternal Fetal Medicine [facility]        Today's Diagnoses     Type 1 diabetes mellitus in pregnancy, third trimester    -  1       Follow-ups after your visit        Your next 10 appointments already scheduled     Dec 08, 2017 11:30 AM CST   ESTABLISHED PRENATAL with Cheyanne Silva MD   [facility] ([facility])    [address] [place] [address]   [phone]            Dec 08, 2017  3:00 PM CST   MFM BPP SINGLE with [id number]   [facility] Maternal Fetal Medicine Ultrasound [facility])    [address]  [facility] [address]   [phone]            Dec 08, 2017  3:30 PM CST   Radiology MD with RH LAWRENCE BETTENCOURT   [facility] Fetal [facility] ([facility])    [address]  [facility] [address]   [phone]           Please arrive at the time given for your first appointment. This visit is used internally to schedule the physician's time during your ultrasound.            Dec 12, 2017 11:00 AM CST   MFM BPP SINGLE with [id number]   [facility] Maternal Fetal Medicine Ultrasound - [facility])    [address]  [facility] [address]   [phone]            Dec 12, 2017 11:30 AM CST   Radiology MD with RH MFDOMINIK BETTENCOURT   [facility] Maternal Fetal Medicine [facility] ([facility])    [address]  [facility] [address]   [phone]           Please arrive at the time given for your first appointment. This visit is used internally to schedule the physician's time during your ultrasound.            Dec 15, 2017  8:45 AM CST   MFM BPP SINGLE with [id number]    [facility] Maternal Fetal Medicine Ultrasound - [facility] ([facility])    [address]  [facility] [address]   [phone]            Dec 15, 2017  9:15 AM CST   Radiology MD with  LAWRENCE BETTENCOURT   [facility] Maternal [facility] ([facility])    [address]  [facility] [address]   [phone]           Please arrive at the time given for your first appointment. This visit is used internally to schedule the physician's time during your ultrasound.            Dec 19, 2017  3:00 PM CST   (Arrive by 2:45 PM)   LAWRENCE BPP SINGLE with [id number]   [facility] Maternal Fetal Medicine Ultrasound - [facility] ([facility])    [address] [facility] [address]  [facility] [address]   [phone]            Dec 19, 2017  3:30 PM CST   Radiology MD with  LAWRENCE BETTENCOURT   [facility] ([facility])    [address] [facility] [address]  [facility] [address]   [phone]           Please arrive at the time given for your first appointment. This visit is used internally to schedule the physician's time during your ultrasound.            Dec 22, 2017 11:15 AM CST   ESTABLISHED PRENATAL with Cheyanne Silva MD   [facility] ([facility])    [address] [place] [address] [phone]              Who to contact     If you have questions or need follow up information about today's clinic visit or your schedule please contact [facility] MATERNAL FETAL [facility] directly at [phone].  Normal or non-critical lab and imaging results will be communicated to you by MyChart, letter or phone within 4 business days after the clinic has received the results. If you do not hear from us within 7 days, please contact the clinic through MyChart or phone. If you have a critical or abnormal lab result, we will notify you by phone as soon as possible.  Submit refill  requests through [employer] or call your pharmacy and they will forward the refill request to us. Please allow 3 business days for your refill to be completed.          Additional Information About Your Visit        [employer] Information     [employer] gives you secure access to your electronic health record. If you see a primary care provider, you can also send messages to your care team and make appointments. If you have questions, please call your primary care clinic.  If you do not have a primary care provider, please call [phone] and they will assist you.        Care EveryWhere ID     This is your Care EveryWhere ID. This could be used by other organizations to access your [place] medical records  [id number]        Your Vitals Were     Last Period                   04/21/2017 (Exact Date)            Blood Pressure from Last 3 Encounters:   11/28/17 128/76   11/24/17 122/72   11/20/17 130/80    Weight from Last 3 Encounters:   11/28/17 85.2 kg (187 lb 12.8 oz)   11/24/17 83.9 kg (185 lb)   11/20/17 86.6 kg (191 lb)              Today, you had the following     No orders found for display       Primary Care Provider Fax #    Physician No Ref-Primary [phone]       No address on file        Equal Access to Services     EDWIGE VALLADARES : Hadii isidoro Quintana, waholgerda nicole, qaybta kaalmada rosette, hernan yeung. So [facility] [phone].    ATENCIÓN: Si habla español, tiene a boateng disposición servicios gratuitos de asistencia lingüística. Jordiame al [phone].    We comply with applicable federal civil rights laws and [place] laws. We do not discriminate on the basis of race, color, national origin, age, disability, sex, sexual orientation, or gender identity.            Thank you!     Thank you for choosing [facility]  for your care. Our goal is always to provide you with excellent care. Hearing back from our patients is one way we can continue  to improve our services. Please take a few minutes to complete the written survey that you may receive in the mail after your visit with us. Thank you!             Your Updated Medication List - Protect others around you: Learn how to safely use, store and throw away your medicines at www.disposemymeds.org.          This list is accurate as of: 12/5/17  4:14 PM.  Always use your most recent med list.                   Brand Name Dispense Instructions for use Diagnosis    acetone (Urine) test Strp     100 each    Use as directed to test urine ketone if blood sugars are >250 or during illness with fever, nausea, vomiting, or abdominal pain.    High-risk pregnancy, young primigravida, third trimester       blood glucose monitoring test strip    RAYMUNDO CONTOUR NEXT    1000 each    Use to test blood sugar 10-12 times daily or as directed.    Uncontrolled type 1 diabetes mellitus without complication (H), Supervision of other high risk pregnancy, antepartum       glucagon 1 MG kit    GLUCAGON EMERGENCY    1 each    Inject 1 mg into the muscle once for 1 dose    Type 1 diabetes mellitus without complication (H), Insulin pump in place       insulin glargine 100 UNIT/ML injection    LANTUS VIAL    10 mL    Inject 19 u sq every 24 hours in the event of insulin pump therapy.  DO NOT FILL RX until requested by patient.    Type 1 diabetes mellitus without complication (H), Insulin pump in place, Encounter for long-term (current) use of insulin (H), High-risk pregnancy, young primigravida, third trimester       insulin lispro 100 UNIT/ML injection    humaLOG    3 vial    As directed via insulin pump - up to 100 units daily, adjusting pump settings due to increased insulin requirements and pregnancy    Uncontrolled type 1 diabetes mellitus without complication (H), Encounter for long-term (current) use of insulin (H)       insulin pump infusion      Date last updated:  12/23/16 Medtronic Minimed: Model 630G BASAL RATES and times:  "12   AM (midnight): 0.85 units/hour   8     AM: 0.8 units/hour  6pm: 0.85 Basal Pattern A:  Fransisca Cabello will use when NA. CARB RATIO and times: 12   AM (midnight): 12 Corection Factor (Sensitivity) and times: 12   AM (midnight): 47 mg/dL BLOOD GLUCOSE TARGET and times: 12   AM (midnight): 100 - 120 5     AM:  90 - 110 9:30    PM:  100 - 120 Active Insulin Time:  3 hours Sensor:  No Carelink / Diasend username: [email or web address] Carelink / Diasend Password:  [email or web address]        insulin syringe-needle U-100 31G X 5/16\" 0.3 ML    BD insulin syringe ultrafine    100 each    Use one syringe 4 daily or as directed in the event of insulin pump failure.  DO NOT DISPENSE until requested by patient    Encounter for long-term (current) use of insulin (H)       MICROLET LANCETS Misc     600 each    Use one lancet 6 times daily for blood sugar testing    Type 1 diabetes mellitus without complication (H), Insulin pump in place       ondansetron 4 MG ODT tab    ZOFRAN ODT    30 tablet    Take 1-2 tablets (4-8 mg) by mouth every 8 hours as needed for nausea    Nausea/vomiting in pregnancy       PRENATAL VITAMINS PO             "

## 2017-12-05 NOTE — TELEPHONE ENCOUNTER
MFM calling. Pt is there for a BPP. Score was 8/8.     Pt mentioned having a headache that was not relieved with tylenol. They checked her BP and it was 153/96.     No other symptoms besides the HA.     Pt has a hx of preeclampsia with a delivery at 34 weeks with her previous pregnancy.     I had MFM send pt down to our office. I spoke with Dr. Solano, the md on-call, who said to send pt to L&D.     L&D notified.     Pt going right to L&D.   MADELEINE Nayak RN

## 2017-12-05 NOTE — PLAN OF CARE
"Data: Patient presented to [facility] at 2017  4:14 PM.  Reason for maternal/fetal assessment is hypertension disorder of pregnancy. Patient reports headache \"slightly\" more severe than \"normal\", rates 5/10, tylenol PO at 1300 .  Patient is a .  Prenatal record reviewed. Pregnancy has been  has been complicated by Type I diabetes, Hx of severe pre-eclampsia thus far.  Gestational Age 32w4d. VSS. Fetal movement present. Patient denies uterine contractions, leaking of vaginal fluid/rupture of membranes, vaginal bleeding, abdominal pain, pelvic pressure, nausea, vomiting, visual disturbances, epigastric or URQ pain, significant edema. Support person is present.   Action: Verbal consent for EFM. Triage assessment completed. Bill of rights reviewed.  Response: Patient verbalized agreement with plan. Will contact Dr Sandra Solano with update and further orders.      "

## 2017-12-05 NOTE — IP AVS SNAPSHOT
MRN:[id number]                      After Visit Summary   12/5/2017    Fransisca Cabello    MRN: [id number]           Thank you!     Thank you for choosing [facility] for your care. Our goal is always to provide you with excellent care. Hearing back from our patients is one way we can continue to improve our services. Please take a few minutes to complete the written survey that you may receive in the mail after you visit. If you would like to speak to someone directly about your visit please contact Patient Relations at [phone]. Thank you!          Patient Information     Date Of Birth          1995        About your hospital stay     You were admitted on:  December 5, 2017 You last received care in the:  [facility] Labor and Delivery    You were discharged on:  December 5, 2017       Who to Call     For medical emergencies, please call 911.  For non-urgent questions about your medical care, please call your primary care provider or clinic, None          Attending Provider     Provider Specialty    Sandra Solano MD OB/Gyn       Primary Care Provider Fax #    Physician No Ref-Primary [phone]      Your next 10 appointments already scheduled     Dec 08, 2017 11:30 AM CST   ESTABLISHED PRENATAL with Cheyanne Silva MD   [facility] ([facility])    [address] [place] [id number] [phone]            Dec 08, 2017  3:00 PM CST   MFM BPP SINGLE with [id number]   [facility] Maternal [facility]etal [facility] Ultrasound - [facility])    [address]  [facility] [id number] [phone]            Dec 08, 2017  3:30 PM CST   Radiology MD with  LAWRENCE BETTENCOURT   [facility] - [facility])    [address]  [facility] [address] [phone]           Please arrive at the time given for your first appointment. This  visit is used internally to schedule the physician's time during your ultrasound.            Dec 12, 2017 11:00 AM CST   MFM BPP SINGLE with [email or web address]   [facility] Maternal [facility]etal Medicine Ultrasound - [facility] ([facility])    [address]  [facility] [address]   [phone]            Dec 12, 2017 11:30 AM CST   Radiology MD with RH MFDOMINIK BETTENCOURT   [facility] Maternal Fetal Medicine - [facility])    [address]  [facility] [address]   [phone]           Please arrive at the time given for your first appointment. This visit is used internally to schedule the physician's time during your ultrasound.            Dec 15, 2017  8:45 AM CST   MFM BPP SINGLE with [id number]   [facility] Ultrasound - [facility])    [address]  [facility] [address]   [phone]            Dec 15, 2017  9:15 AM CST   Radiology MD with RH MFDOMINIK BETTENCOURT   [facility] Fetal Medicine [facility])    [address]  [facility] [address]   [phone]           Please arrive at the time given for your first appointment. This visit is used internally to schedule the physician's time during your ultrasound.            Dec 19, 2017  3:00 PM CST   (Arrive by 2:45 PM)   MFM BPP SINGLE with [email or web address]   [facility] M[facility]etal [facility] Ultrasound - [facility])    [address]  [facility] [address]   [phone]            Dec 19, 2017  3:30 PM CST   Radiology MD with RH MFM MD   [facility] Fetal Medicine [facility])    [address]  [facility] [address]   [phone]           Please arrive at the time given for your first appointment. This visit is used internally to schedule the physician's time during your ultrasound.            Dec 22, 2017 11:15 AM CST   ESTABLISHED PRENATAL  "with Cheyanne Silva MD   [facility] ([facility])    [address] [facility] [address] [phone]              Further instructions from your care team       Discharge Instruction for Undelivered Patients      You were seen for: R/O Pre-eclampsia  We Consulted: Dr Solano  You had (Test or Medicine):NST, PIH labs     Diet:   To manager your diabetes, follow the guidelines for eating and drinking given to you by your Clinic Provider or Diabetes Educator.       Activity:  Call your doctor or nurse midwife if your baby is moving less than usual.     Call your provider if you notice:  Swelling in your face or increased swelling in your hands or legs.  Headaches that are not relieved by Tylenol (acetaminophen).  Changes in your vision (blurring: seeing spots or stars.)  Nausea (sick to your stomach) and vomiting (throwing up).   Weight gain of 5 pounds or more per week.  Heartburn that doesn't go away.  Signs of bladder infection: pain when you urinate (use the toilet), need to go more often and more urgently.  The bag of mccann (rupture of membranes) breaks, or you notice leaking in your underwear.  Bright red blood in your underwear.  Abdominal (lower belly) or stomach pain.  *If less than 34 weeks: Contractions (tightenings) more than 6 times in one hour.  Increase or change in vaginal discharge (note the color and amount)    Follow-up:  As scheduled in the clinic          Pending Results     No orders found from 12/3/2017 to 12/6/2017.            Admission Information     Date & Time Provider Department Dept. Phone    12/5/2017 Sandra Solano MD [facility] Labor and Delivery [phone]      Your Vitals Were     Blood Pressure Temperature Respirations Height Weight Last Period    114/68 98  F (36.7  C) (Oral) 16 1.702 m (5' 7.01\") 84.4 kg (186 lb) 04/21/2017 (Exact Date)    BMI (Body Mass Index)                   29.12 kg/m2           MyChart " Information     [facility] gives you secure access to your electronic health record. If you see a primary care provider, you can also send messages to your care team and make appointments. If you have questions, please call your primary care clinic.  If you do not have a primary care provider, please call [phone] and they will assist you.        Care EveryWhere ID     This is your Care EveryWhere ID. This could be used by other organizations to access your [place] medical records  [id number]        Equal Access to Services     EDWIGE VALLADARES : Hadii aad ku hadasho Soomaali, waaxda luqadaha, qaybta kaalmada adeegyada, waxay vegain shashi yeung. So [facility] [phone].    ATENCIÓN: Si habla español, tiene a boateng disposición servicios gratuitos de asistencia lingüística. Llame al [phone].    We comply with applicable federal civil rights laws and [place] laws. We do not discriminate on the basis of race, color, national origin, age, disability, sex, sexual orientation, or gender identity.               Review of your medicines      UNREVIEWED medicines. Ask your doctor about these medicines        Dose / Directions    ASPIRIN PO        Dose:  81 mg   Take 81 mg by mouth daily   Refills:  0       glucagon 1 MG kit   Commonly known as:  GLUCAGON EMERGENCY   Used for:  Type 1 diabetes mellitus without complication (H), Insulin pump in place        Dose:  1 mg   Inject 1 mg into the muscle once for 1 dose   Quantity:  1 each   Refills:  0       insulin glargine 100 UNIT/ML injection   Commonly known as:  LANTUS VIAL   Used for:  Type 1 diabetes mellitus without complication (H), Insulin pump in place, Encounter for long-term (current) use of insulin (H), High-risk pregnancy, young primigravida, third trimester        Inject 19 u sq every 24 hours in the event of insulin pump therapy.  DO NOT FILL RX until requested by patient.   Quantity:  10 mL   Refills:  1       insulin lispro 100 UNIT/ML injection  "  Commonly known as:  humaLOG   Used for:  Uncontrolled type 1 diabetes mellitus without complication (H), Encounter for long-term (current) use of insulin (H)        As directed via insulin pump - up to 100 units daily, adjusting pump settings due to increased insulin requirements and pregnancy   Quantity:  3 vial   Refills:  5       ondansetron 4 MG ODT tab   Commonly known as:  ZOFRAN ODT   Used for:  Nausea/vomiting in pregnancy        Dose:  4-8 mg   Take 1-2 tablets (4-8 mg) by mouth every 8 hours as needed for nausea   Quantity:  30 tablet   Refills:  1       PRENATAL VITAMINS PO        Refills:  0         CONTINUE these medicines which have NOT CHANGED        Dose / Directions    acetone (Urine) test Strp   Used for:  High-risk pregnancy, young primigravida, third trimester        Use as directed to test urine ketone if blood sugars are >250 or during illness with fever, nausea, vomiting, or abdominal pain.   Quantity:  100 each   Refills:  prn       blood glucose monitoring test strip   Commonly known as:  RAYMUNDO CONTOUR NEXT   Used for:  Uncontrolled type 1 diabetes mellitus without complication (H), Supervision of other high risk pregnancy, antepartum        Use to test blood sugar 10-12 times daily or as directed.   Quantity:  1000 each   Refills:  3       insulin pump infusion        Date last updated:  12/23/16 Medtronic Minimed: Model 630G BASAL RATES and times: 12   AM (midnight): 0.85 units/hour   8     AM: 0.8 units/hour  6pm: 0.85 Basal Pattern A:  Fransisca Cabello will use when NA. CARB RATIO and times: 12   AM (midnight): 12 Corection Factor (Sensitivity) and times: 12   AM (midnight): 47 mg/dL BLOOD GLUCOSE TARGET and times: 12   AM (midnight): 100 - 120 5     AM:  90 - 110 9:30    PM:  100 - 120 Active Insulin Time:  3 hours Sensor:  No Carelink / Diasend username: madison Carelink / Diasend Password:  [email or web address]   Refills:  0       insulin syringe-needle U-100 31G X 5/16\" 0.3 ML "   Commonly known as:  BD insulin syringe ultrafine   Used for:  Encounter for long-term (current) use of insulin (H)        Use one syringe 4 daily or as directed in the event of insulin pump failure.  DO NOT DISPENSE until requested by patient   Quantity:  100 each   Refills:  3       MICROLET LANCETS Misc   Used for:  Type 1 diabetes mellitus without complication (H), Insulin pump in place        Use one lancet 6 times daily for blood sugar testing   Quantity:  600 each   Refills:  1                Protect others around you: Learn how to safely use, store and throw away your medicines at www.disposemymeds.org.             Medication List: This is a list of all your medications and when to take them. Check marks below indicate your daily home schedule. Keep this list as a reference.      Medications           Morning Afternoon Evening Bedtime As Needed    acetone (Urine) test Strp   Use as directed to test urine ketone if blood sugars are >250 or during illness with fever, nausea, vomiting, or abdominal pain.                                ASPIRIN PO   Take 81 mg by mouth daily                                blood glucose monitoring test strip   Commonly known as:  RAYMUNDO CONTOUR NEXT   Use to test blood sugar 10-12 times daily or as directed.                                glucagon 1 MG kit   Commonly known as:  GLUCAGON EMERGENCY   Inject 1 mg into the muscle once for 1 dose                                insulin glargine 100 UNIT/ML injection   Commonly known as:  LANTUS VIAL   Inject 19 u sq every 24 hours in the event of insulin pump therapy.  DO NOT FILL RX until requested by patient.                                insulin lispro 100 UNIT/ML injection   Commonly known as:  humaLOG   As directed via insulin pump - up to 100 units daily, adjusting pump settings due to increased insulin requirements and pregnancy                                insulin pump infusion   Date last updated:  12/23/16 [employer]  "Minimed: Model 630G BASAL RATES and times: 12   AM (midnight): 0.85 units/hour   8     AM: 0.8 units/hour  6pm: 0.85 Basal Pattern A:  Fransisca Cabello will use when NA. CARB RATIO and times: 12   AM (midnight): 12 Corection Factor (Sensitivity) and times: 12   AM (midnight): 47 mg/dL BLOOD GLUCOSE TARGET and times: 12   AM (midnight): 100 - 120 5     AM:  90 - 110 9:30    PM:  100 - 120 Active Insulin Time:  3 hours Sensor:  No Carelink / Diasend username: [email or web address] Carelink / Diasend Password:  [email or web address]                                insulin syringe-needle U-100 31G X 5/16\" 0.3 ML   Commonly known as:  BD insulin syringe ultrafine   Use one syringe 4 daily or as directed in the event of insulin pump failure.  DO NOT DISPENSE until requested by patient                                MICROLET LANCETS Misc   Use one lancet 6 times daily for blood sugar testing                                ondansetron 4 MG ODT tab   Commonly known as:  ZOFRAN ODT   Take 1-2 tablets (4-8 mg) by mouth every 8 hours as needed for nausea                                PRENATAL VITAMINS PO                                  "

## 2017-12-05 NOTE — PROVIDER NOTIFICATION
12/05/17 1753   Provider Notification   Provider Name/Title Russ   Method of Notification Phone   Request Evaluate - Remote   Notification Reason Lab/Diagnostic Study;Status Update   MD updated of lab results, three more pending all WNL at this time. BP normal, pt declines anything for headache. Orders to d/c to home if all labs WNL, will continue POC and return to clinic as scheduled.

## 2017-12-05 NOTE — IP AVS SNAPSHOT
[facility] Delivery    [address]    [facility] [address]    Phone:  [phone]    Fax:  [phone]                                       After Visit Summary   12/5/2017    Fransisca Cabello    MRN: [id number]           After Visit Summary Signature Page     I have received my discharge instructions, and my questions have been answered. I have discussed any challenges I see with this plan with the nurse or doctor.    ..........................................................................................................................................  Patient/Patient Representative Signature      ..........................................................................................................................................  Patient Representative Print Name and Relationship to Patient    ..................................................               ................................................  Date                                            Time    ..........................................................................................................................................  Reviewed by Signature/Title    ...................................................              ..............................................  Date                                                            Time

## 2017-12-06 NOTE — DISCHARGE INSTRUCTIONS
Discharge Instruction for Undelivered Patients      You were seen for: R/O Pre-eclampsia  We Consulted: Dr Solano  You had (Test or Medicine):NST, PIH labs     Diet:   To manager your diabetes, follow the guidelines for eating and drinking given to you by your Clinic Provider or Diabetes Educator.       Activity:  Call your doctor or nurse midwife if your baby is moving less than usual.     Call your provider if you notice:  Swelling in your face or increased swelling in your hands or legs.  Headaches that are not relieved by Tylenol (acetaminophen).  Changes in your vision (blurring: seeing spots or stars.)  Nausea (sick to your stomach) and vomiting (throwing up).   Weight gain of 5 pounds or more per week.  Heartburn that doesn't go away.  Signs of bladder infection: pain when you urinate (use the toilet), need to go more often and more urgently.  The bag of mccann (rupture of membranes) breaks, or you notice leaking in your underwear.  Bright red blood in your underwear.  Abdominal (lower belly) or stomach pain.  *If less than 34 weeks: Contractions (tightenings) more than 6 times in one hour.  Increase or change in vaginal discharge (note the color and amount)    Follow-up:  As scheduled in the clinic

## 2017-12-08 ENCOUNTER — PRENATAL OFFICE VISIT (OUTPATIENT)
Dept: OBGYN | Facility: CLINIC | Age: 22
End: 2017-12-08
Payer: COMMERCIAL

## 2017-12-08 ENCOUNTER — HOSPITAL ENCOUNTER (OUTPATIENT)
Dept: ULTRASOUND IMAGING | Facility: CLINIC | Age: 22
Discharge: HOME | End: 2017-12-08
Attending: OBSTETRICS & GYNECOLOGY | Admitting: OBSTETRICS & GYNECOLOGY
Payer: COMMERCIAL

## 2017-12-08 ENCOUNTER — OFFICE VISIT (OUTPATIENT)
Dept: MATERNAL FETAL MEDICINE | Facility: CLINIC | Age: 22
End: 2017-12-08
Attending: OBSTETRICS & GYNECOLOGY
Payer: COMMERCIAL

## 2017-12-08 VITALS
HEART RATE: 84 BPM | SYSTOLIC BLOOD PRESSURE: 124 MMHG | HEIGHT: 67 IN | DIASTOLIC BLOOD PRESSURE: 72 MMHG | WEIGHT: 189 LBS | BODY MASS INDEX: 29.66 KG/M2

## 2017-12-08 DIAGNOSIS — O16.3 HYPERTENSION AFFECTING PREGNANCY IN THIRD TRIMESTER: ICD-10-CM

## 2017-12-08 DIAGNOSIS — O09.899 SUPERVISION OF OTHER HIGH RISK PREGNANCY, ANTEPARTUM: ICD-10-CM

## 2017-12-08 DIAGNOSIS — O24.013 TYPE 1 DIABETES MELLITUS IN PREGNANCY, THIRD TRIMESTER: Primary | ICD-10-CM

## 2017-12-08 DIAGNOSIS — Z96.41 INSULIN PUMP IN PLACE: ICD-10-CM

## 2017-12-08 DIAGNOSIS — O24.013 TYPE 1 DIABETES MELLITUS IN PREGNANCY, THIRD TRIMESTER: ICD-10-CM

## 2017-12-08 DIAGNOSIS — E10.9 TYPE 1 DIABETES MELLITUS WITHOUT COMPLICATION (H): ICD-10-CM

## 2017-12-08 DIAGNOSIS — O13.3 GESTATIONAL HYPERTENSION WITHOUT SIGNIFICANT PROTEINURIA IN THIRD TRIMESTER: Primary | ICD-10-CM

## 2017-12-08 PROCEDURE — 99207 ZZC PRENATAL VISIT: CPT | Performed by: OBSTETRICS & GYNECOLOGY

## 2017-12-08 PROCEDURE — 76819 FETAL BIOPHYS PROFIL W/O NST: CPT

## 2017-12-08 NOTE — MR AVS SNAPSHOT
After Visit Summary   12/8/2017    Fransisca Cabello    MRN: [id number]           Patient Information     Date Of Birth          1995        Visit Information        Provider Department      12/8/2017 11:30 AM Cheyanne Silva MD [facility]        Today's Diagnoses     Gestational hypertension without significant proteinuria in third trimester    -  1    Supervision of other high risk pregnancy, antepartum        Type 1 diabetes mellitus without complication (H)        Insulin pump in place           Follow-ups after your visit        Your next 10 appointments already scheduled     Dec 08, 2017  3:00 PM CST   MFM BPP SINGLE with [email or web address]   [facility] - [facility] ([facility])    [address]  [facility] [address]   [phone]            Dec 08, 2017  3:30 PM CST   Radiology MD with RH LAWRENCE BETTENCOURT   [facility] Maternal Fetal Medicine [facility])    [address]  [facility] [address]   [phone]           Please arrive at the time given for your first appointment. This visit is used internally to schedule the physician's time during your ultrasound.            Dec 12, 2017 11:00 AM CST   MFM BPP SINGLE with [id number]   [facility] - [facility] ([facility])    [address]  [facility] [address]   [phone]            Dec 12, 2017 11:30 AM CST   Radiology MD with RH LAWRENCE BETTENCOURT   [facility] Maternal Fetal Medicine [facility])    [address]  [facility] [address]   [phone]           Please arrive at the time given for your first appointment. This visit is used internally to schedule the physician's time during your ultrasound.            Dec 15, 2017  8:45 AM CST   MFM BPP SINGLE with [email or web address]   [facility] - [facility]  [facility])    [address]  [facility] [address]   [phone]            Dec 15, 2017  9:15 AM CST   Radiology MD with RH LAWRENCE BETTENCOURT   [facility] Maternal Fetal Medicine [facility] ([facility])    [address]  [facility] [address]   [phone]           Please arrive at the time given for your first appointment. This visit is used internally to schedule the physician's time during your ultrasound.            Dec 15, 2017  3:45 PM CST   ESTABLISHED PRENATAL with Cheyanne Silva MD   [facility] ([facility])    [address] [facility] [id number] [phone]            Dec 19, 2017  3:00 PM CST   (Arrive by 2:45 PM)   LAWRENCE JOSEPHP SINGLE with [id number]   [facility] Maternal Fetal Medicine Ultrasound - [facility])    [address]  [facility] [address]   [phone]            Dec 19, 2017  3:30 PM CST   Radiology MD with  LAWRENCE BETTENCOURT   [facility] Maternal Fetal Medicine [facility] ([facility])    [address]  [facility] [address]   [phone]           Please arrive at the time given for your first appointment. This visit is used internally to schedule the physician's time during your ultrasound.            Dec 22, 2017 11:15 AM CST   ESTABLISHED PRENATAL with Cheyanne Silva MD   [facility] ([facility])    [address] [facility] [address] [phone]              Who to contact     If you have questions or need follow up information about today's clinic visit or your schedule please contact [facility] directly at [phone].  Normal or non-critical lab and imaging results will be communicated to you by MyChart, letter or phone within 4 business days after the clinic has received the results. If you do not hear from us within 7 days, please contact the clinic  "through 24tidyt or phone. If you have a critical or abnormal lab result, we will notify you by phone as soon as possible.  Submit refill requests through [employer] or call your pharmacy and they will forward the refill request to us. Please allow 3 business days for your refill to be completed.          Additional Information About Your Visit        [employer] Information     [employer] gives you secure access to your electronic health record. If you see a primary care provider, you can also send messages to your care team and make appointments. If you have questions, please call your primary care clinic.  If you do not have a primary care provider, please call [phone] and they will assist you.        Care EveryWhere ID     This is your Care EveryWhere ID. This could be used by other organizations to access your [place] medical records  [id number]        Your Vitals Were     Pulse Height Last Period BMI (Body Mass Index)          84 5' 7\" (1.702 m) 04/21/2017 (Exact Date) 29.6 kg/m2         Blood Pressure from Last 3 Encounters:   12/08/17 124/72   12/05/17 114/68   11/28/17 128/76    Weight from Last 3 Encounters:   12/08/17 189 lb (85.7 kg)   12/05/17 186 lb (84.4 kg)   11/28/17 187 lb 12.8 oz (85.2 kg)              Today, you had the following     No orders found for display       Primary Care Provider Fax #    Physician No Ref-Primary [phone]       No address on file        Equal Access to Services     EDWIGE VALLADARES : Hadii isidoro ku hadasho Soshannanali, waaxda luqadaha, qaybta kaalmada adeegyada, hernan spicer . So [facility] [phone].    ATENCIÓN: Si habla español, tiene a boateng disposición servicios gratuitos de asistencia lingüística. Llame al [phone].    We comply with applicable federal civil rights laws and [place] laws. We do not discriminate on the basis of race, color, national origin, age, disability, sex, sexual orientation, or gender identity.            Thank you!     " Thank you for choosing [facility]  for your care. Our goal is always to provide you with excellent care. Hearing back from our patients is one way we can continue to improve our services. Please take a few minutes to complete the written survey that you may receive in the mail after your visit with us. Thank you!             Your Updated Medication List - Protect others around you: Learn how to safely use, store and throw away your medicines at www.disposemymeds.org.          This list is accurate as of: 12/8/17  1:10 PM.  Always use your most recent med list.                   Brand Name Dispense Instructions for use Diagnosis    acetone (Urine) test Strp     100 each    Use as directed to test urine ketone if blood sugars are >250 or during illness with fever, nausea, vomiting, or abdominal pain.    High-risk pregnancy, young primigravida, third trimester       ASPIRIN PO      Take 81 mg by mouth daily        blood glucose monitoring test strip    RAYMUNDO CONTOUR NEXT    1000 each    Use to test blood sugar 10-12 times daily or as directed.    Uncontrolled type 1 diabetes mellitus without complication (H), Supervision of other high risk pregnancy, antepartum       glucagon 1 MG kit    GLUCAGON EMERGENCY    1 each    Inject 1 mg into the muscle once for 1 dose    Type 1 diabetes mellitus without complication (H), Insulin pump in place       insulin glargine 100 UNIT/ML injection    LANTUS VIAL    10 mL    Inject 19 u sq every 24 hours in the event of insulin pump therapy.  DO NOT FILL RX until requested by patient.    Type 1 diabetes mellitus without complication (H), Insulin pump in place, Encounter for long-term (current) use of insulin (H), High-risk pregnancy, young primigravida, third trimester       insulin lispro 100 UNIT/ML injection    humaLOG    3 vial    As directed via insulin pump - up to 100 units daily, adjusting pump settings due to increased insulin requirements and pregnancy     "Uncontrolled type 1 diabetes mellitus without complication (H), Encounter for long-term (current) use of insulin (H)       insulin pump infusion      Date last updated:  12/23/16 Medtronic Minimed: Model 630G BASAL RATES and times: 12   AM (midnight): 0.85 units/hour   8     AM: 0.8 units/hour  6pm: 0.85 Basal Pattern A:  Fransisca Cabello will use when NA. CARB RATIO and times: 12   AM (midnight): 12 Corection Factor (Sensitivity) and times: 12   AM (midnight): 47 mg/dL BLOOD GLUCOSE TARGET and times: 12   AM (midnight): 100 - 120 5     AM:  90 - 110 9:30    PM:  100 - 120 Active Insulin Time:  3 hours Sensor:  No Carelink / Diasend username: madison Carelink / Diasend Password:  [email or web address]        insulin syringe-needle U-100 31G X 5/16\" 0.3 ML    BD insulin syringe ultrafine    100 each    Use one syringe 4 daily or as directed in the event of insulin pump failure.  DO NOT DISPENSE until requested by patient    Encounter for long-term (current) use of insulin (H)       MICROLET LANCETS Misc     600 each    Use one lancet 6 times daily for blood sugar testing    Type 1 diabetes mellitus without complication (H), Insulin pump in place       ondansetron 4 MG ODT tab    ZOFRAN ODT    30 tablet    Take 1-2 tablets (4-8 mg) by mouth every 8 hours as needed for nausea    Nausea/vomiting in pregnancy       PRENATAL VITAMINS PO             "

## 2017-12-08 NOTE — PROGRESS NOTES
PROBLEM LIST  LABS: Opos/RI/    1.  Type I DM on pump: followed by endocrine. Plan: eye exam, TSH qTM, level II US (normal), fetal echo (normal), growth scans q3-4w. Plan delivery at 37 weeks for gHTN and DM unless earlier delivery is indicated.    2. Gestational hypertension w/o significant proteinuria in 3rd trimester  - H/O severe pre-eclampsia at 34 weeks: baseline labs normal (p/c ratio 0.14).  - On baby ASA since 12 weeks.  - Twice weekly BPPs through [facility] (Tues/Fri). Weekly labs with urine pr/cr ratio.  - Signs and symptoms reviewed -- she will call if symptoms increase or change  - Discussed activity. Strict bedrest not recommended. Discussed okay to perform activities of daily living if not strenuous. No heavy lifting/pulling.     No new s/sx. Dull headache at times, good fetal movement, no nausea/vomiting/RUQ or epigastric pain. Labs normal on 12/4. Checking blood pressure at home: 120s-150s/80s, usually. She knows to call for new symptoms, for pain, visual changes, or blood pressure >160 systolic or 105-110 diastolic. BPP later today in M and also Friday, follow up with me weekly for labs as well. Her BS are well-controlled.    Cheyanne Silva MD

## 2017-12-08 NOTE — MR AVS SNAPSHOT
After Visit Summary   12/8/2017    Fransisca Cabello    MRN: [id number]           Patient Information     Date Of Birth          1995        Visit Information        Provider Department      12/8/2017 3:30 PM Mani Vazquez MD [facility] Maternal Fetal Medicine [facility]        Today's Diagnoses     Type 1 diabetes mellitus in pregnancy, third trimester    -  1    Hypertension affecting pregnancy in third trimester           Follow-ups after your visit        Your next 10 appointments already scheduled     Dec 08, 2017  3:30 PM CST   Radiology MD with Mani Vazquez MD   [facility] Maternal Fetal Medicine [facility])    [address]  [facility] [address]   [phone]           Please arrive at the time given for your first appointment. This visit is used internally to schedule the physician's time during your ultrasound.            Dec 12, 2017 11:00 AM CST   MFM BPP SINGLE with [email or web address]   [facility] Fetal Medicine Ultrasound - [facility])    [address]  [facility] [address]   [phone]            Dec 12, 2017 11:30 AM CST   Radiology MD with RH LAWRENCE BETTENCOURT   [facility] Fetal Medicine [facility] ([facility])    [address]  [facility] [address]   [phone]           Please arrive at the time given for your first appointment. This visit is used internally to schedule the physician's time during your ultrasound.            Dec 15, 2017  8:45 AM CST   MFM BPP SINGLE with [email or web address]   [facility] Maternal Fetal Medicine Ultrasound - [facility] ([facility])    [address]  [facility] [address]   [phone]            Dec 15, 2017  9:15 AM CST   Radiology MD with RH LAWRENCE BETTENCOURT   [facility] Fetal Medicine [facility] ([facility])    [address]  [facility] [address]   [phone]            Please arrive at the time given for your first appointment. This visit is used internally to schedule the physician's time during your ultrasound.            Dec 15, 2017  3:45 PM CST   ESTABLISHED PRENATAL with Cheyanne Silva MD   [facility] ([facility])    [id number] [place] [id number] [phone]            Dec 19, 2017  3:00 PM CST   (Arrive by 2:45 PM)   LAWRENCE BPP SINGLE with [email or web address]   [facility] Maternal Fetal Medicine Ultrasound - [facility] ([facility])    [address]  [facility] [address]   [phone]            Dec 19, 2017  3:30 PM CST   Radiology MD with  LAWRENCE BETTENCOURT   [facility] Maternal Fetal [facility] ([facility])    [address] [facility] [address]  [facility] [address]   [phone]           Please arrive at the time given for your first appointment. This visit is used internally to schedule the physician's time during your ultrasound.            Dec 22, 2017  9:30 AM CST   (Arrive by 9:15 AM)   LAWRENCE BPP SINGLE with [email or web address]   [facility] Maternal Fetal Medicine Ultrasound - [facility] ([facility])    [address]  [facility] [address]   [phone]            Dec 22, 2017 11:15 AM CST   ESTABLISHED PRENATAL with Cheyanne Silva MD   [facility] ([facility])    [address] [facility] [id number] [phone]              Who to contact     If you have questions or need follow up information about today's clinic visit or your schedule please contact [facility] MATERNAL FETAL MEDICINE [facility] directly at [phone].  Normal or non-critical lab and imaging results will be communicated to you by MyChart, letter or phone within 4 business days after the clinic has received the results. If you do not hear from us within 7 days, please contact the clinic through MyChart or phone. If you have a critical or  abnormal lab result, we will notify you by phone as soon as possible.  Submit refill requests through [employer] or call your pharmacy and they will forward the refill request to us. Please allow 3 business days for your refill to be completed.          Additional Information About Your Visit        [employer] Information     [employer] gives you secure access to your electronic health record. If you see a primary care provider, you can also send messages to your care team and make appointments. If you have questions, please call your primary care clinic.  If you do not have a primary care provider, please call [phone] and they will assist you.        Care EveryWhere ID     This is your Care EveryWhere ID. This could be used by other organizations to access your [place] medical records  [id number]        Your Vitals Were     Last Period                   04/21/2017 (Exact Date)            Blood Pressure from Last 3 Encounters:   12/08/17 124/72   12/05/17 114/68   11/28/17 128/76    Weight from Last 3 Encounters:   12/08/17 85.7 kg (189 lb)   12/05/17 84.4 kg (186 lb)   11/28/17 85.2 kg (187 lb 12.8 oz)              Today, you had the following     No orders found for display       Primary Care Provider Fax #    Physician No Ref-Primary [phone]       No address on file        Equal Access to Services     EDWIGE VALLADARES : Hadii isidoro ramirezo Soomaali, waaxda luqadaha, qaybta kaalmada adeegyada, hernan spicer . So [facility] [phone].    ATENCIÓN: Si habla español, tiene a boateng disposición servicios gratuitos de asistencia lingüística. Llame al [phone].    We comply with applicable federal civil rights laws and [place] laws. We do not discriminate on the basis of race, color, national origin, age, disability, sex, sexual orientation, or gender identity.            Thank you!     Thank you for choosing [facility]ETAL [facility]  for your care. Our goal is always to  provide you with excellent care. Hearing back from our patients is one way we can continue to improve our services. Please take a few minutes to complete the written survey that you may receive in the mail after your visit with us. Thank you!             Your Updated Medication List - Protect others around you: Learn how to safely use, store and throw away your medicines at www.disposemymeds.org.          This list is accurate as of: 12/8/17  3:16 PM.  Always use your most recent med list.                   Brand Name Dispense Instructions for use Diagnosis    acetone (Urine) test Strp     100 each    Use as directed to test urine ketone if blood sugars are >250 or during illness with fever, nausea, vomiting, or abdominal pain.    High-risk pregnancy, young primigravida, third trimester       ASPIRIN PO      Take 81 mg by mouth daily        blood glucose monitoring test strip    RAYMUNDO CONTOUR NEXT    1000 each    Use to test blood sugar 10-12 times daily or as directed.    Uncontrolled type 1 diabetes mellitus without complication (H), Supervision of other high risk pregnancy, antepartum       glucagon 1 MG kit    GLUCAGON EMERGENCY    1 each    Inject 1 mg into the muscle once for 1 dose    Type 1 diabetes mellitus without complication (H), Insulin pump in place       insulin glargine 100 UNIT/ML injection    LANTUS VIAL    10 mL    Inject 19 u sq every 24 hours in the event of insulin pump therapy.  DO NOT FILL RX until requested by patient.    Type 1 diabetes mellitus without complication (H), Insulin pump in place, Encounter for long-term (current) use of insulin (H), High-risk pregnancy, young primigravida, third trimester       insulin lispro 100 UNIT/ML injection    humaLOG    3 vial    As directed via insulin pump - up to 100 units daily, adjusting pump settings due to increased insulin requirements and pregnancy    Uncontrolled type 1 diabetes mellitus without complication (H), Encounter for long-term  "(current) use of insulin (H)       insulin pump infusion      Date last updated:  12/23/16 [employer] Minimed: Model 630G BASAL RATES and times: 12   AM (midnight): 0.85 units/hour   8     AM: 0.8 units/hour  6pm: 0.85 Basal Pattern A:  Fransisca Cabello will use when NA. CARB RATIO and times: 12   AM (midnight): 12 Corection Factor (Sensitivity) and times: 12   AM (midnight): 47 mg/dL BLOOD GLUCOSE TARGET and times: 12   AM (midnight): 100 - 120 5     AM:  90 - 110 9:30    PM:  100 - 120 Active Insulin Time:  3 hours Sensor:  No Carelink / Diasend username: madison Carelink / Diasend Password:  [email or web address]        insulin syringe-needle U-100 31G X 5/16\" 0.3 ML    BD insulin syringe ultrafine    100 each    Use one syringe 4 daily or as directed in the event of insulin pump failure.  DO NOT DISPENSE until requested by patient    Encounter for long-term (current) use of insulin (H)       MICROLET LANCETS Misc     600 each    Use one lancet 6 times daily for blood sugar testing    Type 1 diabetes mellitus without complication (H), Insulin pump in place       ondansetron 4 MG ODT tab    ZOFRAN ODT    30 tablet    Take 1-2 tablets (4-8 mg) by mouth every 8 hours as needed for nausea    Nausea/vomiting in pregnancy       PRENATAL VITAMINS PO             "

## 2017-12-08 NOTE — NURSING NOTE
"Chief Complaint   Patient presents with     Prenatal Care       Initial /72 (BP Location: Right arm, Patient Position: Sitting, Cuff Size: Adult Regular)  Pulse 84  Ht 5' 7\" (1.702 m)  Wt 189 lb (85.7 kg)  LMP 04/21/2017 (Exact Date)  BMI 29.6 kg/m2 Estimated body mass index is 29.6 kg/(m^2) as calculated from the following:    Height as of this encounter: 5' 7\" (1.702 m).    Weight as of this encounter: 189 lb (85.7 kg).  Medication Reconciliation: complete     33w0d  + FM daily  - cramping, bleeding or contractions  + headache   - vision changes  - heartburn  María Elena Arce LPN      "

## 2017-12-08 NOTE — PROGRESS NOTES
Please refer to ultrasound report under 'Imaging' Studies of 'Chart Review' tabs.    Main Vazquez M.D.

## 2017-12-12 ENCOUNTER — OFFICE VISIT (OUTPATIENT)
Dept: MATERNAL FETAL MEDICINE | Facility: CLINIC | Age: 22
End: 2017-12-12
Attending: OBSTETRICS & GYNECOLOGY
Payer: COMMERCIAL

## 2017-12-12 ENCOUNTER — HOSPITAL ENCOUNTER (OUTPATIENT)
Dept: ULTRASOUND IMAGING | Facility: CLINIC | Age: 22
Discharge: HOME | End: 2017-12-12
Attending: OBSTETRICS & GYNECOLOGY | Admitting: OBSTETRICS & GYNECOLOGY
Payer: COMMERCIAL

## 2017-12-12 DIAGNOSIS — O13.9 GESTATIONAL HYPERTENSION AFFECTING SECOND PREGNANCY: Primary | ICD-10-CM

## 2017-12-12 DIAGNOSIS — O24.013 TYPE 1 DIABETES MELLITUS IN PREGNANCY, THIRD TRIMESTER: ICD-10-CM

## 2017-12-12 DIAGNOSIS — O16.3 HYPERTENSION AFFECTING PREGNANCY IN THIRD TRIMESTER: ICD-10-CM

## 2017-12-12 PROCEDURE — 76819 FETAL BIOPHYS PROFIL W/O NST: CPT

## 2017-12-12 NOTE — MR AVS SNAPSHOT
After Visit Summary   12/12/2017    Fransisca Cabello    MRN: [id number]           Patient Information     Date Of Birth          1995        Visit Information        Provider Department      12/12/2017 11:30 AM eJs James MD [facility] Maternal Fetal Medicine [facility]        Today's Diagnoses     Gestational hypertension affecting second pregnancy    -  1       Follow-ups after your visit        Your next 10 appointments already scheduled     Dec 15, 2017  8:45 AM CST   MFM BPP SINGLE with [email or web address]   [facility] Maternal Fetal Medicine Ultrasound - [facility])    [address]  [facility] [address]   [phone]            Dec 15, 2017  9:15 AM CST   Radiology MD with RH LAWRENCE BETTENCOURT   [facility] Fetal Medicine [facility])    [address]  [facility] [address]   [phone]           Please arrive at the time given for your first appointment. This visit is used internally to schedule the physician's time during your ultrasound.            Dec 15, 2017  3:45 PM CST   ESTABLISHED PRENATAL with Cheyanne Silva MD   [facility] ([facility])    [address] [place] [id number] [phone]            Dec 19, 2017  3:00 PM CST   (Arrive by 2:45 PM)   MFM BPP SINGLE with [email or web address]   [facility] Maternal Fetal Medicine Ultrasound - [facility])    [address]  [facility] [address]   [phone]            Dec 19, 2017  3:30 PM CST   Radiology MD with RH LAWRENCE BETTENCOURT   [facility] Maternal Fetal Medicine [facility] ([facility])    [address]  [facility] [address]   [phone]           Please arrive at the time given for your first appointment. This visit is used internally to schedule the physician's time during your ultrasound.            Dec 22, 2017  9:30 AM CST   (Arrive by  9:15 AM)   MFM BPP SINGLE with [email or web address]   [facility] Maternal Fetal Medicine Ultrasound - [facility] ([facility])    [address]  [facility] [id number] [phone]            Dec 22, 2017 10:00 AM CST   Radiology MD with RH LAWRENCE BETTENCOURT   [facility] Maternal Fetal Medicine [facility] ([facility])    [address]  [facility] [id number] [phone]           Please arrive at the time given for your first appointment. This visit is used internally to schedule the physician's time during your ultrasound.            Dec 22, 2017 11:15 AM CST   ESTABLISHED PRENATAL with Cheyanne Silva MD   [facility] ([facility])    [address] [place] [id number] [phone]            Dec 26, 2017 11:45 AM CST   (Arrive by 11:30 AM)   [facility] US COMPRE SINGLE F/U with [email or web address]   [facility] Maternal Fetal Medicine Ultrasound - [facility] ([facility])    [address] [facility] [address]  [facility] [id number] [phone]           Wear comfortable clothes and leave your valuables at home.            Dec 26, 2017 12:15 PM CST   Radiology MD with  LAWRENCE BETTENCOURT   [facility] Maternal Fetal Medicine [facility] ([facility])    [address]  [facility] [id number] [phone]           Please arrive at the time given for your first appointment. This visit is used internally to schedule the physician's time during your ultrasound.              Who to contact     If you have questions or need follow up information about today's clinic visit or your schedule please contact [facility] MATERNAL FETAL MEDICINE [facility] directly at [phone].  Normal or non-critical lab and imaging results will be communicated to you by MyChart, letter or phone within 4 business days after the clinic has received the results. If you do not hear from us within 7 days, please contact the clinic through [employer] or  phone. If you have a critical or abnormal lab result, we will notify you by phone as soon as possible.  Submit refill requests through [employer] or call your pharmacy and they will forward the refill request to us. Please allow 3 business days for your refill to be completed.          Additional Information About Your Visit        [employer] Information     [employer] gives you secure access to your electronic health record. If you see a primary care provider, you can also send messages to your care team and make appointments. If you have questions, please call your primary care clinic.  If you do not have a primary care provider, please call [phone] and they will assist you.        Care EveryWhere ID     This is your Care EveryWhere ID. This could be used by other organizations to access your [place] medical records  [id number]        Your Vitals Were     Last Period                   04/21/2017 (Exact Date)            Blood Pressure from Last 3 Encounters:   12/08/17 124/72   12/05/17 114/68   11/28/17 128/76    Weight from Last 3 Encounters:   12/08/17 85.7 kg (189 lb)   12/05/17 84.4 kg (186 lb)   11/28/17 85.2 kg (187 lb 12.8 oz)              Today, you had the following     No orders found for display       Primary Care Provider Fax #    Physician No Ref-Primary [phone]       No address on file        Equal Access to Services     EDWIGE VALLADARES : Hadii aad ku hadasho Soomaali, waaxda luqadaha, qaybta kaalmada adeegyada, hernan spicer . So [place] [phone].    ATENCIÓN: Si habla español, tiene a boateng disposición servicios gratuitos de asistencia lingüística. Llame al [phone].    We comply with applicable federal civil rights laws and [place] laws. We do not discriminate on the basis of race, color, national origin, age, disability, sex, sexual orientation, or gender identity.            Thank you!     Thank you for choosing [facility]  for your  care. Our goal is always to provide you with excellent care. Hearing back from our patients is one way we can continue to improve our services. Please take a few minutes to complete the written survey that you may receive in the mail after your visit with us. Thank you!             Your Updated Medication List - Protect others around you: Learn how to safely use, store and throw away your medicines at www.disposemymeds.org.          This list is accurate as of: 12/12/17 11:36 AM.  Always use your most recent med list.                   Brand Name Dispense Instructions for use Diagnosis    acetone (Urine) test Strp     100 each    Use as directed to test urine ketone if blood sugars are >250 or during illness with fever, nausea, vomiting, or abdominal pain.    High-risk pregnancy, young primigravida, third trimester       ASPIRIN PO      Take 81 mg by mouth daily        blood glucose monitoring test strip    RAYMUNDO CONTOUR NEXT    1000 each    Use to test blood sugar 10-12 times daily or as directed.    Uncontrolled type 1 diabetes mellitus without complication (H), Supervision of other high risk pregnancy, antepartum       glucagon 1 MG kit    GLUCAGON EMERGENCY    1 each    Inject 1 mg into the muscle once for 1 dose    Type 1 diabetes mellitus without complication (H), Insulin pump in place       insulin glargine 100 UNIT/ML injection    LANTUS VIAL    10 mL    Inject 19 u sq every 24 hours in the event of insulin pump therapy.  DO NOT FILL RX until requested by patient.    Type 1 diabetes mellitus without complication (H), Insulin pump in place, Encounter for long-term (current) use of insulin (H), High-risk pregnancy, young primigravida, third trimester       insulin lispro 100 UNIT/ML injection    humaLOG    3 vial    As directed via insulin pump - up to 100 units daily, adjusting pump settings due to increased insulin requirements and pregnancy    Uncontrolled type 1 diabetes mellitus without complication (H),  "Encounter for long-term (current) use of insulin (H)       insulin pump infusion      Date last updated:  12/23/16 [employer] Minimed: Model 630G BASAL RATES and times: 12   AM (midnight): 0.85 units/hour   8     AM: 0.8 units/hour  6pm: 0.85 Basal Pattern A:  Fransisca Cabello will use when NA. CARB RATIO and times: 12   AM (midnight): 12 Corection Factor (Sensitivity) and times: 12   AM (midnight): 47 mg/dL BLOOD GLUCOSE TARGET and times: 12   AM (midnight): 100 - 120 5     AM:  90 - 110 9:30    PM:  100 - 120 Active Insulin Time:  3 hours Sensor:  No Carelink / Diasend username: [email or web address] Carelink / Diasend Password:  [email or web address]        insulin syringe-needle U-100 31G X 5/16\" 0.3 ML    BD insulin syringe ultrafine    100 each    Use one syringe 4 daily or as directed in the event of insulin pump failure.  DO NOT DISPENSE until requested by patient    Encounter for long-term (current) use of insulin (H)       MICROLET LANCETS Misc     600 each    Use one lancet 6 times daily for blood sugar testing    Type 1 diabetes mellitus without complication (H), Insulin pump in place       ondansetron 4 MG ODT tab    ZOFRAN ODT    30 tablet    Take 1-2 tablets (4-8 mg) by mouth every 8 hours as needed for nausea    Nausea/vomiting in pregnancy       PRENATAL VITAMINS PO             "

## 2017-12-12 NOTE — PROGRESS NOTES
Please see full imaging report from [employer] program under imaging tab.    BPP 8/8. Delivery at 37w for gestational hypertension.     Jes James MD  Maternal Fetal Medicine

## 2017-12-14 ENCOUNTER — MYC MEDICAL ADVICE (OUTPATIENT)
Dept: ENDOCRINOLOGY | Facility: CLINIC | Age: 22
End: 2017-12-14

## 2017-12-14 DIAGNOSIS — Z79.4 ENCOUNTER FOR LONG-TERM (CURRENT) USE OF INSULIN (H): ICD-10-CM

## 2017-12-15 ENCOUNTER — HOSPITAL ENCOUNTER (OUTPATIENT)
Dept: ULTRASOUND IMAGING | Facility: CLINIC | Age: 22
Discharge: HOME | End: 2017-12-15
Attending: OBSTETRICS & GYNECOLOGY | Admitting: OBSTETRICS & GYNECOLOGY
Payer: COMMERCIAL

## 2017-12-15 ENCOUNTER — OFFICE VISIT (OUTPATIENT)
Dept: MATERNAL FETAL MEDICINE | Facility: CLINIC | Age: 22
End: 2017-12-15
Attending: OBSTETRICS & GYNECOLOGY
Payer: COMMERCIAL

## 2017-12-15 ENCOUNTER — PRENATAL OFFICE VISIT (OUTPATIENT)
Dept: OBGYN | Facility: CLINIC | Age: 22
End: 2017-12-15
Payer: COMMERCIAL

## 2017-12-15 VITALS
BODY MASS INDEX: 30.45 KG/M2 | WEIGHT: 194 LBS | HEIGHT: 67 IN | DIASTOLIC BLOOD PRESSURE: 76 MMHG | HEART RATE: 92 BPM | SYSTOLIC BLOOD PRESSURE: 134 MMHG

## 2017-12-15 DIAGNOSIS — O13.3 PREGNANCY-INDUCED HYPERTENSION IN THIRD TRIMESTER: Primary | ICD-10-CM

## 2017-12-15 DIAGNOSIS — O24.013 TYPE 1 DIABETES MELLITUS IN PREGNANCY, THIRD TRIMESTER: ICD-10-CM

## 2017-12-15 DIAGNOSIS — E10.9 TYPE 1 DIABETES MELLITUS WITHOUT COMPLICATION (H): ICD-10-CM

## 2017-12-15 DIAGNOSIS — O09.899 SUPERVISION OF OTHER HIGH RISK PREGNANCY, ANTEPARTUM: ICD-10-CM

## 2017-12-15 DIAGNOSIS — O24.013 TYPE 1 DIABETES MELLITUS IN PREGNANCY, THIRD TRIMESTER: Primary | ICD-10-CM

## 2017-12-15 DIAGNOSIS — O16.3 HYPERTENSION AFFECTING PREGNANCY IN THIRD TRIMESTER: ICD-10-CM

## 2017-12-15 DIAGNOSIS — Z96.41 INSULIN PUMP IN PLACE: ICD-10-CM

## 2017-12-15 LAB
ERYTHROCYTE [DISTWIDTH] IN BLOOD BY AUTOMATED COUNT: 13.8 % (ref 10–15)
HCT VFR BLD AUTO: 34.5 % (ref 35–47)
HGB BLD-MCNC: 11.3 G/DL (ref 11.7–15.7)
MCH RBC QN AUTO: 30.5 PG (ref 26.5–33)
MCHC RBC AUTO-ENTMCNC: 32.8 G/DL (ref 31.5–36.5)
MCV RBC AUTO: 93 FL (ref 78–100)
PLATELET # BLD AUTO: 262 10E9/L (ref 150–450)
RBC # BLD AUTO: 3.7 10E12/L (ref 3.8–5.2)
WBC # BLD AUTO: 8.8 10E9/L (ref 4–11)

## 2017-12-15 PROCEDURE — 84156 ASSAY OF PROTEIN URINE: CPT | Performed by: OBSTETRICS & GYNECOLOGY

## 2017-12-15 PROCEDURE — 99207 ZZC PRENATAL VISIT: CPT | Performed by: OBSTETRICS & GYNECOLOGY

## 2017-12-15 PROCEDURE — 76819 FETAL BIOPHYS PROFIL W/O NST: CPT

## 2017-12-15 PROCEDURE — 85027 COMPLETE CBC AUTOMATED: CPT | Performed by: OBSTETRICS & GYNECOLOGY

## 2017-12-15 PROCEDURE — 82565 ASSAY OF CREATININE: CPT | Performed by: OBSTETRICS & GYNECOLOGY

## 2017-12-15 PROCEDURE — 84460 ALANINE AMINO (ALT) (SGPT): CPT | Performed by: OBSTETRICS & GYNECOLOGY

## 2017-12-15 PROCEDURE — 36415 COLL VENOUS BLD VENIPUNCTURE: CPT | Performed by: OBSTETRICS & GYNECOLOGY

## 2017-12-15 PROCEDURE — 84450 TRANSFERASE (AST) (SGOT): CPT | Performed by: OBSTETRICS & GYNECOLOGY

## 2017-12-15 NOTE — MR AVS SNAPSHOT
After Visit Summary   12/15/2017    Fransisca Cabello    MRN: [id number]           Patient Information     Date Of Birth          1995        Visit Information        Provider Department      12/15/2017 3:45 PM Cheyanne Silva MD [facility]        Today's Diagnoses     Pregnancy-induced hypertension in third trimester    -  1    Supervision of other high risk pregnancy, antepartum        Type 1 diabetes mellitus without complication (H)        Insulin pump in place           Follow-ups after your visit        Your next 10 appointments already scheduled     Dec 19, 2017  3:00 PM CST   (Arrive by 2:45 PM)   MFM BPP SINGLE with [email or web address]   [facility] M[facility] Fetal [facility] Ultrasound - [facility] ([facility])    [address]  [facility] [address]   [phone]            Dec 19, 2017  3:30 PM CST   Radiology MD with  LAWRENCE BETTENCOURT   [facility] Maternal Fetal Medicine [facility] ([facility])    [address]  [facility] [address]   [phone]           Please arrive at the time given for your first appointment. This visit is used internally to schedule the physician's time during your ultrasound.            Dec 22, 2017  9:30 AM CST   (Arrive by 9:15 AM)   MFM BPP SINGLE with [email or web address]   [facility] Maternal Fetal Medicine Ultrasound - [facility] ([facility])    [address]  [facility] [address]   [phone]            Dec 22, 2017 10:00 AM CST   [facility]   [facility] Maternal Fetal Medicine [facility] ([facility])    [address]  [facility] [address]   [phone]           Please arrive at the time given for your first appointment. This visit is used internally to schedule the physician's time during your ultrasound.            Dec 22, 2017 11:15 AM CST   ESTABLISHED PRENATAL with Cheyanne Silva MD   [facility]  ([facility])    [address] [facility] [address]   [phone]            Dec 26, 2017 11:45 AM CST   (Arrive by 11:30 AM)   MFM US COMPRE SINGLE F/U with [email or web address]   [facility] Maternal Fetal Medicine Ultrasound - [facility] ([facility])    [address]  [facility] [address] [phone]           Wear comfortable clothes and leave your valuables at home.            Dec 26, 2017 12:15 PM CST   Radiology MD with  LAWRENCE BETTENCOURT   [facility] Maternal Fetal Medicine - [facility] ([facility])    [address]  [facility] [address]   [phone]           Please arrive at the time given for your first appointment. This visit is used internally to schedule the physician's time during your ultrasound.            Dec 27, 2017  1:00 PM CST   ESTABLISHED PRENATAL with Cheyanne Silva MD   [facility]    [address]  [facility] [address]   [phone]            Dec 29, 2017 11:00 AM CST   (Arrive by 10:45 AM)   MFM BPP SINGLE with [email or web address]   [facility] Maternal Fetal Medicine Ultrasound - [facility] ([facility])    [address]  [facility] [address]   [phone]            Dec 29, 2017 11:30 AM CST   Radiology MD with  LAWRENCE BETTENCOURT   [facility] Maternal Fetal Medicine [facility] ([facility])    [address]  [facility] [address]   [phone]           Please arrive at the time given for your first appointment. This visit is used internally to schedule the physician's time during your ultrasound.              Who to contact     If you have questions or need follow up information about today's clinic visit or your schedule please contact [facility] directly at [phone].  Normal or non-critical lab and imaging results will be communicated to you by MyChart, letter or phone within 4 business  "days after the clinic has received the results. If you do not hear from us within 7 days, please contact the clinic through [employer] or phone. If you have a critical or abnormal lab result, we will notify you by phone as soon as possible.  Submit refill requests through [employer] or call your pharmacy and they will forward the refill request to us. Please allow 3 business days for your refill to be completed.          Additional Information About Your Visit        [employer] Information     [employer] gives you secure access to your electronic health record. If you see a primary care provider, you can also send messages to your care team and make appointments. If you have questions, please call your primary care clinic.  If you do not have a primary care provider, please call [phone] and they will assist you.        Care EveryWhere ID     This is your Care EveryWhere ID. This could be used by other organizations to access your [place] medical records  [id number]        Your Vitals Were     Pulse Height Last Period Breastfeeding? BMI (Body Mass Index)       92 5' 7\" (1.702 m) 04/21/2017 (Exact Date) No 30.38 kg/m2        Blood Pressure from Last 3 Encounters:   12/15/17 134/76   12/08/17 124/72   12/05/17 114/68    Weight from Last 3 Encounters:   12/15/17 194 lb (88 kg)   12/08/17 189 lb (85.7 kg)   12/05/17 186 lb (84.4 kg)              We Performed the Following     ALT     AST     CBC with platelets     Creatinine urine calculation only     Creatinine     Protein  random urine with Creat Ratio        Primary Care Provider Fax #    Physician No Ref-Primary [phone]       No address on file        Equal Access to Services     EDWIGE VALLADARES : Hadii isidoro barnes Sodario, waaxda luqadaha, qaybta kaalmada hernan dinero . So [facility] [phone].    ATENCIÓN: Si habla español, tiene a boateng disposición servicios gratuitos de asistencia lingüística. Llame al [phone].    We comply " with applicable federal civil rights laws and [place] laws. We do not discriminate on the basis of race, color, national origin, age, disability, sex, sexual orientation, or gender identity.            Thank you!     Thank you for choosing [facility]  for your care. Our goal is always to provide you with excellent care. Hearing back from our patients is one way we can continue to improve our services. Please take a few minutes to complete the written survey that you may receive in the mail after your visit with us. Thank you!             Your Updated Medication List - Protect others around you: Learn how to safely use, store and throw away your medicines at www.disposemymeds.org.          This list is accurate as of: 12/15/17  4:34 PM.  Always use your most recent med list.                   Brand Name Dispense Instructions for use Diagnosis    acetone (Urine) test Strp     100 each    Use as directed to test urine ketone if blood sugars are >250 or during illness with fever, nausea, vomiting, or abdominal pain.    High-risk pregnancy, young primigravida, third trimester       ASPIRIN PO      Take 81 mg by mouth daily        blood glucose monitoring test strip    RAYMUNDO CONTOUR NEXT    1000 each    Use to test blood sugar 10-12 times daily or as directed.    Uncontrolled type 1 diabetes mellitus without complication (H), Supervision of other high risk pregnancy, antepartum       glucagon 1 MG kit    GLUCAGON EMERGENCY    1 each    Inject 1 mg into the muscle once for 1 dose    Type 1 diabetes mellitus without complication (H), Insulin pump in place       insulin glargine 100 UNIT/ML injection    LANTUS VIAL    10 mL    Inject 19 u sq every 24 hours in the event of insulin pump therapy.  DO NOT FILL RX until requested by patient.    Type 1 diabetes mellitus without complication (H), Insulin pump in place, Encounter for long-term (current) use of insulin (H), High-risk pregnancy, young  "primigravida, third trimester       insulin lispro 100 UNIT/ML injection    humaLOG    5 vial    As directed via insulin pump - up to 160 units daily, adjusting pump settings due to increased insulin requirements and pregnancy    Uncontrolled type 1 diabetes mellitus without complication (H), Encounter for long-term (current) use of insulin (H)       insulin pump infusion      Date last updated:  12/23/16 [employer] Minimed: Model 630G BASAL RATES and times: 12   AM (midnight): 0.85 units/hour   8     AM: 0.8 units/hour  6pm: 0.85 Basal Pattern A:  Fransisca Irnieo will use when NA. CARB RATIO and times: 12   AM (midnight): 12 Corection Factor (Sensitivity) and times: 12   AM (midnight): 47 mg/dL BLOOD GLUCOSE TARGET and times: 12   AM (midnight): 100 - 120 5     AM:  90 - 110 9:30    PM:  100 - 120 Active Insulin Time:  3 hours Sensor:  No Carelink / Diasend username: [email or web address] Carelink / Diasend Password:  [email or web address]        insulin syringe-needle U-100 31G X 5/16\" 0.3 ML    BD insulin syringe ultrafine    100 each    Use one syringe 4 daily or as directed in the event of insulin pump failure.  DO NOT DISPENSE until requested by patient    Encounter for long-term (current) use of insulin (H)       MICROLET LANCETS Misc     600 each    Use one lancet 6 times daily for blood sugar testing    Type 1 diabetes mellitus without complication (H), Insulin pump in place       ondansetron 4 MG ODT tab    ZOFRAN ODT    30 tablet    Take 1-2 tablets (4-8 mg) by mouth every 8 hours as needed for nausea    Nausea/vomiting in pregnancy       PRENATAL VITAMINS PO             "

## 2017-12-15 NOTE — NURSING NOTE
"Chief Complaint   Patient presents with     Prenatal Care       Initial /76 (BP Location: Right arm, Patient Position: Sitting, Cuff Size: Adult Regular)  Pulse 92  Ht 5' 7\" (1.702 m)  Wt 194 lb (88 kg)  LMP 04/21/2017 (Exact Date)  Breastfeeding? No  BMI 30.38 kg/m2 Estimated body mass index is 30.38 kg/(m^2) as calculated from the following:    Height as of this encounter: 5' 7\" (1.702 m).    Weight as of this encounter: 194 lb (88 kg).  Medication Reconciliation: complete     Chief Complaint   Patient presents with     Prenatal Care       34w0d. BPP today - 8/8.  + FM daily  - cramping or bleeding  - contractions  + severe headache yesterday with light/noise sensitivity  - leaking of fluid  - heartburn  - edema  María Elena Arce LPN              "

## 2017-12-15 NOTE — MR AVS SNAPSHOT
After Visit Summary   12/15/2017    Fransisca Cabello    MRN: [id number]           Patient Information     Date Of Birth          1995        Visit Information        Provider Department      12/15/2017 9:15 AM Sridevi Tabor MD [facility] Maternal Fetal Medicine [facility]        Today's Diagnoses     Type 1 diabetes mellitus in pregnancy, third trimester    -  1       Follow-ups after your visit        Your next 10 appointments already scheduled     Dec 15, 2017  9:15 AM CST   Radiology MD with Sridevi Tabor MD   [facility] Maternal Fetal Medicine [facility] ([facility])    [address]  [facility] [address]   [phone]           Please arrive at the time given for your first appointment. This visit is used internally to schedule the physician's time during your ultrasound.            Dec 15, 2017  3:45 PM CST   ESTABLISHED PRENATAL with Cheyanne Silva MD   [facility] ([facility])    [address] [place] [address]   [phone]            Dec 19, 2017  3:00 PM CST   (Arrive by 2:45 PM)   MFM BPP SINGLE with [id number]   [facility] Maternal Fetal Medicine Ultrasound - [facility])    [address]  [facility] [address]   [phone]            Dec 19, 2017  3:30 PM CST   Radiology MD with  LAWRENCE BETTENCOURT   [facility] Maternal Fetal Medicine [facility])    [address]  [facility] [address]   [phone]           Please arrive at the time given for your first appointment. This visit is used internally to schedule the physician's time during your ultrasound.            Dec 22, 2017  9:30 AM CST   (Arrive by 9:15 AM)   MFM BPP SINGLE with [id number]   [facility] Maternal Fetal Medicine Ultrasound - [facility])    [address]  [facility] [address]   [phone]            Dec 22,  2017 10:00 AM CST   Radiology MD with  LAWRENCE BETTENCOURT   [facility] Maternal Fetal Medicine [facility] ([facility])    [address]  [facility] [id number] [phone]           Please arrive at the time given for your first appointment. This visit is used internally to schedule the physician's time during your ultrasound.            Dec 22, 2017 11:15 AM CST   ESTABLISHED PRENATAL with Cheyanne Silva MD   [facility] ([facility])    [address] [place] [address]   [phone]            Dec 26, 2017 11:45 AM CST   (Arrive by 11:30 AM)   MFM US COMPRE SINGLE F/U with [id number]   [facility] Maternal Fetal Medicine Ultrasound [facility])    [address]  [facility] [id number] [phone]           Wear comfortable clothes and leave your valuables at home.            Dec 26, 2017 12:15 PM CST   Radiology MD with  LAWRENCE BETTENCOURT   [facility] Maternal Fetal Medicine [facility] ([facility])    [address]  [facility] [id number] [phone]           Please arrive at the time given for your first appointment. This visit is used internally to schedule the physician's time during your ultrasound.            Dec 27, 2017  1:00 PM CST   ESTABLISHED PRENATAL with Cheyanne Silva MD   [facility]    [address] [facility] [address]   [phone]              Who to contact     If you have questions or need follow up information about today's clinic visit or your schedule please contact [facility] MATERNAL FETAL MEDICINE [facility] directly at [phone].  Normal or non-critical lab and imaging results will be communicated to you by MyChart, letter or phone within 4 business days after the clinic has received the results. If you do not hear from us within 7 days, please contact the clinic through Nomad Gamest or  phone. If you have a critical or abnormal lab result, we will notify you by phone as soon as possible.  Submit refill requests through [employer] or call your pharmacy and they will forward the refill request to us. Please allow 3 business days for your refill to be completed.          Additional Information About Your Visit        IOCOMhart Information     [employer] gives you secure access to your electronic health record. If you see a primary care provider, you can also send messages to your care team and make appointments. If you have questions, please call your primary care clinic.  If you do not have a primary care provider, please call [phone] and they will assist you.        Care EveryWhere ID     This is your Care EveryWhere ID. This could be used by other organizations to access your [place] medical records  [id number]        Your Vitals Were     Last Period                   04/21/2017 (Exact Date)            Blood Pressure from Last 3 Encounters:   12/08/17 124/72   12/05/17 114/68   11/28/17 128/76    Weight from Last 3 Encounters:   12/08/17 85.7 kg (189 lb)   12/05/17 84.4 kg (186 lb)   11/28/17 85.2 kg (187 lb 12.8 oz)              Today, you had the following     No orders found for display       Primary Care Provider Fax #    Physician No Ref-Primary [phone]       No address on file        Equal Access to Services     EDWIGE VALLADARES : Hadii aad ku hadasho Soomaali, waaxda luqadaha, qaybta kaalmada adeegyada, hernan spicer . So [facility] [phone].    ATENCIÓN: Si habla español, tiene a boateng disposición servicios gratuitos de asistencia lingüística. Llame al [phone].    We comply with applicable federal civil rights laws and [place] laws. We do not discriminate on the basis of race, color, national origin, age, disability, sex, sexual orientation, or gender identity.            Thank you!     Thank you for choosing [facility]  for your  care. Our goal is always to provide you with excellent care. Hearing back from our patients is one way we can continue to improve our services. Please take a few minutes to complete the written survey that you may receive in the mail after your visit with us. Thank you!             Your Updated Medication List - Protect others around you: Learn how to safely use, store and throw away your medicines at www.disposemymeds.org.          This list is accurate as of: 12/15/17  9:14 AM.  Always use your most recent med list.                   Brand Name Dispense Instructions for use Diagnosis    acetone (Urine) test Strp     100 each    Use as directed to test urine ketone if blood sugars are >250 or during illness with fever, nausea, vomiting, or abdominal pain.    High-risk pregnancy, young primigravida, third trimester       ASPIRIN PO      Take 81 mg by mouth daily        blood glucose monitoring test strip    RAYMUNDO CONTOUR NEXT    1000 each    Use to test blood sugar 10-12 times daily or as directed.    Uncontrolled type 1 diabetes mellitus without complication (H), Supervision of other high risk pregnancy, antepartum       glucagon 1 MG kit    GLUCAGON EMERGENCY    1 each    Inject 1 mg into the muscle once for 1 dose    Type 1 diabetes mellitus without complication (H), Insulin pump in place       insulin glargine 100 UNIT/ML injection    LANTUS VIAL    10 mL    Inject 19 u sq every 24 hours in the event of insulin pump therapy.  DO NOT FILL RX until requested by patient.    Type 1 diabetes mellitus without complication (H), Insulin pump in place, Encounter for long-term (current) use of insulin (H), High-risk pregnancy, young primigravida, third trimester       insulin lispro 100 UNIT/ML injection    humaLOG    5 vial    As directed via insulin pump - up to 160 units daily, adjusting pump settings due to increased insulin requirements and pregnancy    Uncontrolled type 1 diabetes mellitus without complication (H),  "Encounter for long-term (current) use of insulin (H)       insulin pump infusion      Date last updated:  12/23/16 [employer] Minimed: Model 630G BASAL RATES and times: 12   AM (midnight): 0.85 units/hour   8     AM: 0.8 units/hour  6pm: 0.85 Basal Pattern A:  Fransisca Cabello will use when NA. CARB RATIO and times: 12   AM (midnight): 12 Corection Factor (Sensitivity) and times: 12   AM (midnight): 47 mg/dL BLOOD GLUCOSE TARGET and times: 12   AM (midnight): 100 - 120 5     AM:  90 - 110 9:30    PM:  100 - 120 Active Insulin Time:  3 hours Sensor:  No Carelink / Diasend username: [email or web address] Carelink / Diasend Password:  [email or web address]        insulin syringe-needle U-100 31G X 5/16\" 0.3 ML    BD insulin syringe ultrafine    100 each    Use one syringe 4 daily or as directed in the event of insulin pump failure.  DO NOT DISPENSE until requested by patient    Encounter for long-term (current) use of insulin (H)       MICROLET LANCETS Misc     600 each    Use one lancet 6 times daily for blood sugar testing    Type 1 diabetes mellitus without complication (H), Insulin pump in place       ondansetron 4 MG ODT tab    ZOFRAN ODT    30 tablet    Take 1-2 tablets (4-8 mg) by mouth every 8 hours as needed for nausea    Nausea/vomiting in pregnancy       PRENATAL VITAMINS PO             "

## 2017-12-15 NOTE — PROGRESS NOTES
PROBLEM LIST  LABS: Opos/RI/  BOY    1.  Type I DM on pump: followed by endocrine. Plan: eye exam, TSH qTM, level II US (normal), fetal echo (normal), growth scans q3-4w. Plan delivery at 37 weeks for gHTN and DM unless earlier delivery is indicated.    2. Gestational hypertension w/o significant proteinuria in 3rd trimester (sp BMZ 11/17-18)  - H/O severe pre-eclampsia at 34 weeks: baseline labs normal (p/c ratio 0.14).  - On baby ASA since 12 weeks.  - Twice weekly BPPs through MFM (Tues/Fri). Weekly labs with urine pr/cr ratio.  - Signs and symptoms reviewed -- she will call if symptoms increase or change  - Discussed activity. Strict bedrest not recommended. Discussed okay to perform activities of daily living if not strenuous. No heavy lifting/pulling.     No new s/sx, though her headache was worse yesterday. No new vision symptoms. Discussed when to call for evaluation. Recheck labs today. Continue twice weekly BPP and weekly labs and visit with me.    Cheyanne Silva MD

## 2017-12-16 LAB
ALT SERPL W P-5'-P-CCNC: 13 U/L (ref 0–50)
AST SERPL W P-5'-P-CCNC: 10 U/L (ref 0–45)
CREAT SERPL-MCNC: 0.72 MG/DL (ref 0.52–1.04)
CREAT UR-MCNC: 79 MG/DL
GFR SERPL CREATININE-BSD FRML MDRD: >90 ML/MIN/1.7M2
PROT UR-MCNC: 0.08 G/L
PROT/CREAT 24H UR: 0.1 G/G CR (ref 0–0.2)

## 2017-12-19 ENCOUNTER — OFFICE VISIT (OUTPATIENT)
Dept: MATERNAL FETAL MEDICINE | Facility: CLINIC | Age: 22
End: 2017-12-19
Attending: OBSTETRICS & GYNECOLOGY
Payer: COMMERCIAL

## 2017-12-19 ENCOUNTER — HOSPITAL ENCOUNTER (OUTPATIENT)
Dept: ULTRASOUND IMAGING | Facility: CLINIC | Age: 22
Discharge: HOME | End: 2017-12-19
Attending: OBSTETRICS & GYNECOLOGY | Admitting: OBSTETRICS & GYNECOLOGY
Payer: COMMERCIAL

## 2017-12-19 DIAGNOSIS — O13.9 GESTATIONAL HYPERTENSION AFFECTING SECOND PREGNANCY: ICD-10-CM

## 2017-12-19 DIAGNOSIS — O24.013 TYPE 1 DIABETES MELLITUS IN PREGNANCY, THIRD TRIMESTER: Primary | ICD-10-CM

## 2017-12-19 DIAGNOSIS — O24.013 TYPE 1 DIABETES MELLITUS IN PREGNANCY, THIRD TRIMESTER: ICD-10-CM

## 2017-12-19 PROCEDURE — 76819 FETAL BIOPHYS PROFIL W/O NST: CPT

## 2017-12-19 NOTE — MR AVS SNAPSHOT
After Visit Summary   12/19/2017    Fransisca Cabello    MRN: [id number]           Patient Information     Date Of Birth          1995        Visit Information        Provider Department      12/19/2017 3:30 PM Sridevi Tabor MD [facility] Maternal Fetal Medicine - [facility]        Today's Diagnoses     Type 1 diabetes mellitus in pregnancy, third trimester    -  1    Gestational hypertension affecting second pregnancy           Follow-ups after your visit        Your next 10 appointments already scheduled     Dec 22, 2017  9:30 AM CST   (Arrive by 9:15 AM)   MFM BPP SINGLE with [id number]   [facility] Maternal Fetal Medicine Ultrasound - [facility] ([facility])    [address]  [facility] [address] [phone]            Dec 22, 2017 10:00 AM CST   Radiology MD with RH LAWRENCE BETTENCOURT   [facility] Maternal Fetal Medicine [facility] ([facility])    [address]  [facility] [id number] [phone]           Please arrive at the time given for your first appointment. This visit is used internally to schedule the physician's time during your ultrasound.            Dec 22, 2017 11:15 AM CST   ESTABLISHED PRENATAL with Cheyanne Silva MD   [facility])    [address] [place] [id number] [phone]            Dec 26, 2017 11:45 AM CST   (Arrive by 11:30 AM)   MFM US COMPRE SINGLE F/U with [id number]   [facility] Maternal [facility]etal [facility]trasound - [facility])    [address]  [facility] [address] [phone]           Wear comfortable clothes and leave your valuables at home.            Dec 26, 2017 12:15 PM CST   Radiology MD with RH LAWRENCE BETTENCOURT   [facility] Maternal Fetal Medicine [facility] ([facility])    [address]  [facility] [address] [phone]           Please arrive at the time given for  your first appointment. This visit is used internally to schedule the physician's time during your ultrasound.            Dec 27, 2017  1:00 PM CST   ESTABLISHED PRENATAL with Cheyanne Silva MD   [facility]    [address]  [facility] [address]   [phone]            Dec 29, 2017 11:00 AM CST   (Arrive by 10:45 AM)   MFM BPP SINGLE with [id number]   [facility] Maternal Fetal Medicine Ultrasound - [facility])    [address]  [facility] [address]   [phone]            Dec 29, 2017 11:30 AM CST   Radiology MD with  LAWRENCE BETTENCOURT   [facility])    [address]  [facility] [address]   [phone]           Please arrive at the time given for your first appointment. This visit is used internally to schedule the physician's time during your ultrasound.            Jan 05, 2018 11:15 AM CST   ESTABLISHED PRENATAL with Cheyanne Silav MD   [facility])    [address] [place] [address] [phone]              Who to contact     If you have questions or need follow up information about today's clinic visit or your schedule please contact [facility] MATERNAL FETAL [facility] directly at [phone].  Normal or non-critical lab and imaging results will be communicated to you by MyChart, letter or phone within 4 business days after the clinic has received the results. If you do not hear from us within 7 days, please contact the clinic through MyChart or phone. If you have a critical or abnormal lab result, we will notify you by phone as soon as possible.  Submit refill requests through ArriveBefore or call your pharmacy and they will forward the refill request to us. Please allow 3 business days for your refill to be completed.          Additional Information About Your  Visit        Certain Communicationshart Information     [employer] Hot[employer] gives you secure access to your electronic health record. If you see a primary care provider, you can also send messages to your care team and make appointments. If you have questions, please call your primary care clinic.  If you do not have a primary care provider, please call [phone] and they will assist you.        Care EveryWhere ID     This is your Care EveryWhere ID. This could be used by other organizations to access your [place] medical records  [id number]        Your Vitals Were     Last Period                   04/21/2017 (Exact Date)            Blood Pressure from Last 3 Encounters:   12/15/17 134/76   12/08/17 124/72   12/05/17 114/68    Weight from Last 3 Encounters:   12/15/17 88 kg (194 lb)   12/08/17 85.7 kg (189 lb)   12/05/17 84.4 kg (186 lb)              Today, you had the following     No orders found for display       Primary Care Provider Fax #    Physician No Ref-Primary [phone]       No address on file        Equal Access to Services     EDWIGE VALLADARES : Hadii isidoro ku hadasho Soomaali, waaxda luqadaha, qaybta kaalmada adeegyada, waxay layla spicer . So [facility] [phone].    ATENCIÓN: Si habla español, tiene a boateng disposición servicios gratuitos de asistencia lingüística. Llame al [phone].    We comply with applicable federal civil rights laws and [place] laws. We do not discriminate on the basis of race, color, national origin, age, disability, sex, sexual orientation, or gender identity.            Thank you!     Thank you for choosing [facility]  for your care. Our goal is always to provide you with excellent care. Hearing back from our patients is one way we can continue to improve our services. Please take a few minutes to complete the written survey that you may receive in the mail after your visit with us. Thank you!             Your Updated Medication List - Protect others  around you: Learn how to safely use, store and throw away your medicines at www.disposemymeds.org.          This list is accurate as of: 12/19/17  4:18 PM.  Always use your most recent med list.                   Brand Name Dispense Instructions for use Diagnosis    acetone (Urine) test Strp     100 each    Use as directed to test urine ketone if blood sugars are >250 or during illness with fever, nausea, vomiting, or abdominal pain.    High-risk pregnancy, young primigravida, third trimester       ASPIRIN PO      Take 81 mg by mouth daily        blood glucose monitoring test strip    RAYMUNDO CONTOUR NEXT    1000 each    Use to test blood sugar 10-12 times daily or as directed.    Uncontrolled type 1 diabetes mellitus without complication (H), Supervision of other high risk pregnancy, antepartum       glucagon 1 MG kit    GLUCAGON EMERGENCY    1 each    Inject 1 mg into the muscle once for 1 dose    Type 1 diabetes mellitus without complication (H), Insulin pump in place       insulin glargine 100 UNIT/ML injection    LANTUS VIAL    10 mL    Inject 19 u sq every 24 hours in the event of insulin pump therapy.  DO NOT FILL RX until requested by patient.    Type 1 diabetes mellitus without complication (H), Insulin pump in place, Encounter for long-term (current) use of insulin (H), High-risk pregnancy, young primigravida, third trimester       insulin lispro 100 UNIT/ML injection    humaLOG    5 vial    As directed via insulin pump - up to 160 units daily, adjusting pump settings due to increased insulin requirements and pregnancy    Uncontrolled type 1 diabetes mellitus without complication (H), Encounter for long-term (current) use of insulin (H)       insulin pump infusion      Date last updated:  12/23/16 Medtronic Minimed: Model 630G BASAL RATES and times: 12   AM (midnight): 0.85 units/hour   8     AM: 0.8 units/hour  6pm: 0.85 Basal Pattern A:  Fransisca Cabello will use when NA. CARB RATIO and times: 12   AM  "(midnight): 12 Corection Factor (Sensitivity) and times: 12   AM (midnight): 47 mg/dL BLOOD GLUCOSE TARGET and times: 12   AM (midnight): 100 - 120 5     AM:  90 - 110 9:30    PM:  100 - 120 Active Insulin Time:  3 hours Sensor:  No Carelink / Diasend username: [email or web address] Carelink / Diasend Password:  [email or web address]        insulin syringe-needle U-100 31G X 5/16\" 0.3 ML    BD insulin syringe ultrafine    100 each    Use one syringe 4 daily or as directed in the event of insulin pump failure.  DO NOT DISPENSE until requested by patient    Encounter for long-term (current) use of insulin (H)       MICROLET LANCETS Misc     600 each    Use one lancet 6 times daily for blood sugar testing    Type 1 diabetes mellitus without complication (H), Insulin pump in place       ondansetron 4 MG ODT tab    ZOFRAN ODT    30 tablet    Take 1-2 tablets (4-8 mg) by mouth every 8 hours as needed for nausea    Nausea/vomiting in pregnancy       PRENATAL VITAMINS PO             "

## 2017-12-22 ENCOUNTER — OFFICE VISIT (OUTPATIENT)
Dept: MATERNAL FETAL MEDICINE | Facility: CLINIC | Age: 22
End: 2017-12-22
Attending: OBSTETRICS & GYNECOLOGY
Payer: COMMERCIAL

## 2017-12-22 ENCOUNTER — HOSPITAL ENCOUNTER (OUTPATIENT)
Dept: ULTRASOUND IMAGING | Facility: CLINIC | Age: 22
Discharge: HOME | End: 2017-12-22
Attending: OBSTETRICS & GYNECOLOGY | Admitting: OBSTETRICS & GYNECOLOGY
Payer: COMMERCIAL

## 2017-12-22 ENCOUNTER — PRENATAL OFFICE VISIT (OUTPATIENT)
Dept: OBGYN | Facility: CLINIC | Age: 22
End: 2017-12-22
Payer: COMMERCIAL

## 2017-12-22 VITALS
SYSTOLIC BLOOD PRESSURE: 136 MMHG | DIASTOLIC BLOOD PRESSURE: 88 MMHG | BODY MASS INDEX: 30.61 KG/M2 | HEART RATE: 84 BPM | WEIGHT: 195 LBS | HEIGHT: 67 IN

## 2017-12-22 DIAGNOSIS — Z96.41 INSULIN PUMP IN PLACE: ICD-10-CM

## 2017-12-22 DIAGNOSIS — O24.013 TYPE 1 DIABETES MELLITUS IN PREGNANCY, THIRD TRIMESTER: Primary | ICD-10-CM

## 2017-12-22 DIAGNOSIS — O09.899 SUPERVISION OF OTHER HIGH RISK PREGNANCY, ANTEPARTUM: ICD-10-CM

## 2017-12-22 DIAGNOSIS — O13.9 GESTATIONAL HYPERTENSION AFFECTING SECOND PREGNANCY: ICD-10-CM

## 2017-12-22 DIAGNOSIS — O13.3 PREGNANCY-INDUCED HYPERTENSION IN THIRD TRIMESTER: Primary | ICD-10-CM

## 2017-12-22 DIAGNOSIS — E10.9 TYPE 1 DIABETES MELLITUS WITHOUT COMPLICATION (H): ICD-10-CM

## 2017-12-22 DIAGNOSIS — M54.31 SCIATICA OF RIGHT SIDE: ICD-10-CM

## 2017-12-22 DIAGNOSIS — O24.013 TYPE 1 DIABETES MELLITUS IN PREGNANCY, THIRD TRIMESTER: ICD-10-CM

## 2017-12-22 LAB
ERYTHROCYTE [DISTWIDTH] IN BLOOD BY AUTOMATED COUNT: 14 % (ref 10–15)
HCT VFR BLD AUTO: 34.3 % (ref 35–47)
HGB BLD-MCNC: 11.2 G/DL (ref 11.7–15.7)
MCH RBC QN AUTO: 30.7 PG (ref 26.5–33)
MCHC RBC AUTO-ENTMCNC: 32.7 G/DL (ref 31.5–36.5)
MCV RBC AUTO: 94 FL (ref 78–100)
PLATELET # BLD AUTO: 261 10E9/L (ref 150–450)
RBC # BLD AUTO: 3.65 10E12/L (ref 3.8–5.2)
WBC # BLD AUTO: 8.8 10E9/L (ref 4–11)

## 2017-12-22 PROCEDURE — 99207 ZZC PRENATAL VISIT: CPT | Performed by: OBSTETRICS & GYNECOLOGY

## 2017-12-22 PROCEDURE — 85027 COMPLETE CBC AUTOMATED: CPT | Performed by: OBSTETRICS & GYNECOLOGY

## 2017-12-22 PROCEDURE — 84450 TRANSFERASE (AST) (SGOT): CPT | Performed by: OBSTETRICS & GYNECOLOGY

## 2017-12-22 PROCEDURE — 76819 FETAL BIOPHYS PROFIL W/O NST: CPT

## 2017-12-22 PROCEDURE — 84460 ALANINE AMINO (ALT) (SGPT): CPT | Performed by: OBSTETRICS & GYNECOLOGY

## 2017-12-22 PROCEDURE — 82565 ASSAY OF CREATININE: CPT | Performed by: OBSTETRICS & GYNECOLOGY

## 2017-12-22 PROCEDURE — 36415 COLL VENOUS BLD VENIPUNCTURE: CPT | Performed by: OBSTETRICS & GYNECOLOGY

## 2017-12-22 NOTE — PROGRESS NOTES
"Please see \"Imaging\" tab under \"Chart Review\" for details of today's US at the [facility].    Suleiman Scott MD  Maternal-Fetal Medicine    "

## 2017-12-22 NOTE — PROGRESS NOTES
PROBLEM LIST  LABS: Opos/RI/  BOY    1.  Type I DM on pump: followed by endocrine. Plan: eye exam, TSH qTM, level II US (normal), fetal echo (normal), growth scans q3-4w. Plan delivery at 37 weeks for gHTN and DM unless earlier delivery is indicated.    2. Gestational hypertension w/o significant proteinuria in 3rd trimester (sp BMZ 11/17-18)  - H/O severe pre-eclampsia at 34 weeks: baseline labs normal (p/c ratio 0.14).  - On baby ASA since 12 weeks.  - Twice weekly BPPs through M (Tues/Fri). Weekly labs with urine pr/cr ratio.  - Signs and symptoms reviewed -- she will call if symptoms increase or change  - Discussed activity. Strict bedrest not recommended. Discussed okay to perform activities of daily living if not strenuous. No heavy lifting/pulling.     No change in headache or vision, no RUQ or epigastric pain. She has developed sciatica on the right side, making it hard to walk at times. Refer to physical therapy.    Check preeclampsia labs today. Will see back in 1 week, check labs, set up IOL for 37 weeks.   Continue 2x/week BPPs.    Cheyanne Silva MD

## 2017-12-22 NOTE — MR AVS SNAPSHOT
After Visit Summary   12/22/2017    Fransisca Cabello    MRN: [id number]           Patient Information     Date Of Birth          1995        Visit Information        Provider Department      12/22/2017 10:00 AM Suleiman Sctot MD [facility] Maternal Fetal Medicine [facility]        Today's Diagnoses     Type 1 diabetes mellitus in pregnancy, third trimester    -  1    Gestational hypertension affecting second pregnancy           Follow-ups after your visit        Your next 10 appointments already scheduled     Dec 22, 2017 10:00 AM CST   Radiology MD with Suleiman Scott MD   [facility] Maternal Fetal Medicine [facility])    [address]  [facility] [id number] [phone]           Please arrive at the time given for your first appointment. This visit is used internally to schedule the physician's time during your ultrasound.            Dec 22, 2017 11:15 AM CST   ESTABLISHED PRENATAL with Cheyanne Silva MD   [facility] ([address] [id number] [phone]            Dec 26, 2017 11:45 AM CST   (Arrive by 11:30 AM)   MFM US COMPRE SINGLE F/U with [id number]   [facility] Maternal Fetal Medicine Ultrasound - [facility])    [address]  [facility] [address] [phone]           Wear comfortable clothes and leave your valuables at home.            Dec 26, 2017 12:15 PM CST   Radiology MD with  LAWRENCE BETTENCOURT   [facility] Maternal Fetal Medicine [facility])    [address]  [facility] [address] [phone]           Please arrive at the time given for your first appointment. This visit is used internally to schedule the physician's time during your ultrasound.            Dec 27, 2017  1:00 PM CST   ESTABLISHED PRENATAL with Cheyanne Silva MD   [facility])    [address]  [facility] [address]   [phone]            Dec 29, 2017 11:00 AM CST   (Arrive by 10:45 AM)   LAWRENCE WHELAN SINGLE with [id number]   [facility] Maternal Fetal Medicine Ultrasound - [facility] ([facility])    [address]  [facility] [address]   [phone]            Dec 29, 2017 11:30 AM CST   Radiology MD with RH LAWRENCE BETTENCOURT   [facility] Maternal Fetal Medicine [facility] ([facility])    [address]  [facility] [address]   [phone]           Please arrive at the time given for your first appointment. This visit is used internally to schedule the physician's time during your ultrasound.            Jan 05, 2018 11:15 AM CST   ESTABLISHED PRENATAL with Cheyanne Silva MD   [facility] ([facility])    [address] [place] [address] [phone]              Who to contact     If you have questions or need follow up information about today's clinic visit or your schedule please contact [facility] FETAL [facility] directly at [phone].  Normal or non-critical lab and imaging results will be communicated to you by [employer], letter or phone within 4 business days after the clinic has received the results. If you do not hear from us within 7 days, please contact the clinic through [employer] or phone. If you have a critical or abnormal lab result, we will notify you by phone as soon as possible.  Submit refill requests through [employer] or call your pharmacy and they will forward the refill request to us. Please allow 3 business days for your refill to be completed.          Additional Information About Your Visit        [employer] Information     [employer] gives you secure access to your electronic health record. If you see a primary care provider, you can also send messages to your care team and make appointments. If you have questions, please  call your primary care clinic.  If you do not have a primary care provider, please call [phone] and they will assist you.        Care EveryWhere ID     This is your Care EveryWhere ID. This could be used by other organizations to access your [place] medical records  [id number]        Your Vitals Were     Last Period                   04/21/2017 (Exact Date)            Blood Pressure from Last 3 Encounters:   12/15/17 134/76   12/08/17 124/72   12/05/17 114/68    Weight from Last 3 Encounters:   12/15/17 88 kg (194 lb)   12/08/17 85.7 kg (189 lb)   12/05/17 84.4 kg (186 lb)              Today, you had the following     No orders found for display       Primary Care Provider Fax #    Physician No Ref-Primary [phone]       No address on file        Equal Access to Services     EDWIGE VALLADARES : Floridalma Quintana, waaxyadi luqadaha, qaybkylee kaalmayadi dinero, hernan spicer . So [facility] [phone].    ATENCIÓN: Si habla español, tiene a boateng disposición servicios gratuitos de asistencia lingüística. Titus al [phone].    We comply with applicable federal civil rights laws and [place] laws. We do not discriminate on the basis of race, color, national origin, age, disability, sex, sexual orientation, or gender identity.            Thank you!     Thank you for choosing [facility]  for your care. Our goal is always to provide you with excellent care. Hearing back from our patients is one way we can continue to improve our services. Please take a few minutes to complete the written survey that you may receive in the mail after your visit with us. Thank you!             Your Updated Medication List - Protect others around you: Learn how to safely use, store and throw away your medicines at www.disposemymeds.org.          This list is accurate as of: 12/22/17  9:38 AM.  Always use your most recent med list.                   Brand Name Dispense  Instructions for use Diagnosis    acetone (Urine) test Strp     100 each    Use as directed to test urine ketone if blood sugars are >250 or during illness with fever, nausea, vomiting, or abdominal pain.    High-risk pregnancy, young primigravida, third trimester       ASPIRIN PO      Take 81 mg by mouth daily        blood glucose monitoring test strip    RAYMUNDO CONTOUR NEXT    1000 each    Use to test blood sugar 10-12 times daily or as directed.    Uncontrolled type 1 diabetes mellitus without complication (H), Supervision of other high risk pregnancy, antepartum       glucagon 1 MG kit    GLUCAGON EMERGENCY    1 each    Inject 1 mg into the muscle once for 1 dose    Type 1 diabetes mellitus without complication (H), Insulin pump in place       insulin glargine 100 UNIT/ML injection    LANTUS VIAL    10 mL    Inject 19 u sq every 24 hours in the event of insulin pump therapy.  DO NOT FILL RX until requested by patient.    Type 1 diabetes mellitus without complication (H), Insulin pump in place, Encounter for long-term (current) use of insulin (H), High-risk pregnancy, young primigravida, third trimester       insulin lispro 100 UNIT/ML injection    humaLOG    5 vial    As directed via insulin pump - up to 160 units daily, adjusting pump settings due to increased insulin requirements and pregnancy    Uncontrolled type 1 diabetes mellitus without complication (H), Encounter for long-term (current) use of insulin (H)       insulin pump infusion      Date last updated:  12/23/16 Medtronic Minimed: Model 630G BASAL RATES and times: 12   AM (midnight): 0.85 units/hour   8     AM: 0.8 units/hour  6pm: 0.85 Basal Pattern A:  Fransisca Cabello will use when NA. CARB RATIO and times: 12   AM (midnight): 12 Corection Factor (Sensitivity) and times: 12   AM (midnight): 47 mg/dL BLOOD GLUCOSE TARGET and times: 12   AM (midnight): 100 - 120 5     AM:  90 - 110 9:30    PM:  100 - 120 Active Insulin Time:  3 hours Sensor:  No  "Carelink / Diasend username: [email or web address] Carelink / Diasend Password:  [email or web address]        insulin syringe-needle U-100 31G X 5/16\" 0.3 ML    BD insulin syringe ultrafine    100 each    Use one syringe 4 daily or as directed in the event of insulin pump failure.  DO NOT DISPENSE until requested by patient    Encounter for long-term (current) use of insulin (H)       MICROLET LANCETS Misc     600 each    Use one lancet 6 times daily for blood sugar testing    Type 1 diabetes mellitus without complication (H), Insulin pump in place       ondansetron 4 MG ODT tab    ZOFRAN ODT    30 tablet    Take 1-2 tablets (4-8 mg) by mouth every 8 hours as needed for nausea    Nausea/vomiting in pregnancy       PRENATAL VITAMINS PO             "

## 2017-12-22 NOTE — MR AVS SNAPSHOT
After Visit Summary   12/22/2017    Fransisca Cabello    MRN: [id number]           Patient Information     Date Of Birth          1995        Visit Information        Provider Department      12/22/2017 11:15 AM Cheyanne Silva MD [facility]        Today's Diagnoses     Pregnancy-induced hypertension in third trimester    -  1    Supervision of other high risk pregnancy, antepartum        Type 1 diabetes mellitus without complication (H)        Insulin pump in place        Sciatica of right side           Follow-ups after your visit        Additional Services     [facility] PT, HAND, AND CHIROPRACTIC REFERRAL       === This order will print in the [facility] Scheduling  Office ===    Physical therapy, hand therapy and chiropractic care are available through:    [facility]  [place] [facility]    Call one easy number to schedule at any of the above locations:  [phone].    Your provider has referred you to Physical Therapy at [facility] or [facility]    Indication/Reason for Referral: Women's Health (Please Complete Special Programs SmartList)  Onset of Illness:  1 week  Therapy Orders:  Evaluate and Treat  Special Programs:  None and Women's Health: OB/GYN Musculoskeletal Dysfunction: LBP/Sacral Pain   Special Request:  None    Additional Comments for the therapist or chiropractor:      Please be aware that coverage of these services is subject to the terms and limitations of your health insurance plan.  Call member services at your health plan with any benefit or coverage questions.      Please bring the following to your appointment:    >>   Your personal calendar for scheduling future appointments  >>   Comfortable clothing                  Your next 10 appointments already scheduled     Dec 26, 2017 11:45 AM CST   (Arrive by 11:30 AM)   LAWRENCE US COMPRE SINGLE F/U with [email or web address]   [facility] Maternal [facility]etal [facility] Ultrasound [facility]  ([facility])    [address]  [facility] [address]   [phone]           Wear comfortable clothes and leave your valuables at home.            Dec 26, 2017 12:15 PM CST   Radiology MD with  LAWRENCE BETTENCOURT   [facility] Maternal Fetal Medicine [facility] ([facility])    [address]  [facility] [address]   [phone]           Please arrive at the time given for your first appointment. This visit is used internally to schedule the physician's time during your ultrasound.            Dec 27, 2017  1:00 PM CST   ESTABLISHED PRENATAL with Cheyanne Silva MD   [facility] ([facility])    [address] [place]  [facility] [address]   [phone]            Dec 29, 2017 11:00 AM CST   (Arrive by 10:45 AM)   LAWRENCE BPP SINGLE with [id number]   [facility] Maternal Fetal Medicine Ultrasound - [facility])    [address]  [facility] [address]   [phone]            Dec 29, 2017 11:30 AM CST   Radiology MD with  LAWRENCE BETTENCOURT   [facility] Maternal Fetal Medicine [facility] ([facility])    [address]  [facility] [address]   [phone]           Please arrive at the time given for your first appointment. This visit is used internally to schedule the physician's time during your ultrasound.            Jan 05, 2018 11:15 AM CST   ESTABLISHED PRENATAL with Cheyanne Silva MD   [facility] ([facility])    [address] [place] [id number] [phone]              Who to contact     If you have questions or need follow up information about today's clinic visit or your schedule please contact [facility] directly at [phone].  Normal or non-critical lab and imaging results will be communicated to you by MyChart, letter or phone within 4 business days after the clinic has received the  "results. If you do not hear from us within 7 days, please contact the clinic through [employer] or phone. If you have a critical or abnormal lab result, we will notify you by phone as soon as possible.  Submit refill requests through [employer] or call your pharmacy and they will forward the refill request to us. Please allow 3 business days for your refill to be completed.          Additional Information About Your Visit        [employer] Information     [employer] gives you secure access to your electronic health record. If you see a primary care provider, you can also send messages to your care team and make appointments. If you have questions, please call your primary care clinic.  If you do not have a primary care provider, please call [phone] and they will assist you.        Care EveryWhere ID     This is your Care EveryWhere ID. This could be used by other organizations to access your [place] medical records  [id number]        Your Vitals Were     Pulse Height Last Period Breastfeeding? BMI (Body Mass Index)       84 5' 7\" (1.702 m) 04/21/2017 (Exact Date) No 30.54 kg/m2        Blood Pressure from Last 3 Encounters:   12/22/17 136/88   12/15/17 134/76   12/08/17 124/72    Weight from Last 3 Encounters:   12/22/17 195 lb (88.5 kg)   12/15/17 194 lb (88 kg)   12/08/17 189 lb (85.7 kg)              We Performed the Following     ALT     AST     CBC with platelets     Creatinine     [facility] PT, HAND, AND CHIROPRACTIC REFERRAL        Primary Care Provider Fax #    Physician No Ref-Primary [phone]       No address on file        Equal Access to Services     EDWIGE VALLADARES : Hadii isidoro plummer hadasho Soomaali, waaxda luqadaha, qaybta kaalmada adeegyada, hernan spicer . So [facility] [phone].    ATENCIÓN: Si habla español, tiene a boateng disposición servicios gratuitos de asistencia lingüística. Llame al [phone].    We comply with applicable federal civil rights laws and [place] laws. We do not " discriminate on the basis of race, color, national origin, age, disability, sex, sexual orientation, or gender identity.            Thank you!     Thank you for choosing [facility]  for your care. Our goal is always to provide you with excellent care. Hearing back from our patients is one way we can continue to improve our services. Please take a few minutes to complete the written survey that you may receive in the mail after your visit with us. Thank you!             Your Updated Medication List - Protect others around you: Learn how to safely use, store and throw away your medicines at www.disposemymeds.org.          This list is accurate as of: 12/22/17 11:57 AM.  Always use your most recent med list.                   Brand Name Dispense Instructions for use Diagnosis    acetone (Urine) test Strp     100 each    Use as directed to test urine ketone if blood sugars are >250 or during illness with fever, nausea, vomiting, or abdominal pain.    High-risk pregnancy, young primigravida, third trimester       ASPIRIN PO      Take 81 mg by mouth daily        blood glucose monitoring test strip    RAYMUNDO CONTOUR NEXT    1000 each    Use to test blood sugar 10-12 times daily or as directed.    Uncontrolled type 1 diabetes mellitus without complication (H), Supervision of other high risk pregnancy, antepartum       glucagon 1 MG kit    GLUCAGON EMERGENCY    1 each    Inject 1 mg into the muscle once for 1 dose    Type 1 diabetes mellitus without complication (H), Insulin pump in place       insulin glargine 100 UNIT/ML injection    LANTUS VIAL    10 mL    Inject 19 u sq every 24 hours in the event of insulin pump therapy.  DO NOT FILL RX until requested by patient.    Type 1 diabetes mellitus without complication (H), Insulin pump in place, Encounter for long-term (current) use of insulin (H), High-risk pregnancy, young primigravida, third trimester       insulin lispro 100 UNIT/ML injection    humaLOG  "   5 vial    As directed via insulin pump - up to 160 units daily, adjusting pump settings due to increased insulin requirements and pregnancy    Uncontrolled type 1 diabetes mellitus without complication (H), Encounter for long-term (current) use of insulin (H)       insulin pump infusion      Date last updated:  12/23/16 Medtronic Minimed: Model 630G BASAL RATES and times: 12   AM (midnight): 0.85 units/hour   8     AM: 0.8 units/hour  6pm: 0.85 Basal Pattern A:  Fransisca Irineo will use when NA. CARB RATIO and times: 12   AM (midnight): 12 Corection Factor (Sensitivity) and times: 12   AM (midnight): 47 mg/dL BLOOD GLUCOSE TARGET and times: 12   AM (midnight): 100 - 120 5     AM:  90 - 110 9:30    PM:  100 - 120 Active Insulin Time:  3 hours Sensor:  No Carelink / Diasend username: [email or web address] Carelink / Diasend Password:  [email or web address]        insulin syringe-needle U-100 31G X 5/16\" 0.3 ML    BD insulin syringe ultrafine    100 each    Use one syringe 4 daily or as directed in the event of insulin pump failure.  DO NOT DISPENSE until requested by patient    Encounter for long-term (current) use of insulin (H)       MICROLET LANCETS Misc     600 each    Use one lancet 6 times daily for blood sugar testing    Type 1 diabetes mellitus without complication (H), Insulin pump in place       ondansetron 4 MG ODT tab    ZOFRAN ODT    30 tablet    Take 1-2 tablets (4-8 mg) by mouth every 8 hours as needed for nausea    Nausea/vomiting in pregnancy       PRENATAL VITAMINS PO             "

## 2017-12-22 NOTE — NURSING NOTE
"Chief Complaint   Patient presents with     Prenatal Care       Initial /88 (BP Location: Right arm, Patient Position: Sitting, Cuff Size: Adult Regular)  Pulse 84  Ht 5' 7\" (1.702 m)  Wt 195 lb (88.5 kg)  LMP 04/21/2017 (Exact Date)  Breastfeeding? No  BMI 30.54 kg/m2 Estimated body mass index is 30.54 kg/(m^2) as calculated from the following:    Height as of this encounter: 5' 7\" (1.702 m).    Weight as of this encounter: 195 lb (88.5 kg).  Medication Reconciliation: complete     35w0d  + FM daily  - contractions  + headache  + pelvic pressure  + low back pain  María Elena Arce LPN      "

## 2017-12-23 LAB
ALT SERPL W P-5'-P-CCNC: 10 U/L (ref 0–50)
AST SERPL W P-5'-P-CCNC: 11 U/L (ref 0–45)
CREAT SERPL-MCNC: 0.58 MG/DL (ref 0.52–1.04)
GFR SERPL CREATININE-BSD FRML MDRD: >90 ML/MIN/1.7M2

## 2017-12-26 ENCOUNTER — OFFICE VISIT (OUTPATIENT)
Dept: MATERNAL FETAL MEDICINE | Facility: CLINIC | Age: 22
End: 2017-12-26
Attending: OBSTETRICS & GYNECOLOGY
Payer: COMMERCIAL

## 2017-12-26 ENCOUNTER — HOSPITAL ENCOUNTER (OUTPATIENT)
Dept: ULTRASOUND IMAGING | Facility: CLINIC | Age: 22
Discharge: HOME | End: 2017-12-26
Attending: OBSTETRICS & GYNECOLOGY | Admitting: OBSTETRICS & GYNECOLOGY
Payer: COMMERCIAL

## 2017-12-26 DIAGNOSIS — O24.013 TYPE 1 DIABETES MELLITUS IN PREGNANCY, THIRD TRIMESTER: ICD-10-CM

## 2017-12-26 DIAGNOSIS — O13.9 GESTATIONAL HYPERTENSION AFFECTING SECOND PREGNANCY: ICD-10-CM

## 2017-12-26 DIAGNOSIS — O24.013 TYPE 1 DIABETES MELLITUS IN PREGNANCY, THIRD TRIMESTER: Primary | ICD-10-CM

## 2017-12-26 PROCEDURE — 76816 OB US FOLLOW-UP PER FETUS: CPT

## 2017-12-26 PROCEDURE — 76819 FETAL BIOPHYS PROFIL W/O NST: CPT | Performed by: OBSTETRICS & GYNECOLOGY

## 2017-12-26 NOTE — MR AVS SNAPSHOT
After Visit Summary   12/26/2017    Fransisca Cabello    MRN: [id number]           Patient Information     Date Of Birth          1995        Visit Information        Provider Department      12/26/2017 12:15 PM Mani Vazquez MD [facility]        Today's Diagnoses     Type 1 diabetes mellitus in pregnancy, third trimester    -  1    Gestational hypertension affecting second pregnancy           Follow-ups after your visit        Your next 10 appointments already scheduled     Dec 27, 2017  1:00 PM CST   ESTABLISHED PRENATAL with Cheyanne Silva MD   [facility] ([facility])    [address] [place]  [facility] [address]   [phone]            Dec 29, 2017 11:00 AM CST   (Arrive by 10:45 AM)   MFM BPP SINGLE with [id number]   [facility] Maternal Fetal Medicine Ultrasound [facility] ([facility])    [address]  [facility] [address]   [phone]            Dec 29, 2017 11:30 AM CST   Radiology MD with RH LAWRENCE BETTENCOURT   [facility] Maternal Fetal Medicine [facility] ([facility])    [address]  [facility] [address]   [phone]           Please arrive at the time given for your first appointment. This visit is used internally to schedule the physician's time during your ultrasound.            Jan 02, 2018  3:00 PM CST   (Arrive by 2:45 PM)   MFM BPP SINGLE with [id number]   [facility] Maternal Fetal Medicine Ultrasound - [facility] ([facility])    [address]  [facility] [address]   [phone]            Jan 02, 2018  3:30 PM CST   Radiology MD with RH LAWRENCE BETTENCOURT   [facility] Maternal Fetal Medicine [facility] ([facility])    [address]  [facility] [address]   [phone]           Please arrive at the time given for your first appointment. This visit is used internally to schedule the physician's time  during your ultrasound.            Jan 05, 2018 11:15 AM CST   ESTABLISHED PRENATAL with Cheyanne Silva MD   [facility] ([facility])    [address] [place] [id number] [phone]            Jan 05, 2018  3:00 PM CST   (Arrive by 2:45 PM)   MFM BPP SINGLE with [id number]   [facility] Maternal Fetal Medicine Ultrasound - [facility] ([facility])    [address]  [facility] [address] [phone]            Jan 05, 2018  3:30 PM CST   Radiology MD with RH LAWRENCE BETTENCOURT   [facility] Maternal Fetal Medicine - [facility] ([facility])    [address]  [facility] [address] [phone]           Please arrive at the time given for your first appointment. This visit is used internally to schedule the physician's time during your ultrasound.              Future tests that were ordered for you today     Open Future Orders        Priority Expected Expires Ordered    MFM BPP Single Routine 1/2/2018 10/26/2018 12/26/2017    MFM BPP Single Routine 1/5/2018 10/26/2018 12/26/2017    MFM BPP Single Routine 1/12/2018 10/26/2018 12/26/2017    MFM BPP Single Routine 1/9/2018 10/26/2018 12/26/2017            Who to contact     If you have questions or need follow up information about today's clinic visit or your schedule please contact [facility] MATERNAL FETAL MEDIC[facility] directly at [phone].  Normal or non-critical lab and imaging results will be communicated to you by MyChart, letter or phone within 4 business days after the clinic has received the results. If you do not hear from us within 7 days, please contact the clinic through MyChart or phone. If you have a critical or abnormal lab result, we will notify you by phone as soon as possible.  Submit refill requests through [employer] or call your pharmacy and they will forward the refill request to us. Please allow 3 business days for your refill to be  completed.          Additional Information About Your Visit        [employer] Information     [facility] [employer] gives you secure access to your electronic health record. If you see a primary care provider, you can also send messages to your care team and make appointments. If you have questions, please call your primary care clinic.  If you do not have a primary care provider, please call [phone] and they will assist you.        Care EveryWhere ID     This is your Care EveryWhere ID. This could be used by other organizations to access your [place] medical records  [id number]        Your Vitals Were     Last Period                   04/21/2017 (Exact Date)            Blood Pressure from Last 3 Encounters:   12/22/17 136/88   12/15/17 134/76   12/08/17 124/72    Weight from Last 3 Encounters:   12/22/17 88.5 kg (195 lb)   12/15/17 88 kg (194 lb)   12/08/17 85.7 kg (189 lb)               Primary Care Provider Fax #    Physician No Ref-Primary [phone]       No address on file        Equal Access to Services     EDWIGE VALLADARES : Hadii aad ku hadasho Soomaali, waaxda luqadaha, qaybta kaalmada adeegyada, waxay vegain shashi spicer . So [facility] [phone].    ATENCIÓN: Si habla español, tiene a boateng disposición servicios gratuitos de asistencia lingüística. Llame al [phone].    We comply with applicable federal civil rights laws and [place] laws. We do not discriminate on the basis of race, color, national origin, age, disability, sex, sexual orientation, or gender identity.            Thank you!     Thank you for choosing [facility]  for your care. Our goal is always to provide you with excellent care. Hearing back from our patients is one way we can continue to improve our services. Please take a few minutes to complete the written survey that you may receive in the mail after your visit with us. Thank you!             Your Updated Medication List - Protect others around you:  Learn how to safely use, store and throw away your medicines at www.disposemymeds.org.          This list is accurate as of: 12/26/17 12:18 PM.  Always use your most recent med list.                   Brand Name Dispense Instructions for use Diagnosis    acetone (Urine) test Strp     100 each    Use as directed to test urine ketone if blood sugars are >250 or during illness with fever, nausea, vomiting, or abdominal pain.    High-risk pregnancy, young primigravida, third trimester       ASPIRIN PO      Take 81 mg by mouth daily        blood glucose monitoring test strip    RAYMUNDO CONTOUR NEXT    1000 each    Use to test blood sugar 10-12 times daily or as directed.    Uncontrolled type 1 diabetes mellitus without complication (H), Supervision of other high risk pregnancy, antepartum       glucagon 1 MG kit    GLUCAGON EMERGENCY    1 each    Inject 1 mg into the muscle once for 1 dose    Type 1 diabetes mellitus without complication (H), Insulin pump in place       insulin glargine 100 UNIT/ML injection    LANTUS VIAL    10 mL    Inject 19 u sq every 24 hours in the event of insulin pump therapy.  DO NOT FILL RX until requested by patient.    Type 1 diabetes mellitus without complication (H), Insulin pump in place, Encounter for long-term (current) use of insulin (H), High-risk pregnancy, young primigravida, third trimester       insulin lispro 100 UNIT/ML injection    humaLOG    5 vial    As directed via insulin pump - up to 160 units daily, adjusting pump settings due to increased insulin requirements and pregnancy    Uncontrolled type 1 diabetes mellitus without complication (H), Encounter for long-term (current) use of insulin (H)       insulin pump infusion      Date last updated:  12/23/16 Medtronic Minimed: Model 630G BASAL RATES and times: 12   AM (midnight): 0.85 units/hour   8     AM: 0.8 units/hour  6pm: 0.85 Basal Pattern A:  Fransisca Cabello will use when NA. CARB RATIO and times: 12   AM (midnight):  "12 Corection Factor (Sensitivity) and times: 12   AM (midnight): 47 mg/dL BLOOD GLUCOSE TARGET and times: 12   AM (midnight): 100 - 120 5     AM:  90 - 110 9:30    PM:  100 - 120 Active Insulin Time:  3 hours Sensor:  No Carelink / Diasend username: [email or web address] Carelink / Diasend Password:  [email or web address]        insulin syringe-needle U-100 31G X 5/16\" 0.3 ML    BD insulin syringe ultrafine    100 each    Use one syringe 4 daily or as directed in the event of insulin pump failure.  DO NOT DISPENSE until requested by patient    Encounter for long-term (current) use of insulin (H)       MICROLET LANCETS Misc     600 each    Use one lancet 6 times daily for blood sugar testing    Type 1 diabetes mellitus without complication (H), Insulin pump in place       ondansetron 4 MG ODT tab    ZOFRAN ODT    30 tablet    Take 1-2 tablets (4-8 mg) by mouth every 8 hours as needed for nausea    Nausea/vomiting in pregnancy       PRENATAL VITAMINS PO             "

## 2017-12-27 ENCOUNTER — PRENATAL OFFICE VISIT (OUTPATIENT)
Dept: OBGYN | Facility: CLINIC | Age: 22
End: 2017-12-27
Payer: COMMERCIAL

## 2017-12-27 VITALS
HEART RATE: 104 BPM | DIASTOLIC BLOOD PRESSURE: 76 MMHG | WEIGHT: 195 LBS | SYSTOLIC BLOOD PRESSURE: 122 MMHG | BODY MASS INDEX: 30.54 KG/M2

## 2017-12-27 DIAGNOSIS — O09.899 SUPERVISION OF OTHER HIGH RISK PREGNANCY, ANTEPARTUM: ICD-10-CM

## 2017-12-27 DIAGNOSIS — O13.3 GESTATIONAL HYPERTENSION WITHOUT SIGNIFICANT PROTEINURIA IN THIRD TRIMESTER: Primary | ICD-10-CM

## 2017-12-27 DIAGNOSIS — Z96.41 INSULIN PUMP IN PLACE: ICD-10-CM

## 2017-12-27 DIAGNOSIS — E10.9 TYPE 1 DIABETES MELLITUS WITHOUT COMPLICATION (H): ICD-10-CM

## 2017-12-27 PROCEDURE — 82565 ASSAY OF CREATININE: CPT | Performed by: OBSTETRICS & GYNECOLOGY

## 2017-12-27 PROCEDURE — 99207 ZZC PRENATAL VISIT: CPT | Performed by: OBSTETRICS & GYNECOLOGY

## 2017-12-27 PROCEDURE — 87653 STREP B DNA AMP PROBE: CPT | Performed by: OBSTETRICS & GYNECOLOGY

## 2017-12-27 PROCEDURE — 84460 ALANINE AMINO (ALT) (SGPT): CPT | Performed by: OBSTETRICS & GYNECOLOGY

## 2017-12-27 PROCEDURE — 84450 TRANSFERASE (AST) (SGOT): CPT | Performed by: OBSTETRICS & GYNECOLOGY

## 2017-12-27 PROCEDURE — 36415 COLL VENOUS BLD VENIPUNCTURE: CPT | Performed by: OBSTETRICS & GYNECOLOGY

## 2017-12-27 NOTE — NURSING NOTE
"Chief Complaint   Patient presents with     Prenatal Care     baby active and moving - denies contractions - GBS     35w5d    Initial /76 (BP Location: Right arm, Patient Position: Chair, Cuff Size: Adult Regular)  Pulse 104  Wt 195 lb (88.5 kg)  LMP 04/21/2017 (Exact Date)  BMI 30.54 kg/m2 Estimated body mass index is 30.54 kg/(m^2) as calculated from the following:    Height as of 12/22/17: 5' 7\" (1.702 m).    Weight as of this encounter: 195 lb (88.5 kg).  Medication Reconciliation: complete     Nurse assisted visit.  Karma Morris MA.      "

## 2017-12-27 NOTE — MR AVS SNAPSHOT
After Visit Summary   12/27/2017    Fransisca Cabello    MRN: [id number]           Patient Information     Date Of Birth          1995        Visit Information        Provider Department      12/27/2017 1:00 PM Cheyanne Silva MD [facility]        Today's Diagnoses     Gestational hypertension without significant proteinuria in third trimester    -  1    Supervision of other high risk pregnancy, antepartum        Type 1 diabetes mellitus without complication (H)        Insulin pump in place           Follow-ups after your visit        Your next 10 appointments already scheduled     Dec 29, 2017 11:00 AM CST   (Arrive by 10:45 AM)   MFM BPP SINGLE with [id number]   [facility] - [facility])    [address]  [facility] [address]   [phone]            Dec 29, 2017 11:30 AM CST   Radiology MD with  LAWRENCE BETTENCOURT   [facility] Maternal Fetal Medicine [facility])    [address]  [facility] [address]   [phone]           Please arrive at the time given for your first appointment. This visit is used internally to schedule the physician's time during your ultrasound.            Jan 02, 2018  3:00 PM CST   (Arrive by 2:45 PM)   MFM BPP SINGLE with [email or web address]   [facility] - [facility] ([facility])    [address]  [facility] [address]   [phone]            Jan 02, 2018  3:30 PM CST   Radiology MD with [facility]   [facility] Maternal Fetal Medicine [facility] ([facility])    [address]  [facility] [address]   [phone]           Please arrive at the time given for your first appointment. This visit is used internally to schedule the physician's time during your ultrasound.            Jan 05, 2018 11:15 AM CST   ESTABLISHED PRENATAL with Cheyanne Silva MD   [facility])    [address] [place] [address]   [phone]            Jan 05, 2018  3:00 PM CST   (Arrive by 2:45 PM)   MFM BPP SINGLE with [id number]   [facility] - [facility] ([facility])    [address]  [facility] [address]   [phone]            Jan 05, 2018  3:30 PM CST   Radiology MD with RH LAWRENCE BETTENCOURT   [facility] - [facility] ([facility])    [address]  [facility] [address]   [phone]           Please arrive at the time given for your first appointment. This visit is used internally to schedule the physician's time during your ultrasound.            Jan 09, 2018 11:45 AM CST   (Arrive by 11:30 AM)   MFM BPP SINGLE with [email or web address]   [facility] - [facility] ([facility])    [address]  [facility] [address]   [phone]            Jan 09, 2018 12:15 PM CST   Radiology MD with RH LAWRENCE BETTENCOURT   [facility] Maternal Fetal Medicine - [facility] ([facility])    [address]  [facility] [address]   [phone]           Please arrive at the time given for your first appointment. This visit is used internally to schedule the physician's time during your ultrasound.            Jan 12, 2018  3:00 PM CST   (Arrive by 2:45 PM)   MFM BPP SINGLE with [email or web address]   [facility] Ul[facility] - [facility] ([facility])    [address]  [facility] [address]   [phone]              Future tests that were ordered for you today     Open Future Orders        Priority Expected Expires Ordered    MFM BPP Single Routine 1/2/2018 10/26/2018 12/26/2017    MFM BPP Single Routine 1/5/2018 10/26/2018 12/26/2017    MFM BPP Single Routine 1/12/2018 10/26/2018 12/26/2017    MFM BPP Single Routine 1/9/2018 10/26/2018  12/26/2017            Who to contact     If you have questions or need follow up information about today's clinic visit or your schedule please contact [facility] directly at [phone].  Normal or non-critical lab and imaging results will be communicated to you by MyChart, letter or phone within 4 business days after the clinic has received the results. If you do not hear from us within 7 days, please contact the clinic through MyChart or phone. If you have a critical or abnormal lab result, we will notify you by phone as soon as possible.  Submit refill requests through [employer] or call your pharmacy and they will forward the refill request to us. Please allow 3 business days for your refill to be completed.          Additional Information About Your Visit        [employer] Information     [employer] gives you secure access to your electronic health record. If you see a primary care provider, you can also send messages to your care team and make appointments. If you have questions, please call your primary care clinic.  If you do not have a primary care provider, please call [phone] and they will assist you.        Care EveryWhere ID     This is your Care EveryWhere ID. This could be used by other organizations to access your [facility] medical records  [id number]        Your Vitals Were     Pulse Last Period BMI (Body Mass Index)             104 04/21/2017 (Exact Date) 30.54 kg/m2          Blood Pressure from Last 3 Encounters:   12/27/17 122/76   12/22/17 136/88   12/15/17 134/76    Weight from Last 3 Encounters:   12/27/17 195 lb (88.5 kg)   12/22/17 195 lb (88.5 kg)   12/15/17 194 lb (88 kg)              We Performed the Following     ALT     AST     Creatinine     Strep, Group B by PCR        Primary Care Provider Fax #    Physician No Ref-Primary [phone]       No address on file        Equal Access to Services     EDWIGE VALLADARES : amrtha Burgess qaybta  hernan larson johnbuddy ablsmith spicer ah. Terri [facility] [phone].    ATENCIÓN: Si marc yanez, tiene a boateng disposición servicios gratuitos de asistencia lingüística. Titus al [phone].    We comply with applicable federal civil rights laws and [place] laws. We do not discriminate on the basis of race, color, national origin, age, disability, sex, sexual orientation, or gender identity.            Thank you!     Thank you for choosing [facility]  for your care. Our goal is always to provide you with excellent care. Hearing back from our patients is one way we can continue to improve our services. Please take a few minutes to complete the written survey that you may receive in the mail after your visit with us. Thank you!             Your Updated Medication List - Protect others around you: Learn how to safely use, store and throw away your medicines at www.disposemymeds.org.          This list is accurate as of: 12/27/17  3:52 PM.  Always use your most recent med list.                   Brand Name Dispense Instructions for use Diagnosis    acetone (Urine) test Strp     100 each    Use as directed to test urine ketone if blood sugars are >250 or during illness with fever, nausea, vomiting, or abdominal pain.    High-risk pregnancy, young primigravida, third trimester       ASPIRIN PO      Take 81 mg by mouth daily        blood glucose monitoring test strip    RAYMUNDO CONTOUR NEXT    1000 each    Use to test blood sugar 10-12 times daily or as directed.    Uncontrolled type 1 diabetes mellitus without complication (H), Supervision of other high risk pregnancy, antepartum       glucagon 1 MG kit    GLUCAGON EMERGENCY    1 each    Inject 1 mg into the muscle once for 1 dose    Type 1 diabetes mellitus without complication (H), Insulin pump in place       insulin glargine 100 UNIT/ML injection    LANTUS VIAL    10 mL    Inject 19 u sq every 24 hours in the event of insulin pump  "therapy.  DO NOT FILL RX until requested by patient.    Type 1 diabetes mellitus without complication (H), Insulin pump in place, Encounter for long-term (current) use of insulin (H), High-risk pregnancy, young primigravida, third trimester       insulin lispro 100 UNIT/ML injection    humaLOG    5 vial    As directed via insulin pump - up to 160 units daily, adjusting pump settings due to increased insulin requirements and pregnancy    Uncontrolled type 1 diabetes mellitus without complication (H), Encounter for long-term (current) use of insulin (H)       insulin pump infusion      Date last updated:  12/23/16 Medtronic Minimed: Model 630G BASAL RATES and times: 12   AM (midnight): 0.85 units/hour   8     AM: 0.8 units/hour  6pm: 0.85 Basal Pattern A:  Fransisca Cabello will use when NA. CARB RATIO and times: 12   AM (midnight): 12 Corection Factor (Sensitivity) and times: 12   AM (midnight): 47 mg/dL BLOOD GLUCOSE TARGET and times: 12   AM (midnight): 100 - 120 5     AM:  90 - 110 9:30    PM:  100 - 120 Active Insulin Time:  3 hours Sensor:  No Carelink / Diasend username: [email or web address] Carelink / Diasend Password:  [email or web address]        insulin syringe-needle U-100 31G X 5/16\" 0.3 ML    BD insulin syringe ultrafine    100 each    Use one syringe 4 daily or as directed in the event of insulin pump failure.  DO NOT DISPENSE until requested by patient    Encounter for long-term (current) use of insulin (H)       MICROLET LANCETS Misc     600 each    Use one lancet 6 times daily for blood sugar testing    Type 1 diabetes mellitus without complication (H), Insulin pump in place       ondansetron 4 MG ODT tab    ZOFRAN ODT    30 tablet    Take 1-2 tablets (4-8 mg) by mouth every 8 hours as needed for nausea    Nausea/vomiting in pregnancy       PRENATAL VITAMINS PO             "

## 2017-12-27 NOTE — PROGRESS NOTES
PROBLEM LIST  LABS: Opos/RI/  BOY    1.  Type I DM on pump: followed by endocrine. Plan: eye exam, TSH qTM, level II US (normal), fetal echo (normal), growth scans q3-4w. Plan delivery at 37 weeks for gHTN and DM unless earlier delivery is indicated. She will manage her own insulin via pump in labor and postpartum. Will confirm postpartum settings for pump with endocrine.    2. Gestational hypertension w/o significant proteinuria in 3rd trimester (sp BMZ 11/17-18)  - H/O severe pre-eclampsia at 34 weeks: baseline labs normal (p/c ratio 0.14).  - On baby ASA since 12 weeks.  - Twice weekly BPPs through M (Tues/Fri). Weekly labs with urine pr/cr ratio.  - Signs and symptoms reviewed -- she will call if symptoms increase or change  - Discussed activity. Strict bedrest not recommended. Discussed okay to perform activities of daily living if not strenuous. No heavy lifting/pulling.     No change in headache or vision, no RUQ or epigastric pain. She had fairly significant sciatica on the right side last week, but this has resolved. She has had some nausea today, but only after eating. No fever or chills, no diarrhea.    Check preeclampsia labs today. We will plan cervical ripening at 36+6 next Thursday and then for IOL Friday at 37 weeks.   Continue 2x/week BPPs until then. Call for any change in symptoms, elevated blood pressure greater than 160/105, or signs and symptoms of labor. Group B Strep done today.    Cheyanne Silva MD

## 2017-12-28 ENCOUNTER — TELEPHONE (OUTPATIENT)
Dept: OBGYN | Facility: CLINIC | Age: 22
End: 2017-12-28

## 2017-12-28 LAB
ALT SERPL W P-5'-P-CCNC: 11 U/L (ref 0–50)
AST SERPL W P-5'-P-CCNC: 14 U/L (ref 0–45)
CREAT SERPL-MCNC: 0.63 MG/DL (ref 0.52–1.04)
GFR SERPL CREATININE-BSD FRML MDRD: >90 ML/MIN/1.7M2
GP B STREP DNA SPEC QL NAA+PROBE: NEGATIVE
SPECIMEN SOURCE: NORMAL

## 2017-12-28 NOTE — TELEPHONE ENCOUNTER
Patient scheduled for cervical ripening on 01/04/2017- Cytotec 25 mcg oral every 2 hours and then induction on 01/05/2018.   Pt instructed to call Labor and Delivery at 630 pm on 01/04/18 for 730 pm arrival. Number given to patient.     Induction order faxed to L&D and copy placed in Dr. Silva folder at the nursing station.  Emily Yin CMA

## 2017-12-29 ENCOUNTER — OFFICE VISIT (OUTPATIENT)
Dept: MATERNAL FETAL MEDICINE | Facility: CLINIC | Age: 22
End: 2017-12-29
Attending: OBSTETRICS & GYNECOLOGY
Payer: COMMERCIAL

## 2017-12-29 ENCOUNTER — HOSPITAL ENCOUNTER (OUTPATIENT)
Dept: ULTRASOUND IMAGING | Facility: CLINIC | Age: 22
Discharge: HOME | End: 2017-12-29
Attending: OBSTETRICS & GYNECOLOGY | Admitting: OBSTETRICS & GYNECOLOGY
Payer: COMMERCIAL

## 2017-12-29 DIAGNOSIS — O13.9 GESTATIONAL HYPERTENSION AFFECTING SECOND PREGNANCY: ICD-10-CM

## 2017-12-29 DIAGNOSIS — O24.013 PRE-EXISTING TYPE 1 DIABETES MELLITUS DURING PREGNANCY IN THIRD TRIMESTER: Primary | ICD-10-CM

## 2017-12-29 DIAGNOSIS — O24.013 TYPE 1 DIABETES MELLITUS IN PREGNANCY, THIRD TRIMESTER: ICD-10-CM

## 2017-12-29 PROCEDURE — 59025 FETAL NON-STRESS TEST: CPT | Mod: ZF | Performed by: OBSTETRICS & GYNECOLOGY

## 2017-12-29 PROCEDURE — 99211 OFF/OP EST MAY X REQ PHY/QHP: CPT | Mod: 25,ZF

## 2017-12-29 PROCEDURE — 40000068 ZZH STATISTIC EVAL-PTS ADMITTED TO OTHER DEPTS: Mod: ZF

## 2017-12-29 PROCEDURE — 76819 FETAL BIOPHYS PROFIL W/O NST: CPT

## 2017-12-29 NOTE — MR AVS SNAPSHOT
After Visit Summary   12/29/2017    Fransisca Cabello    MRN: [id number]           Patient Information     Date Of Birth          1995        Visit Information        Provider Department      12/29/2017 11:30 AM Jyoti Le,  [facility] Maternal Fetal Medicine [facility]        Today's Diagnoses     Pre-existing type 1 diabetes mellitus during pregnancy in third trimester    -  1    Gestational hypertension affecting second pregnancy           Follow-ups after your visit        Your next 10 appointments already scheduled     Jan 02, 2018  3:00 PM CST   (Arrive by 2:45 PM)   LAWRENCE JOSEPHP SINGLE with [id number]   [facility] Maternal Fetal Medicine Ultrasound - [facility] ([facility])    [address]  [facility] [address] [phone]            Jan 02, 2018  3:30 PM CST   [facility]   [facility] Maternal Fetal Medicine [facility] ([facility])    [address]  [facility] [address] [phone]           Please arrive at the time given for your first appointment. This visit is used internally to schedule the physician's time during your ultrasound.            Jan 05, 2018 11:15 AM CST   ESTABL[facility]    [address] [place] [address] [phone]              Who to contact     If you have questions or need follow up information about today's clinic visit or your schedule please contact [facility]ERNAL FETAL [facility] directly at [phone].  Normal or non-critical lab and imaging results will be communicated to you by MyChart, letter or phone within 4 business days after the clinic has received the results. If you do not hear from us within 7 days, please contact the clinic through MyChart or phone. If you have a critical or abnormal lab result, we will notify you by phone as soon as  possible.  Submit refill requests through [employer] or call your pharmacy and they will forward the refill request to us. Please allow 3 business days for your refill to be completed.          Additional Information About Your Visit        Ligandalhart Information     [employer] gives you secure access to your electronic health record. If you see a primary care provider, you can also send messages to your care team and make appointments. If you have questions, please call your primary care clinic.  If you do not have a primary care provider, please call [phone] and they will assist you.        Care EveryWhere ID     This is your Care EveryWhere ID. This could be used by other organizations to access your [place] medical records  [id number]        Your Vitals Were     Last Period                   04/21/2017 (Exact Date)            Blood Pressure from Last 3 Encounters:   12/27/17 122/76   12/22/17 136/88   12/15/17 134/76    Weight from Last 3 Encounters:   12/27/17 88.5 kg (195 lb)   12/22/17 88.5 kg (195 lb)   12/15/17 88 kg (194 lb)              We Performed the Following     FETAL NON-STRESS TEST        Primary Care Provider Fax #    Physician No Ref-Primary [phone]       No address on file        Equal Access to Services     EDWIGE VALLADARES : Hadii isidoro Quintana, waaxda nicole, qaybta kaalmada aderiverda, hernan yeung. So [facility] [phone].    ATENCIÓN: Si habla español, tiene a boateng disposición servicios gratuitos de asistencia lingüística. Llame al [phone].    We comply with applicable federal civil rights laws and [place] laws. We do not discriminate on the basis of race, color, national origin, age, disability, sex, sexual orientation, or gender identity.            Thank you!     Thank you for choosing [facility]  for your care. Our goal is always to provide you with excellent care. Hearing back from our patients is one way we can  continue to improve our services. Please take a few minutes to complete the written survey that you may receive in the mail after your visit with us. Thank you!             Your Updated Medication List - Protect others around you: Learn how to safely use, store and throw away your medicines at www.disposemymeds.org.          This list is accurate as of: 12/29/17  1:39 PM.  Always use your most recent med list.                   Brand Name Dispense Instructions for use Diagnosis    acetone (Urine) test Strp     100 each    Use as directed to test urine ketone if blood sugars are >250 or during illness with fever, nausea, vomiting, or abdominal pain.    High-risk pregnancy, young primigravida, third trimester       ASPIRIN PO      Take 81 mg by mouth daily        blood glucose monitoring test strip    RAYMUNDO CONTOUR NEXT    1000 each    Use to test blood sugar 10-12 times daily or as directed.    Uncontrolled type 1 diabetes mellitus without complication (H), Supervision of other high risk pregnancy, antepartum       glucagon 1 MG kit    GLUCAGON EMERGENCY    1 each    Inject 1 mg into the muscle once for 1 dose    Type 1 diabetes mellitus without complication (H), Insulin pump in place       insulin glargine 100 UNIT/ML injection    LANTUS VIAL    10 mL    Inject 19 u sq every 24 hours in the event of insulin pump therapy.  DO NOT FILL RX until requested by patient.    Type 1 diabetes mellitus without complication (H), Insulin pump in place, Encounter for long-term (current) use of insulin (H), High-risk pregnancy, young primigravida, third trimester       insulin lispro 100 UNIT/ML injection    humaLOG    5 vial    As directed via insulin pump - up to 160 units daily, adjusting pump settings due to increased insulin requirements and pregnancy    Uncontrolled type 1 diabetes mellitus without complication (H), Encounter for long-term (current) use of insulin (H)       insulin pump infusion      Date last updated:   "12/23/16 [employer] Minimed: Model 630G BASAL RATES and times: 12   AM (midnight): 0.85 units/hour   8     AM: 0.8 units/hour  6pm: 0.85 Basal Pattern A:  Fransisca Cabello will use when NA. CARB RATIO and times: 12   AM (midnight): 12 Corection Factor (Sensitivity) and times: 12   AM (midnight): 47 mg/dL BLOOD GLUCOSE TARGET and times: 12   AM (midnight): 100 - 120 5     AM:  90 - 110 9:30    PM:  100 - 120 Active Insulin Time:  3 hours Sensor:  No Carelink / Diasend username: [place] Carelink / Diasend Password:  [email or web address]        insulin syringe-needle U-100 31G X 5/16\" 0.3 ML    BD insulin syringe ultrafine    100 each    Use one syringe 4 daily or as directed in the event of insulin pump failure.  DO NOT DISPENSE until requested by patient    Encounter for long-term (current) use of insulin (H)       MICROLET LANCETS Misc     600 each    Use one lancet 6 times daily for blood sugar testing    Type 1 diabetes mellitus without complication (H), Insulin pump in place       ondansetron 4 MG ODT tab    ZOFRAN ODT    30 tablet    Take 1-2 tablets (4-8 mg) by mouth every 8 hours as needed for nausea    Nausea/vomiting in pregnancy       PRENATAL VITAMINS PO             "

## 2017-12-29 NOTE — PROGRESS NOTES
"Please see \"Imaging\" tab under \"Chart Review\" for details of today's US.      Jyoti Le, DO  Maternal-Fetal Medicine        "

## 2017-12-29 NOTE — NURSING NOTE
"NST Performed due to basal rate insulin decrease over the past week. Fransisca notes that her BS's have been lower over the past week, with her lowest BS's during the night and mid day. She states her basal rate has decreased over 20% and at times her pump has \"been suspended\" due to low BS, when she would normally need insulin.   reviewed efm tracing (See NST/BPP Doc Flowsheet tab) and spoke with patient about new trend of insulin and BS's. Both Dr Le and I had a long discussion with patient about the concern of this new trend. Patient states she feels her baby move very minimally \"from the inside\" but feels him move more often when she places her hand or a book on her abdomen. Kick counts have previously been reviewed with patient, which patients states she would never have enough kick counts to pass the 1 kick in 1 hour and 10 kicks in 2 hour test, but that has been her normal since 25 weeks. I encouraged her to do her kick counts externally, since that seems to be the only way she feels fetal movement. I also encouraged her to call her primary OB over the weekend if she's concerned about the movement or her insulin/BS's. Patient was offered to be delivered now due to the decreased need of insulin but declined and would like to go to LD on Sunday, 12/31/17 for monitoring and then have another BPP on Tuesday with MFM. She is currently scheduled to go in for cervical ripening on Thursday, 1/4/18. Dr Le spoke to Dr Grossman. Plan is for patient to go in on Sunday - Dr Grossman stated patient can show up at anytime. I strongly encouraged patient to really pay attention to movements and her insulin demands and note any changes. Patient seemed to understand information and agrees to plan.    Face to face: 15 minutes  "

## 2017-12-31 ENCOUNTER — HOSPITAL ENCOUNTER (OUTPATIENT)
Facility: CLINIC | Age: 22
Discharge: HOME | End: 2017-12-31
Attending: OBSTETRICS & GYNECOLOGY | Admitting: OBSTETRICS & GYNECOLOGY
Payer: COMMERCIAL

## 2017-12-31 VITALS
WEIGHT: 195 LBS | DIASTOLIC BLOOD PRESSURE: 82 MMHG | BODY MASS INDEX: 30.61 KG/M2 | SYSTOLIC BLOOD PRESSURE: 135 MMHG | HEIGHT: 67 IN

## 2017-12-31 LAB
ALT SERPL W P-5'-P-CCNC: 10 U/L (ref 0–50)
AST SERPL W P-5'-P-CCNC: 6 U/L (ref 0–45)
CREAT SERPL-MCNC: 0.61 MG/DL (ref 0.52–1.04)
CREAT UR-MCNC: 131 MG/DL
ERYTHROCYTE [DISTWIDTH] IN BLOOD BY AUTOMATED COUNT: 13.9 % (ref 10–15)
GFR SERPL CREATININE-BSD FRML MDRD: >90 ML/MIN/1.7M2
HCT VFR BLD AUTO: 34.4 % (ref 35–47)
HGB BLD-MCNC: 11 G/DL (ref 11.7–15.7)
MCH RBC QN AUTO: 29.9 PG (ref 26.5–33)
MCHC RBC AUTO-ENTMCNC: 32 G/DL (ref 31.5–36.5)
MCV RBC AUTO: 94 FL (ref 78–100)
PLATELET # BLD AUTO: 251 10E9/L (ref 150–450)
PROT UR-MCNC: 0.18 G/L
PROT/CREAT 24H UR: 0.14 G/G CR (ref 0–0.2)
RBC # BLD AUTO: 3.68 10E12/L (ref 3.8–5.2)
URATE SERPL-MCNC: 4.8 MG/DL (ref 2.6–6)
WBC # BLD AUTO: 8.6 10E9/L (ref 4–11)

## 2017-12-31 PROCEDURE — 36415 COLL VENOUS BLD VENIPUNCTURE: CPT | Performed by: OBSTETRICS & GYNECOLOGY

## 2017-12-31 PROCEDURE — 84460 ALANINE AMINO (ALT) (SGPT): CPT | Performed by: OBSTETRICS & GYNECOLOGY

## 2017-12-31 PROCEDURE — 84450 TRANSFERASE (AST) (SGOT): CPT | Performed by: OBSTETRICS & GYNECOLOGY

## 2017-12-31 PROCEDURE — 59025 FETAL NON-STRESS TEST: CPT

## 2017-12-31 PROCEDURE — 84156 ASSAY OF PROTEIN URINE: CPT | Performed by: OBSTETRICS & GYNECOLOGY

## 2017-12-31 PROCEDURE — 99214 OFFICE O/P EST MOD 30 MIN: CPT

## 2017-12-31 PROCEDURE — 82565 ASSAY OF CREATININE: CPT | Performed by: OBSTETRICS & GYNECOLOGY

## 2017-12-31 PROCEDURE — 85027 COMPLETE CBC AUTOMATED: CPT | Performed by: OBSTETRICS & GYNECOLOGY

## 2017-12-31 PROCEDURE — 84550 ASSAY OF BLOOD/URIC ACID: CPT | Performed by: OBSTETRICS & GYNECOLOGY

## 2017-12-31 NOTE — PROVIDER NOTIFICATION
12/31/17 1125   Provider Notification   Provider Name/Title Dr Grossman   Method of Notification Electronic Page   OB paged and updated on arrival, BPs, c/o visual changes, reactive tracing. Orders for labs received. Will update with results, OB on the way in.

## 2017-12-31 NOTE — PROGRESS NOTES
"Patient presents for NST per recommendation by MFM due to decreased fetal movement in the setting of DM1. She reports she has never felt the baby move much this pregnancy, this is not a change for her.     /82  Ht 1.702 m (5' 7\")  Wt 88.5 kg (195 lb)  LMP 04/21/2017 (Exact Date)  BMI 30.54 kg/m2     NST:  BL 130s, mod rajat, +accels, no decels  [place]: quiet    Elevated BP in triage, repeat HELLP labs and P:C ratio. S/p hospital admission for gHTN.  Fetal wellbeing: Cat I, reactive    Dispo: home with follow up on 1/2 with MFM.     Astrid Grossman MD     "

## 2017-12-31 NOTE — PLAN OF CARE
, 36.2 weeks here for NST and BP check. C/O seeing spots but no headache, swelling or epigastric pain. Has had hard time with fetal movement entire pregnancy d/t anterior placenta. Denies vaginal bleeding or leaking fluid. External monitors applied and explained, health history reviewed.Will update OB.

## 2017-12-31 NOTE — IP AVS SNAPSHOT
[facility] Labor and Delivery    [address]    [facility] [address]    Phone:  [phone]    Fax:  [phone]                                       After Visit Summary   12/31/2017    Fransisca Cabello    MRN: [id number]           After Visit Summary Signature Page     I have received my discharge instructions, and my questions have been answered. I have discussed any challenges I see with this plan with the nurse or doctor.    ..........................................................................................................................................  Patient/Patient Representative Signature      ..........................................................................................................................................  Patient Representative Print Name and Relationship to Patient    ..................................................               ................................................  Date                                            Time    ..........................................................................................................................................  Reviewed by Signature/Title    ...................................................              ..............................................  Date                                                            Time

## 2017-12-31 NOTE — DISCHARGE INSTRUCTIONS
Discharge Instruction for Undelivered Patients      You were seen for: Fetal Assessment  We Consulted: Dr Grossman  You had (Test or Medicine):monitoring, labs     Diet:   To manager your diabetes, follow the guidelines for eating and drinking given to you by your Clinic Provider or Diabetes Educator.       Activity:  Call your doctor or nurse midwife if your baby is moving less than usual.     Call your provider if you notice:  Swelling in your face or increased swelling in your hands or legs.  Headaches that are not relieved by Tylenol (acetaminophen).  Changes in your vision (blurring: seeing spots or stars.)  Nausea (sick to your stomach) and vomiting (throwing up).   Weight gain of 5 pounds or more per week.  Heartburn that doesn't go away.  Signs of bladder infection: pain when you urinate (use the toilet), need to go more often and more urgently.  The bag of mccann (rupture of membranes) breaks, or you notice leaking in your underwear.  Bright red blood in your underwear.  Abdominal (lower belly) or stomach pain.  For first baby: Contractions (tightening) less than 5 minutes apart for one hour or more.  Second (plus) baby: Contractions (tightening) less than 10 minutes apart and getting stronger.  *If less than 34 weeks: Contractions (tightenings) more than 6 times in one hour.  Increase or change in vaginal discharge (note the color and amount)  Other:     Follow-up:  As scheduled in the clinic

## 2017-12-31 NOTE — IP AVS SNAPSHOT
MRN:[id number]                      After Visit Summary   12/31/2017    Fransisca Cabello    MRN: [id number]           Thank you!     Thank you for choosing [facility] for your care. Our goal is always to provide you with excellent care. Hearing back from our patients is one way we can continue to improve our services. Please take a few minutes to complete the written survey that you may receive in the mail after you visit. If you would like to speak to someone directly about your visit please contact Patient Relations at [phone]. Thank you!          Patient Information     Date Of Birth          1995        About your hospital stay     You were admitted on:  December 31, 2017 You last received care in the:  [facility] Labor and Delivery    You were discharged on:  December 31, 2017       Who to Call     For medical emergencies, please call 911.  For non-urgent questions about your medical care, please call your primary care provider or clinic, None          Attending Provider     Provider Specialty    Astrid Grossman MD OB/Gyn       Primary Care Provider Fax #    Physician No Ref-Primary [phone]      Your next 10 appointments already scheduled     Jan 02, 2018  3:00 PM CST   (Arrive by 2:45 PM)   LAWRENCE JOSEPHP SINGLE with [id number]   eal Maternal Fetal Medicine Ultrasound - [facility])    [address]  [facility] [id number] [phone]            Jan 02, 2018  3:30 PM CST   Radiology MD with  LAWRENEC BETTENCOURT   [facility] Maternal Fetal Medicine - [facility])    [address]  [facility] [id number] [phone]           Please arrive at the time given for your first appointment. This visit is used internally to schedule the physician's time during your ultrasound.            Jan 05, 2018 11:15 AM CST   ESTABLISHED PRENATAL with Cheyanne Silva MD   [facility]  "([facility])    [address] [facility] [address] [phone]              Further instructions from your care team       Discharge Instruction for Undelivered Patients      You were seen for: Fetal Assessment  We Consulted: Dr Grossman  You had (Test or Medicine):monitoring, labs     Diet:   To manager your diabetes, follow the guidelines for eating and drinking given to you by your Clinic Provider or Diabetes Educator.       Activity:  Call your doctor or nurse midwife if your baby is moving less than usual.     Call your provider if you notice:  Swelling in your face or increased swelling in your hands or legs.  Headaches that are not relieved by Tylenol (acetaminophen).  Changes in your vision (blurring: seeing spots or stars.)  Nausea (sick to your stomach) and vomiting (throwing up).   Weight gain of 5 pounds or more per week.  Heartburn that doesn't go away.  Signs of bladder infection: pain when you urinate (use the toilet), need to go more often and more urgently.  The bag of mccann (rupture of membranes) breaks, or you notice leaking in your underwear.  Bright red blood in your underwear.  Abdominal (lower belly) or stomach pain.  For first baby: Contractions (tightening) less than 5 minutes apart for one hour or more.  Second (plus) baby: Contractions (tightening) less than 10 minutes apart and getting stronger.  *If less than 34 weeks: Contractions (tightenings) more than 6 times in one hour.  Increase or change in vaginal discharge (note the color and amount)  Other:     Follow-up:  As scheduled in the clinic          Pending Results     No orders found from 12/29/2017 to 1/1/2018.            Admission Information     Date & Time Provider Department Dept. Phone    12/31/2017 Astrid Grossman MD [facility] [place] [facility] Labor and Delivery [phone]      Your Vitals Were     Blood Pressure Height Weight Last Period BMI (Body Mass Index)       135/82 1.702 m (5' 7\") 88.5 kg " (195 lb) 04/21/2017 (Exact Date) 30.54 kg/m2       License Acquisitions Information     [employer] gives you secure access to your electronic health record. If you see a primary care provider, you can also send messages to your care team and make appointments. If you have questions, please call your primary care clinic.  If you do not have a primary care provider, please call [phone] and they will assist you.        Care EveryWhere ID     This is your Care EveryWhere ID. This could be used by other organizations to access your [place] medical records  [id number]        Equal Access to Services     [facility]: Hadcarri Quintana, martha rivera, gwyn dinero, hernan spicer . So [facility] [phone].    ATENCIÓN: Si habla español, tiene a boateng disposición servicios gratuitos de asistencia lingüística. Llame al [phone].    We comply with applicable federal civil rights laws and [place] laws. We do not discriminate on the basis of race, color, national origin, age, disability, sex, sexual orientation, or gender identity.               Review of your medicines      UNREVIEWED medicines. Ask your doctor about these medicines        Dose / Directions    ASPIRIN PO        Dose:  81 mg   Take 81 mg by mouth daily   Refills:  0       glucagon 1 MG kit   Commonly known as:  GLUCAGON EMERGENCY   Used for:  Type 1 diabetes mellitus without complication (H), Insulin pump in place        Dose:  1 mg   Inject 1 mg into the muscle once for 1 dose   Quantity:  1 each   Refills:  0       insulin glargine 100 UNIT/ML injection   Commonly known as:  LANTUS VIAL   Used for:  Type 1 diabetes mellitus without complication (H), Insulin pump in place, Encounter for long-term (current) use of insulin (H), High-risk pregnancy, young primigravida, third trimester        Inject 19 u sq every 24 hours in the event of insulin pump therapy.  DO NOT FILL RX until requested by patient.   Quantity:  10  mL   Refills:  1       insulin lispro 100 UNIT/ML injection   Commonly known as:  humaLOG   Used for:  Uncontrolled type 1 diabetes mellitus without complication (H), Encounter for long-term (current) use of insulin (H)        As directed via insulin pump - up to 160 units daily, adjusting pump settings due to increased insulin requirements and pregnancy   Quantity:  5 vial   Refills:  3       ondansetron 4 MG ODT tab   Commonly known as:  ZOFRAN ODT   Used for:  Nausea/vomiting in pregnancy        Dose:  4-8 mg   Take 1-2 tablets (4-8 mg) by mouth every 8 hours as needed for nausea   Quantity:  30 tablet   Refills:  1       PRENATAL VITAMINS PO        Refills:  0         CONTINUE these medicines which have NOT CHANGED        Dose / Directions    acetone (Urine) test Strp   Used for:  High-risk pregnancy, young primigravida, third trimester        Use as directed to test urine ketone if blood sugars are >250 or during illness with fever, nausea, vomiting, or abdominal pain.   Quantity:  100 each   Refills:  prn       blood glucose monitoring test strip   Commonly known as:  RAYMUNDO CONTOUR NEXT   Used for:  Uncontrolled type 1 diabetes mellitus without complication (H), Supervision of other high risk pregnancy, antepartum        Use to test blood sugar 10-12 times daily or as directed.   Quantity:  1000 each   Refills:  3       insulin pump infusion        Date last updated:  12/23/16 Medtronic Minimed: Model 630G BASAL RATES and times: 12   AM (midnight): 0.85 units/hour   8     AM: 0.8 units/hour  6pm: 0.85 Basal Pattern A:  Fransisca Cabello will use when NA. CARB RATIO and times: 12   AM (midnight): 12 Corection Factor (Sensitivity) and times: 12   AM (midnight): 47 mg/dL BLOOD GLUCOSE TARGET and times: 12   AM (midnight): 100 - 120 5     AM:  90 - 110 9:30    PM:  100 - 120 Active Insulin Time:  3 hours Sensor:  No Carelink / Diasend username: madison Carelink / Diasend Password:  [email or web address]   Refills:  0  "      insulin syringe-needle U-100 31G X 5/16\" 0.3 ML   Commonly known as:  BD insulin syringe ultrafine   Used for:  Encounter for long-term (current) use of insulin (H)        Use one syringe 4 daily or as directed in the event of insulin pump failure.  DO NOT DISPENSE until requested by patient   Quantity:  100 each   Refills:  3       MICROLET LANCETS Misc   Used for:  Type 1 diabetes mellitus without complication (H), Insulin pump in place        Use one lancet 6 times daily for blood sugar testing   Quantity:  600 each   Refills:  1                Protect others around you: Learn how to safely use, store and throw away your medicines at www.disposemymeds.org.             Medication List: This is a list of all your medications and when to take them. Check marks below indicate your daily home schedule. Keep this list as a reference.      Medications           Morning Afternoon Evening Bedtime As Needed    acetone (Urine) test Strp   Use as directed to test urine ketone if blood sugars are >250 or during illness with fever, nausea, vomiting, or abdominal pain.                                ASPIRIN PO   Take 81 mg by mouth daily                                blood glucose monitoring test strip   Commonly known as:  RAYMUNDO CONTOUR NEXT   Use to test blood sugar 10-12 times daily or as directed.                                glucagon 1 MG kit   Commonly known as:  GLUCAGON EMERGENCY   Inject 1 mg into the muscle once for 1 dose                                insulin glargine 100 UNIT/ML injection   Commonly known as:  LANTUS VIAL   Inject 19 u sq every 24 hours in the event of insulin pump therapy.  DO NOT FILL RX until requested by patient.                                insulin lispro 100 UNIT/ML injection   Commonly known as:  humaLOG   As directed via insulin pump - up to 160 units daily, adjusting pump settings due to increased insulin requirements and pregnancy                                insulin pump " "infusion   Date last updated:  12/23/16 Medtronic Minimed: Model 630G BASAL RATES and times: 12   AM (midnight): 0.85 units/hour   8     AM: 0.8 units/hour  6pm: 0.85 Basal Pattern A:  Fransisca Cabello will use when NA. CARB RATIO and times: 12   AM (midnight): 12 Corection Factor (Sensitivity) and times: 12   AM (midnight): 47 mg/dL BLOOD GLUCOSE TARGET and times: 12   AM (midnight): 100 - 120 5     AM:  90 - 110 9:30    PM:  100 - 120 Active Insulin Time:  3 hours Sensor:  No Carelink / Diasend username: madison Carelink / Diasend Password:  [email or web address]                                insulin syringe-needle U-100 31G X 5/16\" 0.3 ML   Commonly known as:  BD insulin syringe ultrafine   Use one syringe 4 daily or as directed in the event of insulin pump failure.  DO NOT DISPENSE until requested by patient                                MICROLET LANCETS Misc   Use one lancet 6 times daily for blood sugar testing                                ondansetron 4 MG ODT tab   Commonly known as:  ZOFRAN ODT   Take 1-2 tablets (4-8 mg) by mouth every 8 hours as needed for nausea                                PRENATAL VITAMINS PO                                  "

## 2017-12-31 NOTE — PROVIDER NOTIFICATION
12/31/17 1230   Provider Notification   Provider Name/Title Dr Grossman   Method of Notification In Department   OB at nursing station, updated on tracing, awaiting labs. Orders received to discharge to home if labs normal.

## 2018-01-02 ENCOUNTER — HOSPITAL ENCOUNTER (OUTPATIENT)
Dept: ULTRASOUND IMAGING | Facility: CLINIC | Age: 23
Discharge: HOME | End: 2018-01-02
Attending: OBSTETRICS & GYNECOLOGY | Admitting: OBSTETRICS & GYNECOLOGY
Payer: COMMERCIAL

## 2018-01-02 ENCOUNTER — OFFICE VISIT (OUTPATIENT)
Dept: MATERNAL FETAL MEDICINE | Facility: CLINIC | Age: 23
End: 2018-01-02
Attending: OBSTETRICS & GYNECOLOGY
Payer: COMMERCIAL

## 2018-01-02 DIAGNOSIS — O24.013 PRE-EXISTING TYPE 1 DIABETES MELLITUS DURING PREGNANCY IN THIRD TRIMESTER: Primary | ICD-10-CM

## 2018-01-02 DIAGNOSIS — O26.90 PREGNANCY RELATED CONDITION, UNSPECIFIED TRIMESTER: ICD-10-CM

## 2018-01-02 PROCEDURE — 76819 FETAL BIOPHYS PROFIL W/O NST: CPT

## 2018-01-02 NOTE — MR AVS SNAPSHOT
After Visit Summary   1/2/2018    Fransisca Cabello    MRN: [id number]           Patient Information     Date Of Birth          1995        Visit Information        Provider Department      1/2/2018 3:30 PM Sridevi Tabor MD [facility] Maternal Fetal Medicine [facility]        Today's Diagnoses     Pre-existing type 1 diabetes mellitus during pregnancy in third trimester    -  1       Follow-ups after your visit        Your next 10 appointments already scheduled     Jan 05, 2018 11:15 AM CST   ESTABLISHED PRENATAL with Cheyanne Silva MD   [facility]    [address] [place] [id number] [phone]              Who to contact     If you have questions or need follow up information about today's clinic visit or your schedule please contact [facility] MATERNAL FETAL MEDICINE [place] directly at [phone].  Normal or non-critical lab and imaging results will be communicated to you by MyChart, letter or phone within 4 business days after the clinic has received the results. If you do not hear from us within 7 days, please contact the clinic through [employer] or phone. If you have a critical or abnormal lab result, we will notify you by phone as soon as possible.  Submit refill requests through [employer] or call your pharmacy and they will forward the refill request to us. Please allow 3 business days for your refill to be completed.          Additional Information About Your Visit        MyChart Information     [employer] gives you secure access to your electronic health record. If you see a primary care provider, you can also send messages to your care team and make appointments. If you have questions, please call your primary care clinic.  If you do not have a primary care provider, please call [phone] and they will assist you.        Care EveryWhere ID     This is your Care EveryWhere ID. This could be used by other  organizations to access your [place] medical records  [id number]        Your Vitals Were     Last Period                   04/21/2017 (Exact Date)            Blood Pressure from Last 3 Encounters:   12/31/17 135/82   12/27/17 122/76   12/22/17 136/88    Weight from Last 3 Encounters:   12/31/17 88.5 kg (195 lb)   12/27/17 88.5 kg (195 lb)   12/22/17 88.5 kg (195 lb)              Today, you had the following     No orders found for display       Primary Care Provider Fax #    Physician No Ref-Primary [phone]       No address on file        Equal Access to Services     [facility] : Hadii isidoro barnes Sodario, waholgerda aniketadaha, miriamybkylee kaalmada rosette, hernan spicer . So [facility] [phone].    ATENCIÓN: Si habla español, tiene a boateng disposición servicios gratuitos de asistencia lingüística. Llame al [phone].    We comply with applicable federal civil rights laws and [place] laws. We do not discriminate on the basis of race, color, national origin, age, disability, sex, sexual orientation, or gender identity.            Thank you!     Thank you for choosing [facility]  for your care. Our goal is always to provide you with excellent care. Hearing back from our patients is one way we can continue to improve our services. Please take a few minutes to complete the written survey that you may receive in the mail after your visit with us. Thank you!             Your Updated Medication List - Protect others around you: Learn how to safely use, store and throw away your medicines at www.disposemymeds.org.          This list is accurate as of: 1/2/18  5:55 PM.  Always use your most recent med list.                   Brand Name Dispense Instructions for use Diagnosis    acetone (Urine) test Strp     100 each    Use as directed to test urine ketone if blood sugars are >250 or during illness with fever, nausea, vomiting, or abdominal pain.    High-risk  "pregnancy, young primigravida, third trimester       ASPIRIN PO      Take 81 mg by mouth daily        blood glucose monitoring test strip    RAYMUNDO CONTOUR NEXT    1000 each    Use to test blood sugar 10-12 times daily or as directed.    Uncontrolled type 1 diabetes mellitus without complication (H), Supervision of other high risk pregnancy, antepartum       glucagon 1 MG kit    GLUCAGON EMERGENCY    1 each    Inject 1 mg into the muscle once for 1 dose    Type 1 diabetes mellitus without complication (H), Insulin pump in place       insulin glargine 100 UNIT/ML injection    LANTUS VIAL    10 mL    Inject 19 u sq every 24 hours in the event of insulin pump therapy.  DO NOT FILL RX until requested by patient.    Type 1 diabetes mellitus without complication (H), Insulin pump in place, Encounter for long-term (current) use of insulin (H), High-risk pregnancy, young primigravida, third trimester       insulin lispro 100 UNIT/ML injection    humaLOG    5 vial    As directed via insulin pump - up to 160 units daily, adjusting pump settings due to increased insulin requirements and pregnancy    Uncontrolled type 1 diabetes mellitus without complication (H), Encounter for long-term (current) use of insulin (H)       insulin pump infusion      Date last updated:  12/23/16 Medtronic Minimed: Model 630G BASAL RATES and times: 12   AM (midnight): 0.85 units/hour   8     AM: 0.8 units/hour  6pm: 0.85 Basal Pattern A:  Fransisca Cabello will use when NA. CARB RATIO and times: 12   AM (midnight): 12 Corection Factor (Sensitivity) and times: 12   AM (midnight): 47 mg/dL BLOOD GLUCOSE TARGET and times: 12   AM (midnight): 100 - 120 5     AM:  90 - 110 9:30    PM:  100 - 120 Active Insulin Time:  3 hours Sensor:  No Carelink / Diasend username: madison Carelink / Diasend Password:  [email or web address]        insulin syringe-needle U-100 31G X 5/16\" 0.3 ML    BD insulin syringe ultrafine    100 each    Use one syringe 4 daily or as " directed in the event of insulin pump failure.  DO NOT DISPENSE until requested by patient    Encounter for long-term (current) use of insulin (H)       MICROLET LANCETS Misc     600 each    Use one lancet 6 times daily for blood sugar testing    Type 1 diabetes mellitus without complication (H), Insulin pump in place       ondansetron 4 MG ODT tab    ZOFRAN ODT    30 tablet    Take 1-2 tablets (4-8 mg) by mouth every 8 hours as needed for nausea    Nausea/vomiting in pregnancy       PRENATAL VITAMINS PO

## 2018-01-04 ENCOUNTER — HOSPITAL ENCOUNTER (INPATIENT)
Facility: CLINIC | Age: 23
LOS: 3 days | Discharge: HOME | End: 2018-01-07
Attending: OBSTETRICS & GYNECOLOGY | Admitting: OBSTETRICS & GYNECOLOGY
Payer: COMMERCIAL

## 2018-01-04 LAB
ALT SERPL W P-5'-P-CCNC: 12 U/L (ref 0–50)
AST SERPL W P-5'-P-CCNC: 11 U/L (ref 0–45)
CREAT UR-MCNC: 129 MG/DL
ERYTHROCYTE [DISTWIDTH] IN BLOOD BY AUTOMATED COUNT: 13.9 % (ref 10–15)
HCT VFR BLD AUTO: 34.8 % (ref 35–47)
HGB BLD-MCNC: 11.4 G/DL (ref 11.7–15.7)
MCH RBC QN AUTO: 30.1 PG (ref 26.5–33)
MCHC RBC AUTO-ENTMCNC: 32.8 G/DL (ref 31.5–36.5)
MCV RBC AUTO: 92 FL (ref 78–100)
PLATELET # BLD AUTO: 256 10E9/L (ref 150–450)
PROT UR-MCNC: 0.16 G/L
PROT/CREAT 24H UR: 0.12 G/G CR (ref 0–0.2)
RBC # BLD AUTO: 3.79 10E12/L (ref 3.8–5.2)
URATE SERPL-MCNC: 4.6 MG/DL (ref 2.6–6)
WBC # BLD AUTO: 9.4 10E9/L (ref 4–11)

## 2018-01-04 PROCEDURE — 86901 BLOOD TYPING SEROLOGIC RH(D): CPT | Performed by: OBSTETRICS & GYNECOLOGY

## 2018-01-04 PROCEDURE — 3E0P7VZ INTRODUCTION OF HORMONE INTO FEMALE REPRODUCTIVE, VIA NATURAL OR ARTIFICIAL OPENING: ICD-10-PCS | Performed by: OBSTETRICS & GYNECOLOGY

## 2018-01-04 PROCEDURE — 85027 COMPLETE CBC AUTOMATED: CPT | Performed by: OBSTETRICS & GYNECOLOGY

## 2018-01-04 PROCEDURE — 99232 SBSQ HOSP IP/OBS MODERATE 35: CPT | Performed by: OBSTETRICS & GYNECOLOGY

## 2018-01-04 PROCEDURE — 84156 ASSAY OF PROTEIN URINE: CPT | Performed by: OBSTETRICS & GYNECOLOGY

## 2018-01-04 PROCEDURE — 25000132 ZZH RX MED GY IP 250 OP 250 PS 637: Performed by: OBSTETRICS & GYNECOLOGY

## 2018-01-04 PROCEDURE — 86900 BLOOD TYPING SEROLOGIC ABO: CPT | Performed by: OBSTETRICS & GYNECOLOGY

## 2018-01-04 PROCEDURE — 25000128 H RX IP 250 OP 636: Performed by: OBSTETRICS & GYNECOLOGY

## 2018-01-04 PROCEDURE — 86850 RBC ANTIBODY SCREEN: CPT | Performed by: OBSTETRICS & GYNECOLOGY

## 2018-01-04 PROCEDURE — 84450 TRANSFERASE (AST) (SGOT): CPT | Performed by: OBSTETRICS & GYNECOLOGY

## 2018-01-04 PROCEDURE — 84550 ASSAY OF BLOOD/URIC ACID: CPT | Performed by: OBSTETRICS & GYNECOLOGY

## 2018-01-04 PROCEDURE — 12000031 ZZH R&B OB CRITICAL

## 2018-01-04 PROCEDURE — 86780 TREPONEMA PALLIDUM: CPT | Performed by: OBSTETRICS & GYNECOLOGY

## 2018-01-04 PROCEDURE — 84460 ALANINE AMINO (ALT) (SGPT): CPT | Performed by: OBSTETRICS & GYNECOLOGY

## 2018-01-04 RX ORDER — HYDROXYZINE HYDROCHLORIDE 50 MG/1
100 TABLET, FILM COATED ORAL ONCE
Status: COMPLETED | OUTPATIENT
Start: 2018-01-04 | End: 2018-01-04

## 2018-01-04 RX ADMIN — HYDROXYZINE HYDROCHLORIDE 100 MG: 50 TABLET, FILM COATED ORAL at 22:31

## 2018-01-04 RX ADMIN — SODIUM CHLORIDE, POTASSIUM CHLORIDE, SODIUM LACTATE AND CALCIUM CHLORIDE 500 ML: 600; 310; 30; 20 INJECTION, SOLUTION INTRAVENOUS at 22:58

## 2018-01-04 RX ADMIN — DINOPROSTONE 10 MG: 10 INSERT VAGINAL at 21:07

## 2018-01-04 NOTE — IP AVS SNAPSHOT
MRN:[id number]                      After Visit Summary   1/4/2018    Fransisca Cabello    MRN: [id number]           Thank you!     Thank you for choosing [facility] for your care. Our goal is always to provide you with excellent care. Hearing back from our patients is one way we can continue to improve our services. Please take a few minutes to complete the written survey that you may receive in the mail after you visit. If you would like to speak to someone directly about your visit please contact Patient Relations at [phone]. Thank you!          Patient Information     Date Of Birth          1995        About your hospital stay     You were admitted on:  January 4, 2018 You last received care in the:  [facility]    You were discharged on:  January 7, 2018       Who to Call     For medical emergencies, please call 911.  For non-urgent questions about your medical care, please call your primary care provider or clinic, None          Attending Provider     Provider Specialty    Astrid Grossman MD OB/Gyn    Ambrocio Silva MD OB/Gyn       Primary Care Provider Fax #    Physician No Ref-Primary [phone]      After Care Instructions     Activity       Review discharge instructions            Diet       Resume previous diet            Discharge Instructions - Hypertensive disorders patients       High Blood pressure patients to follow up in clinic or via home care within one- two weeks for a blood pressure check            Discharge Instructions - Postpartum visit       Schedule postpartum visit with your provider and return to clinic in 6 weeks.                  Further instructions from your care team       Postpartum Vaginal Delivery Instructions     Lactation: [phone]    Activity       Ask family and friends for help when you need it.    Do not place anything in your vagina for 6 weeks.    You are not restricted on other activities, but take  it easy for a few weeks to allow your body to recover from delivery.  You are able to do any activities you feel up to that point.    No driving until you have stopped taking your pain medications (usually two weeks after delivery).     Call your health care provider if you have any of these symptoms:       Increased pain, swelling, redness, or fluid around your stiches from an episiotomy or perineal tear.    A fever above 100.4 F (38 C) with or without chills when placing a thermometer under your tongue.    You soak a sanitary pad with blood within 1 hour, or you see blood clots larger than a golf ball.    Bleeding that lasts more than 6 weeks.    Vaginal discharge that smells bad.    Severe pain, cramping or tenderness in your lower belly area.    A need to urinate more frequently (use the toilet more often), more urgently (use the toilet very quickly), or it burns when you urinate.    Nausea and vomiting.    Redness, swelling or pain around a vein in your leg.    Problems breastfeeding or a red or painful area on your breast.    Chest pain and cough or are gasping for air.    Problems coping with sadness, anxiety, or depression.  If you have any concerns about hurting yourself or the baby, call your provider immediately.     You have questions or concerns after you return home.     Keep your hands clean:  Always wash your hands before touching your perineal area and stitches.  This helps reduce your risk of infection.  If your hands aren't dirty, you may use an alcohol hand-rub to clean your hands. Keep your nails clean and short.        Pending Results     No orders found from 1/2/2018 to 1/5/2018.            Statement of Approval     Ordered          01/07/18 1102  I have reviewed and agree with all the recommendations and orders detailed in this document.  EFFECTIVE NOW     Approved and electronically signed by:  Sandra Solano MD             Admission Information     Date & Time Provider Department  "Dept. Phone    2018 Ambrocio Silva MD [facility] [phone]      Your Vitals Were     Blood Pressure Pulse Temperature Respirations Height Last Period    120/66 91 97.5  F (36.4  C) (Oral) 16 1.702 m (5' 7\") 2017 (Exact Date)    Pulse Oximetry                   96%           [employer] Information     [facility] gives you secure access to your electronic health record. If you see a primary care provider, you can also send messages to your care team and make appointments. If you have questions, please call your primary care clinic.  If you do not have a primary care provider, please call [phone] and they will assist you.        Care EveryWhere ID     This is your Care EveryWhere ID. This could be used by other organizations to access your [facility] medical records  [id number]        Equal Access to Services     EDWIGE VALLADARES : Floridalma Quintana, martha rivera, gwyn dinero, hernan spicer . So [facility] [place] [phone].    ATENCIÓN: Si habla español, tiene a boateng disposición servicios gratuitos de asistencia lingüística. Llame al [phone].    We comply with applicable federal civil rights laws and [place] laws. We do not discriminate on the basis of race, color, national origin, age, disability, sex, sexual orientation, or gender identity.               Review of your medicines      START taking        Dose / Directions    docusate sodium 100 MG tablet   Commonly known as:  COLACE   Used for:   (spontaneous vaginal delivery)        Dose:  100 mg   Take 100 mg by mouth daily   Quantity:  60 tablet   Refills:  1       ibuprofen 800 MG tablet   Commonly known as:  ADVIL/MOTRIN   Used for:   (spontaneous vaginal delivery)        Dose:  800 mg   Take 1 tablet (800 mg) by mouth every 8 hours as needed for other (cramping)   Quantity:  90 tablet   Refills:  0         CONTINUE these medicines which have NOT CHANGED        Dose / Directions "    acetone (Urine) test Strp   Used for:  High-risk pregnancy, young primigravida, third trimester        Use as directed to test urine ketone if blood sugars are >250 or during illness with fever, nausea, vomiting, or abdominal pain.   Quantity:  100 each   Refills:  prn       blood glucose monitoring test strip   Commonly known as:  RAYMUNDO CONTOUR NEXT   Used for:  Uncontrolled type 1 diabetes mellitus without complication (H), Supervision of other high risk pregnancy, antepartum        Use to test blood sugar 10-12 times daily or as directed.   Quantity:  1000 each   Refills:  3       insulin glargine 100 UNIT/ML injection   Commonly known as:  LANTUS VIAL   Used for:  Type 1 diabetes mellitus without complication (H), Insulin pump in place, Encounter for long-term (current) use of insulin (H), High-risk pregnancy, young primigravida, third trimester        Inject 19 u sq every 24 hours in the event of insulin pump therapy.  DO NOT FILL RX until requested by patient.   Quantity:  10 mL   Refills:  1       insulin lispro 100 UNIT/ML injection   Commonly known as:  humaLOG   Used for:  Uncontrolled type 1 diabetes mellitus without complication (H), Encounter for long-term (current) use of insulin (H)        As directed via insulin pump - up to 160 units daily, adjusting pump settings due to increased insulin requirements and pregnancy   Quantity:  5 vial   Refills:  3       insulin pump infusion        Date last updated:  12/23/16 [employer] Minimed: Model 630G BASAL RATES and times: 12   AM (midnight): 0.85 units/hour   8     AM: 0.8 units/hour  6pm: 0.85 Basal Pattern A:  Fransisca Cabello will use when NA. CARB RATIO and times: 12   AM (midnight): 12 Corection Factor (Sensitivity) and times: 12   AM (midnight): 47 mg/dL BLOOD GLUCOSE TARGET and times: 12   AM (midnight): 100 - 120 5     AM:  90 - 110 9:30    PM:  100 - 120 Active Insulin Time:  3 hours Sensor:  No Carelink / Diasend username: [email or web address]  "Carelink / Diasend Password:  [email or web address]   Refills:  0       insulin syringe-needle U-100 31G X 5/16\" 0.3 ML   Commonly known as:  BD insulin syringe ultrafine   Used for:  Encounter for long-term (current) use of insulin (H)        Use one syringe 4 daily or as directed in the event of insulin pump failure.  DO NOT DISPENSE until requested by patient   Quantity:  100 each   Refills:  3       MICROLET LANCETS Misc   Used for:  Type 1 diabetes mellitus without complication (H), Insulin pump in place        Use one lancet 6 times daily for blood sugar testing   Quantity:  600 each   Refills:  1       PRENATAL VITAMINS PO        Refills:  0         STOP taking     ASPIRIN PO           glucagon 1 MG kit   Commonly known as:  GLUCAGON EMERGENCY           ondansetron 4 MG ODT tab   Commonly known as:  ZOFRAN ODT                Where to get your medicines      These medications were sent to [facility] [place], [place] - [address] [address] [address]     Phone:  [phone]     docusate sodium 100 MG tablet    ibuprofen 800 MG tablet                Protect others around you: Learn how to safely use, store and throw away your medicines at www.disposemymeds.org.             Medication List: This is a list of all your medications and when to take them. Check marks below indicate your daily home schedule. Keep this list as a reference.      Medications           Morning Afternoon Evening Bedtime As Needed    acetone (Urine) test Strp   Use as directed to test urine ketone if blood sugars are >250 or during illness with fever, nausea, vomiting, or abdominal pain.                                blood glucose monitoring test strip   Commonly known as:  RAYMUNDO CONTOUR NEXT   Use to test blood sugar 10-12 times daily or as directed.                                docusate sodium 100 MG tablet   Commonly known as:  COLACE   Take 100 mg by mouth daily                             " "   ibuprofen 800 MG tablet   Commonly known as:  ADVIL/MOTRIN   Take 1 tablet (800 mg) by mouth every 8 hours as needed for other (cramping)   Last time this was given:  800 mg on 1/7/2018  1:59 AM                                insulin glargine 100 UNIT/ML injection   Commonly known as:  LANTUS VIAL   Inject 19 u sq every 24 hours in the event of insulin pump therapy.  DO NOT FILL RX until requested by patient.                                insulin lispro 100 UNIT/ML injection   Commonly known as:  humaLOG   As directed via insulin pump - up to 160 units daily, adjusting pump settings due to increased insulin requirements and pregnancy                                insulin pump infusion   Date last updated:  12/23/16 Medtronic Minimed: Model 630G BASAL RATES and times: 12   AM (midnight): 0.85 units/hour   8     AM: 0.8 units/hour  6pm: 0.85 Basal Pattern A:  Fransisca Cabello will use when NA. CARB RATIO and times: 12   AM (midnight): 12 Corection Factor (Sensitivity) and times: 12   AM (midnight): 47 mg/dL BLOOD GLUCOSE TARGET and times: 12   AM (midnight): 100 - 120 5     AM:  90 - 110 9:30    PM:  100 - 120 Active Insulin Time:  3 hours Sensor:  No Carelink / Diasend username: [email or web address] Carelink / Diasend Password:  [email or web address]                                insulin syringe-needle U-100 31G X 5/16\" 0.3 ML   Commonly known as:  BD insulin syringe ultrafine   Use one syringe 4 daily or as directed in the event of insulin pump failure.  DO NOT DISPENSE until requested by patient                                MICROLET LANCETS Misc   Use one lancet 6 times daily for blood sugar testing                                PRENATAL VITAMINS PO                                  "

## 2018-01-04 NOTE — IP AVS SNAPSHOT
[facility]    [address]    [facility] [address]    Phone:  [phone]    Fax:  [phone]                                       After Visit Summary   1/4/2018    Fransisca Cabello    MRN: [id number]           After Visit Summary Signature Page     I have received my discharge instructions, and my questions have been answered. I have discussed any challenges I see with this plan with the nurse or doctor.    ..........................................................................................................................................  Patient/Patient Representative Signature      ..........................................................................................................................................  Patient Representative Print Name and Relationship to Patient    ..................................................               ................................................  Date                                            Time    ..........................................................................................................................................  Reviewed by Signature/Title    ...................................................              ..............................................  Date                                                            Time

## 2018-01-05 ENCOUNTER — ANESTHESIA EVENT (OUTPATIENT)
Dept: OBGYN | Facility: CLINIC | Age: 23
End: 2018-01-05
Payer: COMMERCIAL

## 2018-01-05 ENCOUNTER — ANESTHESIA (OUTPATIENT)
Dept: OBGYN | Facility: CLINIC | Age: 23
End: 2018-01-05
Payer: COMMERCIAL

## 2018-01-05 LAB
ABO + RH BLD: NORMAL
ABO + RH BLD: NORMAL
BLD GP AB SCN SERPL QL: NORMAL
BLOOD BANK CMNT PATIENT-IMP: NORMAL
SPECIMEN EXP DATE BLD: NORMAL
T PALLIDUM IGG+IGM SER QL: NEGATIVE

## 2018-01-05 PROCEDURE — 10907ZC DRAINAGE OF AMNIOTIC FLUID, THERAPEUTIC FROM PRODUCTS OF CONCEPTION, VIA NATURAL OR ARTIFICIAL OPENING: ICD-10-PCS | Performed by: OBSTETRICS & GYNECOLOGY

## 2018-01-05 PROCEDURE — 25000128 H RX IP 250 OP 636: Performed by: OBSTETRICS & GYNECOLOGY

## 2018-01-05 PROCEDURE — 25000125 ZZHC RX 250: Performed by: ANESTHESIOLOGY

## 2018-01-05 PROCEDURE — 40000671 ZZH STATISTIC ANESTHESIA CASE

## 2018-01-05 PROCEDURE — 37000011 ZZH ANESTHESIA WARD SERVICE

## 2018-01-05 PROCEDURE — 25000132 ZZH RX MED GY IP 250 OP 250 PS 637: Performed by: OBSTETRICS & GYNECOLOGY

## 2018-01-05 PROCEDURE — 25000128 H RX IP 250 OP 636: Performed by: ANESTHESIOLOGY

## 2018-01-05 PROCEDURE — 3E0R3BZ INTRODUCTION OF ANESTHETIC AGENT INTO SPINAL CANAL, PERCUTANEOUS APPROACH: ICD-10-PCS | Performed by: ANESTHESIOLOGY

## 2018-01-05 PROCEDURE — 25000125 ZZHC RX 250: Performed by: OBSTETRICS & GYNECOLOGY

## 2018-01-05 PROCEDURE — 0KQM0ZZ REPAIR PERINEUM MUSCLE, OPEN APPROACH: ICD-10-PCS | Performed by: OBSTETRICS & GYNECOLOGY

## 2018-01-05 PROCEDURE — 12000031 ZZH R&B OB CRITICAL

## 2018-01-05 PROCEDURE — 00HU33Z INSERTION OF INFUSION DEVICE INTO SPINAL CANAL, PERCUTANEOUS APPROACH: ICD-10-PCS | Performed by: ANESTHESIOLOGY

## 2018-01-05 RX ORDER — SODIUM CHLORIDE, SODIUM LACTATE, POTASSIUM CHLORIDE, CALCIUM CHLORIDE 600; 310; 30; 20 MG/100ML; MG/100ML; MG/100ML; MG/100ML
INJECTION, SOLUTION INTRAVENOUS CONTINUOUS
Status: DISCONTINUED | OUTPATIENT
Start: 2018-01-05 | End: 2018-01-06

## 2018-01-05 RX ORDER — BUPIVACAINE HYDROCHLORIDE 2.5 MG/ML
INJECTION, SOLUTION INFILTRATION; PERINEURAL PRN
Status: DISCONTINUED | OUTPATIENT
Start: 2018-01-05 | End: 2018-01-05

## 2018-01-05 RX ORDER — OXYCODONE AND ACETAMINOPHEN 5; 325 MG/1; MG/1
1 TABLET ORAL
Status: DISCONTINUED | OUTPATIENT
Start: 2018-01-05 | End: 2018-01-06

## 2018-01-05 RX ORDER — NALOXONE HYDROCHLORIDE 0.4 MG/ML
.1-.4 INJECTION, SOLUTION INTRAMUSCULAR; INTRAVENOUS; SUBCUTANEOUS
Status: DISCONTINUED | OUTPATIENT
Start: 2018-01-05 | End: 2018-01-06

## 2018-01-05 RX ORDER — METHYLERGONOVINE MALEATE 0.2 MG/ML
200 INJECTION INTRAVENOUS
Status: DISCONTINUED | OUTPATIENT
Start: 2018-01-05 | End: 2018-01-06

## 2018-01-05 RX ORDER — OXYTOCIN/0.9 % SODIUM CHLORIDE 30/500 ML
1-24 PLASTIC BAG, INJECTION (ML) INTRAVENOUS CONTINUOUS
Status: DISCONTINUED | OUTPATIENT
Start: 2018-01-05 | End: 2018-01-06

## 2018-01-05 RX ORDER — FENTANYL CITRATE 50 UG/ML
50-100 INJECTION, SOLUTION INTRAMUSCULAR; INTRAVENOUS
Status: DISCONTINUED | OUTPATIENT
Start: 2018-01-05 | End: 2018-01-06

## 2018-01-05 RX ORDER — EPHEDRINE SULFATE 50 MG/ML
5 INJECTION, SOLUTION INTRAMUSCULAR; INTRAVENOUS; SUBCUTANEOUS
Status: DISCONTINUED | OUTPATIENT
Start: 2018-01-05 | End: 2018-01-06

## 2018-01-05 RX ORDER — CARBOPROST TROMETHAMINE 250 UG/ML
250 INJECTION, SOLUTION INTRAMUSCULAR
Status: DISCONTINUED | OUTPATIENT
Start: 2018-01-05 | End: 2018-01-06

## 2018-01-05 RX ORDER — ONDANSETRON 2 MG/ML
4 INJECTION INTRAMUSCULAR; INTRAVENOUS EVERY 6 HOURS PRN
Status: DISCONTINUED | OUTPATIENT
Start: 2018-01-05 | End: 2018-01-06

## 2018-01-05 RX ORDER — OXYTOCIN 10 [USP'U]/ML
10 INJECTION, SOLUTION INTRAMUSCULAR; INTRAVENOUS
Status: DISCONTINUED | OUTPATIENT
Start: 2018-01-05 | End: 2018-01-06

## 2018-01-05 RX ORDER — MISOPROSTOL 100 UG/1
25 TABLET ORAL
Status: DISCONTINUED | OUTPATIENT
Start: 2018-01-05 | End: 2018-01-05

## 2018-01-05 RX ORDER — ZOLPIDEM TARTRATE 5 MG/1
5 TABLET ORAL
Status: DISCONTINUED | OUTPATIENT
Start: 2018-01-05 | End: 2018-01-06

## 2018-01-05 RX ORDER — ACETAMINOPHEN 500 MG
1000 TABLET ORAL ONCE
Status: COMPLETED | OUTPATIENT
Start: 2018-01-05 | End: 2018-01-05

## 2018-01-05 RX ORDER — LIDOCAINE 40 MG/G
CREAM TOPICAL
Status: DISCONTINUED | OUTPATIENT
Start: 2018-01-05 | End: 2018-01-06

## 2018-01-05 RX ORDER — OXYTOCIN/0.9 % SODIUM CHLORIDE 30/500 ML
100-340 PLASTIC BAG, INJECTION (ML) INTRAVENOUS CONTINUOUS PRN
Status: COMPLETED | OUTPATIENT
Start: 2018-01-05 | End: 2018-01-05

## 2018-01-05 RX ORDER — NALBUPHINE HYDROCHLORIDE 10 MG/ML
2.5-5 INJECTION, SOLUTION INTRAMUSCULAR; INTRAVENOUS; SUBCUTANEOUS EVERY 6 HOURS PRN
Status: DISCONTINUED | OUTPATIENT
Start: 2018-01-05 | End: 2018-01-06

## 2018-01-05 RX ORDER — IBUPROFEN 800 MG/1
800 TABLET, FILM COATED ORAL
Status: COMPLETED | OUTPATIENT
Start: 2018-01-05 | End: 2018-01-06

## 2018-01-05 RX ORDER — ACETAMINOPHEN 325 MG/1
650 TABLET ORAL EVERY 4 HOURS PRN
Status: DISCONTINUED | OUTPATIENT
Start: 2018-01-05 | End: 2018-01-06

## 2018-01-05 RX ADMIN — FENTANYL CITRATE 100 MCG: 50 INJECTION INTRAMUSCULAR; INTRAVENOUS at 10:34

## 2018-01-05 RX ADMIN — ACETAMINOPHEN 1000 MG: 500 TABLET, FILM COATED ORAL at 02:20

## 2018-01-05 RX ADMIN — SODIUM CHLORIDE, POTASSIUM CHLORIDE, SODIUM LACTATE AND CALCIUM CHLORIDE 1000 ML: 600; 310; 30; 20 INJECTION, SOLUTION INTRAVENOUS at 19:36

## 2018-01-05 RX ADMIN — OXYTOCIN-SODIUM CHLORIDE 0.9% IV SOLN 30 UNIT/500ML 340 ML/HR: 30-0.9/5 SOLUTION at 23:55

## 2018-01-05 RX ADMIN — SODIUM CHLORIDE, POTASSIUM CHLORIDE, SODIUM LACTATE AND CALCIUM CHLORIDE: 600; 310; 30; 20 INJECTION, SOLUTION INTRAVENOUS at 18:19

## 2018-01-05 RX ADMIN — OXYTOCIN-SODIUM CHLORIDE 0.9% IV SOLN 30 UNIT/500ML 1 MILLI-UNITS/MIN: 30-0.9/5 SOLUTION at 18:19

## 2018-01-05 RX ADMIN — FENTANYL CITRATE 100 MCG: 50 INJECTION INTRAMUSCULAR; INTRAVENOUS at 19:12

## 2018-01-05 RX ADMIN — Medication 25 MCG: at 14:01

## 2018-01-05 RX ADMIN — ZOLPIDEM TARTRATE 5 MG: 5 TABLET, FILM COATED ORAL at 02:20

## 2018-01-05 RX ADMIN — Medication: at 19:40

## 2018-01-05 RX ADMIN — Medication 25 MCG: at 16:01

## 2018-01-05 RX ADMIN — BUPIVACAINE HYDROCHLORIDE 15 ML: 2.5 INJECTION, SOLUTION INFILTRATION; PERINEURAL at 19:38

## 2018-01-05 NOTE — PROVIDER NOTIFICATION
01/05/18 1100   Provider Notification   Provider Name/Title Dr Silva   Method of Notification Phone   Request Evaluate - Remote   Notification Reason SVE;Status Update;Uterine Activity   MD updated of SVE, del rio 6, UCs every 2-6, fentanyl given. Orders to hold cervidil. MD to come round at lunch.

## 2018-01-05 NOTE — PROVIDER NOTIFICATION
01/04/18 2220   Provider Notification   Provider Name/Title Dr. Grossman   Method of Notification Phone   Request Evaluate - Remote   Notification Reason Status Update   MD updated on FHT's, contraction pattern, preeclampsia labs normal, BS at 69 on admission apple juice consumed by patient with patient not feeling symtomatic then dropping down to 54 with more juice consumed, and currently at 49 and patient eating a granola bar and suspending her insulin currently.  Orders received for oral atarax 100 mg.

## 2018-01-05 NOTE — PROVIDER NOTIFICATION
01/04/18 2013   Provider Notification   Provider Name/Title Dr. Grossman   Method of Notification Phone   Request Evaluate - Remote   Notification Reason Status Update   MD updated on patient arrival, FHT's, contraction pattern, and BP.  Orders received for cervidil cervical ripening, to utilize her continuous blood glucose value, and to perform preeclampsia labs if repeat BP elevated.

## 2018-01-05 NOTE — PLAN OF CARE
Problem: Patient Care Overview  Goal: Plan of Care/Patient Progress Review  Outcome: Improving  Patient has been able to rest in short periods tonight after taking Atarax and Ambien.  Patient reports feeling fatigued tonight as the uterine cramps have been persistent. Patient rated pain 2/10. Tylenol 1000 mg PO provided for relief. FHT category 1 tracing. Contractions show coupling at times occurring every 2-5 min apart; mild to moderate with palpation. Patient's blood sugars according to her pump have been within the normal range and denies any hypoglycemia symptoms. SVE 1/50/-3 at 0528, after patient reported to be very uncomfortable. Grey score 4. Repositioned patient to the rocking chair along with a warm blanket and patient reported some comfort.

## 2018-01-05 NOTE — PROVIDER NOTIFICATION
01/05/18 0926   Provider Notification   Provider Name/Title Dr Silva   Method of Notification Phone   Request Evaluate - Remote   Notification Reason SVE;Status Update   SVE 1.5/50/-3, Grey Score 4. Orders to repeat cervidil

## 2018-01-05 NOTE — PLAN OF CARE
Report received from Alicia MATA, assuming care at this time. IV fluid bolus continuing related to tachystystole. Patient reports cramps are improving. Contractions palpating moderate. Patient is coping.  FHT category 1 tracing. Denies any pre-eclampsia symptoms. Reviewed labor signs and symptoms. Patient reports BS is 96 now after having juice and granola.

## 2018-01-05 NOTE — H&P
No significant change in general health status based on examination of the patient, review of Nursing Admission Database and prenatal record.    Fransisca Cabello is a 22 year old female  at 36w6d here for cervical ripening for DM1. History of severe preeclampsia with last pregnancy, GHTN in this pregnancy. HELLP labs  within normal limits.     1)admit to L&D for cervical ripening. Cervidil overnight  2). EFW 6lb  3) DM1: will manage with her own glucometer and insulin pump  4) GHTN: HELLP labs within normal limits on . Repeat AST/ALT, CBC, Creatinine, and P:C ratio for pressures > 140/90 sustained for 20 min  5) comfort measures when appropriate  Anticipate .    Astrid Grossman MD

## 2018-01-05 NOTE — PLAN OF CARE
Patient arrived to L and D unit at 1930 for medical induction.  Denies leakage of fluid, vaginal bleeding, headache, epigastric pain, visual disturbances.  Fetal movement present.  External monitors applied.  Physical assessment and health history taken.  Admission questions answered and bill of rights reviewed.  PLAN:  Orders for cervical ripening from Dr. Grossman.

## 2018-01-05 NOTE — PROVIDER NOTIFICATION
01/05/18 1346   Provider Notification   Provider Name/Title Dr Silva   Method of Notification At Bedside   Request Evaluate in Person   Notification Reason SVE;Status Update   MD unable to AROM, SVE not changed. Decision for oral cytotec. Will only check blood sugars prior to meals and if patient becomes symptomatic, once in active labor will assess blood sugar every 1 hour and have her adjust her pump independently. Will continue to monitor and update as needed.

## 2018-01-05 NOTE — PROVIDER NOTIFICATION
01/05/18 0211   Provider Notification   Provider Name/Title    Method of Notification Phone   Request Evaluate - Remote   Notification Reason Uterine Activity;Pain;Patient Request;Status Update    called at patient's request for pain medication and sleep medication. Verbal order for 2 extra strength tylenol and 5 mg PO Ambien. Patient reports uterine cramping, mild with palpation. Contractions every 2-5 min apart. FHT category 1 tracing.

## 2018-01-06 PROCEDURE — 25000132 ZZH RX MED GY IP 250 OP 250 PS 637: Performed by: OBSTETRICS & GYNECOLOGY

## 2018-01-06 PROCEDURE — 72200001 ZZH LABOR CARE VAGINAL DELIVERY SINGLE

## 2018-01-06 PROCEDURE — 12000031 ZZH R&B OB CRITICAL

## 2018-01-06 PROCEDURE — 59400 OBSTETRICAL CARE: CPT | Performed by: OBSTETRICS & GYNECOLOGY

## 2018-01-06 RX ORDER — NALOXONE HYDROCHLORIDE 0.4 MG/ML
.1-.4 INJECTION, SOLUTION INTRAMUSCULAR; INTRAVENOUS; SUBCUTANEOUS
Status: DISCONTINUED | OUTPATIENT
Start: 2018-01-06 | End: 2018-01-07 | Stop reason: HOSPADM

## 2018-01-06 RX ORDER — BISACODYL 10 MG
10 SUPPOSITORY, RECTAL RECTAL DAILY PRN
Status: DISCONTINUED | OUTPATIENT
Start: 2018-01-08 | End: 2018-01-07 | Stop reason: HOSPADM

## 2018-01-06 RX ORDER — ACETAMINOPHEN 325 MG/1
650 TABLET ORAL EVERY 4 HOURS PRN
Status: DISCONTINUED | OUTPATIENT
Start: 2018-01-06 | End: 2018-01-07 | Stop reason: HOSPADM

## 2018-01-06 RX ORDER — OXYTOCIN/0.9 % SODIUM CHLORIDE 30/500 ML
340 PLASTIC BAG, INJECTION (ML) INTRAVENOUS CONTINUOUS PRN
Status: DISCONTINUED | OUTPATIENT
Start: 2018-01-06 | End: 2018-01-07 | Stop reason: HOSPADM

## 2018-01-06 RX ORDER — IBUPROFEN 800 MG/1
800 TABLET, FILM COATED ORAL EVERY 6 HOURS PRN
Status: DISCONTINUED | OUTPATIENT
Start: 2018-01-06 | End: 2018-01-07 | Stop reason: HOSPADM

## 2018-01-06 RX ORDER — AMOXICILLIN 250 MG
2 CAPSULE ORAL 2 TIMES DAILY PRN
Status: DISCONTINUED | OUTPATIENT
Start: 2018-01-06 | End: 2018-01-07 | Stop reason: HOSPADM

## 2018-01-06 RX ORDER — MISOPROSTOL 200 UG/1
400 TABLET ORAL
Status: DISCONTINUED | OUTPATIENT
Start: 2018-01-06 | End: 2018-01-07 | Stop reason: HOSPADM

## 2018-01-06 RX ORDER — OXYTOCIN 10 [USP'U]/ML
10 INJECTION, SOLUTION INTRAMUSCULAR; INTRAVENOUS
Status: DISCONTINUED | OUTPATIENT
Start: 2018-01-06 | End: 2018-01-07 | Stop reason: HOSPADM

## 2018-01-06 RX ORDER — AMOXICILLIN 250 MG
1 CAPSULE ORAL 2 TIMES DAILY PRN
Status: DISCONTINUED | OUTPATIENT
Start: 2018-01-06 | End: 2018-01-07 | Stop reason: HOSPADM

## 2018-01-06 RX ORDER — NICOTINE POLACRILEX 4 MG
15-30 LOZENGE BUCCAL
Status: DISCONTINUED | OUTPATIENT
Start: 2018-01-06 | End: 2018-01-07 | Stop reason: HOSPADM

## 2018-01-06 RX ORDER — HYDROCORTISONE 2.5 %
CREAM (GRAM) TOPICAL 3 TIMES DAILY PRN
Status: DISCONTINUED | OUTPATIENT
Start: 2018-01-06 | End: 2018-01-07 | Stop reason: HOSPADM

## 2018-01-06 RX ORDER — LANOLIN 100 %
OINTMENT (GRAM) TOPICAL
Status: DISCONTINUED | OUTPATIENT
Start: 2018-01-06 | End: 2018-01-07 | Stop reason: HOSPADM

## 2018-01-06 RX ORDER — ASPIRIN 81 MG/1
81 TABLET, CHEWABLE ORAL DAILY
Status: DISCONTINUED | OUTPATIENT
Start: 2018-01-06 | End: 2018-01-06

## 2018-01-06 RX ORDER — OXYTOCIN/0.9 % SODIUM CHLORIDE 30/500 ML
100 PLASTIC BAG, INJECTION (ML) INTRAVENOUS CONTINUOUS
Status: DISCONTINUED | OUTPATIENT
Start: 2018-01-06 | End: 2018-01-07 | Stop reason: HOSPADM

## 2018-01-06 RX ORDER — DEXTROSE MONOHYDRATE 25 G/50ML
25-50 INJECTION, SOLUTION INTRAVENOUS
Status: DISCONTINUED | OUTPATIENT
Start: 2018-01-06 | End: 2018-01-07 | Stop reason: HOSPADM

## 2018-01-06 RX ADMIN — IBUPROFEN 800 MG: 800 TABLET, FILM COATED ORAL at 00:33

## 2018-01-06 RX ADMIN — ACETAMINOPHEN 650 MG: 325 TABLET, FILM COATED ORAL at 03:15

## 2018-01-06 RX ADMIN — ACETAMINOPHEN 650 MG: 325 TABLET, FILM COATED ORAL at 09:48

## 2018-01-06 RX ADMIN — IBUPROFEN 800 MG: 800 TABLET, FILM COATED ORAL at 05:57

## 2018-01-06 RX ADMIN — IBUPROFEN 800 MG: 800 TABLET, FILM COATED ORAL at 15:25

## 2018-01-06 RX ADMIN — SENNOSIDES AND DOCUSATE SODIUM 1 TABLET: 8.6; 5 TABLET ORAL at 09:48

## 2018-01-06 NOTE — ADDENDUM NOTE
Addendum  created 01/05/18 9830 by Derek Locke MD    Anesthesia Event edited, Procedure Event Log accessed

## 2018-01-06 NOTE — ANESTHESIA POSTPROCEDURE EVALUATION
Patient: Fransisca Cabello    * No procedures listed *    Diagnosis:* No pre-op diagnosis entered *  Diagnosis Additional Information: Labor pain  Dr. Silva    Anesthesia Type:  Epidural    Note:  Anesthesia Post Evaluation    Last vitals:  Vitals:    01/06/18 0215 01/06/18 0300 01/06/18 0600   BP: 130/69 138/83 130/84   Pulse:   91   Resp:  20 20   Temp:  97.9  F (36.6  C) 98  F (36.7  C)   SpO2:            Electronically Signed By: Trae Kearney MD  January 6, 2018  7:35 AM    S/P epidural for labor.   I or my partner was immediately available for management of this patient during epidural analgesia infusion.  VSS.  Doing well. Block resolved.  Neuro at baseline. Denies positional headache. Minimal side effects easily managed w/ PRN meds. No apparent anesthetic complications. No follow-up required.    Trae Kearney MD

## 2018-01-06 NOTE — L&D DELIVERY NOTE
"Delivery Summary    Fransisca Cabello MRN# [id number]   Age: 22 year old YOB: 1995     ASSESSMENT & PLAN:         Labor Event Times    Labor onset date:  18 Onset time:   7:00 PM   Dilation complete date:  18 Complete time:  10:31 PM   Start pushing date/time:  2018 2245            Labor Events     labor?:  No    steroids:  None   Labor Type:  Induction, AROM   Predominate monitoring during 1st stage:  continuous electronic fetal monitoring      Rupture date/time:     Rupture type:  Artificial Rupture of Membranes      Induction:  Cervidil   Induction date/time:     Cervical ripening date/time:     Indications for induction:  Hypertension      1:1 continuous labor support provided by?:  RN          Delivery/Placenta Date and Time    Delivery Date:  18 Delivery Time:  11:47 PM   Placenta Date/Time:  2018 11:55 PM   Oxytocin given at the time of delivery:  after delivery of placenta      Vaginal Counts    Initial count performed by 2 team members:   Two Team Members   ADOLFO [address] Dr.          Needles Suture [place] Sponges Instruments   Initial counts 2  5    Added to count  1     Final counts          Placed during labor Accounted for at the end of labor               Apgars    Living status:  Living    1 Minute 5 Minute 10 Minute 15 Minute 20 Minute   Skin color: 1  1       Heart rate: 2  2       Reflex irritability: 2  2       Muscle tone: 2  2       Respiratory effort: 2  2       Total: 9  9          Apgars assigned by:  JULIANA CORRAL RN      Cord    Vessels:  3 Vessels Complications:  None   Cord Blood Disposition:  Lab Gases Sent?:  No         [place] Resuscitation    Methods:  None         [place] Measurements    Weight:  6 lb 9.1 oz Length:  1' 8\"   Head circumference:  34.3 cm       Delivery (Maternal) (Provider to Complete) ([id number])          Mother's Information  Mother: Fransisca Cabello #[id number]    Start of Mother's " Information     IO Blood Loss  18 1900 - 18 0032    Mom's I/O Activity            End of Mother's Information  Mother: Fransisca Cabello #[id number]            Delivery - Provider to Complete ([id number])    Delivering clinician:  DICKSON SILVA   Attempted Delivery Types (Choose all that apply):  Spontaneous Vaginal Delivery   Delivery Type (Choose the 1 that will go to the Birth History):  Vaginal, Spontaneous Delivery                     Other personnel:   Provider Role   JULIANA CORRAL Delivery Nurse   COTY STOVALL Delivery Assist            Placenta    Delayed Cord Clamping:  Done   Date/Time:  2018 11:55 PM            Delivery Note:   Delivery Clinician: Dickson Silva MD    course : induction for  DM type I , HTN, cervidil, pitocin  Intrapartum: SROM: no   AROM: yes      : yes     Placenta spontaneous : yes      Episiotomy: no      Laceration: yes    Degree : 2nd degree, bilateral labial     Sex: male           Weight:6lbs 9 oz      Apgars: 9 at 1 min      9 at 5 min      MD Dickson De La Fuente MD, MD

## 2018-01-06 NOTE — PLAN OF CARE
Problem: Patient Care Overview  Goal: Plan of Care/Patient Progress Review  To NICU x 2 and tolerating activity well. Voiding without difficulty. VSS. Denies signs or symptons of preeclampsia. Using her own continuous glucose pump, blood glucose per pump 110 @ 0600.

## 2018-01-06 NOTE — PROVIDER NOTIFICATION
"   01/05/18 1930   Provider Notification   Provider Name/Title Dr Silva   Method of Notification In Department   Request Evaluate - Remote   Notification Reason Status Update   MD notified that IV Fentanyl given at 1915.  1920: pt called nurse to room reporting difficulty breathing--chest felt \"heavy\". SpO2 WNL. Respirations 20, lungs clear. Pt now comfortable since epdiural, however 10L O2 still placed and SpO2 90-92%. No new orders received at this time will continue to monitor and update as needed.  "

## 2018-01-06 NOTE — ANESTHESIA PREPROCEDURE EVALUATION
PAC NOTE:       ANESTHESIA PRE EVALUATION:  Anesthesia Evaluation       history and physical reviewed .      No history of anesthetic complications          ROS/MED HX    ENT/Pulmonary:  - neg pulmonary ROS     Neurologic:  - neg neurologic ROS     Cardiovascular:  - neg cardiovascular ROS       METS/Exercise Tolerance:     Hematologic:         Musculoskeletal:         GI/Hepatic:  - neg GI/hepatic ROS       Renal/Genitourinary:         Endo:         Psychiatric:         Infectious Disease:         Malignancy:         Other:                     Physical Exam  Normal systems: cardiovascular, pulmonary and dental    Airway   Mallampati: II  TM distance: > 3 FB  Neck ROM: full  Mouth opening: > 3 cm    Dental     Cardiovascular       Pulmonary     Other findings: Lab Test        01/04/18 12/31/17 12/22/17                       2100          1159          1152          WBC          9.4          8.6          8.8           HGB          11.4*        11.0*        11.2*         MCV          92           94           94            PLT          256          251          261            Lab Test        12/31/17 12/27/17 12/22/17      --          12/05/17      --          11/18/17      --          11/18/16     12/02/15                       1159          1325          1152           --           1659           --           1205           --           1335          1339          NA            --           --           --           --          138           --           --           --          138          140           POTASSIUM     --           --           --           --          3.6           --           --           --          4.3          3.7           CHLORIDE      --           --           --           --          105           --           --           --          104          106           CO2           --           --           --           --          23            --           --           --           27           26            BUN           --           --           --           --          7             --           --           --          9            12            CR           0.61         0.63         0.58           < >        0.56           < >        0.60           < >        0.61         0.56          ANIONGAP      --           --           --           --          10            --           --           --          7            8             ROBI           --           --           --           --          8.5           --           --           --          9.0          8.9           GLC           --           --           --           --          92            --          161*          --          220*         196*           < > = values in this interval not displayed.                                  Pt with preexisting type 1 DM      Anesthesia Plan      History & Physical Review      ASA Status:  .  OB Epidural Asa: 2            Postoperative Care      Consents  Anesthetic plan, risks, benefits and alternatives discussed with:  Patient..                            .

## 2018-01-06 NOTE — PLAN OF CARE
Data: Fransisca Cabello transferred to [facility] via wheelchair at 0230. Baby transferred via parent's arms.  Action: Receiving unit notified of transfer: Yes. Patient and family notified of room change. Report given to ADOLFO Rosas at 0240. Belongings sent to receiving unit. Accompanied by Registered Nurse. Oriented patient to surroundings. Call light within reach. ID bands double-checked with receiving RN.  Response: Patient tolerated transfer and is stable.

## 2018-01-06 NOTE — PROVIDER NOTIFICATION
01/05/18 1800   Provider Notification   Provider Name/Title Dr Silva   Method of Notification Phone   Request Evaluate - Remote   Notification Reason SVE;Status Update;Uterine Activity   Orders for pitocin augmentation

## 2018-01-06 NOTE — ADDENDUM NOTE
Addendum  created 01/05/18 2143 by Derek Locke MD    Anesthesia Event edited, Procedure Event Log accessed

## 2018-01-06 NOTE — PLAN OF CARE
Problem: Postpartum (Vaginal Delivery) (Adult,Obstetrics,Pediatric)  Goal: Signs and Symptoms of Listed Potential Problems Will be Absent, Minimized or Managed (Postpartum)  Signs and symptoms of listed potential problems will be absent, minimized or managed by discharge/transition of care (reference Postpartum (Vaginal Delivery) (Adult,Obstetrics,Pediatric) CPG).   Outcome: Improving  Data: Vital signs within normal limits. Postpartum checks within normal limits - see flow record. Patient eating and drinking normally. Patient able to empty bladder independently and is up ambulating. No apparent signs of infection. laceration healing well. Patient performing self cares and is able to care for infant.  Action: Patient medicated during the shift for pain. See MAR. Patient reassessed within 1 hour after each medication and pain was improved - patient stated she was comfortable. Patient education done about breast. See flow record.  Response: Positive attachment behaviors observed with infant. Support persons FOB present.   Plan: Anticipate discharge on 1/7.

## 2018-01-06 NOTE — PROVIDER NOTIFICATION
01/05/18 2043   Provider Notification   Provider Name/Title Dr Silva   Method of Notification Phone   Request Evaluate - Remote   Notification Reason SVE;Status Update   MD updated of SVE 4/90/-2. SpO2 continues to vary between 79-98% with 10L O2. Patient reports no chest discomfort, lungs sounds clear and equal bilaterally. Heart WNL. No new orders received at this time, will continue to monitor and update as needed.

## 2018-01-06 NOTE — PROVIDER NOTIFICATION
01/05/18 2231   Provider Notification   Provider Name/Title Beard   Method of Notification In Department   Request Evaluate - Remote   Notification Reason SVE;Status Update  (starting to push)

## 2018-01-06 NOTE — ANESTHESIA PROCEDURE NOTES
Peripheral nerve/Neuraxial procedure note :        Assessment/Narrative  .  .  . Comments:  Pt seen and interviewed prior to epidural placement for labor analgesia.  This epidural is to be placed in anticipation of normal vaginal delivery.  Risk discussed and consent given prior to performance of procedure.  Time out consisting of identification of patient and desire for epidural analgesia was confirmed.  Sterile prep of lumbar spine was accomplished with povidone iodine three times.  L34 interspace was identified.  Local anesthetic infiltration with 1.5% lido with epi 1/200k was performed with 1.5 cc.  17 G Tuohy needle was advanced in the midline with loss of resistance technique.  No CSF, blood, or paresthesias were noted with placement.  Test dose of 1.5% Lidocaine with epi 1/200k, 3 cc., was injected without evidence of intrathecal or intravascular injection.  Incremental bolus of 0.25% Bupivicaine was injected to a total volume of 15 cc.  Epidural cath 19 G was advanced through needle approx. 6 cm.  No paresthesia was noted with placement and aspiration of cath was negative for blood.  Catheter secured with tegaderm and tape.  Epidural infusion begun after double check of pump settings.  Nursing to inform if there are any complications, but none were noted prior to leaving patient room.  I, or my partners, remain immediately available for management of any issues or complications.  I, or my partners, will monitor at appropriate intervals.  NIALL Locke MD

## 2018-01-06 NOTE — PROVIDER NOTIFICATION
01/06/18 0800   Provider Notification   Provider Name/Title Yesy   Method of Notification In Department   Dr Hayward informed of low BS, will eat and recheck. Discontinued aspirin.

## 2018-01-06 NOTE — PROVIDER NOTIFICATION
01/05/18 2134   Provider Notification   Provider Name/Title Beard   Method of Notification Phone   Request Evaluate - Remote   Notification Reason SVE;Status Update   Please come for SVE 9.5, pt feeling lots of pressure

## 2018-01-06 NOTE — PLAN OF CARE
Problem: Postpartum (Vaginal Delivery) (Adult,Obstetrics,Pediatric)  Goal: Signs and Symptoms of Listed Potential Problems Will be Absent, Minimized or Managed (Postpartum)  Signs and symptoms of listed potential problems will be absent, minimized or managed by discharge/transition of care (reference Postpartum (Vaginal Delivery) (Adult,Obstetrics,Pediatric) CPG).   Report received and care assumed @ 0240. Postpartum check @ 0300 WNL.Tylenol given for cramping discomfort. Infant blood sugar @ 0328 39. Pt was able to express 6 cc colostrum and this was given to infant via syringe after glucose gel. Parents requested infant to nursery @ 0345 until next feeding. 0411 blood glucose 28. Gel given and consent for donor milk obtained. Pediatrician notified. Admission to NICU ordered. All blood sugars and plan of care reviewed with Eran and questions answered.

## 2018-01-07 VITALS
DIASTOLIC BLOOD PRESSURE: 77 MMHG | TEMPERATURE: 98.8 F | HEART RATE: 91 BPM | HEIGHT: 67 IN | SYSTOLIC BLOOD PRESSURE: 135 MMHG | OXYGEN SATURATION: 96 % | RESPIRATION RATE: 16 BRPM

## 2018-01-07 LAB — HGB BLD-MCNC: 11.7 G/DL (ref 11.7–15.7)

## 2018-01-07 PROCEDURE — 36415 COLL VENOUS BLD VENIPUNCTURE: CPT | Performed by: OBSTETRICS & GYNECOLOGY

## 2018-01-07 PROCEDURE — 25000132 ZZH RX MED GY IP 250 OP 250 PS 637: Performed by: OBSTETRICS & GYNECOLOGY

## 2018-01-07 PROCEDURE — 85018 HEMOGLOBIN: CPT | Performed by: OBSTETRICS & GYNECOLOGY

## 2018-01-07 RX ORDER — ASPIRIN 81 MG
100 TABLET, DELAYED RELEASE (ENTERIC COATED) ORAL DAILY
Qty: 60 TABLET | Refills: 1 | Status: SHIPPED | OUTPATIENT
Start: 2018-01-07 | End: 2018-03-06

## 2018-01-07 RX ORDER — IBUPROFEN 800 MG/1
800 TABLET, FILM COATED ORAL EVERY 8 HOURS PRN
Qty: 90 TABLET | Refills: 0 | Status: SHIPPED | OUTPATIENT
Start: 2018-01-07 | End: 2018-03-06

## 2018-01-07 RX ADMIN — ACETAMINOPHEN 650 MG: 325 TABLET, FILM COATED ORAL at 05:06

## 2018-01-07 RX ADMIN — IBUPROFEN 800 MG: 800 TABLET, FILM COATED ORAL at 01:59

## 2018-01-07 RX ADMIN — IBUPROFEN 800 MG: 800 TABLET, FILM COATED ORAL at 11:46

## 2018-01-07 NOTE — PLAN OF CARE
Discharge instructions completed.  Patient states she understands all discharge instructions and all her questions have been answered.  Verbalizes when she needs to return to clinic for follow up for herself. She is caring for herself independently.  Prescriptions reviewed and sent to pharmacy.  Postpartum depression symptoms reviewed and encouraged frequent review of depression scale. Reviewed pre-eclampsia signs and symptoms. Baby is in NICU. Mother discharged and will be staying bed and board at this time.

## 2018-01-07 NOTE — PLAN OF CARE
Discharge instructions completed.  Patient states she understands all discharge instructions and all her questions have been answered.  Verbalizes when she needs to return to clinic for follow up for herself and baby.  She is caring for herself and her baby independently.  Prescriptions reviewed and sent to pharmacy.  Postpartum depression symptoms reviewed and encouraged frequent review of depression scale. Ready for discharge, will call when ready to leave.

## 2018-01-07 NOTE — PROGRESS NOTES
[facility]   Post-Partum Progress Note          Assessment and Plan:    Assessment:   Post-partum day #2  Normal spontaneous vaginal delivery  L&D complications: Type 1DM; gestational HTN      Doing well.  No excessive bleeding  Pain well-controlled.  Baby in NICU      Plan:   Ambulation encouraged  Monitor wound for signs of infection  Discharge later today           Interval History:   Doing well.  Pain is well-controlled.  No fevers.  No history of foul-smelling vaginal discharge.  Good appetite.  Denies chest pain, shortness of breath, nausea or vomiting.  Vaginal bleeding is similar to a heavy menstrual flow.  Ambulatory.  Breastfeeding well.           Significant Problems:      Patient Active Problem List   Diagnosis     Type 1 diabetes mellitus, uncontrolled (H)     Encounter for long-term (current) use of insulin (H)     Type 1 diabetes mellitus without complication (H)     Insulin pump in place     Supervision of other high risk pregnancy, antepartum     Pre-existing type 1 diabetes mellitus during pregnancy in first trimester     Indication for care in labor or delivery     Hypertension in pregnancy     H/O severe pre-eclampsia     Pre-eclampsia     Labor and delivery, indication for care             Review of Systems:    The Review of Systems is negative other than noted in the HPI          Medications:   All medications related to the patient's surgery have been reviewed  Current Facility-Administered Medications   Medication     glucose 40 % gel 15-30 g    Or     dextrose 50 % injection 25-50 mL    Or     glucagon injection 1 mg     oxytocin (PITOCIN) 30 units in 500 mL 0.9% NaCl infusion     ibuprofen (ADVIL/MOTRIN) tablet 800 mg     acetaminophen (TYLENOL) tablet 650 mg     naloxone (NARCAN) injection 0.1-0.4 mg     senna-docusate (SENOKOT-S;PERICOLACE) 8.6-50 MG per tablet 1 tablet    Or     senna-docusate (SENOKOT-S;PERICOLACE) 8.6-50 MG per tablet 2 tablet     [START ON 1/8/2018]  "bisacodyl (DULCOLAX) Suppository 10 mg     [START ON 1/8/2018] sodium phosphate (FLEET ENEMA) 1 enema     hydrocortisone 2.5 % cream     lanolin ointment     lactated ringers BOLUS 1,000 mL     oxytocin (PITOCIN) 30 units in 500 mL 0.9% NaCl infusion     oxytocin (PITOCIN) injection 10 Units     misoprostol (CYTOTEC) tablet 400 mcg     NO Rho (D) immune globulin (RhoGam) needed - mother Rh POSITIVE     No MMR Needed - Assessment: Patient does not need MMR vaccine     Tdap (tetanus-diphtheria-acell pertussis) (ADACEL) injection 0.5 mL             Physical Exam:   All vitals stable    Vital signs:  Temp: 97.5  F (36.4  C) Temp src: Oral BP: 120/66   Heart Rate: 73 Resp: 16       Height: 170.2 cm (5' 7\")    Estimated body mass index is 30.54 kg/(m^2) as calculated from the following:    Height as of 12/31/17: 1.702 m (5' 7\").    Weight as of 12/31/17: 88.5 kg (195 lb).      Uterine fundus is firm, non-tender and at the level of the umbilicus  Extremities: +1 edema bilaterally          Data:     All laboratory data related to this surgery reviewed  Hemoglobin   Date Value Ref Range Status   01/07/2018 11.7 11.7 - 15.7 g/dL Final   01/04/2018 11.4 (L) 11.7 - 15.7 g/dL Final   12/31/2017 11.0 (L) 11.7 - 15.7 g/dL Final   12/22/2017 11.2 (L) 11.7 - 15.7 g/dL Final   12/15/2017 11.3 (L) 11.7 - 15.7 g/dL Final     Comment:     Reviewed: OK with previous     No imaging studies have been ordered    Sandra Solano MD    "

## 2018-01-07 NOTE — PLAN OF CARE
Problem: Patient Care Overview  Goal: Plan of Care/Patient Progress Review  Outcome: Improving  PT AVSS. Ambulating. Tolerating regular diet. Voiding. Makes frequent visits to NICU to see and breastfeed the baby. Pain is controlled with Tylenol and Ibuprofen. Breast pump and supplies were provided.

## 2018-01-07 NOTE — DISCHARGE INSTRUCTIONS
Postpartum Vaginal Delivery Instructions     Lactation: [phone]    Activity       Ask family and friends for help when you need it.    Do not place anything in your vagina for 6 weeks.    You are not restricted on other activities, but take it easy for a few weeks to allow your body to recover from delivery.  You are able to do any activities you feel up to that point.    No driving until you have stopped taking your pain medications (usually two weeks after delivery).     Call your health care provider if you have any of these symptoms:       Increased pain, swelling, redness, or fluid around your stiches from an episiotomy or perineal tear.    A fever above 100.4 F (38 C) with or without chills when placing a thermometer under your tongue.    You soak a sanitary pad with blood within 1 hour, or you see blood clots larger than a golf ball.    Bleeding that lasts more than 6 weeks.    Vaginal discharge that smells bad.    Severe pain, cramping or tenderness in your lower belly area.    A need to urinate more frequently (use the toilet more often), more urgently (use the toilet very quickly), or it burns when you urinate.    Nausea and vomiting.    Redness, swelling or pain around a vein in your leg.    Problems breastfeeding or a red or painful area on your breast.    Chest pain and cough or are gasping for air.    Problems coping with sadness, anxiety, or depression.  If you have any concerns about hurting yourself or the baby, call your provider immediately.     You have questions or concerns after you return home.     Keep your hands clean:  Always wash your hands before touching your perineal area and stitches.  This helps reduce your risk of infection.  If your hands aren't dirty, you may use an alcohol hand-rub to clean your hands. Keep your nails clean and short.

## 2018-01-07 NOTE — PLAN OF CARE
Problem: Patient Care Overview  Goal: Plan of Care/Patient Progress Review  Outcome: No Change  VSS. Ibuprofen and tylenol for pain. Using t pump. Visiting  in NICU to breastfeed. Independent with cares.

## 2018-01-08 ENCOUNTER — LACTATION ENCOUNTER (OUTPATIENT)
Age: 23
End: 2018-01-08

## 2018-01-08 NOTE — CONSULTS
Note copied from baby's chart: 1/8/17  D) SW responding to MD consult. SW met with Fransisca and Teja who are  and reside in [place] with their Daughter Jad who was born at 34.2 weeks and was in the NICU in 2014. She is now 3 years old. Their son Liban was born at 37 weeks and is currently in the NICU for hypoglycemia. The couple report that they thought he may need to be in the NICU due to MOB's Type 1 diabetes. The couple is prepared for him at home and are on WIC. They had questions re:MA insurance for Liban. 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 Fransisca's family is in [place] but are helping with Jad at this time. Teja's family will be wintering in [place] beginning this week.   Fransisca has a history of postpartum depression and received treatment for this. However she feels she is doing much better this time and she scored 1 on EPDS  I) SW discussed role and provided supportive listening. SW gave information on baby blues/postpartum and discussed this. SW gave Parent Resource Guide with [facility] information on applying for MA for baby. The couple requested Financial Counselor and SW made this referral.    A) Fransisca is A&O with appropriate affect and eye contact. Teja is at bedside and holding/bonding with baby. Fransisca has good insight into her mental health needs and Teja is a good support for her. The couple feel confident that they are able to spend time together with their children at home when Liban is d/c. The couple anticipate a short NICU stay for Liban.   P) SW following and available as needed.

## 2018-01-08 NOTE — LACTATION NOTE
This note was copied from a baby's chart.  As LC observed Liban at breast. On right breast and having difficulty maintaining latch. Fransisca  Reports she has sore nipples. I suggested it might be from the tongue tie.  Recommend nipple shield use due to difficulty laching and tongue tie. With nipple shield noted to be uncoordinated for several attempts, finally able to latch. Using short sucking bursts for before transistioning to more rhythmic pattern. Fransisca uses compressions throughout feeding. Her milk volume is abundant. Fransisca noted to have small scab on nipple. She reports its from pumping. We reviewed pump settings. I gave her an abdominal binder for hands free pumping and Mothers love cream. Will continue to follow and support.

## 2018-01-10 ENCOUNTER — LACTATION ENCOUNTER (OUTPATIENT)
Age: 23
End: 2018-01-10

## 2018-01-10 NOTE — LACTATION NOTE
This note was copied from a baby's chart.  Late entry for 1300: Fransisca has not put Liban to breast for >24 hours. She intends to return to breast feeding as soon as they get home. I recommended using the nipple shield to re establish breast feeding. I reinforced due to his tongue tie she may need to continue use of nipple shield for comfort. She is pumping and abundance of breast milk. She reports she had abundant supply with first child and expresses confidence in management and weaning volume and pumping as needed. She acknowledges she has my card for follow  in the outpatient lactation clinic and or future support.

## 2018-01-12 ENCOUNTER — ALLIED HEALTH/NURSE VISIT (OUTPATIENT)
Dept: NURSING | Facility: CLINIC | Age: 23
End: 2018-01-12
Payer: COMMERCIAL

## 2018-01-12 VITALS — SYSTOLIC BLOOD PRESSURE: 136 MMHG | DIASTOLIC BLOOD PRESSURE: 96 MMHG

## 2018-01-12 DIAGNOSIS — R03.0 ELEVATED BLOOD PRESSURE READING WITHOUT DIAGNOSIS OF HYPERTENSION: Primary | ICD-10-CM

## 2018-01-12 PROCEDURE — 99207 ZZC NO CHARGE NURSE ONLY: CPT

## 2018-01-12 NOTE — MR AVS SNAPSHOT
After Visit Summary   1/12/2018    Fransisca Cabello    MRN: [id number]           Patient Information     Date Of Birth          1995        Visit Information        Provider Department      1/12/2018 3:30 PM CR GOLD MA/LPN [facility]        Today's Diagnoses     Elevated blood pressure reading without diagnosis of hypertension    -  1       Follow-ups after your visit        Your next 10 appointments already scheduled     Feb 16, 2018  9:15 AM CST   Post Partum with Cheaynne Silva MD   [facility]    [address] [place] [address] [id number] [phone]              Who to contact     If you have questions or need follow up information about today's clinic visit or your schedule please contact [facility] directly at [phone].  Normal or non-critical lab and imaging results will be communicated to you by MyChart, letter or phone within 4 business days after the clinic has received the results. If you do not hear from us within 7 days, please contact the clinic through GENIAChart or phone. If you have a critical or abnormal lab result, we will notify you by phone as soon as possible.  Submit refill requests through [employer] or call your pharmacy and they will forward the refill request to us. Please allow 3 business days for your refill to be completed.          Additional Information About Your Visit        MyChart Information     [employer] gives you secure access to your electronic health record. If you see a primary care provider, you can also send messages to your care team and make appointments. If you have questions, please call your primary care clinic.  If you do not have a primary care provider, please call [phone] and they will assist you.        Care EveryWhere ID     This is your Care EveryWhere ID. This could be used by other organizations to access your [facility]  records  [id number]        Your Vitals Were     Last Period                   04/21/2017 (Exact Date)            Blood Pressure from Last 3 Encounters:   01/12/18 (!) 136/96   01/07/18 135/77   12/31/17 135/82    Weight from Last 3 Encounters:   12/31/17 195 lb (88.5 kg)   12/27/17 195 lb (88.5 kg)   12/22/17 195 lb (88.5 kg)              Today, you had the following     No orders found for display       Primary Care Provider Fax #    Physician No Ref-Primary [phone]       No address on file        Equal Access to Services     [facility] : Hadcarri Quintana, martha rivera, gwyn dinero, hernan spicer . So [facility] [phone].    ATENCIÓN: Si habla español, tiene a boateng disposición servicios gratuitos de asistencia lingüística. Llame al [phone].    We comply with applicable federal civil rights laws and [place] laws. We do not discriminate on the basis of race, color, national origin, age, disability, sex, sexual orientation, or gender identity.            Thank you!     Thank you for choosing [facility]  for your care. Our goal is always to provide you with excellent care. Hearing back from our patients is one way we can continue to improve our services. Please take a few minutes to complete the written survey that you may receive in the mail after your visit with us. Thank you!             Your Updated Medication List - Protect others around you: Learn how to safely use, store and throw away your medicines at www.disposemymeds.org.          This list is accurate as of: 1/12/18  3:48 PM.  Always use your most recent med list.                   Brand Name Dispense Instructions for use Diagnosis    acetone (Urine) test Strp     100 each    Use as directed to test urine ketone if blood sugars are >250 or during illness with fever, nausea, vomiting, or abdominal pain.    High-risk pregnancy, young primigravida, third trimester        "blood glucose monitoring test strip    RAYMUNDO CONTOUR NEXT    1000 each    Use to test blood sugar 10-12 times daily or as directed.    Uncontrolled type 1 diabetes mellitus without complication (H), Supervision of other high risk pregnancy, antepartum       docusate sodium 100 MG tablet    COLACE    60 tablet    Take 100 mg by mouth daily     (spontaneous vaginal delivery)       ibuprofen 800 MG tablet    ADVIL/MOTRIN    90 tablet    Take 1 tablet (800 mg) by mouth every 8 hours as needed for other (cramping)     (spontaneous vaginal delivery)       insulin glargine 100 UNIT/ML injection    LANTUS VIAL    10 mL    Inject 19 u sq every 24 hours in the event of insulin pump therapy.  DO NOT FILL RX until requested by patient.    Type 1 diabetes mellitus without complication (H), Insulin pump in place, Encounter for long-term (current) use of insulin (H), High-risk pregnancy, young primigravida, third trimester       insulin lispro 100 UNIT/ML injection    humaLOG    5 vial    As directed via insulin pump - up to 160 units daily, adjusting pump settings due to increased insulin requirements and pregnancy    Uncontrolled type 1 diabetes mellitus without complication (H), Encounter for long-term (current) use of insulin (H)       insulin pump infusion      Date last updated:  16 [employer] Minimed: Model 630G BASAL RATES and times: 12   AM (midnight): 0.85 units/hour   8     AM: 0.8 units/hour  6pm: 0.85 Basal Pattern A:  Fransisca Cabello will use when NA. CARB RATIO and times: 12   AM (midnight): 12 Corection Factor (Sensitivity) and times: 12   AM (midnight): 47 mg/dL BLOOD GLUCOSE TARGET and times: 12   AM (midnight): 100 - 120 5     AM:  90 - 110 9:30    PM:  100 - 120 Active Insulin Time:  3 hours Sensor:  No Carelink / Diasend username: [email or web address] Carelink / Diasend Password:  [email or web address]        insulin syringe-needle U-100 31G X \" 0.3 ML    BD insulin syringe ultrafine    100 each    Use one " syringe 4 daily or as directed in the event of insulin pump failure.  DO NOT DISPENSE until requested by patient    Encounter for long-term (current) use of insulin (H)       MICROLET LANCETS Misc     600 each    Use one lancet 6 times daily for blood sugar testing    Type 1 diabetes mellitus without complication (H), Insulin pump in place       PRENATAL VITAMINS PO

## 2018-01-12 NOTE — NURSING NOTE
Consulted with Dr. Silva regarding elevated blood pressure, does not seem to be concerned now. Instructed pt to check BP at home about 3/week and if BP over 150/100 to follow up with Ricardo sooner. Pt said has follow up appt February.   Manda Ram, CMA

## 2018-02-16 ENCOUNTER — PRENATAL OFFICE VISIT (OUTPATIENT)
Dept: OBGYN | Facility: CLINIC | Age: 23
End: 2018-02-16
Payer: COMMERCIAL

## 2018-02-16 VITALS
SYSTOLIC BLOOD PRESSURE: 124 MMHG | BODY MASS INDEX: 25.58 KG/M2 | HEART RATE: 80 BPM | WEIGHT: 163 LBS | DIASTOLIC BLOOD PRESSURE: 86 MMHG | HEIGHT: 67 IN

## 2018-02-16 DIAGNOSIS — E10.9 TYPE 1 DIABETES MELLITUS WITHOUT COMPLICATION (H): ICD-10-CM

## 2018-02-16 DIAGNOSIS — O09.899 SUPERVISION OF OTHER HIGH RISK PREGNANCY, ANTEPARTUM: ICD-10-CM

## 2018-02-16 DIAGNOSIS — Z30.430 ENCOUNTER FOR INSERTION OF INTRAUTERINE CONTRACEPTIVE DEVICE (IUD): ICD-10-CM

## 2018-02-16 DIAGNOSIS — Z96.41 INSULIN PUMP IN PLACE: ICD-10-CM

## 2018-02-16 PROCEDURE — 99207 ZZC POST PARTUM EXAM: CPT | Mod: 25 | Performed by: OBSTETRICS & GYNECOLOGY

## 2018-02-16 PROCEDURE — 58300 INSERT INTRAUTERINE DEVICE: CPT | Performed by: OBSTETRICS & GYNECOLOGY

## 2018-02-16 NOTE — MR AVS SNAPSHOT
After Visit Summary   2/16/2018    Fransisca Cabello    MRN: [id number]           Patient Information     Date Of Birth          1995        Visit Information        Provider Department      2/16/2018 9:15 AM Cheyanne Silva MD [facility]        Today's Diagnoses     Routine postpartum follow-up    -  1    Supervision of other high risk pregnancy, antepartum        Type 1 diabetes mellitus without complication (H)        Insulin pump in place        Encounter for insertion of intrauterine contraceptive device (IUD)           Follow-ups after your visit        Your next 10 appointments already scheduled     Mar 23, 2018 11:00 AM CDT   Post Partum with Cheyanne Silva MD   [facility] ([facility]    [address] [place] [id number] [phone]              Who to contact     If you have questions or need follow up information about today's clinic visit or your schedule please contact [facility] directly at [phone].  Normal or non-critical lab and imaging results will be communicated to you by Jildyhart, letter or phone within 4 business days after the clinic has received the results. If you do not hear from us within 7 days, please contact the clinic through [employer] or phone. If you have a critical or abnormal lab result, we will notify you by phone as soon as possible.  Submit refill requests through [employer] or call your pharmacy and they will forward the refill request to us. Please allow 3 business days for your refill to be completed.          Additional Information About Your Visit        MyChart Information     [employer] gives you secure access to your electronic health record. If you see a primary care provider, you can also send messages to your care team and make appointments. If you have questions, please call your primary care clinic.  If you do not have a primary care provider,  "please call [phone] and they will assist you.        Care EveryWhere ID     This is your Care EveryWhere ID. This could be used by other organizations to access your [facility] medical records  [id number]        Your Vitals Were     Pulse Height Last Period Breastfeeding? BMI (Body Mass Index)       80 5' 7\" (1.702 m) 04/21/2017 (Exact Date) Yes 25.53 kg/m2        Blood Pressure from Last 3 Encounters:   02/16/18 124/86   01/12/18 (!) 136/96   01/07/18 135/77    Weight from Last 3 Encounters:   02/16/18 163 lb (73.9 kg)   12/31/17 195 lb (88.5 kg)   12/27/17 195 lb (88.5 kg)              We Performed the Following     Insertion of an IUD     Mirena        Primary Care Provider Fax #    Physician No Ref-Primary [phone]       No address on file        Equal Access to Services     [facility]ALLADAR[facility] : Hadii isidoro ramirezo Mariano, waaxda luqadaha, qaybta kaalmada adejaleel, hernan spicer . So [facility] [phone].    ATENCIÓN: Si habla español, tiene a boateng disposición servicios gratuitos de asistencia lingüística. Titus al [phone].    We comply with applicable federal civil rights laws and [place] laws. We do not discriminate on the basis of race, color, national origin, age, disability, sex, sexual orientation, or gender identity.            Thank you!     Thank you for choosing [facility]  for your care. Our goal is always to provide you with excellent care. Hearing back from our patients is one way we can continue to improve our services. Please take a few minutes to complete the written survey that you may receive in the mail after your visit with us. Thank you!             Your Updated Medication List - Protect others around you: Learn how to safely use, store and throw away your medicines at www.disposemymeds.org.          This list is accurate as of 2/16/18 11:59 PM.  Always use your most recent med list.                   Brand Name Dispense Instructions for " use Diagnosis    acetone (Urine) test Strp     100 each    Use as directed to test urine ketone if blood sugars are >250 or during illness with fever, nausea, vomiting, or abdominal pain.    High-risk pregnancy, young primigravida, third trimester       blood glucose monitoring test strip    RAYMUNDO CONTOUR NEXT    1000 each    Use to test blood sugar 10-12 times daily or as directed.    Uncontrolled type 1 diabetes mellitus without complication (H), Supervision of other high risk pregnancy, antepartum       docusate sodium 100 MG tablet    COLACE    60 tablet    Take 100 mg by mouth daily     (spontaneous vaginal delivery)       ibuprofen 800 MG tablet    ADVIL/MOTRIN    90 tablet    Take 1 tablet (800 mg) by mouth every 8 hours as needed for other (cramping)     (spontaneous vaginal delivery)       insulin glargine 100 UNIT/ML injection    LANTUS VIAL    10 mL    Inject 19 u sq every 24 hours in the event of insulin pump therapy.  DO NOT FILL RX until requested by patient.    Type 1 diabetes mellitus without complication (H), Insulin pump in place, Encounter for long-term (current) use of insulin (H), High-risk pregnancy, young primigravida, third trimester       insulin lispro 100 UNIT/ML injection    humaLOG    5 vial    As directed via insulin pump - up to 160 units daily, adjusting pump settings due to increased insulin requirements and pregnancy    Uncontrolled type 1 diabetes mellitus without complication (H), Encounter for long-term (current) use of insulin (H)       insulin pump infusion      Date last updated:  16 [employer] Minimed: Model 630G BASAL RATES and times: 12   AM (midnight): 0.85 units/hour   8     AM: 0.8 units/hour  6pm: 0.85 Basal Pattern A:  Fransisca Cabello will use when NA. CARB RATIO and times: 12   AM (midnight): 12 Corection Factor (Sensitivity) and times: 12   AM (midnight): 47 mg/dL BLOOD GLUCOSE TARGET and times: 12   AM (midnight): 100 - 120 5     AM:  90 - 110 9:30     "PM:  100 - 120 Active Insulin Time:  3 hours Sensor:  No Carelink / Diasend username: [email or web address] Carelink / Diasend Password:  [email or web address]        insulin syringe-needle U-100 31G X 5/16\" 0.3 ML    BD insulin syringe ultrafine    100 each    Use one syringe 4 daily or as directed in the event of insulin pump failure.  DO NOT DISPENSE until requested by patient    Encounter for long-term (current) use of insulin (H)       MICROLET LANCETS Misc     600 each    Use one lancet 6 times daily for blood sugar testing    Type 1 diabetes mellitus without complication (H), Insulin pump in place       PRENATAL VITAMINS PO             "

## 2018-02-16 NOTE — PROGRESS NOTES
"  Fransisca is here for a 6-week postpartum checkup.    She had a  of a viable boy, weight 6 pounds 9 oz., with none complications. Date of delivery was 2018. Since delivery, she has been breast feeding.  She has no signs of infection, bleeding or other complications.  She is not pregnant.  We discussed contraceptions and she has chosen Mirena IUD, would like that inserted today if possible.      Post partum tubal: No  History of Gestational Diabetes? No  Type of Delivery:  Vaginal  Feeding Method:  Breast  If initiated breast feeding and stopped, how long did you breast feed?:  Not applicable    REVIEW OF SYSTEMS:  Entirely negative.  Contraception Plan: Mirena IUD    Medical History Reviewed yes - regular follow up with endocrine is encouraged.  Family History Reviewed yes -   Problem List Updated no    EXAM:  /86  Pulse 80  Ht 5' 7\" (1.702 m)  Wt 163 lb (73.9 kg)  LMP 2017 (Exact Date)  Breastfeeding? Yes  BMI 25.53 kg/m2  HEENT: grossly normal.  NECK: no lymphadenopathy or thyroidomegaly.  LUNGS: CTA X 2, no rales or crackles.  BACK: No spinal or CVA tenderness.  HEART: RRR without murmurs clicks or gallops.  ABDOMEN: soft, non tender, good bowel sounds, without masses rebound, guarding or tenderness.  PELVIC:  External genitalia; normal without lesion, repair well healed.   Vagina: normal mucosa and rugae, no discharge.  Cervix: multiparous, well healed, without lesion.  Uterus: non pregnant in size, firm , mobile, no lesions,     Normal shape, position and consistency  Adnexa: non tender, without masses.  EXTREMITIES:  warm to touch, good pulses, no ankle edema or calf tenderness.  NEUROLOGIC: grossly normal.    The pelvic exam revealed normal external genitalia. On bimanual exam the uterus was Anteverted and normal in size with no tenderness present. A speculum was inserted into the vagina and the cervix was visualized. The cervix was prepped with Betadine . The anterior lip of the " cervix was grasped with a single toothed tenaculum. The uterus sounded to 8 cm. A Mirena IUD was then inserted without difficulty. The string was cut to 3 cm.  The patient experienced a mild amount of cramping.        ASSESSMENT:   6-week postpartum exam after induced vaginal delivery.  Type I DM, on pump. Well controlled.    PLAN:    Contraception methods discussed  Exercise encouraged  Follow up in 1 year.  One-hour glucose tolerance test needed? No  Mirena IUD for contraception.    Cheyanne Silva MD

## 2018-02-16 NOTE — NURSING NOTE
"Chief Complaint   Patient presents with     Postpartum Care     6wk PP visit.        Initial /86  Pulse 80  Ht 5' 7\" (1.702 m)  Wt 163 lb (73.9 kg)  LMP 04/21/2017 (Exact Date)  Breastfeeding? Yes  BMI 25.53 kg/m2 Estimated body mass index is 25.53 kg/(m^2) as calculated from the following:    Height as of this encounter: 5' 7\" (1.702 m).    Weight as of this encounter: 163 lb (73.9 kg).  Medication Reconciliation: complete   Lab Results   Component Value Date    PAP NIL 06/30/2017     María Elena Arce LPN      "

## 2018-02-17 ASSESSMENT — PATIENT HEALTH QUESTIONNAIRE - PHQ9: SUM OF ALL RESPONSES TO PHQ QUESTIONS 1-9: 0

## 2018-03-06 ENCOUNTER — APPOINTMENT (OUTPATIENT)
Dept: CT IMAGING | Facility: CLINIC | Age: 23
End: 2018-03-06
Attending: EMERGENCY MEDICINE
Payer: COMMERCIAL

## 2018-03-06 ENCOUNTER — HOSPITAL ENCOUNTER (EMERGENCY)
Facility: CLINIC | Age: 23
Discharge: HOME | End: 2018-03-06
Attending: EMERGENCY MEDICINE | Admitting: EMERGENCY MEDICINE
Payer: COMMERCIAL

## 2018-03-06 VITALS
TEMPERATURE: 98.1 F | SYSTOLIC BLOOD PRESSURE: 121 MMHG | OXYGEN SATURATION: 97 % | RESPIRATION RATE: 14 BRPM | DIASTOLIC BLOOD PRESSURE: 76 MMHG | HEART RATE: 105 BPM

## 2018-03-06 DIAGNOSIS — R40.4 TRANSIENT ALTERATION OF AWARENESS: ICD-10-CM

## 2018-03-06 DIAGNOSIS — E10.649 TYPE 1 DIABETES MELLITUS WITH HYPOGLYCEMIA WITHOUT COMA (H): ICD-10-CM

## 2018-03-06 LAB
ALBUMIN SERPL-MCNC: 4 G/DL (ref 3.4–5)
ALBUMIN UR-MCNC: NEGATIVE MG/DL
ALP SERPL-CCNC: 111 U/L (ref 40–150)
ALT SERPL W P-5'-P-CCNC: 41 U/L (ref 0–50)
ANION GAP SERPL CALCULATED.3IONS-SCNC: 5 MMOL/L (ref 3–14)
APPEARANCE UR: CLEAR
APTT PPP: 27 SEC (ref 22–37)
AST SERPL W P-5'-P-CCNC: 23 U/L (ref 0–45)
B-HCG SERPL-ACNC: <1 IU/L (ref 0–5)
BASOPHILS # BLD AUTO: 0 10E9/L (ref 0–0.2)
BASOPHILS NFR BLD AUTO: 0.5 %
BILIRUB SERPL-MCNC: 0.4 MG/DL (ref 0.2–1.3)
BILIRUB UR QL STRIP: NEGATIVE
BUN SERPL-MCNC: 7 MG/DL (ref 7–30)
CALCIUM SERPL-MCNC: 10.7 MG/DL (ref 8.5–10.1)
CHLORIDE SERPL-SCNC: 107 MMOL/L (ref 94–109)
CO2 SERPL-SCNC: 28 MMOL/L (ref 20–32)
COLOR UR AUTO: YELLOW
CREAT SERPL-MCNC: 0.76 MG/DL (ref 0.52–1.04)
DIFFERENTIAL METHOD BLD: NORMAL
EOSINOPHIL # BLD AUTO: 0 10E9/L (ref 0–0.7)
EOSINOPHIL NFR BLD AUTO: 0.1 %
ERYTHROCYTE [DISTWIDTH] IN BLOOD BY AUTOMATED COUNT: 13.3 % (ref 10–15)
GFR SERPL CREATININE-BSD FRML MDRD: >90 ML/MIN/1.7M2
GLUCOSE BLDC GLUCOMTR-MCNC: 77 MG/DL (ref 70–99)
GLUCOSE SERPL-MCNC: 91 MG/DL (ref 70–99)
GLUCOSE UR STRIP-MCNC: NEGATIVE MG/DL
HCT VFR BLD AUTO: 45.4 % (ref 35–47)
HGB BLD-MCNC: 14.6 G/DL (ref 11.7–15.7)
HGB UR QL STRIP: NEGATIVE
IMM GRANULOCYTES # BLD: 0 10E9/L (ref 0–0.4)
IMM GRANULOCYTES NFR BLD: 0.3 %
INR PPP: 0.96 (ref 0.86–1.14)
INTERPRETATION ECG - MUSE: NORMAL
KETONES UR STRIP-MCNC: NEGATIVE MG/DL
LEUKOCYTE ESTERASE UR QL STRIP: ABNORMAL
LYMPHOCYTES # BLD AUTO: 1.3 10E9/L (ref 0.8–5.3)
LYMPHOCYTES NFR BLD AUTO: 16.2 %
MAGNESIUM SERPL-MCNC: 2.1 MG/DL (ref 1.6–2.3)
MCH RBC QN AUTO: 29.5 PG (ref 26.5–33)
MCHC RBC AUTO-ENTMCNC: 32.2 G/DL (ref 31.5–36.5)
MCV RBC AUTO: 92 FL (ref 78–100)
MONOCYTES # BLD AUTO: 0.4 10E9/L (ref 0–1.3)
MONOCYTES NFR BLD AUTO: 4.5 %
MUCOUS THREADS #/AREA URNS LPF: PRESENT /LPF
NEUTROPHILS # BLD AUTO: 6.1 10E9/L (ref 1.6–8.3)
NEUTROPHILS NFR BLD AUTO: 78.4 %
NITRATE UR QL: NEGATIVE
NRBC # BLD AUTO: 0 10*3/UL
NRBC BLD AUTO-RTO: 0 /100
PH UR STRIP: 6 PH (ref 5–7)
PLATELET # BLD AUTO: 279 10E9/L (ref 150–450)
POTASSIUM SERPL-SCNC: 4.1 MMOL/L (ref 3.4–5.3)
PROT SERPL-MCNC: 8.2 G/DL (ref 6.8–8.8)
RBC # BLD AUTO: 4.95 10E12/L (ref 3.8–5.2)
RBC #/AREA URNS AUTO: 1 /HPF (ref 0–2)
SODIUM SERPL-SCNC: 140 MMOL/L (ref 133–144)
SOURCE: ABNORMAL
SP GR UR STRIP: 1.01 (ref 1–1.03)
SQUAMOUS #/AREA URNS AUTO: 3 /HPF (ref 0–1)
TSH SERPL DL<=0.005 MIU/L-ACNC: 0.53 MU/L (ref 0.4–4)
UROBILINOGEN UR STRIP-MCNC: 0 MG/DL (ref 0–2)
WBC # BLD AUTO: 7.8 10E9/L (ref 4–11)
WBC #/AREA URNS AUTO: 1 /HPF (ref 0–5)

## 2018-03-06 PROCEDURE — 84702 CHORIONIC GONADOTROPIN TEST: CPT | Performed by: EMERGENCY MEDICINE

## 2018-03-06 PROCEDURE — 96360 HYDRATION IV INFUSION INIT: CPT

## 2018-03-06 PROCEDURE — 00000146 ZZHCL STATISTIC GLUCOSE BY METER IP

## 2018-03-06 PROCEDURE — 85610 PROTHROMBIN TIME: CPT | Performed by: EMERGENCY MEDICINE

## 2018-03-06 PROCEDURE — 70450 CT HEAD/BRAIN W/O DYE: CPT

## 2018-03-06 PROCEDURE — 85730 THROMBOPLASTIN TIME PARTIAL: CPT | Performed by: EMERGENCY MEDICINE

## 2018-03-06 PROCEDURE — 85025 COMPLETE CBC W/AUTO DIFF WBC: CPT | Performed by: EMERGENCY MEDICINE

## 2018-03-06 PROCEDURE — 83735 ASSAY OF MAGNESIUM: CPT | Performed by: EMERGENCY MEDICINE

## 2018-03-06 PROCEDURE — 81001 URINALYSIS AUTO W/SCOPE: CPT | Performed by: EMERGENCY MEDICINE

## 2018-03-06 PROCEDURE — 25000128 H RX IP 250 OP 636: Performed by: EMERGENCY MEDICINE

## 2018-03-06 PROCEDURE — 84443 ASSAY THYROID STIM HORMONE: CPT | Performed by: EMERGENCY MEDICINE

## 2018-03-06 PROCEDURE — 93005 ELECTROCARDIOGRAM TRACING: CPT

## 2018-03-06 PROCEDURE — 80053 COMPREHEN METABOLIC PANEL: CPT | Performed by: EMERGENCY MEDICINE

## 2018-03-06 PROCEDURE — 99285 EMERGENCY DEPT VISIT HI MDM: CPT | Mod: 25

## 2018-03-06 RX ADMIN — SODIUM CHLORIDE, POTASSIUM CHLORIDE, SODIUM LACTATE AND CALCIUM CHLORIDE 1000 ML: 600; 310; 30; 20 INJECTION, SOLUTION INTRAVENOUS at 13:52

## 2018-03-06 ASSESSMENT — ENCOUNTER SYMPTOMS
CONFUSION: 1
NAUSEA: 0
DECREASED CONCENTRATION: 1
HALLUCINATIONS: 1
FEVER: 0
VOMITING: 0
ABDOMINAL PAIN: 0
DIARRHEA: 0

## 2018-03-06 NOTE — DISCHARGE INSTRUCTIONS
Insulin Reaction (Low Blood Sugar)  You have been treated for an insulin reaction today. This occurs when insulin causes your blood sugar to go too low (hypoglycemia). It may happen if you take too much insulin. It can also occur from taking your usual amount of insulin but not getting enough food. This can be due to vomiting or loss of appetite. Other causes of low blood sugar include heavy exercise, strong emotions, and alcohol use.  Your blood sugar level may also be affected by tobacco, caffeine, and certain medicines, including:    Aspirin    Haloperidol    Propoxyphene    Chlorpromazine    Propranolol    Disopyramide    ACE inhibitors    Fluoroquinolone antibiotics  If you suspect that caffeine may be affecting you, switch to caffeine-free drinks. If you smoke, try to quit. Quitting smoking is one of the most important things you can do to protect your health. If you are taking any of the listed medicines, talk to your healthcare provider about switching to another type.  A class of medicines called beta-blockers is used for high blood pressure, rapid heart rate, and other conditions. Beta-blockers may prevent the early symptoms of low blood sugar. If you are taking a beta-blocker, you might not realize that your blood sugar level is getting low. If you are taking a beta-blocker, talk to your healthcare provider about switching to a different class. The beta-blocker class includes:    Propranolol    Atenolol    Metoprolol    Nadolol    Labetalol    Carvedilol  Home care    During the next 24 hours rest and eat frequent small meals. This will help prevent the return of low blood sugar.    Learn the signals your body gives as your blood sugar drops (see below).  If symptoms of hypoglycemia return    Keep a source of fast-acting sugar with you. At the first sign of low blood sugar, eat or drink 15 to 20 grams of fast-acting sugar. Examples include:    3 to 4 glucose tablets (found at most drugstores)    4  ounces of regular soda    4 ounces of fruit juice    2 tablespoons of raisins    1 tablespoon of honey    Check your blood sugar 15 minutes after treating yourself. If it is still low, take another 15 to 20 grams of fast-acting sugar. Test again in 15 minutes. If it s still low, go to an emergency room.    Once blood sugar returns to normal, eat a snack or meal to keep your blood sugar in a safe range.     In the future, if you are not able to eat your normal amount at each meal due to illness or vomiting, you may need to reduce your insulin dose. Contact your healthcare provider as soon as possible to ask for a plan for a short-term adjustment of your dose if this happens again.     Check your blood sugar every 4 to 6 hours. Do this until you can begin eating normal amounts again.     Wear a medical alert bracelet or necklace, or carry a card in your wallet or a thumb drive explaining that you have diabetes. If you have a severe hypoglycemic reaction and can't give this information, it will help medical personnel provide proper care.  Follow-up care  Follow up with your healthcare provider, or as advised.  Check and write down your blood sugar and insulin dose twice a day--before breakfast and before dinner. Do this for the next 5 days. See your healthcare provider during the next week to review these records. This will help determine if you need to adjust your insulin dose.  If you have frequent or severe episodes of low blood sugar, your healthcare provider may recommend glucagon injection, which raises your blood sugar. One of your family members or friends would need to learn how to give you this injection.  For more information about diabetes, contact the American Diabetes Association, at www.diabetes.org.  When to seek medical advice  Call your healthcare provider right away if any of these symptoms of low blood sugar occur.    Fatigue    Headache    Shakes    Excess sweating    Hunger    Feeling anxious or  restless    Vision changes    Drowsiness    Weakness    Confusion    Personality changes    Seizure or loss of consciousness  Date Last Reviewed: 6/1/2016 2000-2017 [employer]. [address] [place] [address], [place] [address]. All rights reserved. This information is not intended as a substitute for professional medical care. Always follow your healthcare professional's instructions.

## 2018-03-06 NOTE — ED NOTES
Patient comes in for evaluation of altered mental status. Patient states she is having episodes of blacking out.  reports that the patient typically wakes up in the night several times to pump and when her infant cries but did not wake at all last night, this morning he woke to her hanging out of bed in between the bed and basinet calling for help, this occurred twice. Patient reports she does not remember anything from last night or this morning, does report hallucinations this morning, animals talking, walls moving. Patient states she doesn't know the last thing she remembers that was real. Patient also reports constant headaches for 2 months since delivery. ABCs intact. Patient is diabetic, sugar is 77 and patient states that is normal for her.

## 2018-03-06 NOTE — ED AVS SNAPSHOT
[facility]    [address]    [facility] [address]    Phone:  [phone]    Fax:  [phone]                                       Fransisca Cabello   MRN: [id number]    Department:  [facility]   Date of Visit:  3/6/2018           After Visit Summary Signature Page     I have received my discharge instructions, and my questions have been answered. I have discussed any challenges I see with this plan with the nurse or doctor.    ..........................................................................................................................................  Patient/Patient Representative Signature      ..........................................................................................................................................  Patient Representative Print Name and Relationship to Patient    ..................................................               ................................................  Date                                            Time    ..........................................................................................................................................  Reviewed by Signature/Title    ...................................................              ..............................................  Date                                                            Time

## 2018-03-06 NOTE — ED AVS SNAPSHOT
[facility]    [address] [facility] [facility] [address]    Phone:  [phone]    Fax:  [phone]                                       Fransisca Cabello   MRN: [id number]    Department:  [facility]   Date of Visit:  3/6/2018           Patient Information     Date Of Birth          1995        Your diagnoses for this visit were:     Transient alteration of awareness     Type 1 diabetes mellitus with hypoglycemia without coma (H)        You were seen by Hamlet Stewart MD.      Follow-up Information     Follow up with Maryann Hammonds APRN CNP. Schedule an appointment as soon as possible for a visit in 2 days.    Specialty:  Nurse Practitioner - Family    Why:  For close follow up    Contact information:    [address]  [facility] [address] [phone]          Go to [facility].    Specialty:  EMERGENCY MEDICINE    Why:  If symptoms worsen    Contact information:    [address] [facility] [address] [phone]        Discharge Instructions         Insulin Reaction (Low Blood Sugar)  You have been treated for an insulin reaction today. This occurs when insulin causes your blood sugar to go too low (hypoglycemia). It may happen if you take too much insulin. It can also occur from taking your usual amount of insulin but not getting enough food. This can be due to vomiting or loss of appetite. Other causes of low blood sugar include heavy exercise, strong emotions, and alcohol use.  Your blood sugar level may also be affected by tobacco, caffeine, and certain medicines, including:    Aspirin    Haloperidol    Propoxyphene    Chlorpromazine    Propranolol    Disopyramide    ACE inhibitors    Fluoroquinolone antibiotics  If you suspect that caffeine may be affecting you, switch to caffeine-free drinks. If you smoke, try to quit. Quitting smoking is one of the most  important things you can do to protect your health. If you are taking any of the listed medicines, talk to your healthcare provider about switching to another type.  A class of medicines called beta-blockers is used for high blood pressure, rapid heart rate, and other conditions. Beta-blockers may prevent the early symptoms of low blood sugar. If you are taking a beta-blocker, you might not realize that your blood sugar level is getting low. If you are taking a beta-blocker, talk to your healthcare provider about switching to a different class. The beta-blocker class includes:    Propranolol    Atenolol    Metoprolol    Nadolol    Labetalol    Carvedilol  Home care    During the next 24 hours rest and eat frequent small meals. This will help prevent the return of low blood sugar.    Learn the signals your body gives as your blood sugar drops (see below).  If symptoms of hypoglycemia return    Keep a source of fast-acting sugar with you. At the first sign of low blood sugar, eat or drink 15 to 20 grams of fast-acting sugar. Examples include:    3 to 4 glucose tablets (found at most drugstores)    4 ounces of regular soda    4 ounces of fruit juice    2 tablespoons of raisins    1 tablespoon of honey    Check your blood sugar 15 minutes after treating yourself. If it is still low, take another 15 to 20 grams of fast-acting sugar. Test again in 15 minutes. If it s still low, go to an emergency room.    Once blood sugar returns to normal, eat a snack or meal to keep your blood sugar in a safe range.     In the future, if you are not able to eat your normal amount at each meal due to illness or vomiting, you may need to reduce your insulin dose. Contact your healthcare provider as soon as possible to ask for a plan for a short-term adjustment of your dose if this happens again.     Check your blood sugar every 4 to 6 hours. Do this until you can begin eating normal amounts again.     Wear a medical alert bracelet or  necklace, or carry a card in your wallet or a thumb drive explaining that you have diabetes. If you have a severe hypoglycemic reaction and can't give this information, it will help medical personnel provide proper care.  Follow-up care  Follow up with your healthcare provider, or as advised.  Check and write down your blood sugar and insulin dose twice a day--before breakfast and before dinner. Do this for the next 5 days. See your healthcare provider during the next week to review these records. This will help determine if you need to adjust your insulin dose.  If you have frequent or severe episodes of low blood sugar, your healthcare provider may recommend glucagon injection, which raises your blood sugar. One of your family members or friends would need to learn how to give you this injection.  For more information about diabetes, contact the American Diabetes Association, at www.diabetes.org.  When to seek medical advice  Call your healthcare provider right away if any of these symptoms of low blood sugar occur.    Fatigue    Headache    Shakes    Excess sweating    Hunger    Feeling anxious or restless    Vision changes    Drowsiness    Weakness    Confusion    Personality changes    Seizure or loss of consciousness  Date Last Reviewed: 6/1/2016 2000-2017 [employer]. [address] [place] [address]. All rights reserved. This information is not intended as a substitute for professional medical care. Always follow your healthcare professional's instructions.          Future Appointments        Provider Department Dept Phone Center    3/23/2018 11:00 AM Cheyanne Silva MD [facility] [phone] [facility]      24 Hour Appointment Hotline       To make an appointment at any [facility], call [phone] ([phone]). If you don't have a family doctor or clinic, we will help you find one. [facility] are conveniently located to serve the needs of you and  "your family.             Review of your medicines      Our records show that you are taking the medicines listed below. If these are incorrect, please call your family doctor or clinic.        Dose / Directions Last dose taken    acetone (Urine) test Strp   Quantity:  100 each        Use as directed to test urine ketone if blood sugars are >250 or during illness with fever, nausea, vomiting, or abdominal pain.   Refills:  prn        blood glucose monitoring test strip   Commonly known as:  RAYMUNDO CONTOUR NEXT   Quantity:  1000 each        Use to test blood sugar 10-12 times daily or as directed.   Refills:  3        insulin glargine 100 UNIT/ML injection   Commonly known as:  LANTUS VIAL   Quantity:  10 mL        Inject 19 u sq every 24 hours in the event of insulin pump therapy.  DO NOT FILL RX until requested by patient.   Refills:  1        insulin lispro 100 UNIT/ML injection   Commonly known as:  humaLOG   Quantity:  5 vial        As directed via insulin pump - up to 160 units daily, adjusting pump settings due to increased insulin requirements and pregnancy   Refills:  3        insulin pump infusion        Date last updated:  12/23/16 Medtronic Minimed: Model 630G BASAL RATES and times: 12   AM (midnight): 0.85 units/hour   8     AM: 0.8 units/hour  6pm: 0.85 Basal Pattern A:  Fransisca Cabello will use when NA. CARB RATIO and times: 12   AM (midnight): 12 Corection Factor (Sensitivity) and times: 12   AM (midnight): 47 mg/dL BLOOD GLUCOSE TARGET and times: 12   AM (midnight): 100 - 120 5     AM:  90 - 110 9:30    PM:  100 - 120 Active Insulin Time:  3 hours Sensor:  No Carelink / Diasend username: [email or web address] Carelink / Diasend Password:  [email or web address]   Refills:  0        insulin syringe-needle U-100 31G X 5/16\" 0.3 ML   Commonly known as:  BD insulin syringe ultrafine   Quantity:  100 each        Use one syringe 4 daily or as directed in the event of insulin pump failure.  DO NOT DISPENSE until requested by " patient   Refills:  3        MICROLET LANCETS Misc   Quantity:  600 each        Use one lancet 6 times daily for blood sugar testing   Refills:  1        PRENATAL VITAMINS PO        Refills:  0                Procedures and tests performed during your visit     CBC with platelets differential    CT Head w/o Contrast    Comprehensive metabolic panel    EKG 12 lead    Glucose by meter    HCG quantitative pregnancy    INR    Magnesium    Partial thromboplastin time    Peripheral IV catheter    TSH with free T4 reflex    UA reflex to Microscopic and Culture      Orders Needing Specimen Collection     None      Pending Results     No orders found from 3/4/2018 to 3/7/2018.            Pending Culture Results     No orders found from 3/4/2018 to 3/7/2018.            Pending Results Instructions     If you had any lab results that were not finalized at the time of your Discharge, you can call the ED Lab Result RN at [phone]. You will be contacted by this team for any positive Lab results or changes in treatment. The nurses are available 7 days a week from 10A to 6:30P.  You can leave a message 24 hours per day and they will return your call.        Test Results From Your Hospital Stay        3/6/2018 11:25 AM      Component Results     Component Value Ref Range & Units Status    Glucose 77 70 - 99 mg/dL Final    Dr/RN Notified         3/6/2018 12:36 PM      Component Results     Component Value Ref Range & Units Status    WBC 7.8 4.0 - 11.0 10e9/L Final    RBC Count 4.95 3.8 - 5.2 10e12/L Final    Hemoglobin 14.6 11.7 - 15.7 g/dL Final    Hematocrit 45.4 35.0 - 47.0 % Final    MCV 92 78 - 100 fl Final    MCH 29.5 26.5 - 33.0 pg Final    MCHC 32.2 31.5 - 36.5 g/dL Final    RDW 13.3 10.0 - 15.0 % Final    Platelet Count 279 150 - 450 10e9/L Final    Diff Method Automated Method  Final    % Neutrophils 78.4 % Final    % Lymphocytes 16.2 % Final    % Monocytes 4.5 % Final    % Eosinophils 0.1 % Final    % Basophils 0.5 %  Final    % Immature Granulocytes 0.3 % Final    Nucleated RBCs 0 0 /100 Final    Absolute Neutrophil 6.1 1.6 - 8.3 10e9/L Final    Absolute Lymphocytes 1.3 0.8 - 5.3 10e9/L Final    Absolute Monocytes 0.4 0.0 - 1.3 10e9/L Final    Absolute Eosinophils 0.0 0.0 - 0.7 10e9/L Final    Absolute Basophils 0.0 0.0 - 0.2 10e9/L Final    Abs Immature Granulocytes 0.0 0 - 0.4 10e9/L Final    Absolute Nucleated RBC 0.0  Final         3/6/2018  1:09 PM      Component Results     Component Value Ref Range & Units Status    Sodium 140 133 - 144 mmol/L Final    Potassium 4.1 3.4 - 5.3 mmol/L Final    Chloride 107 94 - 109 mmol/L Final    Carbon Dioxide 28 20 - 32 mmol/L Final    Anion Gap 5 3 - 14 mmol/L Final    Glucose 91 70 - 99 mg/dL Final    Urea Nitrogen 7 7 - 30 mg/dL Final    Creatinine 0.76 0.52 - 1.04 mg/dL Final    GFR Estimate >90 >60 mL/min/1.7m2 Final    Non  GFR Calc    GFR Estimate If Black >90 >60 mL/min/1.7m2 Final    African American GFR Calc    Calcium 10.7 (H) 8.5 - 10.1 mg/dL Final    Bilirubin Total 0.4 0.2 - 1.3 mg/dL Final    Albumin 4.0 3.4 - 5.0 g/dL Final    Protein Total 8.2 6.8 - 8.8 g/dL Final    Alkaline Phosphatase 111 40 - 150 U/L Final    ALT 41 0 - 50 U/L Final    AST 23 0 - 45 U/L Final         3/6/2018 12:45 PM      Component Results     Component Value Ref Range & Units Status    INR 0.96 0.86 - 1.14 Final         3/6/2018 12:45 PM      Component Results     Component Value Ref Range & Units Status    PTT 27 22 - 37 sec Final         3/6/2018  1:20 PM      Narrative     CT SCAN OF THE HEAD WITHOUT CONTRAST   3/6/2018 1:07 PM     HISTORY: Headache and altered mental status, some dizziness during  pregnancy 2 months ago.    TECHNIQUE:  Axial images of the head and coronal reformations without  IV contrast material. Radiation dose for this scan was reduced using  automated exposure control, adjustment of the mA and/or kV according  to patient size, or iterative reconstruction  technique.    COMPARISON: None.    FINDINGS:  The ventricles are normal in size, shape and configuration.   The brain parenchyma and subarachnoid spaces are normal. There is no  evidence of intracranial hemorrhage, mass, acute infarct or anomaly.     The visualized portions of the sinuses and mastoids appear normal.  There is no evidence of trauma.        Impression     IMPRESSION: Normal CT scan of the head.      SHANIQUA PAT MD         3/6/2018  1:09 PM      Component Results     Component Value Ref Range & Units Status    Magnesium 2.1 1.6 - 2.3 mg/dL Final         3/6/2018  1:09 PM      Component Results     Component Value Ref Range & Units Status    TSH 0.53 0.40 - 4.00 mU/L Final         3/6/2018  2:11 PM      Component Results     Component Value Ref Range & Units Status    Color Urine Yellow  Final    Appearance Urine Clear  Final    Glucose Urine Negative NEG^Negative mg/dL Final    Bilirubin Urine Negative NEG^Negative Final    Ketones Urine Negative NEG^Negative mg/dL Final    Specific Gravity Urine 1.011 1.003 - 1.035 Final    Blood Urine Negative NEG^Negative Final    pH Urine 6.0 5.0 - 7.0 pH Final    Protein Albumin Urine Negative NEG^Negative mg/dL Final    Urobilinogen mg/dL 0.0 0.0 - 2.0 mg/dL Final    Nitrite Urine Negative NEG^Negative Final    Leukocyte Esterase Urine Trace (A) NEG^Negative Final    Source Midstream Urine  Final    RBC Urine 1 0 - 2 /HPF Final    WBC Urine 1 0 - 5 /HPF Final    Squamous Epithelial /HPF Urine 3 (H) 0 - 1 /HPF Final    Mucous Urine Present (A) NEG^Negative /LPF Final         3/6/2018  1:09 PM      Component Results     Component Value Ref Range & Units Status    HCG Quantitative Serum <1 0 - 5 IU/L Final                Clinical Quality Measure: Blood Pressure Screening     Your blood pressure was checked while you were in the emergency department today. The last reading we obtained was  BP: 130/75 . Please read the guidelines below about what these numbers mean  and what you should do about them.  If your systolic blood pressure (the top number) is less than 120 and your diastolic blood pressure (the bottom number) is less than 80, then your blood pressure is normal. There is nothing more that you need to do about it.  If your systolic blood pressure (the top number) is 120-139 or your diastolic blood pressure (the bottom number) is 80-89, your blood pressure may be higher than it should be. You should have your blood pressure rechecked within a year by a primary care provider.  If your systolic blood pressure (the top number) is 140 or greater or your diastolic blood pressure (the bottom number) is 90 or greater, you may have high blood pressure. High blood pressure is treatable, but if left untreated over time it can put you at risk for heart attack, stroke, or kidney failure. You should have your blood pressure rechecked by a primary care provider within the next 4 weeks.  If your provider in the emergency department today gave you specific instructions to follow-up with your doctor or provider even sooner than that, you should follow that instruction and not wait for up to 4 weeks for your follow-up visit.        Thank you for choosing [facility]       Thank you for choosing [facility] for your care. Our goal is always to provide you with excellent care. Hearing back from our patients is one way we can continue to improve our services. Please take a few minutes to complete the written survey that you may receive in the mail after you visit with us. Thank you!        [employer] Information     [employer] gives you secure access to your electronic health record. If you see a primary care provider, you can also send messages to your care team and make appointments. If you have questions, please call your primary care clinic.  If you do not have a primary care provider, please call [phone] and they will assist you.        Care EveryWhere ID     This is your Care EveryWhere ID.  This could be used by other organizations to access your [facility] medical records  [id number]        Equal Access to Services     EDWIGE VALLADARES : Hadii isidoro Quintana, martha rivera, hernan lundberg. So [facility] [phone].    ATENCIÓN: Si habla español, tiene a boateng disposición servicios gratuitos de asistencia lingüística. Llame al [phone].    We comply with applicable federal civil rights laws and [place] laws. We do not discriminate on the basis of race, color, national origin, age, disability, sex, sexual orientation, or gender identity.            After Visit Summary       This is your record. Keep this with you and show to your community pharmacist(s) and doctor(s) at your next visit.

## 2018-03-06 NOTE — ED PROVIDER NOTES
"  History     Chief Complaint:  Altered Mental Status      HPI   Fransisca Cabello is a 23 year old female with a history of Type I diabetes and previous postpartum depression who presents to the emergency department today for evaluation of altered mental status. The patient and her  report she has been having near-syncopal episodes with confusion. Additionally, she has been having many headaches starting at the base and temples and eyes since delivery 2 months ago. The patient's  reports the infant was crying this morning and the patient was not responding to him. She went to bed after this, but he found her later between the bed and the basinet asking for help without the ability to get up. The patient doesn't recall this episode.  She remembers going to bed last night. The patient reports this morning, the last thing she remembers is feeding her infant at 0430. Additionally, she has been having hallucinations where the dog was talking to her. She checked her sugars that were at 57 around 0930 that she also doesn't remember. When she arrived, she didn't remember this. She has had fruit snacks this morning for her low sugar. He was worried about these episodes prompting their visit to the emergency department. While they were driving over, she notes \"it felt like I was in a video game\". She denies fever, loss of bowel or bladder function, dysuria, abdominal pain, chest pain, nausea, vomiting, and diarrhea. Currently she feels much improved and denies any numbness/weakness/tingling or headache currently.     Allergies:  Cefzil [Cefprozil]  Phenergan [Promethazine]    Medications:    insulin lispro (HUMALOG) 100 UNIT/ML injection  insulin syringe-needle U-100 (BD INSULIN SYRINGE ULTRAFINE) 31G X 5/16\" 0.3 ML  acetone, Urine, test STRP  insulin glargine (LANTUS VIAL) 100 UNIT/ML injection  Prenatal Multivit-Min-Fe-FA (PRENATAL VITAMINS PO)    Past Medical History:    Postpartum depression  Type 1 " diabetes  Hypertension in pregnancy    Past Surgical History:    C ROOT CANAL THERAPY 2 CANALS    DENTAL SURGERY      Family History:    Diabetes    Social History:  The patient was accompanied to the ED by  and family.  Smoking Status: never  Smokeless Tobacco: Never  Alcohol Use: No  Marital Status:       Review of Systems   Constitutional: Negative for fever.   Eyes: Positive for visual disturbance.   Gastrointestinal: Negative for abdominal pain, diarrhea, nausea and vomiting.   Genitourinary:        Negative loss of bladder     Psychiatric/Behavioral: Positive for confusion, decreased concentration and hallucinations.   All other systems reviewed and are negative.      Physical Exam   First Vitals:  BP: 148/89  Pulse: 126  Temp: 98.1  F (36.7  C)  Resp: 18  SpO2: 97 %    Orthostatics:  Lying Orthostatic BP - Lying Orthostatic BP: 131/77 ; Lying Orthostatic Pulse: 98 bpm   Sitting Orthostatic BP - Sitting Orthostatic BP: 130/83 ; Sitting Orthostatic Pulse: 122 bpm   Standing Orthostatic BP - Standing Orthostatic BP: 138/75 ; Standing Orthostatic Pulse: 142 bpm    Physical Exam  General: Well appearing, nontoxic. Resting comfortably  Head:  Scalp, face, and head appear normal  Eyes:  Pupils are equal, round, and reactive to light    No nystagmus    Conjunctivae non-injected and sclerae white  ENT:    The external nose is normal    Pinnae are normal    The oropharynx is normal, mucous membranes dry. Lips dry and cracked.    Uvula is in the midline  Neck:  Normal range of motion    There is no rigidity noted    Trachea is in the midline  CV:  Tachycardic rate, regular rhythm      Normal S1/S2, no S3/S4    No murmur or rub  Resp:  Lungs are clear and equal bilaterally    There is no tachypnea    No increased work of breathing    No rales, wheezing, or rhonchi  GI:  Abdomen is soft, no rigidity or guarding    No distension, or mass    No tenderness or rebound tenderness   MS:  Normal muscular  tone    Symmetric motor strength    No lower extremity edema. No calf swelling or tenderness.  Skin:  No rash or acute skin lesions noted  Neuro: A&Ox3, GCS 15    CN II - XII intact. Patient with apparent amnesia to events between 0430 and 0930    Speech clear, fluent, and normal    Strength 5/5 and symmetric in bilateral upper and lower extremities.    No pronator drift, no leg drift    patellar reflexes 2+ and symmetric, no ankle clonus    FTN testing normal. No tremor.     Gait normal    No meningismus   Psych:  Normal affect.  Appropriate interactions. Patient reports good mood without recent depressive symptoms.       Emergency Department Course   ECG:  Indication: altered mental status  Completed at 1110.  Read at 1113.   Normal sinus rhythm with sinus arrhythmia  Normal ECG   No significant change compared to EKG dated 1994  Rate 85 bpm. OK interval 124. QRS duration 80. QT/QTc 354/421. P-R-T axes 42  29  18.    Imaging:  Radiology findings were communicated with the patient and family who voiced understanding of the findings.  CT Head w/o contrast  IMPRESSION: Normal CT scan of the head.  Report per radiology     Laboratory:  Laboratory findings were communicated with the patient and family who voiced understanding of the findings.  CBC: WNL. (WBC 7.8, HGB 14.6, )   CMP: Calcium 10.7 (H) o/w WNL (Creatinine 0.76)  PTT: 27  TSH with free T4 reflex: 0.53  INR: 0.96  Glucose by meter: 77  Magnesium: 2.1  HCG Quantitative: <1  UA: clear, yellow urine, leukocyte esterase trace, squamous epithelial 3 (H), mucous present o/w WNL    Interventions:  1352 LR Bolus 1000 mL IV    Emergency Department Course:  Nursing notes and vitals reviewed.  IV was inserted and blood was drawn for laboratory testing, results above.  The patient was sent for a CT Head w/o contrast while in the emergency department, results above.   1215: I performed an exam of the patient as documented above.   1447: Patient rechecked and  updated.   Findings and plan explained to the Patient and spouse. Patient discharged home with instructions regarding supportive care, medications, and reasons to return. The importance of close follow-up was reviewed.   I personally reviewed the laboratory and imaging results with the Patient and spouse and answered all related questions prior to discharge.      Impression & Plan    Medical Decision Making:  Fransisca Cabello is a 23 year old female with history of type I diabetes, two months postpartum who presented with a transient change in mental status last night as noted above. Patient's neurologic exam is completely non-focal at this time. She is alert and oriented x3. She has no neurologic deficits. However, she does have amnesia to the events that occurred overnight. Patient is otherwise well appearing. She is afebrile and there is no evidence of sepsis or significant infection at this time. Broad work up was undertaken. Differential diagnosis includes dysrhythmia, infection, metabolic disturbance, thyroid disturbance. However, the most likely etiology is an episode of hypoglycemia as the patient wears a continuous insulin pump and has been breast feeding and has not been eating well. She has noted that her sugar has been on the lower side in the 50s and 60s recently, which certainly points toward hypoglycemia. Glucose in the emergency department was in the 90s after having some oral intake. The remainder of the work up was negative for signs of infection, metabolic disturbance, DKA, or any other concerning or emergent findings. Head CT was negative for any acute intracranial etiologies. On orthostatics, patient became tachycardic therefore IV fluids were provided. She felt significantly improved. She states that her resting heart rate ranges anywhere from 100-130 and during most of her pregnancy, she was persistently in the 130s. Therefore, her heart rate at discharge is consistent with her baseline.  Patient was instructed to follow up closely with her primary care provider/OBgyn and was discharged in stable condition.  We discussed the importance of monitoring her blood sugar closely and if she were to have any similar recurrent symptoms she should immediately check her blood sugar and take some oral glucose or eat food that contains simple sugars. Return precautions were discussed with patient. The patient's questions were answered and the patient was agreeable with discharge.     Diagnosis:    ICD-10-CM    1. Transient alteration of awareness R40.4    2. Type 1 diabetes mellitus with hypoglycemia without coma (H) E10.649        Disposition:  discharged to home    Scribe Disclosure:  Ricci BAUMAN, am serving as a scribe at 12:01 PM on 3/6/2018 to document services personally performed by Hamlet Stewart MD based on my observations and the provider's statements to me.     3/6/2018   [facility] EMERGENCY DEPARTMENT       Hamlet Stewart MD  03/06/18 2025

## 2018-03-23 ENCOUNTER — PRENATAL OFFICE VISIT (OUTPATIENT)
Dept: OBGYN | Facility: CLINIC | Age: 23
End: 2018-03-23
Payer: COMMERCIAL

## 2018-03-23 VITALS
WEIGHT: 159 LBS | DIASTOLIC BLOOD PRESSURE: 80 MMHG | SYSTOLIC BLOOD PRESSURE: 126 MMHG | BODY MASS INDEX: 24.9 KG/M2 | HEART RATE: 84 BPM

## 2018-03-23 DIAGNOSIS — Z30.431 IUD CHECK UP: Primary | ICD-10-CM

## 2018-03-23 PROCEDURE — 99213 OFFICE O/P EST LOW 20 MIN: CPT | Performed by: OBSTETRICS & GYNECOLOGY

## 2018-03-23 NOTE — MR AVS SNAPSHOT
After Visit Summary   3/23/2018    Fransisca Cabello    MRN: [id number]           Patient Information     Date Of Birth          1995        Visit Information        Provider Department      3/23/2018 11:00 AM Cheyanne Silva MD [facility]        Today's Diagnoses     IUD check up    -  1       Follow-ups after your visit        Who to contact     If you have questions or need follow up information about today's clinic visit or your schedule please contact [facility] directly at [phone].  Normal or non-critical lab and imaging results will be communicated to you by Ink361hart, letter or phone within 4 business days after the clinic has received the results. If you do not hear from us within 7 days, please contact the clinic through [employer] or phone. If you have a critical or abnormal lab result, we will notify you by phone as soon as possible.  Submit refill requests through [employer] or call your pharmacy and they will forward the refill request to us. Please allow 3 business days for your refill to be completed.          Additional Information About Your Visit        MyChart Information     [employer] gives you secure access to your electronic health record. If you see a primary care provider, you can also send messages to your care team and make appointments. If you have questions, please call your primary care clinic.  If you do not have a primary care provider, please call [phone] and they will assist you.        Care EveryWhere ID     This is your Care EveryWhere ID. This could be used by other organizations to access your [place] medical records  [id number]        Your Vitals Were     Pulse Breastfeeding? BMI (Body Mass Index)             84 Yes 24.9 kg/m2          Blood Pressure from Last 3 Encounters:   03/23/18 126/80   03/06/18 121/76   02/16/18 124/86    Weight from Last 3 Encounters:   03/23/18 159 lb (72.1 kg)   02/16/18 163 lb (73.9  kg)   12/31/17 195 lb (88.5 kg)              Today, you had the following     No orders found for display       Primary Care Provider Fax #    Physician No Ref-Primary [phone]       No address on file        Equal Access to Services     EDWIGE LOVELLJENISE SANTIAGO: Floridalma chaudhry kavitha molly Quintana, waholgerda luqadaha, qaybta kaalmada rosette, hernan franklinroyer gamal. So [facility] [phone].    ATENCIÓN: Si habla español, tiene a boateng disposición servicios gratuitos de asistencia lingüística. Llame al [phone].    We comply with applicable federal civil rights laws and [place] laws. We do not discriminate on the basis of race, color, national origin, age, disability, sex, sexual orientation, or gender identity.            Thank you!     Thank you for choosing [facility]  for your care. Our goal is always to provide you with excellent care. Hearing back from our patients is one way we can continue to improve our services. Please take a few minutes to complete the written survey that you may receive in the mail after your visit with us. Thank you!             Your Updated Medication List - Protect others around you: Learn how to safely use, store and throw away your medicines at www.disposemymeds.org.          This list is accurate as of 3/23/18 11:22 AM.  Always use your most recent med list.                   Brand Name Dispense Instructions for use Diagnosis    acetone (Urine) test Strp     100 each    Use as directed to test urine ketone if blood sugars are >250 or during illness with fever, nausea, vomiting, or abdominal pain.    High-risk pregnancy, young primigravida, third trimester       blood glucose monitoring test strip    RAYMUNDO CONTOUR NEXT    1000 each    Use to test blood sugar 10-12 times daily or as directed.    Uncontrolled type 1 diabetes mellitus without complication (H), Supervision of other high risk pregnancy, antepartum       insulin glargine 100 UNIT/ML injection     "LANTUS VIAL    10 mL    Inject 19 u sq every 24 hours in the event of insulin pump therapy.  DO NOT FILL RX until requested by patient.    Type 1 diabetes mellitus without complication (H), Insulin pump in place, Encounter for long-term (current) use of insulin (H), High-risk pregnancy, young primigravida, third trimester       insulin lispro 100 UNIT/ML injection    humaLOG    5 vial    As directed via insulin pump - up to 160 units daily, adjusting pump settings due to increased insulin requirements and pregnancy    Uncontrolled type 1 diabetes mellitus without complication (H), Encounter for long-term (current) use of insulin (H)       insulin pump infusion      Date last updated:  12/23/16 Medtronic Minimed: Model 630G BASAL RATES and times: 12   AM (midnight): 0.85 units/hour   8     AM: 0.8 units/hour  6pm: 0.85 Basal Pattern A:  Fransisca Irineo will use when NA. CARB RATIO and times: 12   AM (midnight): 12 Corection Factor (Sensitivity) and times: 12   AM (midnight): 47 mg/dL BLOOD GLUCOSE TARGET and times: 12   AM (midnight): 100 - 120 5     AM:  90 - 110 9:30    PM:  100 - 120 Active Insulin Time:  3 hours Sensor:  No Carelink / Diasend username: [email or web address] Carelink / Diasend Password:  [email or web address]        insulin syringe-needle U-100 31G X 5/16\" 0.3 ML    BD insulin syringe ultrafine    100 each    Use one syringe 4 daily or as directed in the event of insulin pump failure.  DO NOT DISPENSE until requested by patient    Encounter for long-term (current) use of insulin (H)       levonorgestrel 20 MCG/24HR IUD    MIRENA     1 each by Intrauterine route once        MICROLET LANCETS Misc     600 each    Use one lancet 6 times daily for blood sugar testing    Type 1 diabetes mellitus without complication (H), Insulin pump in place       PRENATAL VITAMINS PO             "

## 2018-03-23 NOTE — PROGRESS NOTES
"  SUBJECTIVE:                                                   Fransisca Cabello is a 23 year old female who presents to clinic today for the following health issue(s):  Patient presents with:  IUD: IUD check. Bleeding since insertion.       HPI:  Here for follow up of Mirena IUD. She reports feeling generally well other than a cold today. Still with light daily spotting, denies fever or chills.       Problem list and histories reviewed & adjusted, as indicated.  Additional history: as documented.    Patient Active Problem List   Diagnosis     Type 1 diabetes mellitus, uncontrolled (H)     Encounter for long-term (current) use of insulin (H)     Pre-existing type 1 diabetes mellitus during pregnancy in first trimester     Indication for care in labor or delivery     Hypertension in pregnancy     H/O severe pre-eclampsia     Pre-eclampsia     Labor and delivery, indication for care     Mirena IUD inserted 2/16/18 - remove on or before 2/16/2023     Past Surgical History:   Procedure Laterality Date     C ROOT CANAL THERAPY 2 CANALS       DENTAL SURGERY        Social History   Substance Use Topics     Smoking status: Never Smoker     Smokeless tobacco: Never Used     Alcohol use No      Problem (# of Occurrences) Relation (Name,Age of Onset)    DIABETES (1) Father (35): Type 1     Family History Negative (1) Mother              Current Outpatient Prescriptions on File Prior to Visit:  insulin lispro (HUMALOG) 100 UNIT/ML injection As directed via insulin pump - up to 160 units daily, adjusting pump settings due to increased insulin requirements and pregnancy   blood glucose monitoring (Hello Music CONTOUR NEXT) test strip Use to test blood sugar 10-12 times daily or as directed.   MICROLET LANCETS MISC Use one lancet 6 times daily for blood sugar testing   insulin syringe-needle U-100 (BD INSULIN SYRINGE ULTRAFINE) 31G X 5/16\" 0.3 ML Use one syringe 4 daily or as directed in the event of insulin pump failure.  DO NOT " DISPENSE until requested by patient   acetone, Urine, test STRP Use as directed to test urine ketone if blood sugars are >250 or during illness with fever, nausea, vomiting, or abdominal pain.   insulin glargine (LANTUS VIAL) 100 UNIT/ML injection Inject 19 u sq every 24 hours in the event of insulin pump therapy.  DO NOT FILL RX until requested by patient.   INSULIN PUMP - OUTPATIENT Date last updated:  12/23/16Medtronic Minimed: Model 630GBASAL RATES and times:12   AM (midnight): 0.85 units/hour  8     AM: 0.8 units/hour 6pm: 0.85Basal Pattern A:  Fransisca Tobiasgel will use when NA.CARB RATIO and times:12   AM (midnight): 12Corection Factor (Sensitivity) and times:12   AM (midnight): 47 mg/dLBLOOD GLUCOSE TARGET and times:12   AM (midnight): 100 - 1205     AM:  90 - 1109:30    PM:  100 - 120Active Insulin Time:  3 hoursSensor:  Splashup / XMLAW username: [email or web address] / XMLAW Password:  [email or web address]   Prenatal Multivit-Min-Fe-FA (PRENATAL VITAMINS PO)      No current facility-administered medications on file prior to visit.   Allergies   Allergen Reactions     Cefzil [Cefprozil] Swelling     Phenergan [Promethazine] Anxiety and Swelling       ROS:  5 point ROS negative except as noted in HPI.    OBJECTIVE:     /80  Pulse 84  Wt 159 lb (72.1 kg)  Breastfeeding? Yes  BMI 24.9 kg/m2  Body mass index is 24.9 kg/(m^2).    Exam:  Pelvis: External genitalia, Bartholin, urethral, and [place] glands within normal limits. Urethra is without lesion and nontender to palpation. Bladder is nontender. On speculum exam, cervix is without lesion and vagina is normal without lesion or discharge. Mirena IUD strings are initially not seen, but were folded in the cervix and were seen with the use of a small q tip to tease them from the os. Exam is normal.    In-Clinic Test Results:  No results found for this or any previous visit (from the past 24 hour(s)).    ASSESSMENT/PLAN:                                                         ICD-10-CM    1. IUD check up Z30.431          Follow up as needed for routine gyn care. If still spotting in a month, consider trial of 1 month of ocps to help bleeding.    Cheyanne Silva MD  [facility]

## 2018-03-23 NOTE — NURSING NOTE
"Chief Complaint   Patient presents with     IUD     IUD check. Bleeding since insertion.        Initial /80  Pulse 84  Wt 159 lb (72.1 kg)  Breastfeeding? Yes  BMI 24.9 kg/m2 Estimated body mass index is 24.9 kg/(m^2) as calculated from the following:    Height as of 2/16/18: 5' 7\" (1.702 m).    Weight as of this encounter: 159 lb (72.1 kg).  Medication Reconciliation: complete   María Elena Arce LPN      "

## 2018-04-23 DIAGNOSIS — Z79.4 ENCOUNTER FOR LONG-TERM (CURRENT) USE OF INSULIN (H): ICD-10-CM

## 2018-04-23 NOTE — TELEPHONE ENCOUNTER
"Requested Prescriptions   Pending Prescriptions Disp Refills     HUMALOG 100 UNIT/ML injection [Pharmacy Med Name: HUMALOG 100UNIT/ML SOLN]  Last Written Prescription Date:  12/14/2017  Last Fill Quantity: 5 vial,  # refills: 3   Last office visit: 11/16/2017 with prescribing provider:  Martina    50 mL 3     Sig: USE AS DIRECTED IN INSULIN PUMP - USE UP  UNITS DAILY - ADJUSTING PUMP SETTINGS DUE TO INCREASED INSULIN REQUIREMENTS AND PREGNANCY    Short Acting Insulin Protocol Failed    4/23/2018 12:50 PM       Failed - LDL on file in past 12 months    No lab results found.         Passed - Blood pressure less than 140/90 in past 6 months    BP Readings from Last 3 Encounters:   03/23/18 126/80   03/06/18 121/76   02/16/18 124/86          Passed - Microalbumin on file in past 12 months    Recent Labs   Lab Test  11/16/17   1136   MICROL  8   UMALCR  6.81          Passed - Serum creatinine on file in past 12 months    Recent Labs   Lab Test  03/06/18   1137   CR  0.76          Passed - HgbA1C in past 3 or 6 months    Recent Labs   Lab Test  11/10/17   1126   A1C  5.3          Passed - Patient is age 18 or older       Passed - Recent (6 mo) or future (30 days) visit within the authorizing provider's specialty    Patient had office visit in the last 6 months or has a visit in the next 30 days with authorizing provider or within the authorizing provider's specialty.  See \"Patient Info\" tab in inbasket, or \"Choose Columns\" in Meds & Orders section of the refill encounter.              "

## 2018-04-26 NOTE — TELEPHONE ENCOUNTER
Routing to [facility]. The Pt is very overdue for follow up visit with Maryann Hammonds.   Please help her schedule, then route refill request directly to Maryann to approve.     The Pt was due for a recheck in January -- which looks like was right around the time she gave birth. Thanks.     Jennifer Bettencourt RN -- [facility]

## 2018-04-30 NOTE — TELEPHONE ENCOUNTER
Spoke with patient.  Future appointment scheduled with Maryann Hammonds for 5/17/18.  Maryann please advise on refill request.  Emely Mullins M.A.

## 2018-05-02 ENCOUNTER — TELEPHONE (OUTPATIENT)
Dept: ENDOCRINOLOGY | Facility: CLINIC | Age: 23
End: 2018-05-02

## 2018-05-02 NOTE — TELEPHONE ENCOUNTER
Received Prescription form from [employer] for diabetic and insulin pump supplies.  Dx: E10.65   [employer] ph# not supplied on form.  Fax completed form to #[phone].  Form on Maryann Hammonds's desk.  Emely Mullins M.A.

## 2018-05-17 ENCOUNTER — OFFICE VISIT (OUTPATIENT)
Dept: ENDOCRINOLOGY | Facility: CLINIC | Age: 23
End: 2018-05-17
Payer: COMMERCIAL

## 2018-05-17 VITALS — SYSTOLIC BLOOD PRESSURE: 115 MMHG | HEART RATE: 99 BPM | DIASTOLIC BLOOD PRESSURE: 82 MMHG

## 2018-05-17 DIAGNOSIS — E10.9 TYPE 1 DIABETES MELLITUS WITHOUT COMPLICATION (H): ICD-10-CM

## 2018-05-17 DIAGNOSIS — Z96.41 INSULIN PUMP IN PLACE: ICD-10-CM

## 2018-05-17 DIAGNOSIS — Z79.4 ENCOUNTER FOR LONG-TERM (CURRENT) USE OF INSULIN (H): ICD-10-CM

## 2018-05-17 DIAGNOSIS — E83.52 HYPERCALCEMIA: ICD-10-CM

## 2018-05-17 LAB — HBA1C MFR BLD: 6.2 % (ref 0–5.6)

## 2018-05-17 PROCEDURE — 99214 OFFICE O/P EST MOD 30 MIN: CPT | Performed by: CLINICAL NURSE SPECIALIST

## 2018-05-17 PROCEDURE — 80069 RENAL FUNCTION PANEL: CPT | Performed by: CLINICAL NURSE SPECIALIST

## 2018-05-17 PROCEDURE — 83036 HEMOGLOBIN GLYCOSYLATED A1C: CPT | Performed by: CLINICAL NURSE SPECIALIST

## 2018-05-17 PROCEDURE — 36415 COLL VENOUS BLD VENIPUNCTURE: CPT | Performed by: CLINICAL NURSE SPECIALIST

## 2018-05-17 RX ORDER — BLOOD SUGAR DIAGNOSTIC
STRIP MISCELLANEOUS
Qty: 100 EACH | Refills: 3 | Status: SHIPPED | OUTPATIENT
Start: 2018-05-17 | End: 2019-06-05

## 2018-05-17 RX ORDER — INSULIN GLARGINE 100 [IU]/ML
INJECTION, SOLUTION SUBCUTANEOUS
Qty: 10 ML | Refills: 1 | Status: SHIPPED | OUTPATIENT
Start: 2018-05-17 | End: 2019-06-05

## 2018-05-17 RX ORDER — LANCETS
EACH MISCELLANEOUS
Qty: 600 EACH | Refills: 3 | Status: SHIPPED | OUTPATIENT
Start: 2018-05-17

## 2018-05-17 NOTE — LETTER
5/17/2018         RE: Fransisca Cabello  [address] [facility] [address]        Dear Colleague,    Thank you for referring your patient, Fransisca Cabello, to the [facility]. Please see a copy of my visit note below.    Name: Fransisca Avila  F/u for Diabetes (Last seen 11/16/2017).  HPI:  Fransisca Avila is a 23 year old female who presents for the evaluation/management of type 1 diabetes.     1. Type 1 DM:  Originally diagnosed at the age of 2.   Induced at 34 weeks on 11/5/2014, preeclampsia.  Baby girl, initially in NICU. Daughter now 3 years old, has also been diagnosed with type 1 diabetes.  Now also has a son, born 4 months ago    Last seen while pregnant 6 months ago.  She had a recent low blood sugar requiring assistance 3/6/2018.  Apparently her basal rate setting were still programmed for the much higher pregnancy insulin requirements and her sensor was malfunctioning causing her insulin pump to revert to her programmed basal setting and this caused the hypoglycemia.    Current Regimen: humalog  Insulin pump - Medtronic 670-G.   Auto mode 98%  Time Rate (U/hr)   00:00 1.00   04:00 1.25   06:00 2.00   08:30 2.15   11:30 2.10   13:00  15:00  18:00  21:00  23:00 2.30  3.00  2.65  2.75  2.45       Carbohydrate Ratio -    Time Ratio   0000 7.0                     Sensitivity  00:00 - 48  17:00 - 48   Active Insulin Time 3.00 hours   Basal  20%    Bolus   80%    Total Carbohydrates/day 184   Total Insulin/day  44   Average Blood Sugar  Sensor Average 134  124   BS Checks  6.4 times a day   Care Link Username-   Password-   Target range:  0:00    5:00     21:30      Complications:   Diabetes Complications  Description / Detail    Diabetic Retinopathy  No retinopathy   CAD / PAD  No   Neuropathy  No   Nephropathy / Microalbuminuria  No   Gastroparesis  No   Hypoglycemia Unawareness  No     PMH/PSH:  Past Medical History:   Diagnosis Date     Postpartum  depression 2014    On medication for 6 months-      Type 1 diabetes (H) 1998    three years at diagnosis     Varicella 1998     Past Surgical History:   Procedure Laterality Date     C ROOT CANAL THERAPY 2 CANALS       DENTAL SURGERY       Family Hx:  Family History   Problem Relation Age of Onset     Family History Negative Mother      DIABETES Father 35     Type 1      Thyroid disease: No         DM2: NO         Autoimmune: DM1, SLE, RA, Vitiligo Yes - Father has type 1 DM    Social Hx:  Social History     Social History     Marital status:      Spouse name: Teja     Number of children: 1     Years of education: N/A     Occupational History      [employer]     Social History Main Topics     Smoking status: Never Smoker     Smokeless tobacco: Never Used     Alcohol use No     Drug use: No     Sexual activity: Yes     Partners: Male     Birth control/ protection: Condom     Other Topics Concern     Blood Transfusions No     Social History Narrative    Fransisca lives with her spouse           MEDICATIONS:  has a current medication list which includes the following prescription(s): acetone (urine) test, blood glucose monitoring, glucagon, insulin glargine, insulin lispro, insulin pump, insulin syringe-needle u-100, levonorgestrel, microlet lancets, and prenatal multivit-min-fe-fa.    ROS     ROS: 10 point ROS neg other than the symptoms noted above in the HPI.    Physical Exam   VS: There were no vitals taken for this visit.  GENERAL: NAD, well dressed, answering questions appropriately, appears stated age.  HEENT: OP clear, no exophthalmos, no proptosis, EOMI, no lig lag, no retraction, no scleral icterus  RESPIRATORY: Clear.  Normal respiratory effort.  CARDIOVASCULAR: RRR. No peripheral edema.  EXTREMITIES: no edema  NEUROLOGY: CN grossly intact, no tremors  MSK: grossly intact, No digital cyanosis. Normal gait and station.  SKIN:warm and dry with good turgor;  no rashes, no lesions, no ulcers  PSYCH: Intact  judgment and insight. A&OX3 with a cordial affect.    LABS:  A1c  Component      Latest Ref Rng & Units 8/16/2017 11/10/2017 5/17/2018   Hemoglobin A1C      0 - 5.6 % 5.7 5.3 6.2 (H)     BMP:  !COMPREHENSIVE Latest Ref Rng & Units 3/6/2018   SODIUM 133 - 144 mmol/L 140   POTASSIUM 3.4 - 5.3 mmol/L 4.1   CHLORIDE 94 - 109 mmol/L 107   BUN 7 - 30 mg/dL 7   Creatinine 0.52 - 1.04 mg/dL 0.76   Glucose 70 - 99 mg/dL 91   ANION GAP 3 - 14 mmol/L 5   CALCIUM 8.5 - 10.1 mg/dL 10.7 (H)   ALBUMIN 3.4 - 5.0 g/dL 4.0   PROTEIN, TOTAL 6.8 - 8.8 g/dL 8.2     !LIPID/HEPATIC Latest Ref Rng 11/8/2014 12/2/2015 11/18/2016   AST 0 - 45 U/L 15 6 5   ALT 0 - 50 U/L 10 22 15     Urine microalbumin:  Component    Latest Ref Rng & Units 6/30/2017   Protein Random Urine    g/L 0.18   Protein Total Urine g/gr Creatinine    0 - 0.2 g/g Cr 0.14   Creatinine Urine    mg/dL 123     JOSH/C-peptide:  Component    Latest Ref Rng 11/10/2014   Glutamic Acid Decarboxylase Antibody     <5.0    Reference range: 0.0 to 5.0      C-Peptide    0.9 - 6.9 ng/mL <0.1 (L)     TFT:  !THYROID Latest Ref Rng & Units 6/30/2017 11/18/2016 12/2/2015   TSH 0.40 - 4.00 mU/L 1.46 1.42 1.89   T4 FREE 0.76 - 1.46 ng/dL   1.16     !THYROID Latest Ref Rng & Units 11/13/2014   TSH 0.40 - 4.00 mU/L 1.58   T4 FREE 0.76 - 1.46 ng/dL 1.16     TTG Abs:  Component    Latest Ref Rng 11/13/2014   Tissue Transglutaminase Antibody IgA    0 - 3.9 U/mL 1.1   Tissue Transglutaminase Rosina IgG    0 - 5.9 U/mL 1.0     All pertinent notes, labs, and images personally reviewed by me.     A/P  Ms.Jennica Avila is a 23 year old here for the evaluation/management of diabetes:    1. DM1 - Controlled, today's A1c 6.2%.  A1c goal  < 7.0%.  Recent low blood sugar requiring assistance.   Change basal rate to:  Time Rate (U/hr)   00:00-24:00 0.975   Rx for glucagon emergency kit, urine ketone strips provided today.   RX for lantus and syringes provided in the event of insulin pump failure provided  "today.  Labs ordered today:   Orders Placed This Encounter   Procedures     Hemoglobin A1c     Renal panel (Alb, BUN, Ca, Cl, CO2, Creat, Gluc, Phos, K, Na)       Radiology/Consults ordered today: None    Orders Placed This Encounter   Medications     acetone, Urine, test STRP     Sig: Use as directed to test urine ketone if blood sugars are >250 or during illness with fever, nausea, vomiting, or abdominal pain.     Dispense:  100 each     Refill:  prn     blood glucose monitoring (RAYMUNDO CONTOUR NEXT) test strip     Sig: Use to test blood sugar 10-12 times daily or as directed.     Dispense:  1000 each     Refill:  3     insulin lispro (HUMALOG) 100 UNIT/ML injection     Sig: USE AS DIRECTED IN INSULIN PUMP - USE UP TO 80 per day     Dispense:  80 mL     Refill:  3     insulin glargine (LANTUS VIAL) 100 UNIT/ML injection     Sig: Inject 18 u sq every 24 hours in the event of insulin pump therapy.  DO NOT FILL RX until requested by patient.     Dispense:  10 mL     Refill:  1     insulin syringe-needle U-100 (BD INSULIN SYRINGE ULTRAFINE) 31G X 5/16\" 0.3 ML     Sig: Use one syringe 4 daily or as directed in the event of insulin pump failure.  DO NOT DISPENSE until requested by patient     Dispense:  100 each     Refill:  3     MICROLET LANCETS MISC     Sig: Use one lancet 6 times daily for blood sugar testing     Dispense:  600 each     Refill:  3     glucagon (GLUCAGON EMERGENCY) 1 MG kit     Sig: Inject 1 mg into the muscle once for 1 dose     Dispense:  1 each     Refill:  0       Follow-up:  3 months     Maryann Hammonds NP  Endocrinology  [facility]  CC:       More than 50% of face to face time spent with Ms. Avila on counseling / coordinating her care.  Total face to face time was 25 minutes.      All questions were answered.  The patient indicates understanding of the above issues and agrees with the plan set forth.           Again, thank you for allowing me to participate in the care of your patient.  "       Sincerely,        HORTENSIA Rosales CNP

## 2018-05-17 NOTE — PROGRESS NOTES
Name: Fransisca Avila  F/u for Diabetes (Last seen 11/16/2017).  HPI:  Fransisca Avila is a 23 year old female who presents for the evaluation/management of type 1 diabetes.     1. Type 1 DM:  Originally diagnosed at the age of 2.   Induced at 34 weeks on 11/5/2014, preeclampsia.  Baby girl, initially in NICU. Daughter now 3 years old, has also been diagnosed with type 1 diabetes.  Now also has a son, born 4 months ago    Last seen while pregnant 6 months ago.  She had a recent low blood sugar requiring assistance 3/6/2018.  Apparently her basal rate setting were still programmed for the much higher pregnancy insulin requirements and her sensor was malfunctioning causing her insulin pump to revert to her programmed basal setting and this caused the hypoglycemia.    Current Regimen: humalog  Insulin pump - [employer] 670-G.   Auto mode 98%  Time Rate (U/hr)   00:00 1.00   04:00 1.25   06:00 2.00   08:30 2.15   11:30 2.10   13:00  15:00  18:00  21:00  23:00 2.30  3.00  2.65  2.75  2.45       Carbohydrate Ratio -    Time Ratio   0000 7.0                     Sensitivity  00:00 - 48  17:00 - 48   Active Insulin Time 3.00 hours   Basal  20%    Bolus   80%    Total Carbohydrates/day 184   Total Insulin/day  44   Average Blood Sugar  Sensor Average 134  124   BS Checks  6.4 times a day   Care Link Username-   Password-   Target range:  0:00    5:00     21:30      Complications:   Diabetes Complications  Description / Detail    Diabetic Retinopathy  No retinopathy   CAD / PAD  No   Neuropathy  No   Nephropathy / Microalbuminuria  No   Gastroparesis  No   Hypoglycemia Unawareness  No     PMH/PSH:  Past Medical History:   Diagnosis Date     Postpartum depression 2014    On medication for 6 months-      Type 1 diabetes (H) 1998    three years at diagnosis     Varicella 1998     Past Surgical History:   Procedure Laterality Date     C ROOT CANAL THERAPY 2 CANALS       DENTAL SURGERY       Family Hx:  Family  History   Problem Relation Age of Onset     Family History Negative Mother      DIABETES Father 35     Type 1      Thyroid disease: No         DM2: NO         Autoimmune: DM1, SLE, RA, Vitiligo Yes - Father has type 1 DM    Social Hx:  Social History     Social History     Marital status:      Spouse name: Teja     Number of children: 1     Years of education: N/A     Occupational History      [employer]     Social History Main Topics     Smoking status: Never Smoker     Smokeless tobacco: Never Used     Alcohol use No     Drug use: No     Sexual activity: Yes     Partners: Male     Birth control/ protection: Condom     Other Topics Concern     Blood Transfusions No     Social History Narrative    Fransisca lives with her spouse           MEDICATIONS:  has a current medication list which includes the following prescription(s): acetone (urine) test, blood glucose monitoring, glucagon, insulin glargine, insulin lispro, insulin pump, insulin syringe-needle u-100, levonorgestrel, microlet lancets, and prenatal multivit-min-fe-fa.    ROS     ROS: 10 point ROS neg other than the symptoms noted above in the HPI.    Physical Exam   VS: There were no vitals taken for this visit.  GENERAL: NAD, well dressed, answering questions appropriately, appears stated age.  HEENT: OP clear, no exophthalmos, no proptosis, EOMI, no lig lag, no retraction, no scleral icterus  RESPIRATORY: Clear.  Normal respiratory effort.  CARDIOVASCULAR: RRR. No peripheral edema.  EXTREMITIES: no edema  NEUROLOGY: CN grossly intact, no tremors  MSK: grossly intact, No digital cyanosis. Normal gait and station.  SKIN:warm and dry with good turgor;  no rashes, no lesions, no ulcers  PSYCH: Intact judgment and insight. A&OX3 with a cordial affect.    LABS:  A1c  Component      Latest Ref Rng & Units 8/16/2017 11/10/2017 5/17/2018   Hemoglobin A1C      0 - 5.6 % 5.7 5.3 6.2 (H)     BMP:  !COMPREHENSIVE Latest Ref Rng & Units 3/6/2018   SODIUM 133 - 144  mmol/L 140   POTASSIUM 3.4 - 5.3 mmol/L 4.1   CHLORIDE 94 - 109 mmol/L 107   BUN 7 - 30 mg/dL 7   Creatinine 0.52 - 1.04 mg/dL 0.76   Glucose 70 - 99 mg/dL 91   ANION GAP 3 - 14 mmol/L 5   CALCIUM 8.5 - 10.1 mg/dL 10.7 (H)   ALBUMIN 3.4 - 5.0 g/dL 4.0   PROTEIN, TOTAL 6.8 - 8.8 g/dL 8.2     !LIPID/HEPATIC Latest Ref Rng 11/8/2014 12/2/2015 11/18/2016   AST 0 - 45 U/L 15 6 5   ALT 0 - 50 U/L 10 22 15     Urine microalbumin:  Component    Latest Ref Rng & Units 6/30/2017   Protein Random Urine    g/L 0.18   Protein Total Urine g/gr Creatinine    0 - 0.2 g/g Cr 0.14   Creatinine Urine    mg/dL 123     JOSH/C-peptide:  Component    Latest Ref Rng 11/10/2014   Glutamic Acid Decarboxylase Antibody     <5.0    Reference range: 0.0 to 5.0      C-Peptide    0.9 - 6.9 ng/mL <0.1 (L)     TFT:  !THYROID Latest Ref Rng & Units 6/30/2017 11/18/2016 12/2/2015   TSH 0.40 - 4.00 mU/L 1.46 1.42 1.89   T4 FREE 0.76 - 1.46 ng/dL   1.16     !THYROID Latest Ref Rng & Units 11/13/2014   TSH 0.40 - 4.00 mU/L 1.58   T4 FREE 0.76 - 1.46 ng/dL 1.16     TTG Abs:  Component    Latest Ref Rng 11/13/2014   Tissue Transglutaminase Antibody IgA    0 - 3.9 U/mL 1.1   Tissue Transglutaminase Rosina IgG    0 - 5.9 U/mL 1.0     All pertinent notes, labs, and images personally reviewed by me.     A/P  Ms.Jennica Avila is a 23 year old here for the evaluation/management of diabetes:    1. DM1 - Controlled, today's A1c 6.2%.  A1c goal  < 7.0%.  Recent low blood sugar requiring assistance.   Change basal rate to:  Time Rate (U/hr)   00:00-24:00 0.975   Rx for glucagon emergency kit, urine ketone strips provided today.   RX for lantus and syringes provided in the event of insulin pump failure provided today.  Labs ordered today:   Orders Placed This Encounter   Procedures     Hemoglobin A1c     Renal panel (Alb, BUN, Ca, Cl, CO2, Creat, Gluc, Phos, K, Na)       Radiology/Consults ordered today: None    Orders Placed This Encounter   Medications     acetone,  "Urine, test STRP     Sig: Use as directed to test urine ketone if blood sugars are >250 or during illness with fever, nausea, vomiting, or abdominal pain.     Dispense:  100 each     Refill:  prn     blood glucose monitoring (RAYMUNDO CONTOUR NEXT) test strip     Sig: Use to test blood sugar 10-12 times daily or as directed.     Dispense:  1000 each     Refill:  3     insulin lispro (HUMALOG) 100 UNIT/ML injection     Sig: USE AS DIRECTED IN INSULIN PUMP - USE UP TO 80 per day     Dispense:  80 mL     Refill:  3     insulin glargine (LANTUS VIAL) 100 UNIT/ML injection     Sig: Inject 18 u sq every 24 hours in the event of insulin pump therapy.  DO NOT FILL RX until requested by patient.     Dispense:  10 mL     Refill:  1     insulin syringe-needle U-100 (BD INSULIN SYRINGE ULTRAFINE) 31G X 5/16\" 0.3 ML     Sig: Use one syringe 4 daily or as directed in the event of insulin pump failure.  DO NOT DISPENSE until requested by patient     Dispense:  100 each     Refill:  3     MICROLET LANCETS MISC     Sig: Use one lancet 6 times daily for blood sugar testing     Dispense:  600 each     Refill:  3     glucagon (GLUCAGON EMERGENCY) 1 MG kit     Sig: Inject 1 mg into the muscle once for 1 dose     Dispense:  1 each     Refill:  0       Follow-up:  3 months     Maryann Hammonds NP  Endocrinology  [facility]  CC:       More than 50% of face to face time spent with Ms. Avila on counseling / coordinating her care.  Total face to face time was 25 minutes.      All questions were answered.  The patient indicates understanding of the above issues and agrees with the plan set forth.         "

## 2018-05-17 NOTE — MR AVS SNAPSHOT
After Visit Summary   5/17/2018    Fransisca Cabello    MRN: [id number]           Patient Information     Date Of Birth          1995        Visit Information        Provider Department      5/17/2018 2:30 PM Maryann Hammonds APRN CNP [facility]        Today's Diagnoses     Uncontrolled type 1 diabetes mellitus without complication (H)    -  1    Encounter for long-term (current) use of insulin (H)        Type 1 diabetes mellitus without complication (H)        Insulin pump in place           Follow-ups after your visit        Follow-up notes from your care team     Return in about 3 months (around 8/17/2018).      Your next 10 appointments already scheduled     Aug 23, 2018 10:00 AM CDT   Return Visit with HORTENSIA Rosales CNP   [facility] ([facility]    [address] [facility] [address] [phone]              Who to contact     If you have questions or need follow up information about today's clinic visit or your schedule please contact [facility] directly at [phone].  Normal or non-critical lab and imaging results will be communicated to you by Soocialhart, letter or phone within 4 business days after the clinic has received the results. If you do not hear from us within 7 days, please contact the clinic through Soocialhart or phone. If you have a critical or abnormal lab result, we will notify you by phone as soon as possible.  Submit refill requests through [employer] or call your pharmacy and they will forward the refill request to us. Please allow 3 business days for your refill to be completed.          Additional Information About Your Visit        MyChart Information     [employer] gives you secure access to your electronic health record. If you see a primary care provider, you can also send messages to your care team and make appointments. If you have questions, please call your primary  care clinic.  If you do not have a primary care provider, please call [phone] and they will assist you.        Care EveryWhere ID     This is your Care EveryWhere ID. This could be used by other organizations to access your [facility] medical records  [id number]        Your Vitals Were     Pulse                   99            Blood Pressure from Last 3 Encounters:   05/17/18 115/82   03/23/18 126/80   03/06/18 121/76    Weight from Last 3 Encounters:   03/23/18 72.1 kg (159 lb)   02/16/18 73.9 kg (163 lb)   12/31/17 88.5 kg (195 lb)              We Performed the Following     Hemoglobin A1c     Renal panel (Alb, BUN, Ca, Cl, CO2, Creat, Gluc, Phos, K, Na)          Today's Medication Changes          These changes are accurate as of 5/17/18  5:44 PM.  If you have any questions, ask your nurse or doctor.               Start taking these medicines.        Dose/Directions    glucagon 1 MG kit   Commonly known as:  GLUCAGON EMERGENCY   Used for:  Type 1 diabetes mellitus without complication (H)   Started by:  Maryann Hammonds APRN CNP        Dose:  1 mg   Inject 1 mg into the muscle once for 1 dose   Quantity:  1 each   Refills:  0         These medicines have changed or have updated prescriptions.        Dose/Directions    insulin glargine 100 UNIT/ML injection   Commonly known as:  LANTUS VIAL   This may have changed:  additional instructions   Used for:  Type 1 diabetes mellitus without complication (H), Insulin pump in place, Encounter for long-term (current) use of insulin (H)   Changed by:  Maryann Hammonds APRN CNP        Inject 18 u sq every 24 hours in the event of insulin pump therapy.  DO NOT FILL RX until requested by patient.   Quantity:  10 mL   Refills:  1       insulin lispro 100 UNIT/ML injection   Commonly known as:  humaLOG   This may have changed:  See the new instructions.   Used for:  Uncontrolled type 1 diabetes mellitus without complication (H), Encounter for long-term (current) use  "of insulin (H)   Changed by:  Maryann Hammonds APRN CNP        USE AS DIRECTED IN INSULIN PUMP - USE UP TO 80 per day   Quantity:  80 mL   Refills:  3            Where to get your medicines      These medications were sent to [facility] [place], [place] - [address]  [address]     Phone:  [phone]     acetone (Urine) test Strp    blood glucose monitoring test strip    glucagon 1 MG kit    insulin glargine 100 UNIT/ML injection    insulin lispro 100 UNIT/ML injection    insulin syringe-needle U-100 31G X 5/16\" 0.3 ML    MICROLET LANCETS [facility]                Primary Care Provider Fax #    Physician No Ref-Primary [phone]       No address on file        Equal Access to Services     EDWIGE VALLADARES : Floridalma ramirezo Sodario, waaxda luqadaha, qaybta kaalmada adesmtihyayadi, hernan yeung. So [facility] [phone].    ATENCIÓN: Si habla español, tiene a boateng disposición servicios gratuitos de asistencia lingüística. [facility] [phone].    We comply with applicable federal civil rights laws and [place] laws. We do not discriminate on the basis of race, color, national origin, age, disability, sex, sexual orientation, or gender identity.            Thank you!     Thank you for choosing [facility]  for your care. Our goal is always to provide you with excellent care. Hearing back from our patients is one way we can continue to improve our services. Please take a few minutes to complete the written survey that you may receive in the mail after your visit with us. Thank you!             Your Updated Medication List - Protect others around you: Learn how to safely use, store and throw away your medicines at www.disposemymeds.org.          This list is accurate as of 5/17/18  5:44 PM.  Always use your most recent med list.                   Brand Name Dispense Instructions for use Diagnosis    acetone (Urine) test Strp " "    100 each    Use as directed to test urine ketone if blood sugars are >250 or during illness with fever, nausea, vomiting, or abdominal pain.        blood glucose monitoring test strip    ARYMUNDO CONTOUR NEXT    1000 each    Use to test blood sugar 10-12 times daily or as directed.    Uncontrolled type 1 diabetes mellitus without complication (H)       glucagon 1 MG kit    GLUCAGON EMERGENCY    1 each    Inject 1 mg into the muscle once for 1 dose    Type 1 diabetes mellitus without complication (H)       insulin glargine 100 UNIT/ML injection    LANTUS VIAL    10 mL    Inject 18 u sq every 24 hours in the event of insulin pump therapy.  DO NOT FILL RX until requested by patient.    Type 1 diabetes mellitus without complication (H), Insulin pump in place, Encounter for long-term (current) use of insulin (H)       insulin lispro 100 UNIT/ML injection    humaLOG    80 mL    USE AS DIRECTED IN INSULIN PUMP - USE UP TO 80 per day    Uncontrolled type 1 diabetes mellitus without complication (H), Encounter for long-term (current) use of insulin (H)       insulin pump infusion      Date last updated:  12/23/16 [employer] Minimed: Model 630G BASAL RATES and times: 12   AM (midnight): 0.85 units/hour   8     AM: 0.8 units/hour  6pm: 0.85 Basal Pattern A:  Fransisca Cabello will use when NA. CARB RATIO and times: 12   AM (midnight): 12 Corection Factor (Sensitivity) and times: 12   AM (midnight): 47 mg/dL BLOOD GLUCOSE TARGET and times: 12   AM (midnight): 100 - 120 5     AM:  90 - 110 9:30    PM:  100 - 120 Active Insulin Time:  3 hours Sensor:  No Carelink / Diasend username: [place] Carelink / Diasend Password:  [email or web address]        insulin syringe-needle U-100 31G X 5/16\" 0.3 ML    BD insulin syringe ultrafine    100 each    Use one syringe 4 daily or as directed in the event of insulin pump failure.  DO NOT DISPENSE until requested by patient    Encounter for long-term (current) use of insulin (H)       " levonorgestrel 20 MCG/24HR IUD    MIRENA     1 each by Intrauterine route once        MICROLET LANCETS Misc     600 each    Use one lancet 6 times daily for blood sugar testing    Type 1 diabetes mellitus without complication (H), Insulin pump in place       PRENATAL VITAMINS PO

## 2018-05-18 LAB
ALBUMIN SERPL-MCNC: 4.1 G/DL (ref 3.4–5)
ANION GAP SERPL CALCULATED.3IONS-SCNC: 8 MMOL/L (ref 3–14)
BUN SERPL-MCNC: 15 MG/DL (ref 7–30)
CALCIUM SERPL-MCNC: 10.2 MG/DL (ref 8.5–10.1)
CHLORIDE SERPL-SCNC: 109 MMOL/L (ref 94–109)
CO2 SERPL-SCNC: 24 MMOL/L (ref 20–32)
CREAT SERPL-MCNC: 0.75 MG/DL (ref 0.52–1.04)
GFR SERPL CREATININE-BSD FRML MDRD: >90 ML/MIN/1.7M2
GLUCOSE SERPL-MCNC: 136 MG/DL (ref 70–99)
PHOSPHATE SERPL-MCNC: 3.9 MG/DL (ref 2.5–4.5)
POTASSIUM SERPL-SCNC: 4.3 MMOL/L (ref 3.4–5.3)
SODIUM SERPL-SCNC: 141 MMOL/L (ref 133–144)

## 2018-05-18 NOTE — PROGRESS NOTES
Please call  Fransisca,  Your calcium repeated slightly abnormal.  Please make a lab appointment in one month to recheck.  Maryann Hammonds NP  Endocrinology

## 2018-05-18 NOTE — PROGRESS NOTES
Fransisca,  Here's a copy of your recent A1c result for your records.  Congratulations on keeping your diabetes so well control.  Maryann Hammonds NP  Endocrinology

## 2018-06-22 ENCOUNTER — TELEPHONE (OUTPATIENT)
Dept: ENDOCRINOLOGY | Facility: CLINIC | Age: 23
End: 2018-06-22

## 2018-06-22 NOTE — TELEPHONE ENCOUNTER
Spoke with patient.  Patient states she is good on her supplies.  Will have Maryann Hammonds sign prescription form for [employer] when she is next in clinic on 6/27/18.  Patient is fine with this plan.  Emely Mullins M.A.

## 2018-06-22 NOTE — TELEPHONE ENCOUNTER
Received Prescription form from [employer] for diabetes and pump supplies.  Dx:E10.65   [employer] ph# not included on form .  Fax completed form to #[phone].  Form in Endocrinology basket in the gold station.   Called patient to see how she is doing on supplies as Maryann Hammonds is out of the office until 6/27/18 and Dr. Weaver is out of the office today.  LMOM to return my call to the gold station.  Emely Mullins M.A.

## 2018-06-27 NOTE — TELEPHONE ENCOUNTER
Form completed and signed.  Faxed to [employer] at fax# [phone].  Patient aware.  Emely Mullins M.A.

## 2018-06-28 NOTE — TELEPHONE ENCOUNTER
"Patient called to report that she received a call from [employer] stating that \"Nubia told them that the patient is not needing supplies\".  Patient is very concerned about this because noone named Nubia has ever spoken with her.  Wonders where this is coming from?  Informed patient that I will call [employer].  As far as I know, no one named Nubia that is a RN works here.  I think it must be a mistake on [employer] end.   Assured patient that her most recent prescription orders were faxed to [employer] on 6/27/18 as we discussed last week.  Per patient, ok to leave a detailed message on her cell phone when I call with an update.     Called [employer] at [phone] and spoke with Rosey.  Rosey reviewed the patients file and does see the email as well as a phone call stating that \"Nubia\" stated the patient is not in need of any supplies at this time.  Informed Rosey that an updated Prescription form for diabetic supplies and pump supplies as well as CGM sensors was faxed to them 6/27/18 at 1:50 p.m. Rosey states it takes up to 48 hours for incoming faxes to be processed and this fax has not been entered.  States the patient is still signed up for auto re-order.  Her orders have not been discontinued.  There should be no issues going forward.  Emely Mullins M.A.  "

## 2018-06-28 NOTE — TELEPHONE ENCOUNTER
Called patient and left a detailed message on her voicemail per her request with all information as documented below.  Advised patient to check in with [employer] in a few days just to be sure there are no further concerns.  Welcomed patient to call me back with any questions.  Emely Mullins M.A.

## 2018-07-05 NOTE — TELEPHONE ENCOUNTER
On Tuesday, July 3rd, spoke with a  at [employer] who told me that their regular fax# is not working properly.  Gave me an emergency alternative fax# of [phone].  Refaxed this prescription form to the alternative fax# on 7/03/18.  Emely Mullins M.A.

## 2018-08-23 ENCOUNTER — OFFICE VISIT (OUTPATIENT)
Dept: ENDOCRINOLOGY | Facility: CLINIC | Age: 23
End: 2018-08-23
Payer: COMMERCIAL

## 2018-08-23 VITALS
WEIGHT: 150.7 LBS | TEMPERATURE: 97.9 F | HEART RATE: 98 BPM | SYSTOLIC BLOOD PRESSURE: 121 MMHG | BODY MASS INDEX: 23.6 KG/M2 | DIASTOLIC BLOOD PRESSURE: 79 MMHG

## 2018-08-23 DIAGNOSIS — E83.52 HYPERCALCEMIA: ICD-10-CM

## 2018-08-23 LAB
ALBUMIN SERPL-MCNC: 3.8 G/DL (ref 3.4–5)
ANION GAP SERPL CALCULATED.3IONS-SCNC: 7 MMOL/L (ref 3–14)
BUN SERPL-MCNC: 13 MG/DL (ref 7–30)
CALCIUM SERPL-MCNC: 9.5 MG/DL (ref 8.5–10.1)
CHLORIDE SERPL-SCNC: 107 MMOL/L (ref 94–109)
CHOLEST SERPL-MCNC: 190 MG/DL
CO2 SERPL-SCNC: 27 MMOL/L (ref 20–32)
CREAT SERPL-MCNC: 0.79 MG/DL (ref 0.52–1.04)
GFR SERPL CREATININE-BSD FRML MDRD: 89 ML/MIN/1.7M2
GLUCOSE SERPL-MCNC: 123 MG/DL (ref 70–99)
HBA1C MFR BLD: 7.6 % (ref 0–5.6)
HDLC SERPL-MCNC: 51 MG/DL
LDLC SERPL CALC-MCNC: 124 MG/DL
NONHDLC SERPL-MCNC: 139 MG/DL
PHOSPHATE SERPL-MCNC: 3.2 MG/DL (ref 2.5–4.5)
POTASSIUM SERPL-SCNC: 4.3 MMOL/L (ref 3.4–5.3)
PTH-INTACT SERPL-MCNC: 9 PG/ML (ref 18–80)
SODIUM SERPL-SCNC: 141 MMOL/L (ref 133–144)
T4 FREE SERPL-MCNC: 1.06 NG/DL (ref 0.76–1.46)
TRIGL SERPL-MCNC: 74 MG/DL
TSH SERPL DL<=0.005 MIU/L-ACNC: 1.1 MU/L (ref 0.4–4)

## 2018-08-23 PROCEDURE — 83970 ASSAY OF PARATHORMONE: CPT | Performed by: CLINICAL NURSE SPECIALIST

## 2018-08-23 PROCEDURE — 83036 HEMOGLOBIN GLYCOSYLATED A1C: CPT | Performed by: CLINICAL NURSE SPECIALIST

## 2018-08-23 PROCEDURE — 84439 ASSAY OF FREE THYROXINE: CPT | Performed by: CLINICAL NURSE SPECIALIST

## 2018-08-23 PROCEDURE — 99214 OFFICE O/P EST MOD 30 MIN: CPT | Performed by: CLINICAL NURSE SPECIALIST

## 2018-08-23 PROCEDURE — 36415 COLL VENOUS BLD VENIPUNCTURE: CPT | Performed by: CLINICAL NURSE SPECIALIST

## 2018-08-23 PROCEDURE — 80061 LIPID PANEL: CPT | Performed by: CLINICAL NURSE SPECIALIST

## 2018-08-23 PROCEDURE — 82306 VITAMIN D 25 HYDROXY: CPT | Performed by: CLINICAL NURSE SPECIALIST

## 2018-08-23 PROCEDURE — 84443 ASSAY THYROID STIM HORMONE: CPT | Performed by: CLINICAL NURSE SPECIALIST

## 2018-08-23 PROCEDURE — 80069 RENAL FUNCTION PANEL: CPT | Performed by: CLINICAL NURSE SPECIALIST

## 2018-08-23 NOTE — LETTER
8/23/2018         RE: Fransisca Cabello  [address] [facility] [address]        Dear Colleague,    Thank you for referring your patient, Fransisca Cabello, to the [facility]. Please see a copy of my visit note below.    Name: Fransisca Avila  F/u for Diabetes (Last seen 5/17/2018).  HPI:  Fransisca Avila is a 23 year old female who presents for the evaluation/management of type 1 diabetes.     1. Type 1 DM:  Originally diagnosed at the age of 2.   Induced at 34 weeks on 11/5/2014, preeclampsia.  Baby girl, initially in NICU. Daughter now 3 years old, has also been diagnosed with type 1 diabetes.  Now also has a son <  Year old    Having a hard time affording insulin and pump supplies    Current Regimen: humalog  Insulin pump - [employer] 670-G.   Auto mode 40%  Time Rate (U/hr)   00:00 0.975     Carbohydrate Ratio -    Time Ratio   0000 6.0   11:30 6.0   14:00 6.0   18:00 6.5         Sensitivity  00:00 - 48  17:00 - 48   Active Insulin Time 3.00 hours   Basal  37% (20 units)   Bolus   63% (34 units)   Total Carbohydrates/day 197   Total Insulin/day  54   Average Blood Sugar  Sensor Average 185  124   BS Checks  6.4 times a day   Care Link Username-   Password-   Target range:  0:00    5:00     21:30      Complications:   Diabetes Complications  Description / Detail    Diabetic Retinopathy  No retinopathy   CAD / PAD  No   Neuropathy  No   Nephropathy / Microalbuminuria  No   Gastroparesis  No   Hypoglycemia Unawareness  No     PMH/PSH:  Past Medical History:   Diagnosis Date     Postpartum depression 2014    On medication for 6 months-      Type 1 diabetes (H) 1998    three years at diagnosis     Varicella 1998     Past Surgical History:   Procedure Laterality Date     C ROOT CANAL THERAPY 2 CANALS       DENTAL SURGERY       Family Hx:  Family History   Problem Relation Age of Onset     Family History Negative Mother      Diabetes Father 35     Type 1       Thyroid disease: No         DM2: NO         Autoimmune: DM1, SLE, RA, Vitiligo Yes - Father has type 1 DM    Social Hx:  Social History     Social History     Marital status:      Spouse name: Teja     Number of children: 1     Years of education: N/A     Occupational History      [employer]     Social History Main Topics     Smoking status: Never Smoker     Smokeless tobacco: Never Used     Alcohol use No     Drug use: No     Sexual activity: Yes     Partners: Male     Birth control/ protection: Condom     Other Topics Concern     Blood Transfusions No     Social History Narrative    Fransisca lives with her spouse           MEDICATIONS:  has a current medication list which includes the following prescription(s): acetone (urine) test, blood glucose monitoring, insulin glargine, insulin lispro, insulin pump, insulin syringe-needle u-100, levonorgestrel, microlet lancets, prenatal multivit-min-fe-fa, and glucagon.    ROS     ROS: 10 point ROS neg other than the symptoms noted above in the HPI.    Physical Exam   VS: /79 (BP Location: Left arm, Patient Position: Chair, Cuff Size: Adult Regular)  Pulse 98  Temp 97.9  F (36.6  C) (Oral)  Wt 68.4 kg (150 lb 11.2 oz)  Breastfeeding? Unknown  BMI 23.6 kg/m2  GENERAL: NAD, well dressed, answering questions appropriately, appears stated age.  HEENT: no exophthalmos, no proptosis, no lig lag, no retraction, no scleral icterus  RESPIRATORY: Clear.  Normal respiratory effort.  CARDIOVASCULAR: RRR. No peripheral edema.  EXTREMITIES: no edema  NEUROLOGY: CN grossly intact, no tremors  MSK: grossly intact, No digital cyanosis. Normal gait and station.  SKIN:warm and dry with good turgor;  no rashes, no lesions, no ulcers  PSYCH: Intact judgment and insight. A&OX3 with a cordial affect.    LABS:  A1c  Component      Latest Ref Rng & Units 11/10/2017 5/17/2018 8/23/2018   Hemoglobin A1C      0 - 5.6 % 5.3 6.2 (H) 7.6 (H)     BMP:  !COMPREHENSIVE Latest Ref Rng &  Units 3/6/2018   SODIUM 133 - 144 mmol/L 140   POTASSIUM 3.4 - 5.3 mmol/L 4.1   CHLORIDE 94 - 109 mmol/L 107   BUN 7 - 30 mg/dL 7   Creatinine 0.52 - 1.04 mg/dL 0.76   Glucose 70 - 99 mg/dL 91   ANION GAP 3 - 14 mmol/L 5   CALCIUM 8.5 - 10.1 mg/dL 10.7 (H)   ALBUMIN 3.4 - 5.0 g/dL 4.0   PROTEIN, TOTAL 6.8 - 8.8 g/dL 8.2     !LIPID/HEPATIC Latest Ref Rng 11/8/2014 12/2/2015 11/18/2016   AST 0 - 45 U/L 15 6 5   ALT 0 - 50 U/L 10 22 15     ENDO Calcium Labs:  ENDO CALCIUM LABS-UMP Latest Ref Rng & Units 5/17/2018 3/6/2018   CALCIUM 8.5 - 10.1 mg/dL 10.2 (H) 10.7 (H)   PHOSPHOROUS 2.5 - 4.5 mg/dL 3.9    MAGNESIUM 1.6 - 2.3 mg/dL  2.1   ALBUMIN 3.4 - 5.0 g/dL 4.1 4.0   BUN 7 - 30 mg/dL 15 7   CREATININE 0.52 - 1.04 mg/dL 0.75 0.76   ALKPHOS 40 - 150 U/L  111   PROTEIN, TOTAL 6.8 - 8.8 g/dL  8.2     Urine microalbumin:  Component    Latest Ref Rng & Units 6/30/2017   Protein Random Urine    g/L 0.18   Protein Total Urine g/gr Creatinine    0 - 0.2 g/g Cr 0.14   Creatinine Urine    mg/dL 123     OJSH/C-peptide:  Component    Latest Ref Rng 11/10/2014   Glutamic Acid Decarboxylase Antibody     <5.0    Reference range: 0.0 to 5.0      C-Peptide    0.9 - 6.9 ng/mL <0.1 (L)     TFT:  !THYROID Latest Ref Rng & Units 3/6/2018   TSH 0.40 - 4.00 mU/L 0.53     TTG Abs:  Component    Latest Ref Rng 11/13/2014   Tissue Transglutaminase Antibody IgA    0 - 3.9 U/mL 1.1   Tissue Transglutaminase Rosina IgG    0 - 5.9 U/mL 1.0     All pertinent notes, labs, and images personally reviewed by me.     A/P  Ms.Jennica Avila is a 23 year old here for the evaluation/management of diabetes:    1. DM1 - Uncontrolled, today's A1c increased 6.2-->7.6%.  A1c goal  < 7.0%.  Change to Novolog - copay coupon provided.  Time Rate (U/hr)   00:00 0.975-->1.00     Carbohydrate Ratio -    Time Ratio   0000 6.0   11:30 6.0   14:00 6.0   18:00 6.5-->6.0     Rx for glucagon emergency kit, urine ketone strips provided 5/2018 .   RX for lantus and syringes  provided in the event of insulin pump failure provided 5/2018.  Labs ordered today:   Orders Placed This Encounter   Procedures     Hemoglobin A1c       Radiology/Consults ordered today: None    No orders of the defined types were placed in this encounter.      Follow-up:  3 months     Maryann Hammonds NP  Endocrinology  [facility]  CC:       More than 50% of face to face time spent with Ms. Avila on counseling / coordinating her care.  Total face to face time was 25 minutes.      All questions were answered.  The patient indicates understanding of the above issues and agrees with the plan set forth.           Again, thank you for allowing me to participate in the care of your patient.        Sincerely,        HORTENSIA Rosales CNP

## 2018-08-23 NOTE — PROGRESS NOTES
Name: Fransisca Avila  F/u for Diabetes (Last seen 5/17/2018).  HPI:  Fransisca Avila is a 23 year old female who presents for the evaluation/management of type 1 diabetes.     1. Type 1 DM:  Originally diagnosed at the age of 2.   Induced at 34 weeks on 11/5/2014, preeclampsia.  Baby girl, initially in NICU. Daughter now 3 years old, has also been diagnosed with type 1 diabetes.  Now also has a son <  Year old    Having a hard time affording insulin and pump supplies    Current Regimen: humalog  Insulin pump - [employer] 670-G.   Auto mode 40%  Time Rate (U/hr)   00:00 0.975     Carbohydrate Ratio -    Time Ratio   0000 6.0   11:30 6.0   14:00 6.0   18:00 6.5         Sensitivity  00:00 - 48  17:00 - 48   Active Insulin Time 3.00 hours   Basal  37% (20 units)   Bolus   63% (34 units)   Total Carbohydrates/day 197   Total Insulin/day  54   Average Blood Sugar  Sensor Average 185  124   BS Checks  6.4 times a day   Care Link Username-   Password-   Target range:  0:00    5:00     21:30      Complications:   Diabetes Complications  Description / Detail    Diabetic Retinopathy  No retinopathy   CAD / PAD  No   Neuropathy  No   Nephropathy / Microalbuminuria  No   Gastroparesis  No   Hypoglycemia Unawareness  No     PMH/PSH:  Past Medical History:   Diagnosis Date     Postpartum depression 2014    On medication for 6 months-      Type 1 diabetes (H) 1998    three years at diagnosis     Varicella 1998     Past Surgical History:   Procedure Laterality Date     C ROOT CANAL THERAPY 2 CANALS       DENTAL SURGERY       Family Hx:  Family History   Problem Relation Age of Onset     Family History Negative Mother      Diabetes Father 35     Type 1      Thyroid disease: No         DM2: NO         Autoimmune: DM1, SLE, RA, Vitiligo Yes - Father has type 1 DM    Social Hx:  Social History     Social History     Marital status:      Spouse name: Teja     Number of children: 1     Years of education: N/A      Occupational History      [employer]     Social History Main Topics     Smoking status: Never Smoker     Smokeless tobacco: Never Used     Alcohol use No     Drug use: No     Sexual activity: Yes     Partners: Male     Birth control/ protection: Condom     Other Topics Concern     Blood Transfusions No     Social History Narrative    Fransisca lives with her spouse           MEDICATIONS:  has a current medication list which includes the following prescription(s): acetone (urine) test, blood glucose monitoring, insulin glargine, insulin lispro, insulin pump, insulin syringe-needle u-100, levonorgestrel, microlet lancets, prenatal multivit-min-fe-fa, and glucagon.    ROS     ROS: 10 point ROS neg other than the symptoms noted above in the HPI.    Physical Exam   VS: /79 (BP Location: Left arm, Patient Position: Chair, Cuff Size: Adult Regular)  Pulse 98  Temp 97.9  F (36.6  C) (Oral)  Wt 68.4 kg (150 lb 11.2 oz)  Breastfeeding? Unknown  BMI 23.6 kg/m2  GENERAL: NAD, well dressed, answering questions appropriately, appears stated age.  HEENT: no exophthalmos, no proptosis, no lig lag, no retraction, no scleral icterus  RESPIRATORY: Clear.  Normal respiratory effort.  CARDIOVASCULAR: RRR. No peripheral edema.  EXTREMITIES: no edema  NEUROLOGY: CN grossly intact, no tremors  MSK: grossly intact, No digital cyanosis. Normal gait and station.  SKIN:warm and dry with good turgor;  no rashes, no lesions, no ulcers  PSYCH: Intact judgment and insight. A&OX3 with a cordial affect.    LABS:  A1c  Component      Latest Ref Rng & Units 11/10/2017 5/17/2018 8/23/2018   Hemoglobin A1C      0 - 5.6 % 5.3 6.2 (H) 7.6 (H)     BMP:  !COMPREHENSIVE Latest Ref Rng & Units 3/6/2018   SODIUM 133 - 144 mmol/L 140   POTASSIUM 3.4 - 5.3 mmol/L 4.1   CHLORIDE 94 - 109 mmol/L 107   BUN 7 - 30 mg/dL 7   Creatinine 0.52 - 1.04 mg/dL 0.76   Glucose 70 - 99 mg/dL 91   ANION GAP 3 - 14 mmol/L 5   CALCIUM 8.5 - 10.1 mg/dL 10.7 (H)   ALBUMIN  3.4 - 5.0 g/dL 4.0   PROTEIN, TOTAL 6.8 - 8.8 g/dL 8.2     !LIPID/HEPATIC Latest Ref Rng 11/8/2014 12/2/2015 11/18/2016   AST 0 - 45 U/L 15 6 5   ALT 0 - 50 U/L 10 22 15     ENDO Calcium Labs:  ENDO CALCIUM LABS-UMP Latest Ref Rng & Units 5/17/2018 3/6/2018   CALCIUM 8.5 - 10.1 mg/dL 10.2 (H) 10.7 (H)   PHOSPHOROUS 2.5 - 4.5 mg/dL 3.9    MAGNESIUM 1.6 - 2.3 mg/dL  2.1   ALBUMIN 3.4 - 5.0 g/dL 4.1 4.0   BUN 7 - 30 mg/dL 15 7   CREATININE 0.52 - 1.04 mg/dL 0.75 0.76   ALKPHOS 40 - 150 U/L  111   PROTEIN, TOTAL 6.8 - 8.8 g/dL  8.2     Urine microalbumin:  Component    Latest Ref Rng & Units 6/30/2017   Protein Random Urine    g/L 0.18   Protein Total Urine g/gr Creatinine    0 - 0.2 g/g Cr 0.14   Creatinine Urine    mg/dL 123     JOSH/C-peptide:  Component    Latest Ref Rng 11/10/2014   Glutamic Acid Decarboxylase Antibody     <5.0    Reference range: 0.0 to 5.0      C-Peptide    0.9 - 6.9 ng/mL <0.1 (L)     TFT:  !THYROID Latest Ref Rng & Units 3/6/2018   TSH 0.40 - 4.00 mU/L 0.53     TTG Abs:  Component    Latest Ref Rng 11/13/2014   Tissue Transglutaminase Antibody IgA    0 - 3.9 U/mL 1.1   Tissue Transglutaminase Rosina IgG    0 - 5.9 U/mL 1.0     All pertinent notes, labs, and images personally reviewed by me.     A/P  Ms.Jennica Avila is a 23 year old here for the evaluation/management of diabetes:    1. DM1 - Uncontrolled, today's A1c increased 6.2-->7.6%.  A1c goal  < 7.0%.  Change to Novolog - copay coupon provided.  Time Rate (U/hr)   00:00 0.975-->1.00     Carbohydrate Ratio -    Time Ratio   0000 6.0   11:30 6.0   14:00 6.0   18:00 6.5-->6.0     Rx for glucagon emergency kit, urine ketone strips provided 5/2018 .   RX for lantus and syringes provided in the event of insulin pump failure provided 5/2018.  Labs ordered today:   Orders Placed This Encounter   Procedures     Hemoglobin A1c       Radiology/Consults ordered today: None    No orders of the defined types were placed in this  encounter.      Follow-up:  3 months     Maryann Hammonds NP  Endocrinology  [facility]  CC:       More than 50% of face to face time spent with Ms. Avila on counseling / coordinating her care.  Total face to face time was 25 minutes.      All questions were answered.  The patient indicates understanding of the above issues and agrees with the plan set forth.

## 2018-08-23 NOTE — MR AVS SNAPSHOT
After Visit Summary   8/23/2018    Fransisca Cabello    MRN: [id number]           Patient Information     Date Of Birth          1995        Visit Information        Provider Department      8/23/2018 10:00 AM Maryann Hammonds APRN CNP [facility]        Today's Diagnoses     Uncontrolled type 1 diabetes mellitus without complication (H)    -  1    Hypercalcemia          Care Instructions    Try changing to Novolog once you use up your current supply of Humalog.    I'm rechecking calcium levels and thyroid levels to see if either of these could be causing the anxiety.    If both are normal, I recommend you see your primary care provider for the anxiety.    Today we changed dinner carb ratio from 6.5 to 6.0 which will give you more insulin with dinner, if you start having low blood sugars after dinner you can either contact me or go back to the 6.5 setting.      Component      Latest Ref Rng & Units 5/17/2018 8/23/2018   Hemoglobin A1C      0 - 5.6 % 6.2 (H) 7.6 (H)       Follow up Friday, 11/30 at 12:30 pm.    Maryann Hammonds NP  Endocrinology            Follow-ups after your visit        Who to contact     If you have questions or need follow up information about today's clinic visit or your schedule please contact [facility] directly at [phone].  Normal or non-critical lab and imaging results will be communicated to you by MyChart, letter or phone within 4 business days after the clinic has received the results. If you do not hear from us within 7 days, please contact the clinic through MyChart or phone. If you have a critical or abnormal lab result, we will notify you by phone as soon as possible.  Submit refill requests through [employer] or call your pharmacy and they will forward the refill request to us. Please allow 3 business days for your refill to be completed.          Additional Information About Your Visit        MyChart Information      [employer] gives you secure access to your electronic health record. If you see a primary care provider, you can also send messages to your care team and make appointments. If you have questions, please call your primary care clinic.  If you do not have a primary care provider, please call [phone] and they will assist you.        Care EveryWhere ID     This is your Care EveryWhere ID. This could be used by other organizations to access your [place] medical records  [id number]        Your Vitals Were     Pulse Temperature Breastfeeding? BMI (Body Mass Index)          98 97.9  F (36.6  C) (Oral) Unknown 23.6 kg/m2         Blood Pressure from Last 3 Encounters:   08/23/18 121/79   05/17/18 115/82   03/23/18 126/80    Weight from Last 3 Encounters:   08/23/18 68.4 kg (150 lb 11.2 oz)   03/23/18 72.1 kg (159 lb)   02/16/18 73.9 kg (163 lb)              We Performed the Following     Hemoglobin A1c     Lipid panel reflex to direct LDL Fasting     Parathyroid Hormone Intact     Renal panel (Alb, BUN, Ca, Cl, CO2, Creat, Gluc, Phos, K, Na)     T4 FREE     TSH     Vitamin D Deficiency          Today's Medication Changes          These changes are accurate as of 8/23/18 10:24 AM.  If you have any questions, ask your nurse or doctor.               Start taking these medicines.        Dose/Directions    ASPIRIN NOT PRESCRIBED   Commonly known as:  INTENTIONAL   Used for:  Uncontrolled type 1 diabetes mellitus without complication (H), Hypercalcemia   Started by:  Maryann Hammonds APRN CNP        Please choose reason not prescribed, below   Quantity:  0 each   Refills:  0       insulin aspart 100 UNITS/ML injection   Commonly known as:  NovoLOG VIAL   Used for:  Uncontrolled type 1 diabetes mellitus without complication (H)   Started by:  Maryann Hammonds APRN CNP        Up to 70 units daily via insulin pump.   Quantity:  20 mL   Refills:  11       STATIN NOT PRESCRIBED (INTENTIONAL)   Used for:  Uncontrolled type 1  diabetes mellitus without complication (H), Hypercalcemia   Started by:  Maryann Hammonds APRN CNP        Please choose reason not prescribed, below   Refills:  0         These medicines have changed or have updated prescriptions.        Dose/Directions    * insulin lispro 100 UNIT/ML injection   Commonly known as:  humaLOG   This may have changed:  Another medication with the same name was added. Make sure you understand how and when to take each.   Used for:  Uncontrolled type 1 diabetes mellitus without complication (H), Encounter for long-term (current) use of insulin (H)   Changed by:  Maryann Hammonds APRN CNP        USE AS DIRECTED IN INSULIN PUMP - USE UP TO 80 per day   Quantity:  80 mL   Refills:  3       * insulin lispro 100 UNIT/ML injection   Commonly known as:  humaLOG   This may have changed:  You were already taking a medication with the same name, and this prescription was added. Make sure you understand how and when to take each.   Used for:  Uncontrolled type 1 diabetes mellitus without complication (H), Hypercalcemia   Changed by:  Maryann Hammonds APRN CNP        70 units subcutaneous daily   Quantity:  10 mL   Refills:  0       * Notice:  This list has 2 medication(s) that are the same as other medications prescribed for you. Read the directions carefully, and ask your doctor or other care provider to review them with you.         Where to get your medicines      Some of these will need a paper prescription and others can be bought over the counter.  Ask your nurse if you have questions.     Bring a paper prescription for each of these medications     insulin aspart 100 UNITS/ML injection    insulin lispro 100 UNIT/ML injection       You don't need a prescription for these medications     ASPIRIN NOT PRESCRIBED    STATIN NOT PRESCRIBED (INTENTIONAL)                Primary Care Provider Fax #    Physician No Ref-Primary [phone]       No address on file        Equal Access to  Services     [facility]: Hadii aad ku hadalexisstephon Terridario, waholgerda luqadaha, qaybta kaalmayadi dinero, hernan spicer . So [facility] [phone].    ATENCIÓN: Si habla español, tiene a boateng disposición servicios gratuitos de asistencia lingüística. Llame al [phone].    We comply with applicable federal civil rights laws and [place] laws. We do not discriminate on the basis of race, color, national origin, age, disability, sex, sexual orientation, or gender identity.            Thank you!     Thank you for choosing [facility]  for your care. Our goal is always to provide you with excellent care. Hearing back from our patients is one way we can continue to improve our services. Please take a few minutes to complete the written survey that you may receive in the mail after your visit with us. Thank you!             Your Updated Medication List - Protect others around you: Learn how to safely use, store and throw away your medicines at www.disposemymeds.org.          This list is accurate as of 8/23/18 10:24 AM.  Always use your most recent med list.                   Brand Name Dispense Instructions for use Diagnosis    acetone (Urine) test Strp     100 each    Use as directed to test urine ketone if blood sugars are >250 or during illness with fever, nausea, vomiting, or abdominal pain.        ASPIRIN NOT PRESCRIBED    INTENTIONAL    0 each    Please choose reason not prescribed, below    Uncontrolled type 1 diabetes mellitus without complication (H), Hypercalcemia       blood glucose monitoring test strip    RAYMUNDO CONTOUR NEXT    1000 each    Use to test blood sugar 10-12 times daily or as directed.    Uncontrolled type 1 diabetes mellitus without complication (H)       glucagon 1 MG kit    GLUCAGON EMERGENCY    1 each    Inject 1 mg into the muscle once for 1 dose    Type 1 diabetes mellitus without complication (H)       insulin aspart 100 UNITS/ML injection    NovoLOG  "VIAL    20 mL    Up to 70 units daily via insulin pump.    Uncontrolled type 1 diabetes mellitus without complication (H)       insulin glargine 100 UNIT/ML injection    LANTUS VIAL    10 mL    Inject 18 u sq every 24 hours in the event of insulin pump therapy.  DO NOT FILL RX until requested by patient.    Type 1 diabetes mellitus without complication (H), Insulin pump in place, Encounter for long-term (current) use of insulin (H)       * insulin lispro 100 UNIT/ML injection    humaLOG    80 mL    USE AS DIRECTED IN INSULIN PUMP - USE UP TO 80 per day    Uncontrolled type 1 diabetes mellitus without complication (H), Encounter for long-term (current) use of insulin (H)       * insulin lispro 100 UNIT/ML injection    humaLOG    10 mL    70 units subcutaneous daily    Uncontrolled type 1 diabetes mellitus without complication (H), Hypercalcemia       insulin pump infusion      Date last updated:  12/23/16 Medtronic Minimed: Model 630G BASAL RATES and times: 12   AM (midnight): 0.85 units/hour   8     AM: 0.8 units/hour  6pm: 0.85 Basal Pattern A:  Fransisca Cabello will use when NA. CARB RATIO and times: 12   AM (midnight): 12 Corection Factor (Sensitivity) and times: 12   AM (midnight): 47 mg/dL BLOOD GLUCOSE TARGET and times: 12   AM (midnight): 100 - 120 5     AM:  90 - 110 9:30    PM:  100 - 120 Active Insulin Time:  3 hours Sensor:  No Carelink / Diasend username: [email or web address] Carelink / Diasend Password:  [email or web address]        insulin syringe-needle U-100 31G X 5/16\" 0.3 ML    BD insulin syringe ultrafine    100 each    Use one syringe 4 daily or as directed in the event of insulin pump failure.  DO NOT DISPENSE until requested by patient    Encounter for long-term (current) use of insulin (H)       levonorgestrel 20 MCG/24HR IUD    MIRENA     1 each by Intrauterine route once        MICROLET LANCETS Misc     600 each    Use one lancet 6 times daily for blood sugar testing    Type 1 diabetes mellitus without " complication (H), Insulin pump in place       PRENATAL VITAMINS PO           STATIN NOT PRESCRIBED (INTENTIONAL)      Please choose reason not prescribed, below    Uncontrolled type 1 diabetes mellitus without complication (H), Hypercalcemia       * Notice:  This list has 2 medication(s) that are the same as other medications prescribed for you. Read the directions carefully, and ask your doctor or other care provider to review them with you.

## 2018-08-23 NOTE — PATIENT INSTRUCTIONS
Try changing to Novolog once you use up your current supply of Humalog.    I'm rechecking calcium levels and thyroid levels to see if either of these could be causing the anxiety.    If both are normal, I recommend you see your primary care provider for the anxiety.    Today we changed dinner carb ratio from 6.5 to 6.0 which will give you more insulin with dinner, if you start having low blood sugars after dinner you can either contact me or go back to the 6.5 setting.      Component      Latest Ref Rng & Units 5/17/2018 8/23/2018   Hemoglobin A1C      0 - 5.6 % 6.2 (H) 7.6 (H)       Follow up Friday, 11/30 at 12:30 pm.    Maryann Hammonds NP  Endocrinology

## 2018-08-24 LAB — DEPRECATED CALCIDIOL+CALCIFEROL SERPL-MC: 31 UG/L (ref 20–75)

## 2018-08-27 NOTE — PROGRESS NOTES
Fransisca,  Your vitamin D and calcium are normal.  Your parathyroid hormone level was not elevated, it was a little below normal which might be a normal variation.  I was more concerned that it might be elevated, which it was not.    Your LDL-cholesterol is minimally elevated, all other cholesterol numbers look good.  Your kidney function is normal.  We discussed your A1c result already at your recent visit.  Let me know ifyou have any questions.  Maryann Hammonds NP  Endocrinology

## 2018-11-30 ENCOUNTER — OFFICE VISIT (OUTPATIENT)
Dept: ENDOCRINOLOGY | Facility: CLINIC | Age: 23
End: 2018-11-30
Payer: COMMERCIAL

## 2018-11-30 VITALS — DIASTOLIC BLOOD PRESSURE: 70 MMHG | SYSTOLIC BLOOD PRESSURE: 120 MMHG | TEMPERATURE: 97.7 F | HEART RATE: 84 BPM

## 2018-11-30 DIAGNOSIS — E10.9 TYPE 1 DIABETES MELLITUS WITHOUT COMPLICATION (H): ICD-10-CM

## 2018-11-30 DIAGNOSIS — Z23 NEED FOR PROPHYLACTIC VACCINATION AND INOCULATION AGAINST INFLUENZA: ICD-10-CM

## 2018-11-30 PROBLEM — O24.011 PRE-EXISTING TYPE 1 DIABETES MELLITUS DURING PREGNANCY IN FIRST TRIMESTER: Status: RESOLVED | Noted: 2017-07-12 | Resolved: 2018-11-30

## 2018-11-30 LAB — HBA1C MFR BLD: 7.6 % (ref 0–5.6)

## 2018-11-30 PROCEDURE — 90471 IMMUNIZATION ADMIN: CPT | Performed by: CLINICAL NURSE SPECIALIST

## 2018-11-30 PROCEDURE — 90686 IIV4 VACC NO PRSV 0.5 ML IM: CPT | Performed by: CLINICAL NURSE SPECIALIST

## 2018-11-30 PROCEDURE — 83036 HEMOGLOBIN GLYCOSYLATED A1C: CPT | Performed by: CLINICAL NURSE SPECIALIST

## 2018-11-30 PROCEDURE — 99214 OFFICE O/P EST MOD 30 MIN: CPT | Mod: 25 | Performed by: CLINICAL NURSE SPECIALIST

## 2018-11-30 PROCEDURE — 36415 COLL VENOUS BLD VENIPUNCTURE: CPT | Performed by: CLINICAL NURSE SPECIALIST

## 2018-11-30 PROCEDURE — 82043 UR ALBUMIN QUANTITATIVE: CPT | Performed by: CLINICAL NURSE SPECIALIST

## 2018-11-30 NOTE — MR AVS SNAPSHOT
After Visit Summary   11/30/2018    Fransisca Cabello    MRN: [id number]           Patient Information     Date Of Birth          1995        Visit Information        Provider Department      11/30/2018 12:30 PM Maryann Hammonds APRN CNP [facility]        Today's Diagnoses     Type 1 diabetes mellitus, uncontrolled (H)    -  1      Care Instructions      Today we increased your basal rate from 1.00 per hour to 1.05 per hour.    Component      Latest Ref Rng & Units 5/17/2018 8/23/2018 11/30/2018   Hemoglobin A1C      0 - 5.6 % 6.2 (H) 7.6 (H) 7.6 (H)       Other insulin options:    Admelog  Humalog   Novolog  Apridra  Fiasp    Follow up in 3 months.  Friday March 1st, at 11 am    Maryann Hammonds NP  Endocrinology            Follow-ups after your visit        Who to contact     If you have questions or need follow up information about today's clinic visit or your schedule please contact [facility] directly at [phone].  Normal or non-critical lab and imaging results will be communicated to you by "[employer]hart, letter or phone within 4 business days after the clinic has received the results. If you do not hear from us within 7 days, please contact the clinic through "[employer]hart or phone. If you have a critical or abnormal lab result, we will notify you by phone as soon as possible.  Submit refill requests through [employer] or call your pharmacy and they will forward the refill request to us. Please allow 3 business days for your refill to be completed.          Additional Information About Your Visit        MyChart Information     [employer] gives you secure access to your electronic health record. If you see a primary care provider, you can also send messages to your care team and make appointments. If you have questions, please call your primary care clinic.  If you do not have a primary care provider, please call [phone] and they will assist you.        Care  EveryWhere ID     This is your Care EveryWhere ID. This could be used by other organizations to access your [place] medical records  [id number]        Your Vitals Were     Pulse Temperature Breastfeeding?             84 97.7  F (36.5  C) (Oral) Yes          Blood Pressure from Last 3 Encounters:   11/30/18 120/70   08/23/18 121/79   05/17/18 115/82    Weight from Last 3 Encounters:   08/23/18 68.4 kg (150 lb 11.2 oz)   03/23/18 72.1 kg (159 lb)   02/16/18 73.9 kg (163 lb)              We Performed the Following     Albumin Random Urine Quantitative with Creat Ratio     Hemoglobin A1c        Primary Care Provider Fax #    Physician No Ref-Primary [phone]       No address on file        Equal Access to Services     [employer] : Floridalma Quintana, waaxda luqadaha, qaybta kaalmada adesmithyayadi, hernan spicer . So [facility] [phone].    ATENCIÓN: Si habla español, tiene a boateng disposición servicios gratuitos de asistencia lingüística. Llame al [phone].    We comply with applicable federal civil rights laws and [place] laws. We do not discriminate on the basis of race, color, national origin, age, disability, sex, sexual orientation, or gender identity.            Thank you!     Thank you for choosing [facility]  for your care. Our goal is always to provide you with excellent care. Hearing back from our patients is one way we can continue to improve our services. Please take a few minutes to complete the written survey that you may receive in the mail after your visit with us. Thank you!             Your Updated Medication List - Protect others around you: Learn how to safely use, store and throw away your medicines at www.disposemymeds.org.          This list is accurate as of 11/30/18  1:07 PM.  Always use your most recent med list.                   Brand Name Dispense Instructions for use Diagnosis    acetone urine test strip    KETOSTIX    100 each  "   Use as directed to test urine ketone if blood sugars are >250 or during illness with fever, nausea, vomiting, or abdominal pain.        ASPIRIN NOT PRESCRIBED    INTENTIONAL    0 each    Please choose reason not prescribed, below    Uncontrolled type 1 diabetes mellitus without complication (H), Hypercalcemia       blood glucose monitoring test strip    RAYMUNDO CONTOUR NEXT    1000 each    Use to test blood sugar 10-12 times daily or as directed.    Uncontrolled type 1 diabetes mellitus without complication (H)       glucagon 1 MG kit    GLUCAGON EMERGENCY    1 each    Inject 1 mg into the muscle once for 1 dose    Type 1 diabetes mellitus without complication (H)       insulin aspart 100 UNITS/ML vial    NovoLOG VIAL    20 mL    Up to 70 units daily via insulin pump.    Uncontrolled type 1 diabetes mellitus without complication (H)       insulin glargine 100 UNIT/ML vial    LANTUS VIAL    10 mL    Inject 18 u sq every 24 hours in the event of insulin pump therapy.  DO NOT FILL RX until requested by patient.    Type 1 diabetes mellitus without complication (H), Insulin pump in place, Encounter for long-term (current) use of insulin (H)       insulin pump infusion      Date last updated:  12/23/16 Medtronic Minimed: Model 630G BASAL RATES and times: 12   AM (midnight): 0.85 units/hour   8     AM: 0.8 units/hour  6pm: 0.85 Basal Pattern A:  Fransisca Cadetakualalit will use when NA. CARB RATIO and times: 12   AM (midnight): 12 Corection Factor (Sensitivity) and times: 12   AM (midnight): 47 mg/dL BLOOD GLUCOSE TARGET and times: 12   AM (midnight): 100 - 120 5     AM:  90 - 110 9:30    PM:  100 - 120 Active Insulin Time:  3 hours Sensor:  No Carelink / Diasend username: [email or web address] Carelink / Diasend Password:  [email or web address]        insulin syringe-needle U-100 31G X 5/16\" 0.3 ML miscellaneous    BD insulin syringe ultrafine    100 each    Use one syringe 4 daily or as directed in the event of insulin pump failure.  DO NOT " DISPENSE until requested by patient    Encounter for long-term (current) use of insulin (H)       levonorgestrel 20 MCG/24HR IUD    MIRENA     1 each by Intrauterine route once        MICROLET LANCETS Misc     600 each    Use one lancet 6 times daily for blood sugar testing    Type 1 diabetes mellitus without complication (H), Insulin pump in place       PRENATAL VITAMINS PO           STATIN NOT PRESCRIBED    INTENTIONAL     Please choose reason not prescribed, below    Uncontrolled type 1 diabetes mellitus without complication (H), Hypercalcemia

## 2018-11-30 NOTE — PROGRESS NOTES

## 2018-11-30 NOTE — LETTER
11/30/2018         RE: Fransisca Cabello  [address] [facility] [address]        Dear Colleague,    Thank you for referring your patient, Fransisca Cabello, to the [facility]. Please see a copy of my visit note below.    Name: Fransisca Avila  F/u for Diabetes (Last seen 8/23/2018).  HPI:  Fransisca Avila is a 23 year old female who presents for the evaluation/management of type 1 diabetes.     1. Type 1 DM:  Originally diagnosed at the age of 2.   Induced at 34 weeks on 11/5/2014, preeclampsia.  Baby girl, initially in NICU. Daughter now 4 years old, has also been diagnosed with type 1 diabetes.  Now also has a son 10 months old    Having a hard time affording insulin and pump supplies    Cannot find Enlite transmitter - thinks her 4-year-old daughter threw it away.  Can't afford to buy a new one.    Current Regimen: Humalog  Insulin pump - Medtronic 670-G.   Auto mode 0%  Reservoir change: every 6.5 days  Time Rate (U/hr)   00:00 1.00     Carbohydrate Ratio -    Time Ratio   0000 6.0   11:30 6.0   14:00 6.0   18:00 6.0         Sensitivity  00:00 - 48  17:00 - 48   Active Insulin Time 3.00 hours   Basal  45% (24 units)   Bolus   55% (29 units)   Total Carbohydrates/day 174   Total Insulin/day  53   Average Blood Sugar  Sensor Average 107  124   BS Checks  6.4 times a day   Care Link Username-   Password-   Target range:  0:00    5:00     21:30      Complications:   Diabetes Complications  Description / Detail    Diabetic Retinopathy  No retinopathy, last exam 1/2018   CAD / PAD  No   Neuropathy  No   Nephropathy / Microalbuminuria  No   Gastroparesis  No   Hypoglycemia Unawareness  No     PMH/PSH:  Past Medical History:   Diagnosis Date     Postpartum depression 2014    On medication for 6 months-      Type 1 diabetes (H) 1998    three years at diagnosis     Varicella 1998     Past Surgical History:   Procedure Laterality Date     C ROOT CANAL THERAPY 2 CANALS        DENTAL SURGERY       Family Hx:  Family History   Problem Relation Age of Onset     Family History Negative Mother      Diabetes Father 35     Type 1      Thyroid disease: No         DM2: NO         Autoimmune: DM1, SLE, RA, Vitiligo Yes - Father has type 1 DM    Social Hx:  Social History     Social History     Marital status:      Spouse name: Teja     Number of children: 1     Years of education: N/A     Occupational History      [employer]     Social History Main Topics     Smoking status: Never Smoker     Smokeless tobacco: Never Used     Alcohol use No     Drug use: No     Sexual activity: Yes     Partners: Male     Birth control/ protection: Condom     Other Topics Concern     Blood Transfusions No     Social History Narrative    Fransisca lives with her spouse           MEDICATIONS:  has a current medication list which includes the following prescription(s): acetone urine, aspirin not prescribed, blood glucose monitoring, insulin aspart, insulin glargine, insulin pump, insulin syringe-needle u-100, levonorgestrel, microlet lancets, prenatal multivit-min-fe-fa, statin not prescribed, and glucagon.    ROS     ROS: 10 point ROS neg other than the symptoms noted above in the HPI.    Physical Exam   VS: /70 (BP Location: Left arm, Patient Position: Chair, Cuff Size: Adult Regular)  Pulse 84  Temp 97.7  F (36.5  C) (Oral)  Breastfeeding? Yes  GENERAL: NAD, well dressed, answering questions appropriately, appears stated age.  HEENT: no exophthalmos, no proptosis, no lig lag, no retraction, no scleral icterus  RESPIRATORY: Clear.  Normal respiratory effort.  CARDIOVASCULAR: RRR.  EXTREMITIES: no edema  NEUROLOGY: CN grossly intact, no tremors  MSK: grossly intact, No digital cyanosis. Normal gait and station.  PSYCH: Intact judgment and insight. A&OX3 with a cordial affect.    LABS:  A1c  Component      Latest Ref Rng & Units 5/17/2018 8/23/2018 11/30/2018   Hemoglobin A1C      0 - 5.6 % 6.2 (H) 7.6  (H) 7.6 (H)     BMP:  !COMPREHENSIVE Latest Ref Rng & Units 8/23/2018   SODIUM 133 - 144 mmol/L 141   POTASSIUM 3.4 - 5.3 mmol/L 4.3   CHLORIDE 94 - 109 mmol/L 107   BUN 7 - 30 mg/dL 13   Creatinine 0.52 - 1.04 mg/dL 0.79   Glucose 70 - 99 mg/dL 123 (H)   ANION GAP 3 - 14 mmol/L 7   CALCIUM 8.5 - 10.1 mg/dL 9.5   ALBUMIN 3.4 - 5.0 g/dL 3.8       !LIPID/HEPATIC Latest Ref Rng 11/8/2014 12/2/2015 11/18/2016   AST 0 - 45 U/L 15 6 5   ALT 0 - 50 U/L 10 22 15     ENDO Calcium Labs:  ENDO CALCIUM LABS-UMP Latest Ref Rng & Units 8/23/2018   CALCIUM 8.5 - 10.1 mg/dL 9.5   PHOSPHOROUS 2.5 - 4.5 mg/dL 3.2   MAGNESIUM 1.6 - 2.3 mg/dL    ALBUMIN 3.4 - 5.0 g/dL 3.8   BUN 7 - 30 mg/dL 13   CREATININE 0.52 - 1.04 mg/dL 0.79   PARATHYROID HORMONE INTACT 18 - 80 pg/mL 9 (L)   ALKPHOS 40 - 150 U/L    VITAMIN D DEFICIENCY SCREENING 20 - 75 ug/L 31     ENDO CALCIUM LABS-UMP Latest Ref Rng & Units 5/17/2018 3/6/2018   CALCIUM 8.5 - 10.1 mg/dL 10.2 (H) 10.7 (H)   PHOSPHOROUS 2.5 - 4.5 mg/dL 3.9    MAGNESIUM 1.6 - 2.3 mg/dL  2.1   ALBUMIN 3.4 - 5.0 g/dL 4.1 4.0   BUN 7 - 30 mg/dL 15 7   CREATININE 0.52 - 1.04 mg/dL 0.75 0.76   PARATHYROID HORMONE INTACT 18 - 80 pg/mL     ALKPHOS 40 - 150 U/L  111   VITAMIN D DEFICIENCY SCREENING 20 - 75 ug/L       Urine microalbumin:  Component    Latest Ref Rng & Units 6/30/2017   Protein Random Urine    g/L 0.18   Protein Total Urine g/gr Creatinine    0 - 0.2 g/g Cr 0.14   Creatinine Urine    mg/dL 123     JOSH/C-peptide:  Component    Latest Ref Rng 11/10/2014   Glutamic Acid Decarboxylase Antibody     <5.0    Reference range: 0.0 to 5.0      C-Peptide    0.9 - 6.9 ng/mL <0.1 (L)     TFT:  !THYROID Latest Ref Rng & Units 8/23/2018 3/6/2018   TSH 0.40 - 4.00 mU/L 1.10 0.53   T4 FREE 0.76 - 1.46 ng/dL 1.06      TTG Abs:  Component    Latest Ref Rng 11/13/2014   Tissue Transglutaminase Antibody IgA    0 - 3.9 U/mL 1.1   Tissue Transglutaminase Rosina IgG    0 - 5.9 U/mL 1.0     All pertinent notes, labs, and  images personally reviewed by me.     A/P  Ms.Jennica Avila is a 23 year old here for the evaluation/management of diabetes:    1. DM1 - Uncontrolled, today's A1c stable at 7.6%.  A1c goal  < 7.0%.  Time Rate (U/hr)   00:00 1.00-->1.05       Rx for glucagon emergency kit, urine ketone strips provided 5/2018 .   RX for lantus and syringes provided in the event of insulin pump failure provided 5/2018.  Labs ordered today:   Orders Placed This Encounter   Procedures     Hemoglobin A1c     Albumin Random Urine Quantitative with Creat Ratio       Radiology/Consults ordered today: None    No orders of the defined types were placed in this encounter.      Follow-up:  3 months     Maryann Hammonds NP  Endocrinology  [facility]  CC:       More than 50% of face to face time spent with Ms. Avila on counseling / coordinating her care.  Total face to face time was 25 minutes.      All questions were answered.  The patient indicates understanding of the above issues and agrees with the plan set forth.             Injectable Influenza Immunization Documentation    1.  Is the person to be vaccinated sick today?   No    2. Does the person to be vaccinated have an allergy to a component   of the vaccine?   No  Egg Allergy Algorithm Link    3. Has the person to be vaccinated ever had a serious reaction   to influenza vaccine in the past?   No    4. Has the person to be vaccinated ever had Guillain-Barré syndrome?   No    Form completed by Emely Mullins M.A.           Again, thank you for allowing me to participate in the care of your patient.        Sincerely,        HORTENSIA Rosales CNP

## 2018-11-30 NOTE — PATIENT INSTRUCTIONS
Today we increased your basal rate from 1.00 per hour to 1.05 per hour.    Component      Latest Ref Rng & Units 5/17/2018 8/23/2018 11/30/2018   Hemoglobin A1C      0 - 5.6 % 6.2 (H) 7.6 (H) 7.6 (H)       Other insulin options:    Admelog  Humalog   Novolog  Leona Mckeon    Follow up in 3 months.  Friday March 1st, at 11 am    Maryann Hammonds NP  Endocrinology

## 2018-11-30 NOTE — PROGRESS NOTES
Name: Fransisca Avila  F/u for Diabetes (Last seen 8/23/2018).  HPI:  Fransisca Avila is a 23 year old female who presents for the evaluation/management of type 1 diabetes.     1. Type 1 DM:  Originally diagnosed at the age of 2.   Induced at 34 weeks on 11/5/2014, preeclampsia.  Baby girl, initially in NICU. Daughter now 4 years old, has also been diagnosed with type 1 diabetes.  Now also has a son 10 months old    Having a hard time affording insulin and pump supplies    Cannot find Enlite transmitter - thinks her 4-year-old daughter threw it away.  Can't afford to buy a new one.    Current Regimen: Humalog  Insulin pump - Medtronic 670-G.   Auto mode 0%  Reservoir change: every 6.5 days  Time Rate (U/hr)   00:00 1.00     Carbohydrate Ratio -    Time Ratio   0000 6.0   11:30 6.0   14:00 6.0   18:00 6.0         Sensitivity  00:00 - 48  17:00 - 48   Active Insulin Time 3.00 hours   Basal  45% (24 units)   Bolus   55% (29 units)   Total Carbohydrates/day 174   Total Insulin/day  53   Average Blood Sugar  Sensor Average 107  124   BS Checks  6.4 times a day   Care Link Username-   Password-   Target range:  0:00    5:00     21:30      Complications:   Diabetes Complications  Description / Detail    Diabetic Retinopathy  No retinopathy, last exam 1/2018   CAD / PAD  No   Neuropathy  No   Nephropathy / Microalbuminuria  No   Gastroparesis  No   Hypoglycemia Unawareness  No     PMH/PSH:  Past Medical History:   Diagnosis Date     Postpartum depression 2014    On medication for 6 months-      Type 1 diabetes (H) 1998    three years at diagnosis     Varicella 1998     Past Surgical History:   Procedure Laterality Date     C ROOT CANAL THERAPY 2 CANALS       DENTAL SURGERY       Family Hx:  Family History   Problem Relation Age of Onset     Family History Negative Mother      Diabetes Father 35     Type 1      Thyroid disease: No         DM2: NO         Autoimmune: DM1, SLE, RA, Vitiligo Yes - Father has  type 1 DM    Social Hx:  Social History     Social History     Marital status:      Spouse name: Teja     Number of children: 1     Years of education: N/A     Occupational History      [employer]     Social History Main Topics     Smoking status: Never Smoker     Smokeless tobacco: Never Used     Alcohol use No     Drug use: No     Sexual activity: Yes     Partners: Male     Birth control/ protection: Condom     Other Topics Concern     Blood Transfusions No     Social History Narrative    Fransisca lives with her spouse           MEDICATIONS:  has a current medication list which includes the following prescription(s): acetone urine, aspirin not prescribed, blood glucose monitoring, insulin aspart, insulin glargine, insulin pump, insulin syringe-needle u-100, levonorgestrel, microlet lancets, prenatal multivit-min-fe-fa, statin not prescribed, and glucagon.    ROS     ROS: 10 point ROS neg other than the symptoms noted above in the HPI.    Physical Exam   VS: /70 (BP Location: Left arm, Patient Position: Chair, Cuff Size: Adult Regular)  Pulse 84  Temp 97.7  F (36.5  C) (Oral)  Breastfeeding? Yes  GENERAL: NAD, well dressed, answering questions appropriately, appears stated age.  HEENT: no exophthalmos, no proptosis, no lig lag, no retraction, no scleral icterus  RESPIRATORY: Clear.  Normal respiratory effort.  CARDIOVASCULAR: RRR.  EXTREMITIES: no edema  NEUROLOGY: CN grossly intact, no tremors  MSK: grossly intact, No digital cyanosis. Normal gait and station.  PSYCH: Intact judgment and insight. A&OX3 with a cordial affect.    LABS:  A1c  Component      Latest Ref Rng & Units 5/17/2018 8/23/2018 11/30/2018   Hemoglobin A1C      0 - 5.6 % 6.2 (H) 7.6 (H) 7.6 (H)     BMP:  !COMPREHENSIVE Latest Ref Rng & Units 8/23/2018   SODIUM 133 - 144 mmol/L 141   POTASSIUM 3.4 - 5.3 mmol/L 4.3   CHLORIDE 94 - 109 mmol/L 107   BUN 7 - 30 mg/dL 13   Creatinine 0.52 - 1.04 mg/dL 0.79   Glucose 70 - 99 mg/dL 123 (H)    ANION GAP 3 - 14 mmol/L 7   CALCIUM 8.5 - 10.1 mg/dL 9.5   ALBUMIN 3.4 - 5.0 g/dL 3.8       !LIPID/HEPATIC Latest Ref Rng 11/8/2014 12/2/2015 11/18/2016   AST 0 - 45 U/L 15 6 5   ALT 0 - 50 U/L 10 22 15     ENDO Calcium Labs:  ENDO CALCIUM LABS-UMP Latest Ref Rng & Units 8/23/2018   CALCIUM 8.5 - 10.1 mg/dL 9.5   PHOSPHOROUS 2.5 - 4.5 mg/dL 3.2   MAGNESIUM 1.6 - 2.3 mg/dL    ALBUMIN 3.4 - 5.0 g/dL 3.8   BUN 7 - 30 mg/dL 13   CREATININE 0.52 - 1.04 mg/dL 0.79   PARATHYROID HORMONE INTACT 18 - 80 pg/mL 9 (L)   ALKPHOS 40 - 150 U/L    VITAMIN D DEFICIENCY SCREENING 20 - 75 ug/L 31     ENDO CALCIUM LABS-UMP Latest Ref Rng & Units 5/17/2018 3/6/2018   CALCIUM 8.5 - 10.1 mg/dL 10.2 (H) 10.7 (H)   PHOSPHOROUS 2.5 - 4.5 mg/dL 3.9    MAGNESIUM 1.6 - 2.3 mg/dL  2.1   ALBUMIN 3.4 - 5.0 g/dL 4.1 4.0   BUN 7 - 30 mg/dL 15 7   CREATININE 0.52 - 1.04 mg/dL 0.75 0.76   PARATHYROID HORMONE INTACT 18 - 80 pg/mL     ALKPHOS 40 - 150 U/L  111   VITAMIN D DEFICIENCY SCREENING 20 - 75 ug/L       Urine microalbumin:  Component    Latest Ref Rng & Units 6/30/2017   Protein Random Urine    g/L 0.18   Protein Total Urine g/gr Creatinine    0 - 0.2 g/g Cr 0.14   Creatinine Urine    mg/dL 123     JOSH/C-peptide:  Component    Latest Ref Rng 11/10/2014   Glutamic Acid Decarboxylase Antibody     <5.0    Reference range: 0.0 to 5.0      C-Peptide    0.9 - 6.9 ng/mL <0.1 (L)     TFT:  !THYROID Latest Ref Rng & Units 8/23/2018 3/6/2018   TSH 0.40 - 4.00 mU/L 1.10 0.53   T4 FREE 0.76 - 1.46 ng/dL 1.06      TTG Abs:  Component    Latest Ref Rng 11/13/2014   Tissue Transglutaminase Antibody IgA    0 - 3.9 U/mL 1.1   Tissue Transglutaminase Rosina IgG    0 - 5.9 U/mL 1.0     All pertinent notes, labs, and images personally reviewed by me.     A/P  Ms.Jennica Avila is a 23 year old here for the evaluation/management of diabetes:    1. DM1 - Uncontrolled, today's A1c stable at 7.6%.  A1c goal  < 7.0%.  Time Rate (U/hr)   00:00 1.00-->1.05       Rx for  glucagon emergency kit, urine ketone strips provided 5/2018 .   RX for lantus and syringes provided in the event of insulin pump failure provided 5/2018.  Labs ordered today:   Orders Placed This Encounter   Procedures     Hemoglobin A1c     Albumin Random Urine Quantitative with Creat Ratio       Radiology/Consults ordered today: None    No orders of the defined types were placed in this encounter.      Follow-up:  3 months     Maryann Hammonds NP  Endocrinology  [facility]  CC:       More than 50% of face to face time spent with Ms. Avila on counseling / coordinating her care.  Total face to face time was 25 minutes.      All questions were answered.  The patient indicates understanding of the above issues and agrees with the plan set forth.

## 2018-12-01 LAB
CREAT UR-MCNC: 101 MG/DL
MICROALBUMIN UR-MCNC: <5 MG/L
MICROALBUMIN/CREAT UR: NORMAL MG/G CR (ref 0–25)

## 2018-12-01 NOTE — PROGRESS NOTES
Fransisca,  There was no abnormal protein in your urine.  Here's a copy of the results for your records.  I talked to diabetes ed and asked them to be on the lookout for any extra or free replacement transmitters for your Enlite.  Have a happy holiday season.  I'll see you in 3 months.  Maryann Hammonds NP  Endocrinology

## 2019-03-01 ENCOUNTER — OFFICE VISIT (OUTPATIENT)
Dept: ENDOCRINOLOGY | Facility: CLINIC | Age: 24
End: 2019-03-01
Payer: COMMERCIAL

## 2019-03-01 VITALS
TEMPERATURE: 98.2 F | HEART RATE: 99 BPM | WEIGHT: 160.3 LBS | DIASTOLIC BLOOD PRESSURE: 70 MMHG | RESPIRATION RATE: 12 BRPM | BODY MASS INDEX: 25.11 KG/M2 | SYSTOLIC BLOOD PRESSURE: 100 MMHG | OXYGEN SATURATION: 97 %

## 2019-03-01 DIAGNOSIS — E10.9 TYPE 1 DIABETES MELLITUS WITHOUT COMPLICATION (H): Primary | ICD-10-CM

## 2019-03-01 LAB — HBA1C MFR BLD: 7.5 % (ref 0–5.6)

## 2019-03-01 PROCEDURE — 36415 COLL VENOUS BLD VENIPUNCTURE: CPT | Performed by: CLINICAL NURSE SPECIALIST

## 2019-03-01 PROCEDURE — 99214 OFFICE O/P EST MOD 30 MIN: CPT | Performed by: CLINICAL NURSE SPECIALIST

## 2019-03-01 PROCEDURE — 83036 HEMOGLOBIN GLYCOSYLATED A1C: CPT | Performed by: CLINICAL NURSE SPECIALIST

## 2019-03-01 NOTE — PROGRESS NOTES
Name: Fransisca Avila  F/u for Diabetes (Last seen 11/23/2018).  HPI:  Fransisca Avila is a 24 year old female who presents for the management of type 1 diabetes.     1. Type 1 DM:  Originally diagnosed at the age of 2.   Induced at 34 weeks on 11/5/2014, preeclampsia.  Baby girl, initially in NICU. Daughter born 11/2014, has also been diagnosed with type 1 diabetes.  Also has a son, born 1/2018    Having a hard time affording insulin and pump supplies    No Enlite transmitter    Current Regimen: Humalog  Insulin pump - Medtronic 670-G.   Auto mode 0%  Reservoir change: every 6.5 days  Time Rate (U/hr)   00:00 1.05     Carbohydrate Ratio -    Time Ratio   0000 6.0   11:30 6.0   14:00 6.0   18:00 6.0         Sensitivity  00:00 - 48  17:00 - 48   Active Insulin Time 3.00 hours   Basal  37% (25 units)   Bolus   63% (42 units)   Total Carbohydrates/day 244   Total Insulin/day  67 units   Average Blood Sugar  Sensor Average 201   BS Checks   times a day   Care Link Username-   Password-   Target range:  0:00    5:00     21:30      Complications:   Diabetes Complications  Description / Detail    Diabetic Retinopathy  No retinopathy, last exam 1/2018   CAD / PAD  No   Neuropathy  No   Nephropathy / Microalbuminuria  No   Gastroparesis  No   Hypoglycemia Unawareness  No     PMH/PSH:  Past Medical History:   Diagnosis Date     Postpartum depression 2014    On medication for 6 months-      Type 1 diabetes (H) 1998    three years at diagnosis     Varicella 1998     Past Surgical History:   Procedure Laterality Date     C ROOT CANAL THERAPY 2 CANALS       DENTAL SURGERY       Family Hx:  Family History   Problem Relation Age of Onset     Family History Negative Mother      Diabetes Father 35        Type 1      Thyroid disease: No         DM2: NO         Autoimmune: DM1, SLE, RA, Vitiligo Yes - Father has type 1 DM    Social Hx:  Social History     Socioeconomic History     Marital status:      Spouse  name: Teja     Number of children: 1     Years of education: Not on file     Highest education level: Not on file   Occupational History     Employer: [employer]   Social Needs     Financial resource strain: Not on file     Food insecurity:     Worry: Not on file     Inability: Not on file     Transportation needs:     Medical: Not on file     Non-medical: Not on file   Tobacco Use     Smoking status: Never Smoker     Smokeless tobacco: Never Used   Substance and Sexual Activity     Alcohol use: No     Alcohol/week: 0.0 oz     Drug use: No     Sexual activity: Yes     Partners: Male     Birth control/protection: Condom   Lifestyle     Physical activity:     Days per week: Not on file     Minutes per session: Not on file     Stress: Not on file   Relationships     Social connections:     Talks on phone: Not on file     Gets together: Not on file     Attends [employer] service: Not on file     Active member of club or organization: Not on file     Attends meetings of clubs or organizations: Not on file     Relationship status: Not on file     Intimate partner violence:     Fear of current or ex partner: Not on file     Emotionally abused: Not on file     Physically abused: Not on file     Forced sexual activity: Not on file   Other Topics Concern      Service Not Asked     Blood Transfusions No     Caffeine Concern Not Asked     Occupational Exposure Not Asked     Hobby Hazards Not Asked     Sleep Concern Not Asked     Stress Concern Not Asked     Weight Concern Not Asked     Special Diet Not Asked     Back Care Not Asked     Exercise Not Asked     Bike Helmet Not Asked     Seat Belt Not Asked     Self-Exams Not Asked     Parent/sibling w/ CABG, MI or angioplasty before 65F 55M? Not Asked   Social History Narrative    Fransisca lives with her spouse           MEDICATIONS:  has a current medication list which includes the following prescription(s): acetone urine, aspirin not prescribed, blood glucose, insulin  glargine, insulin lispro, insulin pump, insulin syringe-needle u-100, levonorgestrel, microlet lancets, prenatal multivit-min-fe-fa, statin not prescribed, and glucagon.    ROS     ROS: 10 point ROS neg other than the symptoms noted above in the HPI.    Physical Exam   VS: /70 (BP Location: Right arm, Patient Position: Chair, Cuff Size: Adult Regular)   Pulse 99   Temp 98.2  F (36.8  C) (Tympanic)   Resp 12   Wt 72.7 kg (160 lb 4.8 oz)   SpO2 97%   Breastfeeding? No   BMI 25.11 kg/m    GENERAL: NAD, well dressed, answering questions appropriately, appears stated age.  HEENT: no exophthalmos, no proptosis, no lig lag, no retraction, no scleral icterus  RESPIRATORY: Clear.  Normal respiratory effort.  CARDIOVASCULAR: RRR.  EXTREMITIES: no edema  NEUROLOGY: CN grossly intact, no tremors  MSK: grossly intact, No digital cyanosis. Normal gait and station.  PSYCH: Intact judgment and insight. A&OX3 with a cordial affect.    LABS:  A1c  Component      Latest Ref Rng & Units 8/23/2018 11/30/2018 3/1/2019   Hemoglobin A1C      0 - 5.6 % 7.6 (H) 7.6 (H) 7.5 (H)     BMP:  !COMPREHENSIVE Latest Ref Rng & Units 8/23/2018   SODIUM 133 - 144 mmol/L 141   POTASSIUM 3.4 - 5.3 mmol/L 4.3   CHLORIDE 94 - 109 mmol/L 107   BUN 7 - 30 mg/dL 13   Creatinine 0.52 - 1.04 mg/dL 0.79   Glucose 70 - 99 mg/dL 123 (H)   ANION GAP 3 - 14 mmol/L 7   CALCIUM 8.5 - 10.1 mg/dL 9.5   ALBUMIN 3.4 - 5.0 g/dL 3.8       !LIPID/HEPATIC Latest Ref Rng 11/8/2014 12/2/2015 11/18/2016   AST 0 - 45 U/L 15 6 5   ALT 0 - 50 U/L 10 22 15     ENDO Calcium Labs:  ENDO CALCIUM LABS-UMP Latest Ref Rng & Units 8/23/2018   CALCIUM 8.5 - 10.1 mg/dL 9.5   PHOSPHOROUS 2.5 - 4.5 mg/dL 3.2   MAGNESIUM 1.6 - 2.3 mg/dL    ALBUMIN 3.4 - 5.0 g/dL 3.8   BUN 7 - 30 mg/dL 13   CREATININE 0.52 - 1.04 mg/dL 0.79   PARATHYROID HORMONE INTACT 18 - 80 pg/mL 9 (L)   ALKPHOS 40 - 150 U/L    VITAMIN D DEFICIENCY SCREENING 20 - 75 ug/L 31     ENDO CALCIUM LABS-UMP Latest Ref  Rng & Units 5/17/2018 3/6/2018   CALCIUM 8.5 - 10.1 mg/dL 10.2 (H) 10.7 (H)   PHOSPHOROUS 2.5 - 4.5 mg/dL 3.9    MAGNESIUM 1.6 - 2.3 mg/dL  2.1   ALBUMIN 3.4 - 5.0 g/dL 4.1 4.0   BUN 7 - 30 mg/dL 15 7   CREATININE 0.52 - 1.04 mg/dL 0.75 0.76   PARATHYROID HORMONE INTACT 18 - 80 pg/mL     ALKPHOS 40 - 150 U/L  111   VITAMIN D DEFICIENCY SCREENING 20 - 75 ug/L       Urine microalbumin:  Component    Latest Ref Rng & Units 6/30/2017   Protein Random Urine    g/L 0.18   Protein Total Urine g/gr Creatinine    0 - 0.2 g/g Cr 0.14   Creatinine Urine    mg/dL 123     JOSH/C-peptide:  Component    Latest Ref Rng 11/10/2014   Glutamic Acid Decarboxylase Antibody     <5.0    Reference range: 0.0 to 5.0      C-Peptide    0.9 - 6.9 ng/mL <0.1 (L)     TFT:  !THYROID Latest Ref Rng & Units 8/23/2018 3/6/2018   TSH 0.40 - 4.00 mU/L 1.10 0.53   T4 FREE 0.76 - 1.46 ng/dL 1.06      TTG Abs:  Component    Latest Ref Rng 11/13/2014   Tissue Transglutaminase Antibody IgA    0 - 3.9 U/mL 1.1   Tissue Transglutaminase Rosina IgG    0 - 5.9 U/mL 1.0     All pertinent notes, labs, and images personally reviewed by me.     A/P  Ms.Jennica Avila is a 24 year old here for the evaluation/management of diabetes:    1. DM1 - Uncontrolled, today's A1c stable at 7.5%.  A1c goal  < 7.0%.  Time Rate (U/hr)   00:00  07:00   1.05-->1.00  1.05     Sensitivity  00:00 - 48 --> 50  17:00 - 48 --> 50     Recommend Dexcom     Rx for glucagon emergency kit, urine ketone strips provided 5/2018 .   RX for lantus and syringes provided in the event of insulin pump failure provided 5/2018.  Labs ordered today:   Orders Placed This Encounter   Procedures     Hemoglobin A1c       Radiology/Consults ordered today: None    No orders of the defined types were placed in this encounter.      Follow-up:  3 months     Maryann Hammonds NP  Endocrinology  [facility]  CC:       More than 50% of face to face time spent with Ms. Avila on counseling / coordinating her care.   Total face to face time was 25 minutes.      All questions were answered.  The patient indicates understanding of the above issues and agrees with the plan set forth.

## 2019-03-01 NOTE — LETTER
3/1/2019         RE: Fransisca Cabello  [address] [facility] [address]        Dear Colleague,    Thank you for referring your patient, Fransisca Cabello, to the [facility]. Please see a copy of my visit note below.    Name: Fransisca Avila  F/u for Diabetes (Last seen 11/23/2018).  HPI:  Fransisca Avila is a 24 year old female who presents for the management of type 1 diabetes.     1. Type 1 DM:  Originally diagnosed at the age of 2.   Induced at 34 weeks on 11/5/2014, preeclampsia.  Baby girl, initially in NICU. Daughter born 11/2014, has also been diagnosed with type 1 diabetes.  Also has a son, born 1/2018    Having a hard time affording insulin and pump supplies    No Enlite transmitter    Current Regimen: Humalog  Insulin pump - Medtronic 670-G.   Auto mode 0%  Reservoir change: every 6.5 days  Time Rate (U/hr)   00:00 1.05     Carbohydrate Ratio -    Time Ratio   0000 6.0   11:30 6.0   14:00 6.0   18:00 6.0         Sensitivity  00:00 - 48  17:00 - 48   Active Insulin Time 3.00 hours   Basal  37% (25 units)   Bolus   63% (42 units)   Total Carbohydrates/day 244   Total Insulin/day  67 units   Average Blood Sugar  Sensor Average 201   BS Checks   times a day   Care Link Username-   Password-   Target range:  0:00    5:00     21:30      Complications:   Diabetes Complications  Description / Detail    Diabetic Retinopathy  No retinopathy, last exam 1/2018   CAD / PAD  No   Neuropathy  No   Nephropathy / Microalbuminuria  No   Gastroparesis  No   Hypoglycemia Unawareness  No     PMH/PSH:  Past Medical History:   Diagnosis Date     Postpartum depression 2014    On medication for 6 months-      Type 1 diabetes (H) 1998    three years at diagnosis     Varicella 1998     Past Surgical History:   Procedure Laterality Date     C ROOT CANAL THERAPY 2 CANALS       DENTAL SURGERY       Family Hx:  Family History   Problem Relation Age of Onset     Family History  Negative Mother      Diabetes Father 35        Type 1      Thyroid disease: No         DM2: NO         Autoimmune: DM1, SLE, RA, Vitiligo Yes - Father has type 1 DM    Social Hx:  Social History     Socioeconomic History     Marital status:      Spouse name: Teja     Number of children: 1     Years of education: Not on file     Highest education level: Not on file   Occupational History     Employer: [employer]   Social Needs     Financial resource strain: Not on file     Food insecurity:     Worry: Not on file     Inability: Not on file     Transportation needs:     Medical: Not on file     Non-medical: Not on file   Tobacco Use     Smoking status: Never Smoker     Smokeless tobacco: Never Used   Substance and Sexual Activity     Alcohol use: No     Alcohol/week: 0.0 oz     Drug use: No     Sexual activity: Yes     Partners: Male     Birth control/protection: Condom   Lifestyle     Physical activity:     Days per week: Not on file     Minutes per session: Not on file     Stress: Not on file   Relationships     Social connections:     Talks on phone: Not on file     Gets together: Not on file     Attends Restorationism service: Not on file     Active member of club or organization: Not on file     Attends meetings of clubs or organizations: Not on file     Relationship status: Not on file     Intimate partner violence:     Fear of current or ex partner: Not on file     Emotionally abused: Not on file     Physically abused: Not on file     Forced sexual activity: Not on file   Other Topics Concern      Service Not Asked     Blood Transfusions No     Caffeine Concern Not Asked     Occupational Exposure Not Asked     Hobby Hazards Not Asked     Sleep Concern Not Asked     Stress Concern Not Asked     Weight Concern Not Asked     Special Diet Not Asked     Back Care Not Asked     Exercise Not Asked     Bike Helmet Not Asked     Seat Belt Not Asked     Self-Exams Not Asked     Parent/sibling w/ CABG, MI or  angioplasty before 65F 55M? Not Asked   Social History Narrative    Fransisca lives with her spouse           MEDICATIONS:  has a current medication list which includes the following prescription(s): acetone urine, aspirin not prescribed, blood glucose, insulin glargine, insulin lispro, insulin pump, insulin syringe-needle u-100, levonorgestrel, microlet lancets, prenatal multivit-min-fe-fa, statin not prescribed, and glucagon.    ROS     ROS: 10 point ROS neg other than the symptoms noted above in the HPI.    Physical Exam   VS: /70 (BP Location: Right arm, Patient Position: Chair, Cuff Size: Adult Regular)   Pulse 99   Temp 98.2  F (36.8  C) (Tympanic)   Resp 12   Wt 72.7 kg (160 lb 4.8 oz)   SpO2 97%   Breastfeeding? No   BMI 25.11 kg/m     GENERAL: NAD, well dressed, answering questions appropriately, appears stated age.  HEENT: no exophthalmos, no proptosis, no lig lag, no retraction, no scleral icterus  RESPIRATORY: Clear.  Normal respiratory effort.  CARDIOVASCULAR: RRR.  EXTREMITIES: no edema  NEUROLOGY: CN grossly intact, no tremors  MSK: grossly intact, No digital cyanosis. Normal gait and station.  PSYCH: Intact judgment and insight. A&OX3 with a cordial affect.    LABS:  A1c  Component      Latest Ref Rng & Units 8/23/2018 11/30/2018 3/1/2019   Hemoglobin A1C      0 - 5.6 % 7.6 (H) 7.6 (H) 7.5 (H)     BMP:  !COMPREHENSIVE Latest Ref Rng & Units 8/23/2018   SODIUM 133 - 144 mmol/L 141   POTASSIUM 3.4 - 5.3 mmol/L 4.3   CHLORIDE 94 - 109 mmol/L 107   BUN 7 - 30 mg/dL 13   Creatinine 0.52 - 1.04 mg/dL 0.79   Glucose 70 - 99 mg/dL 123 (H)   ANION GAP 3 - 14 mmol/L 7   CALCIUM 8.5 - 10.1 mg/dL 9.5   ALBUMIN 3.4 - 5.0 g/dL 3.8       !LIPID/HEPATIC Latest Ref Rng 11/8/2014 12/2/2015 11/18/2016   AST 0 - 45 U/L 15 6 5   ALT 0 - 50 U/L 10 22 15     ENDO Calcium Labs:  ENDO CALCIUM LABS-UMP Latest Ref Rng & Units 8/23/2018   CALCIUM 8.5 - 10.1 mg/dL 9.5   PHOSPHOROUS 2.5 - 4.5 mg/dL 3.2   MAGNESIUM 1.6  - 2.3 mg/dL    ALBUMIN 3.4 - 5.0 g/dL 3.8   BUN 7 - 30 mg/dL 13   CREATININE 0.52 - 1.04 mg/dL 0.79   PARATHYROID HORMONE INTACT 18 - 80 pg/mL 9 (L)   ALKPHOS 40 - 150 U/L    VITAMIN D DEFICIENCY SCREENING 20 - 75 ug/L 31     ENDO CALCIUM LABS-UMP Latest Ref Rng & Units 5/17/2018 3/6/2018   CALCIUM 8.5 - 10.1 mg/dL 10.2 (H) 10.7 (H)   PHOSPHOROUS 2.5 - 4.5 mg/dL 3.9    MAGNESIUM 1.6 - 2.3 mg/dL  2.1   ALBUMIN 3.4 - 5.0 g/dL 4.1 4.0   BUN 7 - 30 mg/dL 15 7   CREATININE 0.52 - 1.04 mg/dL 0.75 0.76   PARATHYROID HORMONE INTACT 18 - 80 pg/mL     ALKPHOS 40 - 150 U/L  111   VITAMIN D DEFICIENCY SCREENING 20 - 75 ug/L       Urine microalbumin:  Component    Latest Ref Rng & Units 6/30/2017   Protein Random Urine    g/L 0.18   Protein Total Urine g/gr Creatinine    0 - 0.2 g/g Cr 0.14   Creatinine Urine    mg/dL 123     JOSH/C-peptide:  Component    Latest Ref Rng 11/10/2014   Glutamic Acid Decarboxylase Antibody     <5.0    Reference range: 0.0 to 5.0      C-Peptide    0.9 - 6.9 ng/mL <0.1 (L)     TFT:  !THYROID Latest Ref Rng & Units 8/23/2018 3/6/2018   TSH 0.40 - 4.00 mU/L 1.10 0.53   T4 FREE 0.76 - 1.46 ng/dL 1.06      TTG Abs:  Component    Latest Ref Rng 11/13/2014   Tissue Transglutaminase Antibody IgA    0 - 3.9 U/mL 1.1   Tissue Transglutaminase Rosina IgG    0 - 5.9 U/mL 1.0     All pertinent notes, labs, and images personally reviewed by me.     A/P  Ms.Jennica Avila is a 24 year old here for the evaluation/management of diabetes:    1. DM1 - Uncontrolled, today's A1c stable at 7.5%.  A1c goal  < 7.0%.  Time Rate (U/hr)   00:00  07:00   1.05-->1.00  1.05     Sensitivity  00:00 - 48 --> 50  17:00 - 48 --> 50     Recommend Dexcom     Rx for glucagon emergency kit, urine ketone strips provided 5/2018 .   RX for lantus and syringes provided in the event of insulin pump failure provided 5/2018.  Labs ordered today:   Orders Placed This Encounter   Procedures     Hemoglobin A1c       Radiology/Consults ordered today:  None    No orders of the defined types were placed in this encounter.      Follow-up:  3 months     Maryann Hammonds NP  Endocrinology  [facility]  CC:       More than 50% of face to face time spent with Ms. Avila on counseling / coordinating her care.  Total face to face time was 25 minutes.      All questions were answered.  The patient indicates understanding of the above issues and agrees with the plan set forth.           Again, thank you for allowing me to participate in the care of your patient.        Sincerely,        HORTENSIA Rosales CNP

## 2019-03-01 NOTE — PATIENT INSTRUCTIONS
In an effort to stay on schedule, please remember to check in for your next clinic appointment 15-20 minutes early.  This is necessary so your insulin pump/blood glucose meter or CGM can be uploaded and any labs can be done if necessary.  We appreciate your understanding.    Component      Latest Ref Rng & Units 8/23/2018 11/30/2018 3/1/2019   Hemoglobin A1C      0 - 5.6 % 7.6 (H) 7.6 (H) 7.5 (H)

## 2019-03-02 NOTE — RESULT ENCOUNTER NOTE
Fransisca,  Here's a copy of your recent A1c result for your records.  Maryann Hammonds NP  Endocrinology

## 2019-03-07 ENCOUNTER — TELEPHONE (OUTPATIENT)
Dept: ENDOCRINOLOGY | Facility: CLINIC | Age: 24
End: 2019-03-07

## 2019-03-07 NOTE — TELEPHONE ENCOUNTER
Received Certificate of Medical Necessity form from [employer] for a replacement Transmitter.  [employer] contact is Joycelyn Lopez # [phone] .  Fax completed form to #[phone].  Form on Maryann Hammonds's desk.  Emely Mullins M.A.

## 2019-03-08 ENCOUNTER — MEDICAL CORRESPONDENCE (OUTPATIENT)
Dept: HEALTH INFORMATION MANAGEMENT | Facility: CLINIC | Age: 24
End: 2019-03-08

## 2019-06-05 ENCOUNTER — OFFICE VISIT (OUTPATIENT)
Dept: ENDOCRINOLOGY | Facility: CLINIC | Age: 24
End: 2019-06-05
Payer: COMMERCIAL

## 2019-06-05 VITALS
TEMPERATURE: 97.8 F | HEART RATE: 107 BPM | BODY MASS INDEX: 26.53 KG/M2 | RESPIRATION RATE: 12 BRPM | WEIGHT: 169.4 LBS | DIASTOLIC BLOOD PRESSURE: 84 MMHG | SYSTOLIC BLOOD PRESSURE: 120 MMHG

## 2019-06-05 DIAGNOSIS — E10.9 TYPE 1 DIABETES MELLITUS WITHOUT COMPLICATION (H): Primary | ICD-10-CM

## 2019-06-05 DIAGNOSIS — Z23 NEED FOR PROPHYLACTIC VACCINATION AND INOCULATION AGAINST INFLUENZA: ICD-10-CM

## 2019-06-05 DIAGNOSIS — Z79.4 ENCOUNTER FOR LONG-TERM (CURRENT) USE OF INSULIN (H): ICD-10-CM

## 2019-06-05 DIAGNOSIS — Z96.41 INSULIN PUMP IN PLACE: ICD-10-CM

## 2019-06-05 LAB — HBA1C MFR BLD: 6.5 % (ref 0–5.6)

## 2019-06-05 PROCEDURE — 83036 HEMOGLOBIN GLYCOSYLATED A1C: CPT | Performed by: CLINICAL NURSE SPECIALIST

## 2019-06-05 PROCEDURE — 99207 C FOOT EXAM  NO CHARGE: CPT | Performed by: CLINICAL NURSE SPECIALIST

## 2019-06-05 PROCEDURE — 36415 COLL VENOUS BLD VENIPUNCTURE: CPT | Performed by: CLINICAL NURSE SPECIALIST

## 2019-06-05 PROCEDURE — 99213 OFFICE O/P EST LOW 20 MIN: CPT | Performed by: CLINICAL NURSE SPECIALIST

## 2019-06-05 RX ORDER — INSULIN GLARGINE 100 [IU]/ML
INJECTION, SOLUTION SUBCUTANEOUS
Qty: 10 ML | Refills: 1 | Status: SHIPPED | OUTPATIENT
Start: 2019-06-05 | End: 2020-06-10

## 2019-06-05 RX ORDER — BLOOD SUGAR DIAGNOSTIC
STRIP MISCELLANEOUS
Qty: 100 EACH | Refills: 3 | Status: SHIPPED | OUTPATIENT
Start: 2019-06-05 | End: 2020-10-14

## 2019-06-05 NOTE — PROGRESS NOTES
Name: Fransisca Avila  F/u for Diabetes (Last seen 3/1/2019).  HPI:  Fransisca Avila is a 24 year old female who presents for the management of type 1 diabetes.     1. Type 1 DM:  Originally diagnosed at the age of 2.   Induced at 34 weeks on 11/5/2014, preeclampsia.  Baby girl, initially in NICU. Daughter born 11/2014, has also been diagnosed with type 1 diabetes.  Also has a son, born 1/2018    Having a hard time affording insulin and pump supplies  Doesn't like auto mode - feels pump suspends when it shouldn't    No Enlite transmitter    Current Regimen: Humalog  Insulin pump - Medtronic 670-G.   Auto mode 0%  Reservoir change: every 2.6 days  Sensor wear - 71%  Time Rate (U/hr)   00:00 1.25   11:00 1.15   20:00 1.35     Carbohydrate Ratio -    Time Ratio   0000 6.0   11:30 6.0   14:00 6.0   18:00 6.0         Sensitivity  00:00 - 47  17:00 - 47   Active Insulin Time 3.00 hours   Basal  47% (28 units)   Bolus   53% (32 units)   Total Carbohydrates/day 187   Total Insulin/day  60 units   Average Blood Sugar  Sensor Average 137  114   BS Checks   times a day   Care Link Username-   Password-   Target range:  0:00    5:00     21:30      Complications:   Diabetes Complications  Description / Detail    Diabetic Retinopathy  No retinopathy, last exam 1/2018   CAD / PAD  No   Neuropathy  No   Nephropathy / Microalbuminuria  No   Gastroparesis  No   Hypoglycemia Unawareness  No     2.  Intermittent hypercalcemia.  Most recent calcium 8/2018 normalized.  Previous PTH low at 9, calcium was 9.5 at the time.  Continue to monitor.    PMH/PSH:  Past Medical History:   Diagnosis Date     Postpartum depression 2014    On medication for 6 months-      Type 1 diabetes (H) 1998    three years at diagnosis     Varicella 1998     Past Surgical History:   Procedure Laterality Date     C ROOT CANAL THERAPY 2 CANALS       DENTAL SURGERY       Family Hx:  Family History   Problem Relation Age of Onset     Family History  Negative Mother      Diabetes Father 35        Type 1      Thyroid disease: No         DM2: NO         Autoimmune: DM1, SLE, RA, Vitiligo Yes - Father has type 1 DM    Social Hx:  Social History     Socioeconomic History     Marital status:      Spouse name: Teja     Number of children: 1     Years of education: Not on file     Highest education level: Not on file   Occupational History     Employer: [employer]   Social Needs     Financial resource strain: Not on file     Food insecurity:     Worry: Not on file     Inability: Not on file     Transportation needs:     Medical: Not on file     Non-medical: Not on file   Tobacco Use     Smoking status: Never Smoker     Smokeless tobacco: Never Used   Substance and Sexual Activity     Alcohol use: No     Alcohol/week: 0.0 oz     Drug use: No     Sexual activity: Yes     Partners: Male     Birth control/protection: Condom   Lifestyle     Physical activity:     Days per week: Not on file     Minutes per session: Not on file     Stress: Not on file   Relationships     Social connections:     Talks on phone: Not on file     Gets together: Not on file     Attends Sabianist service: Not on file     Active member of club or organization: Not on file     Attends meetings of clubs or organizations: Not on file     Relationship status: Not on file     Intimate partner violence:     Fear of current or ex partner: Not on file     Emotionally abused: Not on file     Physically abused: Not on file     Forced sexual activity: Not on file   Other Topics Concern      Service Not Asked     Blood Transfusions No     Caffeine Concern Not Asked     Occupational Exposure Not Asked     Hobby Hazards Not Asked     Sleep Concern Not Asked     Stress Concern Not Asked     Weight Concern Not Asked     Special Diet Not Asked     Back Care Not Asked     Exercise Not Asked     Bike Helmet Not Asked     Seat Belt Not Asked     Self-Exams Not Asked     Parent/sibling w/ CABG, MI or  angioplasty before 65F 55M? Not Asked   Social History Narrative    Fransisca lives with her spouse           MEDICATIONS:  has a current medication list which includes the following prescription(s): acetone urine, aspirin not prescribed, blood glucose, glucagon, insulin glargine, insulin lispro, insulin pump, insulin syringe-needle u-100, levonorgestrel, microlet lancets, prenatal multivit-min-fe-fa, and statin not prescribed.    ROS     ROS: 10 point ROS neg other than the symptoms noted above in the HPI.    Physical Exam   VS: /84 (BP Location: Left arm, Patient Position: Chair, Cuff Size: Adult Regular)   Pulse 107   Temp 97.8  F (36.6  C) (Oral)   Resp 12   Wt 76.8 kg (169 lb 6.4 oz)   Breastfeeding? No   BMI 26.53 kg/m    GENERAL: NAD, well dressed, answering questions appropriately, appears stated age.  HEENT: no exophthalmos, no proptosis, no lig lag, no retraction, no scleral icterus  RESPIRATORY: Clear.  Normal respiratory effort.  CARDIOVASCULAR: RRR.  EXTREMITIES: no edema, feet with 2+ pulses, 10-gram plantar sensation 6/6 bilaterally, no open lesions  NEUROLOGY: CN grossly intact, no tremors  MSK: Normal gait and station.  PSYCH: Intact judgment and insight. A&OX3 with a cordial affect.    LABS:  A1c  Component      Latest Ref Rng & Units 8/23/2018 11/30/2018 3/1/2019 6/5/2019   Hemoglobin A1C      0 - 5.6 % 7.6 (H) 7.6 (H) 7.5 (H) 6.5 (H)     BMP:  !COMPREHENSIVE Latest Ref Rng & Units 8/23/2018   SODIUM 133 - 144 mmol/L 141   POTASSIUM 3.4 - 5.3 mmol/L 4.3   CHLORIDE 94 - 109 mmol/L 107   BUN 7 - 30 mg/dL 13   Creatinine 0.52 - 1.04 mg/dL 0.79   Glucose 70 - 99 mg/dL 123 (H)   ANION GAP 3 - 14 mmol/L 7   CALCIUM 8.5 - 10.1 mg/dL 9.5   ALBUMIN 3.4 - 5.0 g/dL 3.8     !LIPID/HEPATIC Latest Ref Rng & Units 3/6/2018 8/23/2018   CHOLESTEROL <200 mg/dL  190   TRIGLYCERIDES <150 mg/dL  74   HDL CHOLESTEROL >49 mg/dL  51   LDL CHOLESTEROL, CALCULATED <100 mg/dL  124 (H)   NON HDL CHOLESTEROL <130  mg/dL  139 (H)   AST 0 - 45 U/L 23    ALT 0 - 50 U/L 41      ENDO Calcium Labs:  ENDO CALCIUM LABS-UMP Latest Ref Rng & Units 8/23/2018   CALCIUM 8.5 - 10.1 mg/dL 9.5   PHOSPHOROUS 2.5 - 4.5 mg/dL 3.2   MAGNESIUM 1.6 - 2.3 mg/dL    ALBUMIN 3.4 - 5.0 g/dL 3.8   BUN 7 - 30 mg/dL 13   CREATININE 0.52 - 1.04 mg/dL 0.79   PARATHYROID HORMONE INTACT 18 - 80 pg/mL 9 (L)   ALKPHOS 40 - 150 U/L    VITAMIN D DEFICIENCY SCREENING 20 - 75 ug/L 31     ENDO CALCIUM LABS-UMP Latest Ref Rng & Units 5/17/2018 3/6/2018   CALCIUM 8.5 - 10.1 mg/dL 10.2 (H) 10.7 (H)   PHOSPHOROUS 2.5 - 4.5 mg/dL 3.9    MAGNESIUM 1.6 - 2.3 mg/dL  2.1   ALBUMIN 3.4 - 5.0 g/dL 4.1 4.0   BUN 7 - 30 mg/dL 15 7   CREATININE 0.52 - 1.04 mg/dL 0.75 0.76   PARATHYROID HORMONE INTACT 18 - 80 pg/mL     ALKPHOS 40 - 150 U/L  111   VITAMIN D DEFICIENCY SCREENING 20 - 75 ug/L       Urine microalbumin:  Component      Latest Ref Rng & Units 11/30/2018   Creatinine Urine      mg/dL 101   Albumin Urine mg/L      mg/L <5   Albumin Urine mg/g Cr      0 - 25 mg/g Cr Unable to calculate due to low value     JOSH/C-peptide:  Component    Latest Ref Rng 11/10/2014   Glutamic Acid Decarboxylase Antibody     <5.0    Reference range: 0.0 to 5.0      C-Peptide    0.9 - 6.9 ng/mL <0.1 (L)     TFT:  !THYROID Latest Ref Rng & Units 8/23/2018   TSH 0.40 - 4.00 mU/L 1.10   T4 FREE 0.76 - 1.46 ng/dL 1.06     TTG Abs:  Component    Latest Ref Rng 11/13/2014   Tissue Transglutaminase Antibody IgA    0 - 3.9 U/mL 1.1   Tissue Transglutaminase Rosina IgG    0 - 5.9 U/mL 1.0     All pertinent notes, labs, and images personally reviewed by me.     A/P  Ms.Jennica Avila is a 24 year old here for the evaluation/management of diabetes:    1. DM1 - Controlled, today's A1c 6.5%.  A1c goal  < 7.0%  No insulin pump changes at this time.  Obtain fasting labs 1 week prior to next follow up - CMP, TSH, lipids, A1c.    Rx for glucagon emergency kit, urine ketone strips provided today.   RX for lantus and  "syringes provided in the event of insulin pump failure provided today.      Labs ordered today:   Orders Placed This Encounter   Procedures     FOOT EXAM     Hemoglobin A1c     Lipid panel reflex to direct LDL Fasting     Hemoglobin A1c     **Comprehensive metabolic panel FUTURE anytime     TSH with free T4 reflex       Radiology/Consults ordered today: C FOOT EXAM  NO CHARGE    Orders Placed This Encounter   Medications     acetone urine (KETOSTIX) test strip     Sig: Use as directed to test urine ketone if blood sugars are >250 or during illness with fever, nausea, vomiting, or abdominal pain.     Dispense:  100 each     Refill:  prn     insulin glargine (LANTUS VIAL) 100 UNIT/ML vial     Sig: Inject 18 u sq every 24 hours in the event of insulin pump therapy.  DO NOT FILL RX until requested by patient.     Dispense:  10 mL     Refill:  1     insulin lispro (HUMALOG) 100 UNIT/ML vial     Si units subcutaneous daily via insulin pump     Dispense:  70 mL     Refill:  3     insulin syringe-needle U-100 (BD INSULIN SYRINGE ULTRAFINE) 31G X 5/16\" 0.3 ML miscellaneous     Sig: Use one syringe 4 daily or as directed in the event of insulin pump failure.  DO NOT DISPENSE until requested by patient     Dispense:  100 each     Refill:  3     glucagon (GLUCAGON EMERGENCY) 1 MG kit     Sig: Inject 1 mg into the muscle once for 1 dose Do not dispense until requested by patient.     Dispense:  1 each     Refill:  0       Follow-up:  3 months     Maryann Hammonds NP  Endocrinology  [facility]  CC:       More than 50% of face to face time spent with Ms. Avila on counseling / coordinating her care.  Total face to face time was 25 minutes.      All questions were answered.  The patient indicates understanding of the above issues and agrees with the plan set forth.         "

## 2019-06-05 NOTE — LETTER
6/5/2019         RE: Fransisca Cabello  [address] [facility] [address]        Dear Colleague,    Thank you for referring your patient, Fransisca Cabello, to the [facility]. Please see a copy of my visit note below.    Name: Fransisca Avila  F/u for Diabetes (Last seen 3/1/2019).  HPI:  Fransisca Avila is a 24 year old female who presents for the management of type 1 diabetes.     1. Type 1 DM:  Originally diagnosed at the age of 2.   Induced at 34 weeks on 11/5/2014, preeclampsia.  Baby girl, initially in NICU. Daughter born 11/2014, has also been diagnosed with type 1 diabetes.  Also has a son, born 1/2018    Having a hard time affording insulin and pump supplies  Doesn't like auto mode - feels pump suspends when it shouldn't    No Enlite transmitter    Current Regimen: Humalog  Insulin pump - [employer] 670-G.   Auto mode 0%  Reservoir change: every 2.6 days  Sensor wear - 71%  Time Rate (U/hr)   00:00 1.25   11:00 1.15   20:00 1.35     Carbohydrate Ratio -    Time Ratio   0000 6.0   11:30 6.0   14:00 6.0   18:00 6.0         Sensitivity  00:00 - 47  17:00 - 47   Active Insulin Time 3.00 hours   Basal  47% (28 units)   Bolus   53% (32 units)   Total Carbohydrates/day 187   Total Insulin/day  60 units   Average Blood Sugar  Sensor Average 137  114   BS Checks   times a day   Care Link Username-   Password-   Target range:  0:00    5:00     21:30      Complications:   Diabetes Complications  Description / Detail    Diabetic Retinopathy  No retinopathy, last exam 1/2018   CAD / PAD  No   Neuropathy  No   Nephropathy / Microalbuminuria  No   Gastroparesis  No   Hypoglycemia Unawareness  No     2.  Intermittent hypercalcemia.  Most recent calcium 8/2018 normalized.  Previous PTH low at 9, calcium was 9.5 at the time.  Continue to monitor.    PMH/PSH:  Past Medical History:   Diagnosis Date     Postpartum depression 2014    On medication for 6 months-      Type 1  diabetes (H) 1998    three years at diagnosis     Varicella 1998     Past Surgical History:   Procedure Laterality Date     C ROOT CANAL THERAPY 2 CANALS       DENTAL SURGERY       Family Hx:  Family History   Problem Relation Age of Onset     Family History Negative Mother      Diabetes Father 35        Type 1      Thyroid disease: No         DM2: NO         Autoimmune: DM1, SLE, RA, Vitiligo Yes - Father has type 1 DM    Social Hx:  Social History     Socioeconomic History     Marital status:      Spouse name: Teja     Number of children: 1     Years of education: Not on file     Highest education level: Not on file   Occupational History     Employer: [employer]   Social Needs     Financial resource strain: Not on file     Food insecurity:     Worry: Not on file     Inability: Not on file     Transportation needs:     Medical: Not on file     Non-medical: Not on file   Tobacco Use     Smoking status: Never Smoker     Smokeless tobacco: Never Used   Substance and Sexual Activity     Alcohol use: No     Alcohol/week: 0.0 oz     Drug use: No     Sexual activity: Yes     Partners: Male     Birth control/protection: Condom   Lifestyle     Physical activity:     Days per week: Not on file     Minutes per session: Not on file     Stress: Not on file   Relationships     Social connections:     Talks on phone: Not on file     Gets together: Not on file     Attends Hoahaoism service: Not on file     Active member of club or organization: Not on file     Attends meetings of clubs or organizations: Not on file     Relationship status: Not on file     Intimate partner violence:     Fear of current or ex partner: Not on file     Emotionally abused: Not on file     Physically abused: Not on file     Forced sexual activity: Not on file   Other Topics Concern      Service Not Asked     Blood Transfusions No     Caffeine Concern Not Asked     Occupational Exposure Not Asked     Hobby Hazards Not Asked     Sleep Concern  Not Asked     Stress Concern Not Asked     Weight Concern Not Asked     Special Diet Not Asked     Back Care Not Asked     Exercise Not Asked     Bike Helmet Not Asked     Seat Belt Not Asked     Self-Exams Not Asked     Parent/sibling w/ CABG, MI or angioplasty before 65F 55M? Not Asked   Social History Narrative    Fransisca lives with her spouse           MEDICATIONS:  has a current medication list which includes the following prescription(s): acetone urine, aspirin not prescribed, blood glucose, glucagon, insulin glargine, insulin lispro, insulin pump, insulin syringe-needle u-100, levonorgestrel, microlet lancets, prenatal multivit-min-fe-fa, and statin not prescribed.    ROS     ROS: 10 point ROS neg other than the symptoms noted above in the HPI.    Physical Exam   VS: /84 (BP Location: Left arm, Patient Position: Chair, Cuff Size: Adult Regular)   Pulse 107   Temp 97.8  F (36.6  C) (Oral)   Resp 12   Wt 76.8 kg (169 lb 6.4 oz)   Breastfeeding? No   BMI 26.53 kg/m     GENERAL: NAD, well dressed, answering questions appropriately, appears stated age.  HEENT: no exophthalmos, no proptosis, no lig lag, no retraction, no scleral icterus  RESPIRATORY: Clear.  Normal respiratory effort.  CARDIOVASCULAR: RRR.  EXTREMITIES: no edema, feet with 2+ pulses, 10-gram plantar sensation 6/6 bilaterally, no open lesions  NEUROLOGY: CN grossly intact, no tremors  MSK: Normal gait and station.  PSYCH: Intact judgment and insight. A&OX3 with a cordial affect.    LABS:  A1c  Component      Latest Ref Rng & Units 8/23/2018 11/30/2018 3/1/2019 6/5/2019   Hemoglobin A1C      0 - 5.6 % 7.6 (H) 7.6 (H) 7.5 (H) 6.5 (H)     BMP:  !COMPREHENSIVE Latest Ref Rng & Units 8/23/2018   SODIUM 133 - 144 mmol/L 141   POTASSIUM 3.4 - 5.3 mmol/L 4.3   CHLORIDE 94 - 109 mmol/L 107   BUN 7 - 30 mg/dL 13   Creatinine 0.52 - 1.04 mg/dL 0.79   Glucose 70 - 99 mg/dL 123 (H)   ANION GAP 3 - 14 mmol/L 7   CALCIUM 8.5 - 10.1 mg/dL 9.5   ALBUMIN  3.4 - 5.0 g/dL 3.8     !LIPID/HEPATIC Latest Ref Rng & Units 3/6/2018 8/23/2018   CHOLESTEROL <200 mg/dL  190   TRIGLYCERIDES <150 mg/dL  74   HDL CHOLESTEROL >49 mg/dL  51   LDL CHOLESTEROL, CALCULATED <100 mg/dL  124 (H)   NON HDL CHOLESTEROL <130 mg/dL  139 (H)   AST 0 - 45 U/L 23    ALT 0 - 50 U/L 41      ENDO Calcium Labs:  ENDO CALCIUM LABS-UMP Latest Ref Rng & Units 8/23/2018   CALCIUM 8.5 - 10.1 mg/dL 9.5   PHOSPHOROUS 2.5 - 4.5 mg/dL 3.2   MAGNESIUM 1.6 - 2.3 mg/dL    ALBUMIN 3.4 - 5.0 g/dL 3.8   BUN 7 - 30 mg/dL 13   CREATININE 0.52 - 1.04 mg/dL 0.79   PARATHYROID HORMONE INTACT 18 - 80 pg/mL 9 (L)   ALKPHOS 40 - 150 U/L    VITAMIN D DEFICIENCY SCREENING 20 - 75 ug/L 31     ENDO CALCIUM LABS-UMP Latest Ref Rng & Units 5/17/2018 3/6/2018   CALCIUM 8.5 - 10.1 mg/dL 10.2 (H) 10.7 (H)   PHOSPHOROUS 2.5 - 4.5 mg/dL 3.9    MAGNESIUM 1.6 - 2.3 mg/dL  2.1   ALBUMIN 3.4 - 5.0 g/dL 4.1 4.0   BUN 7 - 30 mg/dL 15 7   CREATININE 0.52 - 1.04 mg/dL 0.75 0.76   PARATHYROID HORMONE INTACT 18 - 80 pg/mL     ALKPHOS 40 - 150 U/L  111   VITAMIN D DEFICIENCY SCREENING 20 - 75 ug/L       Urine microalbumin:  Component      Latest Ref Rng & Units 11/30/2018   Creatinine Urine      mg/dL 101   Albumin Urine mg/L      mg/L <5   Albumin Urine mg/g Cr      0 - 25 mg/g Cr Unable to calculate due to low value     JOSH/C-peptide:  Component    Latest Ref Rng 11/10/2014   Glutamic Acid Decarboxylase Antibody     <5.0    Reference range: 0.0 to 5.0      C-Peptide    0.9 - 6.9 ng/mL <0.1 (L)     TFT:  !THYROID Latest Ref Rng & Units 8/23/2018   TSH 0.40 - 4.00 mU/L 1.10   T4 FREE 0.76 - 1.46 ng/dL 1.06     TTG Abs:  Component    Latest Ref Rng 11/13/2014   Tissue Transglutaminase Antibody IgA    0 - 3.9 U/mL 1.1   Tissue Transglutaminase Rosina IgG    0 - 5.9 U/mL 1.0     All pertinent notes, labs, and images personally reviewed by me.     A/P  Ms.Jennica Avila is a 24 year old here for the evaluation/management of diabetes:    1. DM1 -  "Controlled, today's A1c 6.5%.  A1c goal  < 7.0%  No insulin pump changes at this time.  Obtain fasting labs 1 week prior to next follow up - CMP, TSH, lipids, A1c.    Rx for glucagon emergency kit, urine ketone strips provided today.   RX for lantus and syringes provided in the event of insulin pump failure provided today.      Labs ordered today:   Orders Placed This Encounter   Procedures     FOOT EXAM     Hemoglobin A1c     Lipid panel reflex to direct LDL Fasting     Hemoglobin A1c     **Comprehensive metabolic panel FUTURE anytime     TSH with free T4 reflex       Radiology/Consults ordered today: C FOOT EXAM  NO CHARGE    Orders Placed This Encounter   Medications     acetone urine (KETOSTIX) test strip     Sig: Use as directed to test urine ketone if blood sugars are >250 or during illness with fever, nausea, vomiting, or abdominal pain.     Dispense:  100 each     Refill:  prn     insulin glargine (LANTUS VIAL) 100 UNIT/ML vial     Sig: Inject 18 u sq every 24 hours in the event of insulin pump therapy.  DO NOT FILL RX until requested by patient.     Dispense:  10 mL     Refill:  1     insulin lispro (HUMALOG) 100 UNIT/ML vial     Si units subcutaneous daily via insulin pump     Dispense:  70 mL     Refill:  3     insulin syringe-needle U-100 (BD INSULIN SYRINGE ULTRAFINE) 31G X 5/16\" 0.3 ML miscellaneous     Sig: Use one syringe 4 daily or as directed in the event of insulin pump failure.  DO NOT DISPENSE until requested by patient     Dispense:  100 each     Refill:  3     glucagon (GLUCAGON EMERGENCY) 1 MG kit     Sig: Inject 1 mg into the muscle once for 1 dose Do not dispense until requested by patient.     Dispense:  1 each     Refill:  0       Follow-up:  3 months     Maryann Hammonds NP  Endocrinology  [facility]  CC:       More than 50% of face to face time spent with Ms. Avila on counseling / coordinating her care.  Total face to face time was 25 minutes.      All questions were " answered.  The patient indicates understanding of the above issues and agrees with the plan set forth.           Again, thank you for allowing me to participate in the care of your patient.        Sincerely,        HORTENSIA Rosales CNP

## 2019-06-20 ENCOUNTER — TELEPHONE (OUTPATIENT)
Dept: ENDOCRINOLOGY | Facility: CLINIC | Age: 24
End: 2019-06-20

## 2019-06-20 NOTE — TELEPHONE ENCOUNTER
Received a faxed Prescription form from [employer] for insulin pump and diabetic supplies.  Fax signed form back to [employer] at fax# [phone].   Phone number not supplied.  Form on Maryann Hammonds's desk.  Emely Mullins CMA on 6/20/2019 at 11:39 AM

## 2019-09-11 ENCOUNTER — OFFICE VISIT (OUTPATIENT)
Dept: ENDOCRINOLOGY | Facility: CLINIC | Age: 24
End: 2019-09-11
Payer: COMMERCIAL

## 2019-09-11 VITALS
SYSTOLIC BLOOD PRESSURE: 105 MMHG | WEIGHT: 168 LBS | HEART RATE: 93 BPM | BODY MASS INDEX: 26.31 KG/M2 | DIASTOLIC BLOOD PRESSURE: 69 MMHG | RESPIRATION RATE: 12 BRPM

## 2019-09-11 DIAGNOSIS — E10.9 TYPE 1 DIABETES MELLITUS WITHOUT COMPLICATION (H): Primary | ICD-10-CM

## 2019-09-11 LAB — HBA1C MFR BLD: 6.5 % (ref 0–5.6)

## 2019-09-11 PROCEDURE — 83036 HEMOGLOBIN GLYCOSYLATED A1C: CPT | Performed by: CLINICAL NURSE SPECIALIST

## 2019-09-11 PROCEDURE — 99214 OFFICE O/P EST MOD 30 MIN: CPT | Performed by: CLINICAL NURSE SPECIALIST

## 2019-09-11 PROCEDURE — 36415 COLL VENOUS BLD VENIPUNCTURE: CPT | Performed by: CLINICAL NURSE SPECIALIST

## 2019-09-11 NOTE — PROGRESS NOTES
Name: Fransisca Avila  F/u for Diabetes (Last seen 6/5/2019).  HPI:  Fransisca Avila is a 24 year old female who presents for the management of type 1 diabetes.     1. Type 1 DM:  Originally diagnosed at the age of 2.   Induced at 34 weeks on 11/5/2014, preeclampsia.  Baby girl, initially in NICU. Daughter born 11/2014, has also been diagnosed with type 1 diabetes.  Also has a son, born 1/2018    Having a hard time affording insulin and pump supplies  Doesn't like auto mode - feels pump suspends when it shouldn't and feels BG run higher while in auto mode, doesn't feel well when her BG is in the 150 range.  Eating lower carb - trying to lose weight    Current Regimen: Humalog  Insulin pump - [employer] 670-G.   Auto mode 0%   Reservoir change: every 2.6 days  Sensor wear - 84%  Time Rate (U/hr)   00:00 1.25   11:00 1.50   20:00 1.65     Carbohydrate Ratio -    Time Ratio   0000 6.0   11:30 6.0   14:00 6.0   18:00 6.0         Sensitivity  00:00 - 47  17:00 - 47   Active Insulin Time 3.00 hours   Basal  61% (31 units)   Bolus   39% (20 units)   Total Carbohydrates/day 108   Total Insulin/day  51 units   Average Blood Sugar  Sensor Average 160  123   BS Checks  4.1 times a day   Care Link Username-   Password-   Target range:  0:00    5:00     21:30      Complications:   Diabetes Complications  Description / Detail    Diabetic Retinopathy  No retinopathy, overdue for annual exam - hard time finding time to schedule due to parenting two young children - will try to schedule   CAD / PAD  No   Neuropathy  No   Nephropathy / Microalbuminuria  No   Gastroparesis  No   Hypoglycemia Unawareness  No     2.  Intermittent hypercalcemia.  Most recent calcium 8/2018 normalized.  Previous PTH low at 9, calcium was 9.5 at the time.  Continue to monitor.    PMH/PSH:  Past Medical History:   Diagnosis Date     Postpartum depression 2014    On medication for 6 months-      Type 1 diabetes (H) 1998    three years at  diagnosis     Varicella 1998     Past Surgical History:   Procedure Laterality Date     C ROOT CANAL THERAPY 2 CANALS       DENTAL SURGERY       Family Hx:  Family History   Problem Relation Age of Onset     Family History Negative Mother      Diabetes Father 35        Type 1      Thyroid disease: No         DM2: NO         Autoimmune: DM1, SLE, RA, Vitiligo Yes - Father has type 1 DM    Social Hx:  Social History     Socioeconomic History     Marital status:      Spouse name: Teja     Number of children: 1     Years of education: Not on file     Highest education level: Not on file   Occupational History     Employer: [employer]   Social Needs     Financial resource strain: Not on file     Food insecurity:     Worry: Not on file     Inability: Not on file     Transportation needs:     Medical: Not on file     Non-medical: Not on file   Tobacco Use     Smoking status: Never Smoker     Smokeless tobacco: Never Used   Substance and Sexual Activity     Alcohol use: No     Alcohol/week: 0.0 oz     Drug use: No     Sexual activity: Yes     Partners: Male     Birth control/protection: Condom   Lifestyle     Physical activity:     Days per week: Not on file     Minutes per session: Not on file     Stress: Not on file   Relationships     Social connections:     Talks on phone: Not on file     Gets together: Not on file     Attends Mosque service: Not on file     Active member of club or organization: Not on file     Attends meetings of clubs or organizations: Not on file     Relationship status: Not on file     Intimate partner violence:     Fear of current or ex partner: Not on file     Emotionally abused: Not on file     Physically abused: Not on file     Forced sexual activity: Not on file   Other Topics Concern      Service Not Asked     Blood Transfusions No     Caffeine Concern Not Asked     Occupational Exposure Not Asked     Hobby Hazards Not Asked     Sleep Concern Not Asked     Stress Concern Not  Asked     Weight Concern Not Asked     Special Diet Not Asked     Back Care Not Asked     Exercise Not Asked     Bike Helmet Not Asked     Seat Belt Not Asked     Self-Exams Not Asked     Parent/sibling w/ CABG, MI or angioplasty before 65F 55M? Not Asked   Social History Narrative    Fransisca lives with her spouse           MEDICATIONS:  has a current medication list which includes the following prescription(s): acetone urine, aspirin not prescribed, blood glucose, glucagon, insulin glargine, insulin lispro, insulin pump, insulin syringe-needle u-100, levonorgestrel, microlet lancets, prenatal multivit-min-fe-fa, and statin not prescribed.    ROS     ROS: 10 point ROS neg other than the symptoms noted above in the HPI.    Physical Exam   VS: /69 (BP Location: Right arm, Patient Position: Chair, Cuff Size: Adult Regular)   Pulse 93   Resp 12   Wt 76.2 kg (168 lb)   Breastfeeding? No   BMI 26.31 kg/m    GENERAL: NAD, well dressed, answering questions appropriately, appears stated age.  HEENT: no exophthalmos, no proptosis, no lig lag, no retraction, no scleral icterus  RESPIRATORY: Clear.  Normal respiratory effort.  CARDIOVASCULAR: RRR.  EXTREMITIES: no edema  NEUROLOGY: CN grossly intact, no tremors  MSK: Normal gait and station.  PSYCH: Intact judgment and insight. A&OX3 with a cordial affect.    LABS:  A1c  Component      Latest Ref Rng & Units 6/5/2019 9/11/2019   Hemoglobin A1C      0 - 5.6 % 6.5 (H) 6.5 (H)     BMP:  !COMPREHENSIVE Latest Ref Rng & Units 8/23/2018   SODIUM 133 - 144 mmol/L 141   POTASSIUM 3.4 - 5.3 mmol/L 4.3   CHLORIDE 94 - 109 mmol/L 107   BUN 7 - 30 mg/dL 13   Creatinine 0.52 - 1.04 mg/dL 0.79   Glucose 70 - 99 mg/dL 123 (H)   ANION GAP 3 - 14 mmol/L 7   CALCIUM 8.5 - 10.1 mg/dL 9.5   ALBUMIN 3.4 - 5.0 g/dL 3.8     !LIPID/HEPATIC Latest Ref Rng & Units 3/6/2018 8/23/2018   CHOLESTEROL <200 mg/dL  190   TRIGLYCERIDES <150 mg/dL  74   HDL CHOLESTEROL >49 mg/dL  51   LDL CHOLESTEROL,  CALCULATED <100 mg/dL  124 (H)   NON HDL CHOLESTEROL <130 mg/dL  139 (H)   AST 0 - 45 U/L 23    ALT 0 - 50 U/L 41      ALT 0 - 50 U/L 41      ENDO Calcium Labs:  ENDO CALCIUM LABS-UMP Latest Ref Rng & Units 8/23/2018   CALCIUM 8.5 - 10.1 mg/dL 9.5   PHOSPHOROUS 2.5 - 4.5 mg/dL 3.2   MAGNESIUM 1.6 - 2.3 mg/dL    ALBUMIN 3.4 - 5.0 g/dL 3.8   BUN 7 - 30 mg/dL 13   CREATININE 0.52 - 1.04 mg/dL 0.79   PARATHYROID HORMONE INTACT 18 - 80 pg/mL 9 (L)   ALKPHOS 40 - 150 U/L    VITAMIN D DEFICIENCY SCREENING 20 - 75 ug/L 31     ENDO CALCIUM LABS-UMP Latest Ref Rng & Units 5/17/2018 3/6/2018   CALCIUM 8.5 - 10.1 mg/dL 10.2 (H) 10.7 (H)   PHOSPHOROUS 2.5 - 4.5 mg/dL 3.9    MAGNESIUM 1.6 - 2.3 mg/dL  2.1   ALBUMIN 3.4 - 5.0 g/dL 4.1 4.0   BUN 7 - 30 mg/dL 15 7   CREATININE 0.52 - 1.04 mg/dL 0.75 0.76   PARATHYROID HORMONE INTACT 18 - 80 pg/mL     ALKPHOS 40 - 150 U/L  111   VITAMIN D DEFICIENCY SCREENING 20 - 75 ug/L       Urine microalbumin:  Component      Latest Ref Rng & Units 11/30/2018   Creatinine Urine      mg/dL 101   Albumin Urine mg/L      mg/L <5   Albumin Urine mg/g Cr      0 - 25 mg/g Cr Unable to calculate due to low value     JOSH/C-peptide:  Component    Latest Ref Rng 11/10/2014   Glutamic Acid Decarboxylase Antibody     <5.0    Reference range: 0.0 to 5.0      C-Peptide    0.9 - 6.9 ng/mL <0.1 (L)     TFT:  !THYROID Latest Ref Rng & Units 8/23/2018   TSH 0.40 - 4.00 mU/L 1.10   T4 FREE 0.76 - 1.46 ng/dL 1.06     TTG Abs:  Component    Latest Ref Rng 11/13/2014   Tissue Transglutaminase Antibody IgA    0 - 3.9 U/mL 1.1   Tissue Transglutaminase Rosina IgG    0 - 5.9 U/mL 1.0     All pertinent notes, labs, and images personally reviewed by me.     A/P  Ms.Fransisca Avila is a 24 year old here for the management of diabetes:    1. DM1 - Controlled, today's A1c 6.5%.  A1c goal  < 7.0%  No insulin pump changes at this time.  Obtain fasting labs 1 week prior to next follow up - CMP, TSH, lipids, A1c.    Rx for glucagon  emergency provided today.   RX for urine ketone strips, lantus and syringes provided in the event of insulin pump failure provided 6/2019.    Labs ordered today:   Orders Placed This Encounter   Procedures     Hemoglobin A1c       Radiology/Consults ordered today: None    No orders of the defined types were placed in this encounter.      Follow-up:  3 months - reminded to schedule fasting labs    Maryann Hammonds NP  Endocrinology  [facility]  CC:       More than 50% of face to face time spent with Ms. Avila on counseling / coordinating her care.  Total face to face time was 20 minutes.      All questions were answered.  The patient indicates understanding of the above issues and agrees with the plan set forth.

## 2019-09-11 NOTE — PATIENT INSTRUCTIONS
Component      Latest Ref Rng & Units 6/5/2019 9/11/2019   Hemoglobin A1C      0 - 5.6 % 6.5 (H) 6.5 (H)

## 2019-09-11 NOTE — TELEPHONE ENCOUNTER
Prescription approved per [facility] Refill Protocol.    Contour test strips, protocol passed    Lea Esteban RN   [facility]

## 2019-09-11 NOTE — LETTER
9/11/2019         RE: Fransisca Cabello  [address] [facility] [address]        Dear Colleague,    Thank you for referring your patient, Fransisca Cabello, to the [facility]. Please see a copy of my visit note below.    Name: Fransisca Avila  F/u for Diabetes (Last seen 6/5/2019).  HPI:  Fransisca Avila is a 24 year old female who presents for the management of type 1 diabetes.     1. Type 1 DM:  Originally diagnosed at the age of 2.   Induced at 34 weeks on 11/5/2014, preeclampsia.  Baby girl, initially in NICU. Daughter born 11/2014, has also been diagnosed with type 1 diabetes.  Also has a son, born 1/2018    Having a hard time affording insulin and pump supplies  Doesn't like auto mode - feels pump suspends when it shouldn't and feels BG run higher while in auto mode, doesn't feel well when her BG is in the 150 range.  Eating lower carb - trying to lose weight    Current Regimen: Humalog  Insulin pump - [employer]tr[employer] 670-G.   Auto mode 0%   Reservoir change: every 2.6 days  Sensor wear - 84%  Time Rate (U/hr)   00:00 1.25   11:00 1.50   20:00 1.65     Carbohydrate Ratio -    Time Ratio   0000 6.0   11:30 6.0   14:00 6.0   18:00 6.0         Sensitivity  00:00 - 47  17:00 - 47   Active Insulin Time 3.00 hours   Basal  61% (31 units)   Bolus   39% (20 units)   Total Carbohydrates/day 108   Total Insulin/day  51 units   Average Blood Sugar  Sensor Average 160  123   BS Checks  4.1 times a day   Care Link Username-   Password-   Target range:  0:00    5:00     21:30      Complications:   Diabetes Complications  Description / Detail    Diabetic Retinopathy  No retinopathy, overdue for annual exam - hard time finding time to schedule due to parenting two young children - will try to schedule   CAD / PAD  No   Neuropathy  No   Nephropathy / Microalbuminuria  No   Gastroparesis  No   Hypoglycemia Unawareness  No     2.  Intermittent hypercalcemia.  Most recent calcium  8/2018 normalized.  Previous PTH low at 9, calcium was 9.5 at the time.  Continue to monitor.    PMH/PSH:  Past Medical History:   Diagnosis Date     Postpartum depression 2014    On medication for 6 months-      Type 1 diabetes (H) 1998    three years at diagnosis     Varicella 1998     Past Surgical History:   Procedure Laterality Date     C ROOT CANAL THERAPY 2 CANALS       DENTAL SURGERY       Family Hx:  Family History   Problem Relation Age of Onset     Family History Negative Mother      Diabetes Father 35        Type 1      Thyroid disease: No         DM2: NO         Autoimmune: DM1, SLE, RA, Vitiligo Yes - Father has type 1 DM    Social Hx:  Social History     Socioeconomic History     Marital status:      Spouse name: Teja     Number of children: 1     Years of education: Not on file     Highest education level: Not on file   Occupational History     Employer: [employer]   Social Needs     Financial resource strain: Not on file     Food insecurity:     Worry: Not on file     Inability: Not on file     Transportation needs:     Medical: Not on file     Non-medical: Not on file   Tobacco Use     Smoking status: Never Smoker     Smokeless tobacco: Never Used   Substance and Sexual Activity     Alcohol use: No     Alcohol/week: 0.0 oz     Drug use: No     Sexual activity: Yes     Partners: Male     Birth control/protection: Condom   Lifestyle     Physical activity:     Days per week: Not on file     Minutes per session: Not on file     Stress: Not on file   Relationships     Social connections:     Talks on phone: Not on file     Gets together: Not on file     Attends Roman Catholic service: Not on file     Active member of club or organization: Not on file     Attends meetings of clubs or organizations: Not on file     Relationship status: Not on file     Intimate partner violence:     Fear of current or ex partner: Not on file     Emotionally abused: Not on file     Physically abused: Not on file     Forced  sexual activity: Not on file   Other Topics Concern      Service Not Asked     Blood Transfusions No     Caffeine Concern Not Asked     Occupational Exposure Not Asked     Hobby Hazards Not Asked     Sleep Concern Not Asked     Stress Concern Not Asked     Weight Concern Not Asked     Special Diet Not Asked     Back Care Not Asked     Exercise Not Asked     Bike Helmet Not Asked     Seat Belt Not Asked     Self-Exams Not Asked     Parent/sibling w/ CABG, MI or angioplasty before 65F 55M? Not Asked   Social History Narrative    Fransisca lives with her spouse           MEDICATIONS:  has a current medication list which includes the following prescription(s): acetone urine, aspirin not prescribed, blood glucose, glucagon, insulin glargine, insulin lispro, insulin pump, insulin syringe-needle u-100, levonorgestrel, microlet lancets, prenatal multivit-min-fe-fa, and statin not prescribed.    ROS     ROS: 10 point ROS neg other than the symptoms noted above in the HPI.    Physical Exam   VS: /69 (BP Location: Right arm, Patient Position: Chair, Cuff Size: Adult Regular)   Pulse 93   Resp 12   Wt 76.2 kg (168 lb)   Breastfeeding? No   BMI 26.31 kg/m     GENERAL: NAD, well dressed, answering questions appropriately, appears stated age.  HEENT: no exophthalmos, no proptosis, no lig lag, no retraction, no scleral icterus  RESPIRATORY: Clear.  Normal respiratory effort.  CARDIOVASCULAR: RRR.  EXTREMITIES: no edema  NEUROLOGY: CN grossly intact, no tremors  MSK: Normal gait and station.  PSYCH: Intact judgment and insight. A&OX3 with a cordial affect.    LABS:  A1c  Component      Latest Ref Rng & Units 6/5/2019 9/11/2019   Hemoglobin A1C      0 - 5.6 % 6.5 (H) 6.5 (H)     BMP:  !COMPREHENSIVE Latest Ref Rng & Units 8/23/2018   SODIUM 133 - 144 mmol/L 141   POTASSIUM 3.4 - 5.3 mmol/L 4.3   CHLORIDE 94 - 109 mmol/L 107   BUN 7 - 30 mg/dL 13   Creatinine 0.52 - 1.04 mg/dL 0.79   Glucose 70 - 99 mg/dL 123 (H)    ANION GAP 3 - 14 mmol/L 7   CALCIUM 8.5 - 10.1 mg/dL 9.5   ALBUMIN 3.4 - 5.0 g/dL 3.8     !LIPID/HEPATIC Latest Ref Rng & Units 3/6/2018 8/23/2018   CHOLESTEROL <200 mg/dL  190   TRIGLYCERIDES <150 mg/dL  74   HDL CHOLESTEROL >49 mg/dL  51   LDL CHOLESTEROL, CALCULATED <100 mg/dL  124 (H)   NON HDL CHOLESTEROL <130 mg/dL  139 (H)   AST 0 - 45 U/L 23    ALT 0 - 50 U/L 41      ALT 0 - 50 U/L 41      ENDO Calcium Labs:  ENDO CALCIUM LABS-UMP Latest Ref Rng & Units 8/23/2018   CALCIUM 8.5 - 10.1 mg/dL 9.5   PHOSPHOROUS 2.5 - 4.5 mg/dL 3.2   MAGNESIUM 1.6 - 2.3 mg/dL    ALBUMIN 3.4 - 5.0 g/dL 3.8   BUN 7 - 30 mg/dL 13   CREATININE 0.52 - 1.04 mg/dL 0.79   PARATHYROID HORMONE INTACT 18 - 80 pg/mL 9 (L)   ALKPHOS 40 - 150 U/L    VITAMIN D DEFICIENCY SCREENING 20 - 75 ug/L 31     ENDO CALCIUM LABS-UMP Latest Ref Rng & Units 5/17/2018 3/6/2018   CALCIUM 8.5 - 10.1 mg/dL 10.2 (H) 10.7 (H)   PHOSPHOROUS 2.5 - 4.5 mg/dL 3.9    MAGNESIUM 1.6 - 2.3 mg/dL  2.1   ALBUMIN 3.4 - 5.0 g/dL 4.1 4.0   BUN 7 - 30 mg/dL 15 7   CREATININE 0.52 - 1.04 mg/dL 0.75 0.76   PARATHYROID HORMONE INTACT 18 - 80 pg/mL     ALKPHOS 40 - 150 U/L  111   VITAMIN D DEFICIENCY SCREENING 20 - 75 ug/L       Urine microalbumin:  Component      Latest Ref Rng & Units 11/30/2018   Creatinine Urine      mg/dL 101   Albumin Urine mg/L      mg/L <5   Albumin Urine mg/g Cr      0 - 25 mg/g Cr Unable to calculate due to low value     JOSH/C-peptide:  Component    Latest Ref Rng 11/10/2014   Glutamic Acid Decarboxylase Antibody     <5.0    Reference range: 0.0 to 5.0      C-Peptide    0.9 - 6.9 ng/mL <0.1 (L)     TFT:  !THYROID Latest Ref Rng & Units 8/23/2018   TSH 0.40 - 4.00 mU/L 1.10   T4 FREE 0.76 - 1.46 ng/dL 1.06     TTG Abs:  Component    Latest Ref Rng 11/13/2014   Tissue Transglutaminase Antibody IgA    0 - 3.9 U/mL 1.1   Tissue Transglutaminase Rosina IgG    0 - 5.9 U/mL 1.0     All pertinent notes, labs, and images personally reviewed by me.      A/P  Ms.Jennica Avila is a 24 year old here for the management of diabetes:    1. DM1 - Controlled, today's A1c 6.5%.  A1c goal  < 7.0%  No insulin pump changes at this time.  Obtain fasting labs 1 week prior to next follow up - CMP, TSH, lipids, A1c.    Rx for glucagon emergency provided today.   RX for urine ketone strips, lantus and syringes provided in the event of insulin pump failure provided 6/2019.    Labs ordered today:   Orders Placed This Encounter   Procedures     Hemoglobin A1c       Radiology/Consults ordered today: None    No orders of the defined types were placed in this encounter.      Follow-up:  3 months - reminded to schedule fasting labs    Maryann Hammonds NP  Endocrinology  [facility]  CC:       More than 50% of face to face time spent with Ms. Avila on counseling / coordinating her care.  Total face to face time was 20 minutes.      All questions were answered.  The patient indicates understanding of the above issues and agrees with the plan set forth.           Again, thank you for allowing me to participate in the care of your patient.        Sincerely,        HORTENSIA Rosales CNP

## 2019-09-12 NOTE — RESULT ENCOUNTER NOTE
Fransisca,  Here's a copy of your A1c result for your records.  Maryann Hammonds NP  Endocrinology

## 2019-12-11 ENCOUNTER — OFFICE VISIT (OUTPATIENT)
Dept: ENDOCRINOLOGY | Facility: CLINIC | Age: 24
End: 2019-12-11
Payer: COMMERCIAL

## 2019-12-11 ENCOUNTER — NURSE TRIAGE (OUTPATIENT)
Dept: NURSING | Facility: CLINIC | Age: 24
End: 2019-12-11

## 2019-12-11 VITALS
BODY MASS INDEX: 26.84 KG/M2 | HEART RATE: 104 BPM | SYSTOLIC BLOOD PRESSURE: 111 MMHG | WEIGHT: 171 LBS | DIASTOLIC BLOOD PRESSURE: 60 MMHG | HEIGHT: 67 IN

## 2019-12-11 DIAGNOSIS — Z96.41 INSULIN PUMP IN PLACE: ICD-10-CM

## 2019-12-11 DIAGNOSIS — E10.9 TYPE 1 DIABETES MELLITUS WITHOUT COMPLICATION (H): Primary | ICD-10-CM

## 2019-12-11 DIAGNOSIS — Z23 NEED FOR PROPHYLACTIC VACCINATION AND INOCULATION AGAINST INFLUENZA: ICD-10-CM

## 2019-12-11 LAB
ALBUMIN SERPL-MCNC: 3.8 G/DL (ref 3.4–5)
ALP SERPL-CCNC: 82 U/L (ref 40–150)
ALT SERPL W P-5'-P-CCNC: 23 U/L (ref 0–50)
ANION GAP SERPL CALCULATED.3IONS-SCNC: 6 MMOL/L (ref 3–14)
AST SERPL W P-5'-P-CCNC: 12 U/L (ref 0–45)
BILIRUB SERPL-MCNC: 0.2 MG/DL (ref 0.2–1.3)
BUN SERPL-MCNC: 12 MG/DL (ref 7–30)
CALCIUM SERPL-MCNC: 8.9 MG/DL (ref 8.5–10.1)
CHLORIDE SERPL-SCNC: 109 MMOL/L (ref 94–109)
CHOLEST SERPL-MCNC: 175 MG/DL
CO2 SERPL-SCNC: 26 MMOL/L (ref 20–32)
CREAT SERPL-MCNC: 0.78 MG/DL (ref 0.52–1.04)
GFR SERPL CREATININE-BSD FRML MDRD: >90 ML/MIN/{1.73_M2}
GLUCOSE SERPL-MCNC: 32 MG/DL (ref 70–99)
HBA1C MFR BLD: 7 % (ref 0–5.6)
HDLC SERPL-MCNC: 46 MG/DL
LDLC SERPL CALC-MCNC: 112 MG/DL
NONHDLC SERPL-MCNC: 129 MG/DL
POTASSIUM SERPL-SCNC: 3.8 MMOL/L (ref 3.4–5.3)
PROT SERPL-MCNC: 7.4 G/DL (ref 6.8–8.8)
SODIUM SERPL-SCNC: 141 MMOL/L (ref 133–144)
TRIGL SERPL-MCNC: 87 MG/DL
TSH SERPL DL<=0.005 MIU/L-ACNC: 1.3 MU/L (ref 0.4–4)

## 2019-12-11 PROCEDURE — 99214 OFFICE O/P EST MOD 30 MIN: CPT | Mod: 25 | Performed by: CLINICAL NURSE SPECIALIST

## 2019-12-11 PROCEDURE — 80053 COMPREHEN METABOLIC PANEL: CPT | Performed by: CLINICAL NURSE SPECIALIST

## 2019-12-11 PROCEDURE — 90471 IMMUNIZATION ADMIN: CPT | Performed by: CLINICAL NURSE SPECIALIST

## 2019-12-11 PROCEDURE — 82043 UR ALBUMIN QUANTITATIVE: CPT | Performed by: CLINICAL NURSE SPECIALIST

## 2019-12-11 PROCEDURE — 80061 LIPID PANEL: CPT | Performed by: CLINICAL NURSE SPECIALIST

## 2019-12-11 PROCEDURE — 36415 COLL VENOUS BLD VENIPUNCTURE: CPT | Performed by: CLINICAL NURSE SPECIALIST

## 2019-12-11 PROCEDURE — 83036 HEMOGLOBIN GLYCOSYLATED A1C: CPT | Performed by: CLINICAL NURSE SPECIALIST

## 2019-12-11 PROCEDURE — 84443 ASSAY THYROID STIM HORMONE: CPT | Performed by: CLINICAL NURSE SPECIALIST

## 2019-12-11 PROCEDURE — 90686 IIV4 VACC NO PRSV 0.5 ML IM: CPT | Performed by: CLINICAL NURSE SPECIALIST

## 2019-12-11 ASSESSMENT — MIFFLIN-ST. JEOR: SCORE: 1545.34

## 2019-12-11 NOTE — PROGRESS NOTES
Name: Fransisca Avila  F/u for Diabetes (Last seen 9/11/2019).  HPI:  Fransisca Avila is a 24 year old female who presents for the management of type 1 diabetes.     1. Type 1 DM:  Originally diagnosed at the age of 2.   Daughter born 11/2014, has also been diagnosed with type 1 diabetes.  Also has a son, born 1/2018    Doesn't like auto mode - feels pump suspends when it shouldn't and feels BG run higher while in auto mode, doesn't feel well when her BG is in the 150 range.    Current Regimen: Humalog  Insulin pump - [employer]tronic 670-G.   Auto mode 0% - doesn't like auto mode  Reservoir change: every 4.3 days  Sensor wear - 47%  Time Rate (U/hr)   00:00 1.15   09:30 1.25   12:30 1.55   19:30 1.60     Carbohydrate Ratio -    Time Ratio   0000 7.5   11:30 7.0   14:00 6.5   18:00 8.0         Sensitivity  00:00 - 45  17:00 - 47   Active Insulin Time 3.00 hours   Basal  60% (32 units)   Bolus   40% (21 units)   Total Carbohydrates/day 132   Total Insulin/day  53 units   Average Blood Sugar  Sensor Average 193  149   BS Checks  6 times a day   Care Link Username-   Password-   Target range:  0:00    5:00     21:30      Complications:   Diabetes Complications  Description / Detail    Diabetic Retinopathy  No retinopathy, overdue for annual exam - hard time finding time to schedule due to parenting two young children - will try to schedule   CAD / PAD  No   Neuropathy  No   Nephropathy / Microalbuminuria  No   Gastroparesis  No   Hypoglycemia Unawareness  No     2.  Intermittent hypercalcemia.  Most recent calcium normalized.  Previous PTH low at 9, calcium was 9.5 at the time.  Continue to monitor.    PMH/PSH:  Past Medical History:   Diagnosis Date     Postpartum depression 2014    On medication for 6 months-      Type 1 diabetes (H) 1998    three years at diagnosis     Varicella 1998     Past Surgical History:   Procedure Laterality Date     C ROOT CANAL THERAPY 2 CANALS       DENTAL SURGERY       Family  Hx:  Family History   Problem Relation Age of Onset     Family History Negative Mother      Diabetes Father 35        Type 1      Thyroid disease: No         DM2: NO         Autoimmune: DM1, SLE, RA, Vitiligo Yes - Father has type 1 DM    Social Hx:  Social History     Socioeconomic History     Marital status:      Spouse name: Teja     Number of children: 1     Years of education: Not on file     Highest education level: Not on file   Occupational History     Employer: [employer]   Social Needs     Financial resource strain: Not on file     Food insecurity:     Worry: Not on file     Inability: Not on file     Transportation needs:     Medical: Not on file     Non-medical: Not on file   Tobacco Use     Smoking status: Never Smoker     Smokeless tobacco: Never Used   Substance and Sexual Activity     Alcohol use: No     Alcohol/week: 0.0 standard drinks     Drug use: No     Sexual activity: Yes     Partners: Male     Birth control/protection: Condom   Lifestyle     Physical activity:     Days per week: Not on file     Minutes per session: Not on file     Stress: Not on file   Relationships     Social connections:     Talks on phone: Not on file     Gets together: Not on file     Attends Sikhism service: Not on file     Active member of club or organization: Not on file     Attends meetings of clubs or organizations: Not on file     Relationship status: Not on file     Intimate partner violence:     Fear of current or ex partner: Not on file     Emotionally abused: Not on file     Physically abused: Not on file     Forced sexual activity: Not on file   Other Topics Concern      Service Not Asked     Blood Transfusions No     Caffeine Concern Not Asked     Occupational Exposure Not Asked     Hobby Hazards Not Asked     Sleep Concern Not Asked     Stress Concern Not Asked     Weight Concern Not Asked     Special Diet Not Asked     Back Care Not Asked     Exercise Not Asked     Bike Helmet Not Asked      "Seat Belt Not Asked     Self-Exams Not Asked     Parent/sibling w/ CABG, MI or angioplasty before 65F 55M? Not Asked   Social History Narrative    Fransisca lives with her spouse           MEDICATIONS:  has a current medication list which includes the following prescription(s): blood glucose, acetone urine, aspirin not prescribed, glucagon, insulin glargine, insulin lispro, insulin pump, insulin syringe-needle u-100, levonorgestrel, microlet lancets, prenatal multivit-min-fe-fa, and statin not prescribed.    ROS     ROS: 10 point ROS neg other than the symptoms noted above in the HPI.    Physical Exam   VS: /60 (BP Location: Right arm, Patient Position: Chair, Cuff Size: Adult Regular)   Pulse 104   Ht 1.689 m (5' 6.5\")   Wt 77.6 kg (171 lb)   LMP 12/11/2019   Breastfeeding No   BMI 27.19 kg/m    GENERAL: NAD, well dressed, answering questions appropriately, appears stated age.  HEENT: no exophthalmos, no proptosis, no lig lag, no retraction, no scleral icterus  RESPIRATORY: Clear.  Normal respiratory effort.  CARDIOVASCULAR: RRR.  EXTREMITIES: no edema  NEUROLOGY: CN grossly intact, no tremors  MSK: Normal gait and station.  PSYCH: Intact judgment and insight. A&OX3 with a cordial affect.    LABS:  A1c  Component      Latest Ref Rng & Units 9/11/2019 12/11/2019   Hemoglobin A1C      0 - 5.6 % 6.5 (H) 7.0 (H)     BMP:  !COMPREHENSIVE Latest Ref Rng & Units 8/23/2018   SODIUM 133 - 144 mmol/L 141   POTASSIUM 3.4 - 5.3 mmol/L 4.3   CHLORIDE 94 - 109 mmol/L 107   BUN 7 - 30 mg/dL 13   Creatinine 0.52 - 1.04 mg/dL 0.79   Glucose 70 - 99 mg/dL 123 (H)   ANION GAP 3 - 14 mmol/L 7   CALCIUM 8.5 - 10.1 mg/dL 9.5   ALBUMIN 3.4 - 5.0 g/dL 3.8     !LIPID/HEPATIC Latest Ref Rng & Units 3/6/2018 8/23/2018   CHOLESTEROL <200 mg/dL  190   TRIGLYCERIDES <150 mg/dL  74   HDL CHOLESTEROL >49 mg/dL  51   LDL CHOLESTEROL, CALCULATED <100 mg/dL  124 (H)   NON HDL CHOLESTEROL <130 mg/dL  139 (H)   AST 0 - 45 U/L 23    ALT 0 - " 50 U/L 41      ALT 0 - 50 U/L 41      ENDO Calcium Labs:  ENDO CALCIUM LABS-UMP Latest Ref Rng & Units 8/23/2018   CALCIUM 8.5 - 10.1 mg/dL 9.5   PHOSPHOROUS 2.5 - 4.5 mg/dL 3.2   MAGNESIUM 1.6 - 2.3 mg/dL    ALBUMIN 3.4 - 5.0 g/dL 3.8   BUN 7 - 30 mg/dL 13   CREATININE 0.52 - 1.04 mg/dL 0.79   PARATHYROID HORMONE INTACT 18 - 80 pg/mL 9 (L)   ALKPHOS 40 - 150 U/L    VITAMIN D DEFICIENCY SCREENING 20 - 75 ug/L 31     ENDO CALCIUM LABS-UMP Latest Ref Rng & Units 5/17/2018 3/6/2018   CALCIUM 8.5 - 10.1 mg/dL 10.2 (H) 10.7 (H)   PHOSPHOROUS 2.5 - 4.5 mg/dL 3.9    MAGNESIUM 1.6 - 2.3 mg/dL  2.1   ALBUMIN 3.4 - 5.0 g/dL 4.1 4.0   BUN 7 - 30 mg/dL 15 7   CREATININE 0.52 - 1.04 mg/dL 0.75 0.76   PARATHYROID HORMONE INTACT 18 - 80 pg/mL     ALKPHOS 40 - 150 U/L  111   VITAMIN D DEFICIENCY SCREENING 20 - 75 ug/L       Urine microalbumin:  Component      Latest Ref Rng & Units 11/30/2018   Creatinine Urine      mg/dL 101   Albumin Urine mg/L      mg/L <5   Albumin Urine mg/g Cr      0 - 25 mg/g Cr Unable to calculate due to low value     JOSH/C-peptide:  Component    Latest Ref Rng 11/10/2014   Glutamic Acid Decarboxylase Antibody     <5.0    Reference range: 0.0 to 5.0      C-Peptide    0.9 - 6.9 ng/mL <0.1 (L)     TFT:  !THYROID Latest Ref Rng & Units 8/23/2018   TSH 0.40 - 4.00 mU/L 1.10   T4 FREE 0.76 - 1.46 ng/dL 1.06     TTG Abs:  Component    Latest Ref Rng 11/13/2014   Tissue Transglutaminase Antibody IgA    0 - 3.9 U/mL 1.1   Tissue Transglutaminase Rosina IgG    0 - 5.9 U/mL 1.0     All pertinent notes, labs, and images personally reviewed by me.     A/P  Ms.Jennica Avila is a 24 year old here for the management of diabetes:    1. DM1 - Controlled, today's A1c increased slightly to 7.0%.  A1c goal  < 7.0%  Pump frequently suspends in the morning, BG spikes while pump is suspended.  This could be contributing to the increased A1c.  Many morning glucose drops follow a correction.  Change morning sensitivity from  45-->50.  Sensitivity  00:00 - 45-->50  12:00 - 45  17:00 - 47     Rx for glucagon emergency provided 9/2019.   RX for urine ketone strips, lantus and syringes provided in the event of insulin pump failure provided 6/2019.  Flu shot today.    Labs ordered today:   Orders Placed This Encounter   Procedures     INFLUENZA VACCINE IM > 6 MONTHS VALENT IIV4 [93704]     Vaccine Administration, Initial [11912]     Comprehensive metabolic panel     TSH with free T4 reflex     Lipid panel reflex to direct LDL Fasting     Albumin Random Urine Quantitative with Creat Ratio     Hemoglobin A1c       Radiology/Consults ordered today: HC FLU VAC PRESRV FREE QUAD SPLIT VIR 3+YRS IM  HC VACCINE ADMINISTRATION, INITIAL    Orders Placed This Encounter   Medications     blood glucose (RAYMUNDO CONTOUR NEXT) test strip     Sig: Use to test blood sugar 6 times daily or as directed.     Dispense:  550 each     Refill:  3       Follow-up:  3 months     Maryann Hammonds NP  Endocrinology  [facility]  CC:        More than 50% of the time spent with Ms. Cabello on counseling / coordinating her care discussing the above plan of care.The patient indicates understanding of the above issues and agrees with the plan set forth.  Total face to face time was 25 minutes.

## 2019-12-11 NOTE — LETTER
12/11/2019         RE: Fransisca Cabello  [address]  [facility] [address]        Dear Colleague,    Thank you for referring your patient, Fransisca Cabello, to the [facility]. Please see a copy of my visit note below.    Name: Fransisca Avila  F/u for Diabetes (Last seen 9/11/2019).  HPI:  Fransisca Avila is a 24 year old female who presents for the management of type 1 diabetes.     1. Type 1 DM:  Originally diagnosed at the age of 2.   Daughter born 11/2014, has also been diagnosed with type 1 diabetes.  Also has a son, born 1/2018    Doesn't like auto mode - feels pump suspends when it shouldn't and feels BG run higher while in auto mode, doesn't feel well when her BG is in the 150 range.    Current Regimen: Humalog  Insulin pump - Medtronic 670-G.   Auto mode 0% - doesn't like auto mode  [place] change: every 4.3 days  Sensor wear - 47%  Time Rate (U/hr)   00:00 1.15   09:30 1.25   12:30 1.55   19:30 1.60     Carbohydrate Ratio -    Time Ratio   0000 7.5   11:30 7.0   14:00 6.5   18:00 8.0         Sensitivity  00:00 - 45  17:00 - 47   Active Insulin Time 3.00 hours   Basal  60% (32 units)   Bolus   40% (21 units)   Total Carbohydrates/day 132   Total Insulin/day  53 units   Average Blood Sugar  Sensor Average 193  149   BS Checks  6 times a day   Care Link Username-   Password-   Target range:  0:00    5:00     21:30      Complications:   Diabetes Complications  Description / Detail    Diabetic Retinopathy  No retinopathy, overdue for annual exam - hard time finding time to schedule due to parenting two young children - will try to schedule   CAD / PAD  No   Neuropathy  No   Nephropathy / Microalbuminuria  No   Gastroparesis  No   Hypoglycemia Unawareness  No     2.  Intermittent hypercalcemia.  Most recent calcium normalized.  Previous PTH low at 9, calcium was 9.5 at the time.  Continue to monitor.    PMH/PSH:  Past Medical History:   Diagnosis Date      Postpartum depression 2014    On medication for 6 months-      Type 1 diabetes (H) 1998    three years at diagnosis     Varicella 1998     Past Surgical History:   Procedure Laterality Date     C ROOT CANAL THERAPY 2 CANALS       DENTAL SURGERY       Family Hx:  Family History   Problem Relation Age of Onset     Family History Negative Mother      Diabetes Father 35        Type 1      Thyroid disease: No         DM2: NO         Autoimmune: DM1, SLE, RA, Vitiligo Yes - Father has type 1 DM    Social Hx:  Social History     Socioeconomic History     Marital status:      Spouse name: Teja     Number of children: 1     Years of education: Not on file     Highest education level: Not on file   Occupational History     Employer: [employer]   Social Needs     Financial resource strain: Not on file     Food insecurity:     Worry: Not on file     Inability: Not on file     Transportation needs:     Medical: Not on file     Non-medical: Not on file   Tobacco Use     Smoking status: Never Smoker     Smokeless tobacco: Never Used   Substance and Sexual Activity     Alcohol use: No     Alcohol/week: 0.0 standard drinks     Drug use: No     Sexual activity: Yes     Partners: Male     Birth control/protection: Condom   Lifestyle     Physical activity:     Days per week: Not on file     Minutes per session: Not on file     Stress: Not on file   Relationships     Social connections:     Talks on phone: Not on file     Gets together: Not on file     Attends [employer] service: Not on file     Active member of club or organization: Not on file     Attends meetings of clubs or organizations: Not on file     Relationship status: Not on file     Intimate partner violence:     Fear of current or ex partner: Not on file     Emotionally abused: Not on file     Physically abused: Not on file     Forced sexual activity: Not on file   Other Topics Concern      Service Not Asked     Blood Transfusions No     Caffeine Concern Not Asked  "    Occupational Exposure Not Asked     Hobby Hazards Not Asked     Sleep Concern Not Asked     Stress Concern Not Asked     Weight Concern Not Asked     Special Diet Not Asked     Back Care Not Asked     Exercise Not Asked     Bike Helmet Not Asked     Seat Belt Not Asked     Self-Exams Not Asked     Parent/sibling w/ CABG, MI or angioplasty before 65F 55M? Not Asked   Social History Narrative    Fransisca lives with her spouse           MEDICATIONS:  has a current medication list which includes the following prescription(s): blood glucose, acetone urine, aspirin not prescribed, glucagon, insulin glargine, insulin lispro, insulin pump, insulin syringe-needle u-100, levonorgestrel, microlet lancets, prenatal multivit-min-fe-fa, and statin not prescribed.    ROS     ROS: 10 point ROS neg other than the symptoms noted above in the HPI.    Physical Exam   VS: /60 (BP Location: Right arm, Patient Position: Chair, Cuff Size: Adult Regular)   Pulse 104   Ht 1.689 m (5' 6.5\")   Wt 77.6 kg (171 lb)   LMP 12/11/2019   Breastfeeding No   BMI 27.19 kg/m     GENERAL: NAD, well dressed, answering questions appropriately, appears stated age.  HEENT: no exophthalmos, no proptosis, no lig lag, no retraction, no scleral icterus  RESPIRATORY: Clear.  Normal respiratory effort.  CARDIOVASCULAR: RRR.  EXTREMITIES: no edema  NEUROLOGY: CN grossly intact, no tremors  MSK: Normal gait and station.  PSYCH: Intact judgment and insight. A&OX3 with a cordial affect.    LABS:  A1c  Component      Latest Ref Rng & Units 9/11/2019 12/11/2019   Hemoglobin A1C      0 - 5.6 % 6.5 (H) 7.0 (H)     BMP:  !COMPREHENSIVE Latest Ref Rng & Units 8/23/2018   SODIUM 133 - 144 mmol/L 141   POTASSIUM 3.4 - 5.3 mmol/L 4.3   CHLORIDE 94 - 109 mmol/L 107   BUN 7 - 30 mg/dL 13   Creatinine 0.52 - 1.04 mg/dL 0.79   Glucose 70 - 99 mg/dL 123 (H)   ANION GAP 3 - 14 mmol/L 7   CALCIUM 8.5 - 10.1 mg/dL 9.5   ALBUMIN 3.4 - 5.0 g/dL 3.8     !LIPID/HEPATIC " Latest Ref Rng & Units 3/6/2018 8/23/2018   CHOLESTEROL <200 mg/dL  190   TRIGLYCERIDES <150 mg/dL  74   HDL CHOLESTEROL >49 mg/dL  51   LDL CHOLESTEROL, CALCULATED <100 mg/dL  124 (H)   NON HDL CHOLESTEROL <130 mg/dL  139 (H)   AST 0 - 45 U/L 23    ALT 0 - 50 U/L 41      ALT 0 - 50 U/L 41      ENDO Calcium Labs:  ENDO CALCIUM LABS-UMP Latest Ref Rng & Units 8/23/2018   CALCIUM 8.5 - 10.1 mg/dL 9.5   PHOSPHOROUS 2.5 - 4.5 mg/dL 3.2   MAGNESIUM 1.6 - 2.3 mg/dL    ALBUMIN 3.4 - 5.0 g/dL 3.8   BUN 7 - 30 mg/dL 13   CREATININE 0.52 - 1.04 mg/dL 0.79   PARATHYROID HORMONE INTACT 18 - 80 pg/mL 9 (L)   ALKPHOS 40 - 150 U/L    VITAMIN D DEFICIENCY SCREENING 20 - 75 ug/L 31     ENDO CALCIUM LABS-UMP Latest Ref Rng & Units 5/17/2018 3/6/2018   CALCIUM 8.5 - 10.1 mg/dL 10.2 (H) 10.7 (H)   PHOSPHOROUS 2.5 - 4.5 mg/dL 3.9    MAGNESIUM 1.6 - 2.3 mg/dL  2.1   ALBUMIN 3.4 - 5.0 g/dL 4.1 4.0   BUN 7 - 30 mg/dL 15 7   CREATININE 0.52 - 1.04 mg/dL 0.75 0.76   PARATHYROID HORMONE INTACT 18 - 80 pg/mL     ALKPHOS 40 - 150 U/L  111   VITAMIN D DEFICIENCY SCREENING 20 - 75 ug/L       Urine microalbumin:  Component      Latest Ref Rng & Units 11/30/2018   Creatinine Urine      mg/dL 101   Albumin Urine mg/L      mg/L <5   Albumin Urine mg/g Cr      0 - 25 mg/g Cr Unable to calculate due to low value     JOSH/C-peptide:  Component    Latest Ref Rng 11/10/2014   Glutamic Acid Decarboxylase Antibody     <5.0    Reference range: 0.0 to 5.0      C-Peptide    0.9 - 6.9 ng/mL <0.1 (L)     TFT:  !THYROID Latest Ref Rng & Units 8/23/2018   TSH 0.40 - 4.00 mU/L 1.10   T4 FREE 0.76 - 1.46 ng/dL 1.06     TTG Abs:  Component    Latest Ref Rng 11/13/2014   Tissue Transglutaminase Antibody IgA    0 - 3.9 U/mL 1.1   Tissue Transglutaminase Rosina IgG    0 - 5.9 U/mL 1.0     All pertinent notes, labs, and images personally reviewed by me.     A/P  Ms.Jennica Avila is a 24 year old here for the management of diabetes:    1. DM1 - Controlled, today's A1c  increased slightly to 7.0%.  A1c goal  < 7.0%  Pump frequently suspends in the morning, BG spikes while pump is suspended.  This could be contributing to the increased A1c.  Many morning glucose drops follow a correction.  Change morning sensitivity from 45-->50.  Sensitivity  00:00 - 45-->50  12:00 - 45  17:00 - 47     Rx for glucagon emergency provided 9/2019.   RX for urine ketone strips, lantus and syringes provided in the event of insulin pump failure provided 6/2019.  Flu shot today.    Labs ordered today:   Orders Placed This Encounter   Procedures     INFLUENZA VACCINE IM > 6 MONTHS VALENT IIV4 [80050]     Vaccine Administration, Initial [44077]     Comprehensive metabolic panel     TSH with free T4 reflex     Lipid panel reflex to direct LDL Fasting     Albumin Random Urine Quantitative with Creat Ratio     Hemoglobin A1c       Radiology/Consults ordered today: HC FLU VAC PRESRV FREE QUAD SPLIT VIR 3+YRS IM  HC VACCINE ADMINISTRATION, INITIAL    Orders Placed This Encounter   Medications     blood glucose (RAYMUNDO CONTOUR NEXT) test strip     Sig: Use to test blood sugar 6 times daily or as directed.     Dispense:  550 each     Refill:  3       Follow-up:  3 months     Maryann Hammonds NP  Endocrinology  [facility]  CC:        More than 50% of the time spent with Ms. Cabello on counseling / coordinating her care discussing the above plan of care.The patient indicates understanding of the above issues and agrees with the plan set forth.  Total face to face time was 25 minutes.        Again, thank you for allowing me to participate in the care of your patient.        Sincerely,        HORTENSIA Rosales CNP

## 2019-12-11 NOTE — PATIENT INSTRUCTIONS
Component      Latest Ref Rng & Units 9/11/2019 12/11/2019   Hemoglobin A1C      0 - 5.6 % 6.5 (H) 7.0 (H)

## 2019-12-12 ENCOUNTER — MYC MEDICAL ADVICE (OUTPATIENT)
Dept: ENDOCRINOLOGY | Facility: CLINIC | Age: 24
End: 2019-12-12

## 2019-12-12 NOTE — RESULT ENCOUNTER NOTE
Please call  Fransisca,  I'm really concerned about the very low blood sugar you had while here in clinic yesterday.  You did not show any outward signs of hypoglycemia while being roomed or during your appointment.  Did you have symptoms or know your blood sugar was so low yesterday?  Do you know why your blood sugar was so low?  Sometimes people with diabetes develop a condition called hypoglycemic unawareness, this happens more often if you are having low blood sugars on a regular basis.  I would like you to set up an appointment with diabetes ed just to assess your blood sugar control again and make appropriate pump setting changes if indicated.  Please let me know if you have any questions.  Maryann Hammonds NP  Endocrinology

## 2019-12-12 NOTE — TELEPHONE ENCOUNTER
Called by provider Dr. Katz to investigate a critical lab, glucose of 32, call he received for this patient, as it was to the incorrect provider on-call.  Contacted  and got page into Dr. Morales at 9:25 pm, on-call back-up for Endocrine.  When provider called back, he had already spoken to lab staff and was aware of critical lab.  Did inform him of the conversation with the patient and he recommended no further action at this time.    María Elena Kirkpatrick RN on 12/11/2019 at 9:45 PM

## 2019-12-13 ENCOUNTER — TELEPHONE (OUTPATIENT)
Dept: ENDOCRINOLOGY | Facility: CLINIC | Age: 24
End: 2019-12-13

## 2019-12-13 LAB
CREAT UR-MCNC: 103 MG/DL
MICROALBUMIN UR-MCNC: 6 MG/L
MICROALBUMIN/CREAT UR: 6.27 MG/G CR (ref 0–25)

## 2019-12-13 NOTE — TELEPHONE ENCOUNTER
Diabetes Education Scheduling Outreach #1:    Call to patient to schedule. Left message with phone number to call to schedule.    Plan for 2nd outreach attempt within 1 week.    Shannan Armando  [place] [facility]  Diabetes and Nutrition Scheduling

## 2019-12-17 NOTE — RESULT ENCOUNTER NOTE
Fransisca,  Good news! There was no abnormal protein in your urine.  Other than the low glucose, your other labs were essentially normal.  Here's a copy of the results for your records.  Maryann Hammonds NP  Endocrinology

## 2019-12-24 NOTE — TELEPHONE ENCOUNTER
Diabetes Education Scheduling Outreach #2:    Call to patient to schedule. Left message with phone number to call to schedule.    Shannan Armando  [place] [facility]  Diabetes and Nutrition Scheduling

## 2020-03-02 ENCOUNTER — HEALTH MAINTENANCE LETTER (OUTPATIENT)
Age: 25
End: 2020-03-02

## 2020-03-11 ENCOUNTER — OFFICE VISIT (OUTPATIENT)
Dept: ENDOCRINOLOGY | Facility: CLINIC | Age: 25
End: 2020-03-11
Payer: COMMERCIAL

## 2020-03-11 VITALS
WEIGHT: 171 LBS | SYSTOLIC BLOOD PRESSURE: 130 MMHG | DIASTOLIC BLOOD PRESSURE: 85 MMHG | BODY MASS INDEX: 27.19 KG/M2 | RESPIRATION RATE: 14 BRPM | HEART RATE: 98 BPM

## 2020-03-11 DIAGNOSIS — Z96.41 INSULIN PUMP IN PLACE: ICD-10-CM

## 2020-03-11 DIAGNOSIS — E10.9 TYPE 1 DIABETES MELLITUS WITHOUT COMPLICATION (H): Primary | ICD-10-CM

## 2020-03-11 LAB — HBA1C MFR BLD: 7.8 % (ref 0–5.6)

## 2020-03-11 PROCEDURE — 36415 COLL VENOUS BLD VENIPUNCTURE: CPT | Performed by: CLINICAL NURSE SPECIALIST

## 2020-03-11 PROCEDURE — 99214 OFFICE O/P EST MOD 30 MIN: CPT | Performed by: CLINICAL NURSE SPECIALIST

## 2020-03-11 PROCEDURE — 83036 HEMOGLOBIN GLYCOSYLATED A1C: CPT | Performed by: CLINICAL NURSE SPECIALIST

## 2020-03-11 NOTE — LETTER
3/11/2020         RE: Fransisca Cabello  [address]  [facility] [address]        Dear Colleague,    Thank you for referring your patient, Fransisca Cabello, to the [facility]. Please see a copy of my visit note below.    Name: Fransisca Avila  F/u for Diabetes (Last seen 12/11/2019).  HPI:  Fransisca Avila is a 25 year old female who presents for the management of type 1 diabetes.     1. Type 1 DM:  Originally diagnosed at the age of 2.   Daughter born 11/2014, was diagnosed with type 1 diabetes.  Also has a son, born 1/2018  Selling house.  Building a new house but will need to live in a trailer on the property while house is being built.    Doesn't like auto mode - feels pump suspends when it shouldn't and feels BG run higher while in auto mode, doesn't feel well when her BG is in the 150 range.    Current Regimen: Humalog  Insulin pump - Medtronic 670-G.   Auto mode 92% - doesn't like auto mode but has been trying to use it more regularly.  [place] change: every 6.0 days  Sensor wear - 88%  Time Rate (U/hr)   00:00 1.15   09:30 1.25   12:30 1.55   19:30 1.60     Carbohydrate Ratio -    Time Ratio   0000 7.5   11:30 7.0   14:00 6.5   18:00 8.0         Sensitivity  00:00 - 48  12:00 - 44  17:00 - 46   Active Insulin Time 3.00 hours   Basal  45% (32 units)   Bolus   55% (29 units)   Total Carbohydrates/day 186   Total Insulin/day  53 units   Average Blood Sugar  Sensor Average 160  143   BS Checks 4.4 times a day   Care Link Username-   Password-   Target range:  0:00      Complications:   Diabetes Complications  Description / Detail    Diabetic Retinopathy  No retinopathy, overdue for annual exam - hard time finding time to schedule due to parenting two young children - will try to schedule   CAD / PAD  No   Neuropathy  No   Nephropathy / Microalbuminuria  No   Gastroparesis  No   Hypoglycemia Unawareness  No     2.  Intermittent hypercalcemia.  Calcium normalized since  8/2028.  Previous PTH low at 9, calcium was 9.5 at the time.  Continue to monitor.    PMH/PSH:  Past Medical History:   Diagnosis Date     Postpartum depression 2014    On medication for 6 months-      Type 1 diabetes (H) 1998    three years at diagnosis     Varicella 1998     Past Surgical History:   Procedure Laterality Date     C ROOT CANAL THERAPY 2 CANALS       DENTAL SURGERY       Family Hx:  Family History   Problem Relation Age of Onset     Family History Negative Mother      Diabetes Father 35        Type 1      Thyroid disease: No         DM2: NO         Autoimmune: DM1, SLE, RA, Vitiligo Yes - Father has type 1 DM    Social Hx:  Social History     Socioeconomic History     Marital status:      Spouse name: Teja     Number of children: 1     Years of education: Not on file     Highest education level: Not on file   Occupational History     Employer: [employer]   Social Needs     Financial resource strain: Not on file     Food insecurity     Worry: Not on file     Inability: Not on file     Transportation needs     Medical: Not on file     Non-medical: Not on file   Tobacco Use     Smoking status: Never Smoker     Smokeless tobacco: Never Used   Substance and Sexual Activity     Alcohol use: No     Alcohol/week: 0.0 standard drinks     Drug use: No     Sexual activity: Yes     Partners: Male     Birth control/protection: Condom   Lifestyle     Physical activity     Days per week: Not on file     Minutes per session: Not on file     Stress: Not on file   Relationships     Social connections     Talks on phone: Not on file     Gets together: Not on file     Attends [employer] service: Not on file     Active member of club or organization: Not on file     Attends meetings of clubs or organizations: Not on file     Relationship status: Not on file     Intimate partner violence     Fear of current or ex partner: Not on file     Emotionally abused: Not on file     Physically abused: Not on file     Forced  sexual activity: Not on file   Other Topics Concern      Service Not Asked     Blood Transfusions No     Caffeine Concern Not Asked     Occupational Exposure Not Asked     Hobby Hazards Not Asked     Sleep Concern Not Asked     Stress Concern Not Asked     Weight Concern Not Asked     Special Diet Not Asked     Back Care Not Asked     Exercise Not Asked     Bike Helmet Not Asked     Seat Belt Not Asked     Self-Exams Not Asked     Parent/sibling w/ CABG, MI or angioplasty before 65F 55M? Not Asked   Social History Narrative    Fransisca lives with her spouse           MEDICATIONS:  has a current medication list which includes the following prescription(s): acetone urine, aspirin not prescribed, blood glucose, glucagon, insulin glargine, insulin lispro, insulin pump, insulin syringe-needle u-100, levonorgestrel, microlet lancets, prenatal vit-fe fumarate-fa, and statin not prescribed.    ROS     ROS: 10 point ROS neg other than the symptoms noted above in the HPI.    Physical Exam   VS: There were no vitals taken for this visit.  GENERAL: NAD, well dressed, answering questions appropriately, appears stated age.  HEENT: no exophthalmos, no proptosis, no lig lag, no retraction, no scleral icterus  RESPIRATORY: Clear.  Normal respiratory effort.  CARDIOVASCULAR: RRR.  EXTREMITIES: no edema  NEUROLOGY: CN grossly intact, no tremors  MSK: Normal gait and station.  PSYCH: Intact judgment and insight. A&OX3 with a cordial affect.    LABS:  A1c  Component      Latest Ref Rng & Units 9/11/2019 12/11/2019 3/11/2020   Hemoglobin A1C      0 - 5.6 % 6.5 (H) 7.0 (H) 7.8 (H)     BMP:  !COMPREHENSIVE Latest Ref Rng & Units 12/11/2019   SODIUM 133 - 144 mmol/L 141   POTASSIUM 3.4 - 5.3 mmol/L 3.8   CHLORIDE 94 - 109 mmol/L 109   BUN 7 - 30 mg/dL 12   Creatinine 0.52 - 1.04 mg/dL 0.78   Glucose 70 - 99 mg/dL 32 (LL)   ANION GAP 3 - 14 mmol/L 6   CALCIUM 8.5 - 10.1 mg/dL 8.9   ALBUMIN 3.4 - 5.0 g/dL 3.8   PROTEIN, TOTAL 6.8 - 8.8  g/dL 7.4     !LIPID/HEPATIC Latest Ref Rng & Units 12/11/2019   CHOLESTEROL <200 mg/dL 175   TRIGLYCERIDES <150 mg/dL 87   HDL CHOLESTEROL >49 mg/dL 46 (L)   LDL CHOLESTEROL, CALCULATED <100 mg/dL 112 (H)   NON HDL CHOLESTEROL <130 mg/dL 129   AST 0 - 45 U/L 12   ALT 0 - 50 U/L 23     Urine microalbumin:  Component      Latest Ref Rng & Units 12/11/2019   Creatinine Urine      mg/dL 103   Albumin Urine mg/L      mg/L 6   Albumin Urine mg/g Cr      0 - 25 mg/g Cr 6.27     JOSH/C-peptide:  Component    Latest Ref Rng 11/10/2014   Glutamic Acid Decarboxylase Antibody     <5.0    Reference range: 0.0 to 5.0      C-Peptide    0.9 - 6.9 ng/mL <0.1 (L)     TFT:  !THYROID Latest Ref Rng & Units 12/11/2019   TSH 0.40 - 4.00 mU/L 1.30     TTG Abs:  Component    Latest Ref Rng 11/13/2014   Tissue Transglutaminase Antibody IgA    0 - 3.9 U/mL 1.1   Tissue Transglutaminase Rosina IgG    0 - 5.9 U/mL 1.0     All pertinent notes, labs, and images personally reviewed by me.     A/P  Ms.Jennica Avila is a 25 year old here for the management of diabetes:    1. DM1 - Uncontrolled, today's A1c increased to 7.8% although recent two week sensor glucose average 143 = an A1c of < 7.0%.  A1c goal  < 7.0%  Glucose rise noted after lunch and dinner.  Change carb ratio at 11:30 am and 5 pm.  Carbohydrate Ratio -    Time Ratio   0000 7.5   11:30 7.0-->6.5   17:00 8.0-->7.0             Rx for glucagon emergency provided 9/2019.   RX for urine ketone strips, lantus and syringes provided in the event of insulin pump failure provided 6/2019.  Flu shot today.    Labs ordered today:   No orders of the defined types were placed in this encounter.      Radiology/Consults ordered today: None    No orders of the defined types were placed in this encounter.      Follow-up:  3 months     Maryann Hammonds NP  Endocrinology  [facility]  CC:        More than 50% of the time spent with Ms. Cabello on counseling / coordinating her  care discussing the above plan of care.The patient indicates understanding of the above issues and agrees with the plan set forth.  Total face to face time was 25 minutes.        Again, thank you for allowing me to participate in the care of your patient.        Sincerely,        HORTENSIA Rosales CNP

## 2020-03-11 NOTE — RESULT ENCOUNTER NOTE
Fransisca,  Here's a copy of your recent A1c result for your records.  I'm sure the next A1c will be back down again.  Maryann Hammonds NP  Endocrinology

## 2020-03-11 NOTE — PROGRESS NOTES
Name: Fransisca Avila  F/u for Diabetes (Last seen 12/11/2019).  HPI:  Fransisca Avila is a 25 year old female who presents for the management of type 1 diabetes.     1. Type 1 DM:  Originally diagnosed at the age of 2.   Daughter born 11/2014, was diagnosed with type 1 diabetes.  Also has a son, born 1/2018  Selling house.  Building a new house but will need to live in a trailer on the property while house is being built.    Doesn't like auto mode - feels pump suspends when it shouldn't and feels BG run higher while in auto mode, doesn't feel well when her BG is in the 150 range.    Current Regimen: Humalog  Insulin pump - Medtronic 670-G.   Auto mode 92% - doesn't like auto mode but has been trying to use it more regularly.  [place] change: every 6.0 days  Sensor wear - 88%  Time Rate (U/hr)   00:00 1.15   09:30 1.25   12:30 1.55   19:30 1.60     Carbohydrate Ratio -    Time Ratio   0000 7.5   11:30 7.0   14:00 6.5   18:00 8.0         Sensitivity  00:00 - 48  12:00 - 44  17:00 - 46   Active Insulin Time 3.00 hours   Basal  45% (32 units)   Bolus   55% (29 units)   Total Carbohydrates/day 186   Total Insulin/day  53 units   Average Blood Sugar  Sensor Average 160  143   BS Checks 4.4 times a day   Care Link Username-   Password-   Target range:  0:00      Complications:   Diabetes Complications  Description / Detail    Diabetic Retinopathy  No retinopathy, overdue for annual exam - hard time finding time to schedule due to parenting two young children - will try to schedule   CAD / PAD  No   Neuropathy  No   Nephropathy / Microalbuminuria  No   Gastroparesis  No   Hypoglycemia Unawareness  No     2.  Intermittent hypercalcemia.  Calcium normalized since 8/2028.  Previous PTH low at 9, calcium was 9.5 at the time.  Continue to monitor.    PMH/PSH:  Past Medical History:   Diagnosis Date     Postpartum depression 2014    On medication for 6 months-      Type 1 diabetes (H) 1998    three years at diagnosis      Varicella 1998     Past Surgical History:   Procedure Laterality Date     C ROOT CANAL THERAPY 2 CANALS       DENTAL SURGERY       Family Hx:  Family History   Problem Relation Age of Onset     Family History Negative Mother      Diabetes Father 35        Type 1      Thyroid disease: No         DM2: NO         Autoimmune: DM1, SLE, RA, Vitiligo Yes - Father has type 1 DM    Social Hx:  Social History     Socioeconomic History     Marital status:      Spouse name: Teja     Number of children: 1     Years of education: Not on file     Highest education level: Not on file   Occupational History     Employer: [employer]   Social Needs     Financial resource strain: Not on file     Food insecurity     Worry: Not on file     Inability: Not on file     Transportation needs     Medical: Not on file     Non-medical: Not on file   Tobacco Use     Smoking status: Never Smoker     Smokeless tobacco: Never Used   Substance and Sexual Activity     Alcohol use: No     Alcohol/week: 0.0 standard drinks     Drug use: No     Sexual activity: Yes     Partners: Male     Birth control/protection: Condom   Lifestyle     Physical activity     Days per week: Not on file     Minutes per session: Not on file     Stress: Not on file   Relationships     Social connections     Talks on phone: Not on file     Gets together: Not on file     Attends Evangelical service: Not on file     Active member of club or organization: Not on file     Attends meetings of clubs or organizations: Not on file     Relationship status: Not on file     Intimate partner violence     Fear of current or ex partner: Not on file     Emotionally abused: Not on file     Physically abused: Not on file     Forced sexual activity: Not on file   Other Topics Concern      Service Not Asked     Blood Transfusions No     Caffeine Concern Not Asked     Occupational Exposure Not Asked     Hobby Hazards Not Asked     Sleep Concern Not Asked     Stress Concern Not Asked      Weight Concern Not Asked     Special Diet Not Asked     Back Care Not Asked     Exercise Not Asked     Bike Helmet Not Asked     Seat Belt Not Asked     Self-Exams Not Asked     Parent/sibling w/ CABG, MI or angioplasty before 65F 55M? Not Asked   Social History Narrative    Fransisca lives with her spouse           MEDICATIONS:  has a current medication list which includes the following prescription(s): acetone urine, aspirin not prescribed, blood glucose, glucagon, insulin glargine, insulin lispro, insulin pump, insulin syringe-needle u-100, levonorgestrel, microlet lancets, prenatal vit-fe fumarate-fa, and statin not prescribed.    ROS     ROS: 10 point ROS neg other than the symptoms noted above in the HPI.    Physical Exam   VS: There were no vitals taken for this visit.  GENERAL: NAD, well dressed, answering questions appropriately, appears stated age.  HEENT: no exophthalmos, no proptosis, no lig lag, no retraction, no scleral icterus  RESPIRATORY: Clear.  Normal respiratory effort.  CARDIOVASCULAR: RRR.  EXTREMITIES: no edema  NEUROLOGY: CN grossly intact, no tremors  MSK: Normal gait and station.  PSYCH: Intact judgment and insight. A&OX3 with a cordial affect.    LABS:  A1c  Component      Latest Ref Rng & Units 9/11/2019 12/11/2019 3/11/2020   Hemoglobin A1C      0 - 5.6 % 6.5 (H) 7.0 (H) 7.8 (H)     BMP:  !COMPREHENSIVE Latest Ref Rng & Units 12/11/2019   SODIUM 133 - 144 mmol/L 141   POTASSIUM 3.4 - 5.3 mmol/L 3.8   CHLORIDE 94 - 109 mmol/L 109   BUN 7 - 30 mg/dL 12   Creatinine 0.52 - 1.04 mg/dL 0.78   Glucose 70 - 99 mg/dL 32 (LL)   ANION GAP 3 - 14 mmol/L 6   CALCIUM 8.5 - 10.1 mg/dL 8.9   ALBUMIN 3.4 - 5.0 g/dL 3.8   PROTEIN, TOTAL 6.8 - 8.8 g/dL 7.4     !LIPID/HEPATIC Latest Ref Rng & Units 12/11/2019   CHOLESTEROL <200 mg/dL 175   TRIGLYCERIDES <150 mg/dL 87   HDL CHOLESTEROL >49 mg/dL 46 (L)   LDL CHOLESTEROL, CALCULATED <100 mg/dL 112 (H)   NON HDL CHOLESTEROL <130 mg/dL 129   AST 0 - 45 U/L 12    ALT 0 - 50 U/L 23     Urine microalbumin:  Component      Latest Ref Rng & Units 12/11/2019   Creatinine Urine      mg/dL 103   Albumin Urine mg/L      mg/L 6   Albumin Urine mg/g Cr      0 - 25 mg/g Cr 6.27     JOSH/C-peptide:  Component    Latest Ref Rng 11/10/2014   Glutamic Acid Decarboxylase Antibody     <5.0    Reference range: 0.0 to 5.0      C-Peptide    0.9 - 6.9 ng/mL <0.1 (L)     TFT:  !THYROID Latest Ref Rng & Units 12/11/2019   TSH 0.40 - 4.00 mU/L 1.30     TTG Abs:  Component    Latest Ref Rng 11/13/2014   Tissue Transglutaminase Antibody IgA    0 - 3.9 U/mL 1.1   Tissue Transglutaminase Rosina IgG    0 - 5.9 U/mL 1.0     All pertinent notes, labs, and images personally reviewed by me.     A/P  Ms.Jennica Avila is a 25 year old here for the management of diabetes:    1. DM1 - Uncontrolled, today's A1c increased to 7.8% although recent two week sensor glucose average 143 = an A1c of < 7.0%.  A1c goal  < 7.0%  Glucose rise noted after lunch and dinner.  Change carb ratio at 11:30 am and 5 pm.  Carbohydrate Ratio -    Time Ratio   0000 7.5   11:30 7.0-->6.5   17:00 8.0-->7.0             Rx for glucagon emergency provided 9/2019.   RX for urine ketone strips, lantus and syringes provided in the event of insulin pump failure provided 6/2019.  Flu shot today.    Labs ordered today:   No orders of the defined types were placed in this encounter.      Radiology/Consults ordered today: None    No orders of the defined types were placed in this encounter.      Follow-up:  3 months     Maryann Hammonds NP  Endocrinology  [facility]  CC:        More than 50% of the time spent with Ms. Cabello on counseling / coordinating her care discussing the above plan of care.The patient indicates understanding of the above issues and agrees with the plan set forth.  Total face to face time was 25 minutes.

## 2020-06-02 ENCOUNTER — TELEPHONE (OUTPATIENT)
Dept: ENDOCRINOLOGY | Facility: CLINIC | Age: 25
End: 2020-06-02

## 2020-06-02 NOTE — TELEPHONE ENCOUNTER
The following [employer] message sent:    Thank you for scheduling your visit as a Video Visit! To receive an invitation and connect with your provider, the [facility] video visit technology must be accessible from your smartphone or personal device. Please click the link below for setup instructions:    [email or web address]        Let me know if you have any questions.  Take care!    Thank you,  Emely Mullins M.A.  [facility]  [phone] [facility]

## 2020-06-10 ENCOUNTER — VIRTUAL VISIT (OUTPATIENT)
Dept: ENDOCRINOLOGY | Facility: CLINIC | Age: 25
End: 2020-06-10
Payer: COMMERCIAL

## 2020-06-10 DIAGNOSIS — Z96.41 INSULIN PUMP IN PLACE: ICD-10-CM

## 2020-06-10 PROCEDURE — 99213 OFFICE O/P EST LOW 20 MIN: CPT | Mod: GT | Performed by: CLINICAL NURSE SPECIALIST

## 2020-06-10 RX ORDER — INSULIN GLARGINE 100 [IU]/ML
INJECTION, SOLUTION SUBCUTANEOUS
Qty: 10 ML | Refills: 1 | Status: SHIPPED | OUTPATIENT
Start: 2020-06-10 | End: 2020-10-14

## 2020-06-10 RX ORDER — IBUPROFEN 600 MG/1
1 TABLET ORAL ONCE
Qty: 1 EACH | Refills: 0 | Status: SHIPPED | OUTPATIENT
Start: 2020-06-10 | End: 2020-10-14

## 2020-06-10 NOTE — LETTER
"    6/10/2020         RE: Fransisca Cabello  [address] [facility] [address]        Dear Colleague,    Thank you for referring your patient, Fransisca Cabello, to the [facility]. Please see a copy of my visit note below.    Fransisca Cabello is a 25 year old female who is being evaluated via a billable video visit due to the COVID-19 pandemic.      The patient has been notified of following:     \"This video visit will be conducted via a call between you and your physician/provider. We have found that certain health care needs can be provided without the need for an in-person physical exam.  This service lets us provide the care you need with a video conversation.  If a prescription is necessary we can send it directly to your pharmacy.  If lab work is needed we can place an order for that and you can then stop by our lab to have the test done at a later time.    Video visits are billed at different rates depending on your insurance coverage.  Please reach out to your insurance provider with any questions.    If during the course of the call the physician/provider feels a video visit is not appropriate, you will not be charged for this service.\"    Patient has given verbal consent for Video visit? Yes    How would you like to obtain your AVS? [facility]    Patient would like the video invitation sent by: Text to cell phone: [phone]    Will anyone else be joining your video visit? No        Video-Visit Details    Type of service:  Video Visit    Video Start Time: 1:34 pm  Video End Time: 1:49 pm    Originating Location (pt. Location): Home    Distant Location (provider location):  Home    Platform used for Video Visit: [facility]    Name: Fransisca Avila  F/u for Diabetes (Last seen 3/11/2020).  HPI:  1. Type 1 DM:  Originally diagnosed at the age of 2.   Daughter born 11/2014, was diagnosed with type 1 diabetes.  Also has a son, born 1/2018  Fostering a 2 year old for the next 9 " months    Current Regimen: Humalog  Insulin pump - Medtronic 670-G.   Auto mode 60% - doesn't like auto mode but has been using it regularly when wearing her sensor.  Can't afford to wear the sensor all the time.  [place] change: every 6.5 days  Sensor wear - 59%  Time Rate (U/hr)   00:00 1.15   09:30 1.25   12:30 1.55   19:30 1.60     Carbohydrate Ratio -    Time Ratio   00:00 7.5   11:30 6.5   17:00 7.0             Sensitivity  00:00 - 48  12:00 - 44  17:00 - 46   Active Insulin Time 3.00 hours   Basal  48% (25 units)   Bolus   52% (27 units)   Total Carbohydrates/day 173   Total Insulin/day  54 units   Average Blood Sugar  Sensor Average 172  148   BS Checks 3.5 times a day   Care Link Username-   Password-   Target range:  0:00      Complications:   Diabetes Complications  Description / Detail    Diabetic Retinopathy  No retinopathy, overdue for annual exam - hard time finding time to schedule due to parenting two young children - will try to schedule   CAD / PAD  No   Neuropathy  No   Nephropathy / Microalbuminuria  No   Gastroparesis  No   Hypoglycemia Unawareness  No     2.  Intermittent hypercalcemia.  Calcium normalized since 8/2028.  Previous PTH low at 9, calcium was 9.5 at the time.  Continue to monitor.    PMH/PSH:  Past Medical History:   Diagnosis Date     Postpartum depression 2014    On medication for 6 months-      Type 1 diabetes (H) 1998    three years at diagnosis     Varicella 1998     Past Surgical History:   Procedure Laterality Date     C ROOT CANAL THERAPY 2 CANALS       DENTAL SURGERY       Family Hx:  Family History   Problem Relation Age of Onset     Family History Negative Mother      Diabetes Father 35        Type 1      Thyroid disease: No         DM2: NO         Autoimmune: DM1, SLE, RA, Vitiligo Yes - Father has type 1 DM    Social Hx:  Social History     Socioeconomic History     Marital status:      Spouse name: Teja     Number of children: 1     Years of  education: Not on file     Highest education level: Not on file   Occupational History     Employer: [employer]   Social Needs     Financial resource strain: Not on file     Food insecurity     Worry: Not on file     Inability: Not on file     Transportation needs     Medical: Not on file     Non-medical: Not on file   Tobacco Use     Smoking status: Never Smoker     Smokeless tobacco: Never Used   Substance and Sexual Activity     Alcohol use: No     Alcohol/week: 0.0 standard drinks     Drug use: No     Sexual activity: Yes     Partners: Male     Birth control/protection: Condom   Lifestyle     Physical activity     Days per week: Not on file     Minutes per session: Not on file     Stress: Not on file   Relationships     Social connections     Talks on phone: Not on file     Gets together: Not on file     Attends Latter-day service: Not on file     Active member of club or organization: Not on file     Attends meetings of clubs or organizations: Not on file     Relationship status: Not on file     Intimate partner violence     Fear of current or ex partner: Not on file     Emotionally abused: Not on file     Physically abused: Not on file     Forced sexual activity: Not on file   Other Topics Concern      Service Not Asked     Blood Transfusions No     Caffeine Concern Not Asked     Occupational Exposure Not Asked     Hobby Hazards Not Asked     Sleep Concern Not Asked     Stress Concern Not Asked     Weight Concern Not Asked     Special Diet Not Asked     Back Care Not Asked     Exercise Not Asked     Bike Helmet Not Asked     Seat Belt Not Asked     Self-Exams Not Asked     Parent/sibling w/ CABG, MI or angioplasty before 65F 55M? Not Asked   Social History Narrative    Fransisca lives with her spouse           MEDICATIONS:  has a current medication list which includes the following prescription(s): acetone urine, aspirin not prescribed, blood glucose, glucagon, insulin glargine, insulin lispro, insulin pump,  insulin syringe-needle u-100, levonorgestrel, microlet lancets, prenatal vit-fe fumarate-fa, and statin not prescribed.    ROS     ROS: 10 point ROS neg other than the symptoms noted above in the HPI.    Physical Exam   VS: There were no vitals taken for this visit.  GENERAL: NAD, well dressed, answering questions appropriately, appears stated age.  HEENT: no exophthalmos, no proptosis, no lig lag, no retraction, no scleral icterus  RESPIRATORY: Normal respiratory effort.  CARDIOVASCULAR:  EXTREMITIES:   NEUROLOGY:  MSK:   PSYCH: Intact judgment and insight. A&OX3 with a cordial affect.    LABS:  A1c  Component      Latest Ref Rng & Units 9/11/2019 12/11/2019 3/11/2020   Hemoglobin A1C      0 - 5.6 % 6.5 (H) 7.0 (H) 7.8 (H)     BMP:  !COMPREHENSIVE Latest Ref Rng & Units 12/11/2019   SODIUM 133 - 144 mmol/L 141   POTASSIUM 3.4 - 5.3 mmol/L 3.8   CHLORIDE 94 - 109 mmol/L 109   BUN 7 - 30 mg/dL 12   Creatinine 0.52 - 1.04 mg/dL 0.78   Glucose 70 - 99 mg/dL 32 (LL)   ANION GAP 3 - 14 mmol/L 6   CALCIUM 8.5 - 10.1 mg/dL 8.9   ALBUMIN 3.4 - 5.0 g/dL 3.8   PROTEIN, TOTAL 6.8 - 8.8 g/dL 7.4     !LIPID/HEPATIC Latest Ref Rng & Units 12/11/2019   CHOLESTEROL <200 mg/dL 175   TRIGLYCERIDES <150 mg/dL 87   HDL CHOLESTEROL >49 mg/dL 46 (L)   LDL CHOLESTEROL, CALCULATED <100 mg/dL 112 (H)   NON HDL CHOLESTEROL <130 mg/dL 129   AST 0 - 45 U/L 12   ALT 0 - 50 U/L 23     Urine microalbumin:  Component      Latest Ref Rng & Units 12/11/2019   Creatinine Urine      mg/dL 103   Albumin Urine mg/L      mg/L 6   Albumin Urine mg/g Cr      0 - 25 mg/g Cr 6.27     JOSH/C-peptide:  Component    Latest Ref Rng 11/10/2014   Glutamic Acid Decarboxylase Antibody     <5.0    Reference range: 0.0 to 5.0      C-Peptide    0.9 - 6.9 ng/mL <0.1 (L)     TFT:  !THYROID Latest Ref Rng & Units 12/11/2019   TSH 0.40 - 4.00 mU/L 1.30     TTG Abs:  Component    Latest Ref Rng 11/13/2014   Tissue Transglutaminase Antibody IgA    0 - 3.9 U/mL 1.1   Tissue  Transglutaminase Rosina IgG    0 - 5.9 U/mL 1.0     All pertinent notes, labs, and images personally reviewed by me.     A/P  Ms.Jennica Avila is a 25 year old seen via video visit for the management of diabetes:    1. DM1 - Uncontrolled, most recent A1c increased to 7.8%.  Recent two week sensor glucose average 143 = an A1c of < 7.0% indicating control may be improving. A1c goal  < 7.0%  Sensor shows pattern of glucose decreasing after meal bolus followed by glucose rise.  It appears her carb ratio is too aggressive.  ? If correction factor may be inadequate but difficult to assess until carb ratio is more accurate.  Change carb ratios:    Time Ratio   00:00 7.5--> 9.0   11:30 6.5--> 9.0   17:00 7.0--> 9.0     Rx for glucagon emergency provided today.   RX for urine ketone strips, lantus and syringes provided in the event of insulin pump failure provided today.  Labs ordered today:   No orders of the defined types were placed in this encounter.      Radiology/Consults ordered today: None    No orders of the defined types were placed in this encounter.      Follow-up:  3-4 months     Maryann Hammonds NP  Endocrinology  [facility]  CC:           Again, thank you for allowing me to participate in the care of your patient.        Sincerely,        HORTENSIA Rosales CNP

## 2020-06-10 NOTE — PROGRESS NOTES
"Fransisca Cabello is a 25 year old female who is being evaluated via a billable video visit due to the COVID-19 pandemic.      The patient has been notified of following:     \"This video visit will be conducted via a call between you and your physician/provider. We have found that certain health care needs can be provided without the need for an in-person physical exam.  This service lets us provide the care you need with a video conversation.  If a prescription is necessary we can send it directly to your pharmacy.  If lab work is needed we can place an order for that and you can then stop by our lab to have the test done at a later time.    Video visits are billed at different rates depending on your insurance coverage.  Please reach out to your insurance provider with any questions.    If during the course of the call the physician/provider feels a video visit is not appropriate, you will not be charged for this service.\"    Patient has given verbal consent for Video visit? Yes    How would you like to obtain your AVS? [employer]    Patient would like the video invitation sent by: Text to cell phone: [phone]    Will anyone else be joining your video visit? No        Video-Visit Details    Type of service:  Video Visit    Video Start Time: 1:34 pm  Video End Time: 1:49 pm    Originating Location (pt. Location): Home    Distant Location (provider location):  Home    Platform used for Video Visit: Re.Mu    Name: Fransisca Avila  F/u for Diabetes (Last seen 3/11/2020).  HPI:  1. Type 1 DM:  Originally diagnosed at the age of 2.   Daughter born 11/2014, was diagnosed with type 1 diabetes.  Also has a son, born 1/2018  Fostering a 2 year old for the next 9 months    Current Regimen: Humalog  Insulin pump - [employer] 670-G.   Auto mode 60% - doesn't like auto mode but has been using it regularly when wearing her sensor.  Can't afford to wear the sensor all the time.  Reservoir change: every 6.5 days  Sensor wear - " 59%  Time Rate (U/hr)   00:00 1.15   09:30 1.25   12:30 1.55   19:30 1.60     Carbohydrate Ratio -    Time Ratio   00:00 7.5   11:30 6.5   17:00 7.0             Sensitivity  00:00 - 48  12:00 - 44  17:00 - 46   Active Insulin Time 3.00 hours   Basal  48% (25 units)   Bolus   52% (27 units)   Total Carbohydrates/day 173   Total Insulin/day  54 units   Average Blood Sugar  Sensor Average 172  148   BS Checks 3.5 times a day   Care Link Username-   Password-   Target range:  0:00      Complications:   Diabetes Complications  Description / Detail    Diabetic Retinopathy  No retinopathy, overdue for annual exam - hard time finding time to schedule due to parenting two young children - will try to schedule   CAD / PAD  No   Neuropathy  No   Nephropathy / Microalbuminuria  No   Gastroparesis  No   Hypoglycemia Unawareness  No     2.  Intermittent hypercalcemia.  Calcium normalized since 8/2028.  Previous PTH low at 9, calcium was 9.5 at the time.  Continue to monitor.    PMH/PSH:  Past Medical History:   Diagnosis Date     Postpartum depression 2014    On medication for 6 months-      Type 1 diabetes (H) 1998    three years at diagnosis     Varicella 1998     Past Surgical History:   Procedure Laterality Date     C ROOT CANAL THERAPY 2 CANALS       DENTAL SURGERY       Family Hx:  Family History   Problem Relation Age of Onset     Family History Negative Mother      Diabetes Father 35        Type 1      Thyroid disease: No         DM2: NO         Autoimmune: DM1, SLE, RA, Vitiligo Yes - Father has type 1 DM    Social Hx:  Social History     Socioeconomic History     Marital status:      Spouse name: Teja     Number of children: 1     Years of education: Not on file     Highest education level: Not on file   Occupational History     Employer: [employer]   Social Needs     Financial resource strain: Not on file     Food insecurity     Worry: Not on file     Inability: Not on file     Transportation needs      Medical: Not on file     Non-medical: Not on file   Tobacco Use     Smoking status: Never Smoker     Smokeless tobacco: Never Used   Substance and Sexual Activity     Alcohol use: No     Alcohol/week: 0.0 standard drinks     Drug use: No     Sexual activity: Yes     Partners: Male     Birth control/protection: Condom   Lifestyle     Physical activity     Days per week: Not on file     Minutes per session: Not on file     Stress: Not on file   Relationships     Social connections     Talks on phone: Not on file     Gets together: Not on file     Attends Judaism service: Not on file     Active member of club or organization: Not on file     Attends meetings of clubs or organizations: Not on file     Relationship status: Not on file     Intimate partner violence     Fear of current or ex partner: Not on file     Emotionally abused: Not on file     Physically abused: Not on file     Forced sexual activity: Not on file   Other Topics Concern      Service Not Asked     Blood Transfusions No     Caffeine Concern Not Asked     Occupational Exposure Not Asked     Hobby Hazards Not Asked     Sleep Concern Not Asked     Stress Concern Not Asked     Weight Concern Not Asked     Special Diet Not Asked     Back Care Not Asked     Exercise Not Asked     Bike Helmet Not Asked     Seat Belt Not Asked     Self-Exams Not Asked     Parent/sibling w/ CABG, MI or angioplasty before 65F 55M? Not Asked   Social History Narrative    Fransisca lives with her spouse           MEDICATIONS:  has a current medication list which includes the following prescription(s): acetone urine, aspirin not prescribed, blood glucose, glucagon, insulin glargine, insulin lispro, insulin pump, insulin syringe-needle u-100, levonorgestrel, microlet lancets, prenatal vit-fe fumarate-fa, and statin not prescribed.    ROS     ROS: 10 point ROS neg other than the symptoms noted above in the HPI.    Physical Exam   VS: There were no vitals taken for this  visit.  GENERAL: NAD, well dressed, answering questions appropriately, appears stated age.  HEENT: no exophthalmos, no proptosis, no lig lag, no retraction, no scleral icterus  RESPIRATORY: Normal respiratory effort.  CARDIOVASCULAR:  EXTREMITIES:   NEUROLOGY:  MSK:   PSYCH: Intact judgment and insight. A&OX3 with a cordial affect.    LABS:  A1c  Component      Latest Ref Rng & Units 9/11/2019 12/11/2019 3/11/2020   Hemoglobin A1C      0 - 5.6 % 6.5 (H) 7.0 (H) 7.8 (H)     BMP:  !COMPREHENSIVE Latest Ref Rng & Units 12/11/2019   SODIUM 133 - 144 mmol/L 141   POTASSIUM 3.4 - 5.3 mmol/L 3.8   CHLORIDE 94 - 109 mmol/L 109   BUN 7 - 30 mg/dL 12   Creatinine 0.52 - 1.04 mg/dL 0.78   Glucose 70 - 99 mg/dL 32 (LL)   ANION GAP 3 - 14 mmol/L 6   CALCIUM 8.5 - 10.1 mg/dL 8.9   ALBUMIN 3.4 - 5.0 g/dL 3.8   PROTEIN, TOTAL 6.8 - 8.8 g/dL 7.4     !LIPID/HEPATIC Latest Ref Rng & Units 12/11/2019   CHOLESTEROL <200 mg/dL 175   TRIGLYCERIDES <150 mg/dL 87   HDL CHOLESTEROL >49 mg/dL 46 (L)   LDL CHOLESTEROL, CALCULATED <100 mg/dL 112 (H)   NON HDL CHOLESTEROL <130 mg/dL 129   AST 0 - 45 U/L 12   ALT 0 - 50 U/L 23     Urine microalbumin:  Component      Latest Ref Rng & Units 12/11/2019   Creatinine Urine      mg/dL 103   Albumin Urine mg/L      mg/L 6   Albumin Urine mg/g Cr      0 - 25 mg/g Cr 6.27     JOSH/C-peptide:  Component    Latest Ref Rng 11/10/2014   Glutamic Acid Decarboxylase Antibody     <5.0    Reference range: 0.0 to 5.0      C-Peptide    0.9 - 6.9 ng/mL <0.1 (L)     TFT:  !THYROID Latest Ref Rng & Units 12/11/2019   TSH 0.40 - 4.00 mU/L 1.30     TTG Abs:  Component    Latest Ref Rng 11/13/2014   Tissue Transglutaminase Antibody IgA    0 - 3.9 U/mL 1.1   Tissue Transglutaminase Rosina IgG    0 - 5.9 U/mL 1.0     All pertinent notes, labs, and images personally reviewed by me.     A/P  Ms.Fransisca Avila is a 25 year old seen via video visit for the management of diabetes:    1. DM1 - Uncontrolled, most recent A1c increased  to 7.8%.  Recent two week sensor glucose average 143 = an A1c of < 7.0% indicating control may be improving. A1c goal  < 7.0%  Sensor shows pattern of glucose decreasing after meal bolus followed by glucose rise.  It appears her carb ratio is too aggressive.  ? If correction factor may be inadequate but difficult to assess until carb ratio is more accurate.  Change carb ratios:    Time Ratio   00:00 7.5--> 9.0   11:30 6.5--> 9.0   17:00 7.0--> 9.0     Rx for glucagon emergency provided today.   RX for urine ketone strips, lantus and syringes provided in the event of insulin pump failure provided today.  Labs ordered today:   No orders of the defined types were placed in this encounter.      Radiology/Consults ordered today: None    No orders of the defined types were placed in this encounter.      Follow-up:  3-4 months     Maryann Hammonds NP  Endocrinology  [facility]  CC:

## 2020-06-25 ENCOUNTER — TELEPHONE (OUTPATIENT)
Dept: ENDOCRINOLOGY | Facility: CLINIC | Age: 25
End: 2020-06-25

## 2020-06-25 NOTE — TELEPHONE ENCOUNTER
Received a faxed Prescription form from [employer] for insulin pump and diabetic supplies.  Fax signed form back to [employer] at fax# [phone].   Contact is Liss Delatorre at ph# [phone].  Form on Maryann Hmamonds's desk.  Emely Mullins CMA on 6/25/2020 at 3:10 PM

## 2020-09-15 ENCOUNTER — TELEPHONE (OUTPATIENT)
Dept: ENDOCRINOLOGY | Facility: CLINIC | Age: 25
End: 2020-09-15

## 2020-09-15 NOTE — TELEPHONE ENCOUNTER
The following Depositphotos message sent to the patient:    Hi Fransisca,    I left you a voicemail but wanted to send you a Depositphotos message as well regarding your upcoming appointment scheduled in Endocrinology.  You are scheduled to see Maryann Hammonds NP, Wednesday, October 14th at 1:30 p.m.  Unfortunately we are not seeing patients in clinic at this time as we are trying to minimize patient exposure and keep our patients healthy while also practicing social distancing due to COVID19.  We would be happy to do your visit by video via Depositphotos or Smartphone as scheduled for 10/14/2020 at 1:30 p.m.      If you log in to your Depositphotos, you will see that Depositphotos video visit instructions have been sent to you.  We would be happy to do a video visit via your smartphone if you prefer and I will plan to discuss how to connect virtually when I call you to prepare for the appointment.  I will call you up to 15 minutes prior to your scheduled appointment time to confirm you are ready for the appointment and answer any questions you may have about connecting online.  If this works for you, no need to call us back.  If not, please call us to discuss alternatives for your appointment.       Please upload your insulin pump to the [employer] website a couple of days before your appointment so I can print your pump report for Maryann Zapata review in time for your appointment.    Thank you,  Emely Mullins M.A.  Endocrinology  [facility]  [phone] [facility]

## 2020-09-29 ENCOUNTER — TELEPHONE (OUTPATIENT)
Dept: ENDOCRINOLOGY | Facility: CLINIC | Age: 25
End: 2020-09-29

## 2020-10-14 ENCOUNTER — VIRTUAL VISIT (OUTPATIENT)
Dept: ENDOCRINOLOGY | Facility: CLINIC | Age: 25
End: 2020-10-14
Payer: COMMERCIAL

## 2020-10-14 DIAGNOSIS — Z79.4 ENCOUNTER FOR LONG-TERM (CURRENT) USE OF INSULIN (H): ICD-10-CM

## 2020-10-14 DIAGNOSIS — E10.9 TYPE 1 DIABETES MELLITUS WITHOUT COMPLICATION (H): Primary | ICD-10-CM

## 2020-10-14 DIAGNOSIS — Z96.41 INSULIN PUMP IN PLACE: ICD-10-CM

## 2020-10-14 PROCEDURE — 99213 OFFICE O/P EST LOW 20 MIN: CPT | Mod: TEL | Performed by: CLINICAL NURSE SPECIALIST

## 2020-10-14 RX ORDER — BLOOD SUGAR DIAGNOSTIC
STRIP MISCELLANEOUS
Qty: 100 EACH | Refills: 3 | Status: SHIPPED | OUTPATIENT
Start: 2020-10-14

## 2020-10-14 RX ORDER — INSULIN GLARGINE 100 [IU]/ML
INJECTION, SOLUTION SUBCUTANEOUS
Qty: 10 ML | Refills: 1 | Status: SHIPPED | OUTPATIENT
Start: 2020-10-14

## 2020-10-14 RX ORDER — IBUPROFEN 600 MG/1
1 TABLET ORAL ONCE
Qty: 1 EACH | Refills: 0 | Status: SHIPPED | OUTPATIENT
Start: 2020-10-14 | End: 2021-09-14

## 2020-10-14 NOTE — PROGRESS NOTES
"Fransisca Cabello is a 25 year old female who is being evaluated via a billable telephone visit.      The patient has been notified of following:     \"This telephone visit will be conducted via a call between you and your physician/provider. We have found that certain health care needs can be provided without the need for a physical exam.  This service lets us provide the care you need with a short phone conversation.  If a prescription is necessary we can send it directly to your pharmacy.  If lab work is needed we can place an order for that and you can then stop by our lab to have the test done at a later time.    Telephone visits are billed at different rates depending on your insurance coverage. During this emergency period, for some insurers they may be billed the same as an in-person visit.  Please reach out to your insurance provider with any questions.    If during the course of the call the physician/provider feels a telephone visit is not appropriate, you will not be charged for this service.\"    Patient has given verbal consent for Telephone visit?  Yes    What phone number would you like to be contacted at? [phone]    How would you like to obtain your AVS? MyChart    Name: Fransisca Avila  F/u for Diabetes (Last seen 6/10/2020).  HPI:  1. Type 1 DM:  Originally diagnosed at the age of 2.   Daughter born 11/2014, was diagnosed with type 1 diabetes.  Also has a son, born 1/2018  Fostering a 2 year old for the next 9 months    Current Regimen: Humalog  Insulin pump - Medtronic 670-G.   Auto mode 78%  Reservoir change: every 6.5 days  Sensor wear - 59%  Time Rate (U/hr)   00:00 1.20   09:30 1.25   12:30 1.25   19:30 1.25     Carbohydrate Ratio -    Time Ratio   00:00 9.0   11:30 9.0   17:00 9.0             Sensitivity  00:00 - 48  12:00 - 44  17:00 - 46   Active Insulin Time 3.00 hours   Basal  50% (24 units)   Bolus   50% (24 units)   Total Carbohydrates/day 210 grams   Total Insulin/day  48 units "   Average Blood Sugar  Sensor Average 153  142   BS Checks 3.5 times a day   Care Link Username-   Password-   Target range:  0:00      Complications:   Diabetes Complications  Description / Detail    Diabetic Retinopathy  No retinopathy, overdue for annual exam - hard time finding time to schedule due to parenting two young children - reminded her to try to schedule   CAD / PAD  No   Neuropathy  No   Nephropathy / Microalbuminuria  No   Gastroparesis  No   Hypoglycemia Unawareness  No     2.  Intermittent hypercalcemia.  Calcium normalized since 8/2028.  Previous PTH low at 9, calcium was 9.5 at the time.  Continue to monitor.    PMH/PSH:  Past Medical History:   Diagnosis Date     Postpartum depression 2014    On medication for 6 months-      Type 1 diabetes (H) 1998    three years at diagnosis     Varicella 1998     Past Surgical History:   Procedure Laterality Date     C ROOT CANAL THERAPY 2 CANALS       DENTAL SURGERY       Family Hx:  Family History   Problem Relation Age of Onset     Family History Negative Mother      Diabetes Father 35        Type 1      Thyroid disease: No         DM2: NO         Autoimmune: DM1, SLE, RA, Vitiligo Yes - Father has type 1 DM    Social Hx:  Social History     Socioeconomic History     Marital status:      Spouse name: Teja     Number of children: 1     Years of education: Not on file     Highest education level: Not on file   Occupational History     Employer: [employer]   Social Needs     Financial resource strain: Not on file     Food insecurity     Worry: Not on file     Inability: Not on file     Transportation needs     Medical: Not on file     Non-medical: Not on file   Tobacco Use     Smoking status: Never Smoker     Smokeless tobacco: Never Used   Substance and Sexual Activity     Alcohol use: No     Alcohol/week: 0.0 standard drinks     Drug use: No     Sexual activity: Yes     Partners: Male     Birth control/protection: Condom   Lifestyle     Physical  activity     Days per week: Not on file     Minutes per session: Not on file     Stress: Not on file   Relationships     Social connections     Talks on phone: Not on file     Gets together: Not on file     Attends Islam service: Not on file     Active member of club or organization: Not on file     Attends meetings of clubs or organizations: Not on file     Relationship status: Not on file     Intimate partner violence     Fear of current or ex partner: Not on file     Emotionally abused: Not on file     Physically abused: Not on file     Forced sexual activity: Not on file   Other Topics Concern      Service Not Asked     Blood Transfusions No     Caffeine Concern Not Asked     Occupational Exposure Not Asked     Hobby Hazards Not Asked     Sleep Concern Not Asked     Stress Concern Not Asked     Weight Concern Not Asked     Special Diet Not Asked     Back Care Not Asked     Exercise Not Asked     Bike Helmet Not Asked     Seat Belt Not Asked     Self-Exams Not Asked     Parent/sibling w/ CABG, MI or angioplasty before 65F 55M? Not Asked   Social History Narrative    Fransisca lives with her spouse           MEDICATIONS:  has a current medication list which includes the following prescription(s): acetone urine, aspirin not prescribed, blood glucose, glucagon emergency, insulin glargine, insulin lispro, insulin pump, insulin syringe-needle u-100, levonorgestrel, microlet lancets, prenatal vit-fe fumarate-fa, and statin not prescribed.    ROS     ROS: 10 point ROS neg other than the symptoms noted above in the HPI.    Vitals:  No vitals were obtained today due to virtual visit.    healthy, alert and no distress  PSYCH: Alert and oriented times 3; coherent speech, normal   rate and volume, able to articulate logical thoughts, able   to abstract reason, no tangential thoughts, no hallucinations   or delusions  Her affect is normal and pleasant  RESP: No cough, no audible wheezing, able to talk in full  sentences  Remainder of exam unable to be completed due to telephone visits        LABS:  A1c  Component      Latest Ref Rng & Units 9/11/2019 12/11/2019 3/11/2020   Hemoglobin A1C      0 - 5.6 % 6.5 (H) 7.0 (H) 7.8 (H)     BMP:  !COMPREHENSIVE Latest Ref Rng & Units 12/11/2019   SODIUM 133 - 144 mmol/L 141   POTASSIUM 3.4 - 5.3 mmol/L 3.8   CHLORIDE 94 - 109 mmol/L 109   BUN 7 - 30 mg/dL 12   Creatinine 0.52 - 1.04 mg/dL 0.78   Glucose 70 - 99 mg/dL 32 (LL)   ANION GAP 3 - 14 mmol/L 6   CALCIUM 8.5 - 10.1 mg/dL 8.9   ALBUMIN 3.4 - 5.0 g/dL 3.8   PROTEIN, TOTAL 6.8 - 8.8 g/dL 7.4     !LIPID/HEPATIC Latest Ref Rng & Units 12/11/2019   CHOLESTEROL <200 mg/dL 175   TRIGLYCERIDES <150 mg/dL 87   HDL CHOLESTEROL >49 mg/dL 46 (L)   LDL CHOLESTEROL, CALCULATED <100 mg/dL 112 (H)   NON HDL CHOLESTEROL <130 mg/dL 129   AST 0 - 45 U/L 12   ALT 0 - 50 U/L 23     Urine microalbumin:  Component      Latest Ref Rng & Units 12/11/2019   Creatinine Urine      mg/dL 103   Albumin Urine mg/L      mg/L 6   Albumin Urine mg/g Cr      0 - 25 mg/g Cr 6.27     JOSH/C-peptide:  Component    Latest Ref Rng 11/10/2014   Glutamic Acid Decarboxylase Antibody     <5.0    Reference range: 0.0 to 5.0      C-Peptide    0.9 - 6.9 ng/mL <0.1 (L)     TFT:  !THYROID Latest Ref Rng & Units 12/11/2019   TSH 0.40 - 4.00 mU/L 1.30     TTG Abs:  Component    Latest Ref Rng 11/13/2014   Tissue Transglutaminase Antibody IgA    0 - 3.9 U/mL 1.1   Tissue Transglutaminase Rosina IgG    0 - 5.9 U/mL 1.0     All pertinent notes, labs, and images personally reviewed by me.     A/P  Ms.Jennica Avila is a 25 year old seen via video visit for the management of diabetes:    1. DM1 - ? Level of control unknown but appears to be in reasonable control.  Recent two week sensor glucose average 142 = an A1c of < 7.0%. A1c goal  < 7.0%  Plan to obtain labs: A1c, lipids, CMP, TSH, and urine microalbumin.  Sensor shows dinner post-prandial BG above range > 50% of the time.   States she sometimes forgets to bolus before dinner.  Continue working on consistently bolusing before meals.  If dinner postprandial remains elevated, recommend she change dinner carb ratio from 9.0-->8.8 or contact me to discuss changes.  Rx for glucagon emergency provided today.   RX for urine ketone strips, lantus and syringes provided in the event of insulin pump failure provided today.  Labs ordered today:   No orders of the defined types were placed in this encounter.      Radiology/Consults ordered today: None    No orders of the defined types were placed in this encounter.      Follow-up:  3-4 months     Maryann Hammonds NP  Endocrinology  [facility]  CC:     Phone call duration:  18 minutes.  Call start time: 1:38 pm; call end time: 1:56 pm

## 2020-10-14 NOTE — PATIENT INSTRUCTIONS
RAMON,    Remember to get your flu shot.    Keep working on taking your insulin bolus before eating.  If you blood sugar after dinner is still going to high (ovr 180), you can either let me know OR change your dinner (5 pm) carb ratio from 9.0 to 8.8.  If you need further increases, change gradually to a lower number, 8.8 --> 8..7, etc.    Please schedule a lab appointment at your convenience.  You are due for the A1c, cholesterol, thyroid, liver/kidney function, and urine protein.  I'll let you know lab results when available.    To see when you are due for a pump upgrade, call [employer].    Maryann Hammonds NP  Endocrinology

## 2020-10-14 NOTE — LETTER
"    10/14/2020         RE: Fransisca Cabello  [address] [address] [facility] [address]        Dear Colleague,    Thank you for referring your patient, Fransisca Cabello, to the [facility]. Please see a copy of my visit note below.    Fransisca Cabello is a 25 year old female who is being evaluated via a billable telephone visit.      The patient has been notified of following:     \"This telephone visit will be conducted via a call between you and your physician/provider. We have found that certain health care needs can be provided without the need for a physical exam.  This service lets us provide the care you need with a short phone conversation.  If a prescription is necessary we can send it directly to your pharmacy.  If lab work is needed we can place an order for that and you can then stop by our lab to have the test done at a later time.    Telephone visits are billed at different rates depending on your insurance coverage. During this emergency period, for some insurers they may be billed the same as an in-person visit.  Please reach out to your insurance provider with any questions.    If during the course of the call the physician/provider feels a telephone visit is not appropriate, you will not be charged for this service.\"    Patient has given verbal consent for Telephone visit?  Yes    What phone number would you like to be contacted at? [phone]    How would you like to obtain your AVS? MyChart    Name: Fransisca Avila  F/u for Diabetes (Last seen 6/10/2020).  HPI:  1. Type 1 DM:  Originally diagnosed at the age of 2.   Daughter born 11/2014, was diagnosed with type 1 diabetes.  Also has a son, born 1/2018  Fostering a 2 year old for the next 9 months    Current Regimen: Humalog  Insulin pump - Medtronic 670-G.   Auto mode 78%  Reservoir change: every 6.5 days  Sensor wear - 59%  Time Rate (U/hr)   00:00 1.20   09:30 1.25   12:30 1.25   19:30 1.25 "     Carbohydrate Ratio -    Time Ratio   00:00 9.0   11:30 9.0   17:00 9.0             Sensitivity  00:00 - 48  12:00 - 44  17:00 - 46   Active Insulin Time 3.00 hours   Basal  50% (24 units)   Bolus   50% (24 units)   Total Carbohydrates/day 210 grams   Total Insulin/day  48 units   Average Blood Sugar  Sensor Average 153  142   BS Checks 3.5 times a day   Care Link Username-   Password-   Target range:  0:00      Complications:   Diabetes Complications  Description / Detail    Diabetic Retinopathy  No retinopathy, overdue for annual exam - hard time finding time to schedule due to parenting two young children - reminded her to try to schedule   CAD / PAD  No   Neuropathy  No   Nephropathy / Microalbuminuria  No   Gastroparesis  No   Hypoglycemia Unawareness  No     2.  Intermittent hypercalcemia.  Calcium normalized since 8/2028.  Previous PTH low at 9, calcium was 9.5 at the time.  Continue to monitor.    PMH/PSH:  Past Medical History:   Diagnosis Date     Postpartum depression 2014    On medication for 6 months-      Type 1 diabetes (H) 1998    three years at diagnosis     Varicella 1998     Past Surgical History:   Procedure Laterality Date     C ROOT CANAL THERAPY 2 CANALS       DENTAL SURGERY       Family Hx:  Family History   Problem Relation Age of Onset     Family History Negative Mother      Diabetes Father 35        Type 1      Thyroid disease: No         DM2: NO         Autoimmune: DM1, SLE, RA, Vitiligo Yes - Father has type 1 DM    Social Hx:  Social History     Socioeconomic History     Marital status:      Spouse name: Teja     Number of children: 1     Years of education: Not on file     Highest education level: Not on file   Occupational History     Employer: [employer]   Social Needs     Financial resource strain: Not on file     Food insecurity     Worry: Not on file     Inability: Not on file     Transportation needs     Medical: Not on file     Non-medical: Not on file   Tobacco Use      Smoking status: Never Smoker     Smokeless tobacco: Never Used   Substance and Sexual Activity     Alcohol use: No     Alcohol/week: 0.0 standard drinks     Drug use: No     Sexual activity: Yes     Partners: Male     Birth control/protection: Condom   Lifestyle     Physical activity     Days per week: Not on file     Minutes per session: Not on file     Stress: Not on file   Relationships     Social connections     Talks on phone: Not on file     Gets together: Not on file     Attends Mandaeism service: Not on file     Active member of club or organization: Not on file     Attends meetings of clubs or organizations: Not on file     Relationship status: Not on file     Intimate partner violence     Fear of current or ex partner: Not on file     Emotionally abused: Not on file     Physically abused: Not on file     Forced sexual activity: Not on file   Other Topics Concern      Service Not Asked     Blood Transfusions No     Caffeine Concern Not Asked     Occupational Exposure Not Asked     Hobby Hazards Not Asked     Sleep Concern Not Asked     Stress Concern Not Asked     Weight Concern Not Asked     Special Diet Not Asked     Back Care Not Asked     Exercise Not Asked     Bike Helmet Not Asked     Seat Belt Not Asked     Self-Exams Not Asked     Parent/sibling w/ CABG, MI or angioplasty before 65F 55M? Not Asked   Social History Narrative    Fransisca lives with her spouse           MEDICATIONS:  has a current medication list which includes the following prescription(s): acetone urine, aspirin not prescribed, blood glucose, glucagon emergency, insulin glargine, insulin lispro, insulin pump, insulin syringe-needle u-100, levonorgestrel, microlet lancets, prenatal vit-fe fumarate-fa, and statin not prescribed.    ROS     ROS: 10 point ROS neg other than the symptoms noted above in the HPI.    Vitals:  No vitals were obtained today due to virtual visit.    healthy, alert and no distress  PSYCH: Alert and  oriented times 3; coherent speech, normal   rate and volume, able to articulate logical thoughts, able   to abstract reason, no tangential thoughts, no hallucinations   or delusions  Her affect is normal and pleasant  RESP: No cough, no audible wheezing, able to talk in full sentences  Remainder of exam unable to be completed due to telephone visits        LABS:  A1c  Component      Latest Ref Rng & Units 9/11/2019 12/11/2019 3/11/2020   Hemoglobin A1C      0 - 5.6 % 6.5 (H) 7.0 (H) 7.8 (H)     BMP:  !COMPREHENSIVE Latest Ref Rng & Units 12/11/2019   SODIUM 133 - 144 mmol/L 141   POTASSIUM 3.4 - 5.3 mmol/L 3.8   CHLORIDE 94 - 109 mmol/L 109   BUN 7 - 30 mg/dL 12   Creatinine 0.52 - 1.04 mg/dL 0.78   Glucose 70 - 99 mg/dL 32 (LL)   ANION GAP 3 - 14 mmol/L 6   CALCIUM 8.5 - 10.1 mg/dL 8.9   ALBUMIN 3.4 - 5.0 g/dL 3.8   PROTEIN, TOTAL 6.8 - 8.8 g/dL 7.4     !LIPID/HEPATIC Latest Ref Rng & Units 12/11/2019   CHOLESTEROL <200 mg/dL 175   TRIGLYCERIDES <150 mg/dL 87   HDL CHOLESTEROL >49 mg/dL 46 (L)   LDL CHOLESTEROL, CALCULATED <100 mg/dL 112 (H)   NON HDL CHOLESTEROL <130 mg/dL 129   AST 0 - 45 U/L 12   ALT 0 - 50 U/L 23     Urine microalbumin:  Component      Latest Ref Rng & Units 12/11/2019   Creatinine Urine      mg/dL 103   Albumin Urine mg/L      mg/L 6   Albumin Urine mg/g Cr      0 - 25 mg/g Cr 6.27     JOSH/C-peptide:  Component    Latest Ref Rng 11/10/2014   Glutamic Acid Decarboxylase Antibody     <5.0    Reference range: 0.0 to 5.0      C-Peptide    0.9 - 6.9 ng/mL <0.1 (L)     TFT:  !THYROID Latest Ref Rng & Units 12/11/2019   TSH 0.40 - 4.00 mU/L 1.30     TTG Abs:  Component    Latest Ref Rng 11/13/2014   Tissue Transglutaminase Antibody IgA    0 - 3.9 U/mL 1.1   Tissue Transglutaminase Rosina IgG    0 - 5.9 U/mL 1.0     All pertinent notes, labs, and images personally reviewed by me.     A/P  Ms.Jennica vAila is a 25 year old seen via video visit for the management of diabetes:    1. DM1 - ? Level of  control unknown but appears to be in reasonable control.  Recent two week sensor glucose average 142 = an A1c of < 7.0%. A1c goal  < 7.0%  Plan to obtain labs: A1c, lipids, CMP, TSH, and urine microalbumin.  Sensor shows dinner post-prandial BG above range > 50% of the time.  States she sometimes forgets to bolus before dinner.  Continue working on consistently bolusing before meals.  If dinner postprandial remains elevated, recommend she change dinner carb ratio from 9.0-->8.8 or contact me to discuss changes.  Rx for glucagon emergency provided today.   RX for urine ketone strips, lantus and syringes provided in the event of insulin pump failure provided today.  Labs ordered today:   No orders of the defined types were placed in this encounter.      Radiology/Consults ordered today: None    No orders of the defined types were placed in this encounter.      Follow-up:  3-4 months     Maryann Hammonds NP  Endocrinology  [facility]  CC:     Phone call duration:  18 minutes.  Call start time: 1:38 pm; call end time: 1:56 pm      Again, thank you for allowing me to participate in the care of your patient.        Sincerely,        HORTENSIA Rosales CNP

## 2020-12-16 ENCOUNTER — TELEPHONE (OUTPATIENT)
Dept: ENDOCRINOLOGY | Facility: CLINIC | Age: 25
End: 2020-12-16

## 2020-12-16 ENCOUNTER — MEDICAL CORRESPONDENCE (OUTPATIENT)
Dept: HEALTH INFORMATION MANAGEMENT | Facility: CLINIC | Age: 25
End: 2020-12-16

## 2020-12-20 ENCOUNTER — HEALTH MAINTENANCE LETTER (OUTPATIENT)
Age: 25
End: 2020-12-20

## 2021-02-11 ENCOUNTER — OFFICE VISIT (OUTPATIENT)
Dept: OBGYN | Facility: CLINIC | Age: 26
End: 2021-02-11
Payer: COMMERCIAL

## 2021-02-11 VITALS
BODY MASS INDEX: 26.37 KG/M2 | SYSTOLIC BLOOD PRESSURE: 136 MMHG | HEIGHT: 67 IN | HEART RATE: 96 BPM | WEIGHT: 168 LBS | DIASTOLIC BLOOD PRESSURE: 82 MMHG

## 2021-02-11 DIAGNOSIS — Z12.4 SCREENING FOR CERVICAL CANCER: ICD-10-CM

## 2021-02-11 DIAGNOSIS — E10.9 TYPE 1 DIABETES MELLITUS WITHOUT COMPLICATION (H): ICD-10-CM

## 2021-02-11 DIAGNOSIS — Z30.432 ENCOUNTER FOR IUD REMOVAL: ICD-10-CM

## 2021-02-11 DIAGNOSIS — Z01.419 WOMEN'S ANNUAL ROUTINE GYNECOLOGICAL EXAMINATION: Primary | ICD-10-CM

## 2021-02-11 LAB
ALBUMIN SERPL-MCNC: 3.5 G/DL (ref 3.4–5)
ALP SERPL-CCNC: 66 U/L (ref 40–150)
ALT SERPL W P-5'-P-CCNC: 37 U/L (ref 0–50)
ANION GAP SERPL CALCULATED.3IONS-SCNC: 2 MMOL/L (ref 3–14)
AST SERPL W P-5'-P-CCNC: 17 U/L (ref 0–45)
BILIRUB SERPL-MCNC: 0.6 MG/DL (ref 0.2–1.3)
BUN SERPL-MCNC: 8 MG/DL (ref 7–30)
CALCIUM SERPL-MCNC: 9.1 MG/DL (ref 8.5–10.1)
CHLORIDE SERPL-SCNC: 108 MMOL/L (ref 94–109)
CHOLEST SERPL-MCNC: 164 MG/DL
CO2 SERPL-SCNC: 28 MMOL/L (ref 20–32)
CREAT SERPL-MCNC: 0.71 MG/DL (ref 0.52–1.04)
GFR SERPL CREATININE-BSD FRML MDRD: >90 ML/MIN/{1.73_M2}
GLUCOSE SERPL-MCNC: 151 MG/DL (ref 70–99)
HBA1C MFR BLD: 7.5 % (ref 0–5.6)
HDLC SERPL-MCNC: 49 MG/DL
LDLC SERPL CALC-MCNC: 105 MG/DL
NONHDLC SERPL-MCNC: 115 MG/DL
POTASSIUM SERPL-SCNC: 4.2 MMOL/L (ref 3.4–5.3)
PROT SERPL-MCNC: 6.8 G/DL (ref 6.8–8.8)
SODIUM SERPL-SCNC: 138 MMOL/L (ref 133–144)
TRIGL SERPL-MCNC: 48 MG/DL
TSH SERPL DL<=0.005 MIU/L-ACNC: 1.24 MU/L (ref 0.4–4)

## 2021-02-11 PROCEDURE — 82043 UR ALBUMIN QUANTITATIVE: CPT | Performed by: CLINICAL NURSE SPECIALIST

## 2021-02-11 PROCEDURE — 36415 COLL VENOUS BLD VENIPUNCTURE: CPT | Performed by: CLINICAL NURSE SPECIALIST

## 2021-02-11 PROCEDURE — G0145 SCR C/V CYTO,THINLAYER,RESCR: HCPCS | Performed by: OBSTETRICS & GYNECOLOGY

## 2021-02-11 PROCEDURE — 58301 REMOVE INTRAUTERINE DEVICE: CPT | Performed by: OBSTETRICS & GYNECOLOGY

## 2021-02-11 PROCEDURE — 84443 ASSAY THYROID STIM HORMONE: CPT | Performed by: CLINICAL NURSE SPECIALIST

## 2021-02-11 PROCEDURE — 83036 HEMOGLOBIN GLYCOSYLATED A1C: CPT | Performed by: CLINICAL NURSE SPECIALIST

## 2021-02-11 PROCEDURE — 80061 LIPID PANEL: CPT | Performed by: CLINICAL NURSE SPECIALIST

## 2021-02-11 PROCEDURE — 99395 PREV VISIT EST AGE 18-39: CPT | Mod: 25 | Performed by: OBSTETRICS & GYNECOLOGY

## 2021-02-11 PROCEDURE — 80053 COMPREHEN METABOLIC PANEL: CPT | Performed by: CLINICAL NURSE SPECIALIST

## 2021-02-11 PROCEDURE — 99213 OFFICE O/P EST LOW 20 MIN: CPT | Mod: 25 | Performed by: OBSTETRICS & GYNECOLOGY

## 2021-02-11 ASSESSMENT — MIFFLIN-ST. JEOR: SCORE: 1521.73

## 2021-02-11 NOTE — Clinical Note
This patient (formerly saw Maryann Hammonds) may be seeing you for follow up of Type I DM--planning conception soon. Please let me know if any questions after you see her, thanks.    Cheyanne

## 2021-02-11 NOTE — NURSING NOTE
"Chief Complaint   Patient presents with     Physical       Initial /82   Pulse 96   Ht 1.689 m (5' 6.5\")   Wt 76.2 kg (168 lb)   Breastfeeding No   BMI 26.71 kg/m   Estimated body mass index is 26.71 kg/m  as calculated from the following:    Height as of this encounter: 1.689 m (5' 6.5\").    Weight as of this encounter: 76.2 kg (168 lb).  BP completed using cuff size: regular    Questioned patient about current smoking habits.  Pt. has never smoked.          The following HM Due: pap smear      The following patient reported/Care Every where data was sent to:  P ABSTRACT QUALITY INITIATIVES [[id number]]  María Elena Arce LPN             "

## 2021-02-12 LAB
CREAT UR-MCNC: 190 MG/DL
MICROALBUMIN UR-MCNC: 11 MG/L
MICROALBUMIN/CREAT UR: 5.68 MG/G CR (ref 0–25)

## 2021-02-15 LAB
COPATH REPORT: NORMAL
PAP: NORMAL

## 2021-06-15 DIAGNOSIS — Z79.4 ENCOUNTER FOR LONG-TERM (CURRENT) USE OF INSULIN (H): ICD-10-CM

## 2021-06-15 DIAGNOSIS — E10.9 TYPE 1 DIABETES MELLITUS WITHOUT COMPLICATION (H): ICD-10-CM

## 2021-06-15 DIAGNOSIS — Z96.41 INSULIN PUMP IN PLACE: ICD-10-CM

## 2021-06-15 NOTE — TELEPHONE ENCOUNTER
Routing refill request to provider for review/approval because:  Patient needs to be seen because:  It has been more than 6 months since last diabetic visit. Needs established endocrinology provider.     TEOFILO BergN, RN  [facility]

## 2021-07-22 ENCOUNTER — MYC MEDICAL ADVICE (OUTPATIENT)
Dept: ENDOCRINOLOGY | Facility: CLINIC | Age: 26
End: 2021-07-22

## 2021-07-22 ENCOUNTER — TELEPHONE (OUTPATIENT)
Dept: ENDOCRINOLOGY | Facility: CLINIC | Age: 26
End: 2021-07-22

## 2021-07-22 ENCOUNTER — TRANSFERRED RECORDS (OUTPATIENT)
Dept: HEALTH INFORMATION MANAGEMENT | Facility: CLINIC | Age: 26
End: 2021-07-22

## 2021-07-22 LAB — HBA1C MFR BLD: 7.4 %

## 2021-07-22 NOTE — TELEPHONE ENCOUNTER
Noted low BG 44.  I need records before this can be signed.  Please ask to get BG for last 8-10 days.  I am off Friday -so this can be done on Monday.  I will try to log in on Friday.

## 2021-07-22 NOTE — TELEPHONE ENCOUNTER
Insulin-Treated DM Assessment Form    She brought in her pump/sensors but I was unable to upload it, she will call [employer].    I wrote down this:    BG  4 TIMES  BG   BG STD DEV 45.5  LOW BG 44  HIGH     TDD  46.775 U  BASAL  28U/60%  BOLUS 18.775U/40%    LAST OFFICE/VIRTUAL VISIT:  10/14/20 WITH DEBORAH HUBER    FUTURE OFFICE/VIRTUAL VISIT:  08/28/21    Becka Crandall CMA  [facility]  [facility]

## 2021-07-22 NOTE — TELEPHONE ENCOUNTER
Patient of Maryann's needs her DMV DM Form filled out asap, I told her no, to go to pcp but she said her pcp declined to sign it.    She's mad.  See my chart.    Please advise.    Becka Crandall [facility] [place]  [phone]

## 2021-07-22 NOTE — TELEPHONE ENCOUNTER
Message sent via [employer].      Becka Crandall CMA  [facility]  [facility]

## 2021-07-22 NOTE — TELEPHONE ENCOUNTER
Pt requesting to speak with Shelia (see msg below). Pt had an appt with pcp and provider refuse to fill out dmv form. Please call pt back ASAP, license will  in 2 days. Pt stated she never had to do this before.

## 2021-07-22 NOTE — TELEPHONE ENCOUNTER
Message sent via [employer].      Becka Crandall CMA  [facility]  [facility]

## 2021-07-22 NOTE — TELEPHONE ENCOUNTER
Message sent via [employer].      Becka Crandall CMA  [facility]  [facility]

## 2021-07-22 NOTE — TELEPHONE ENCOUNTER
I received a letter saying my drivers license was being suspended, not sure why or what happened, but it stated I needed a letter from my endocrinologist to keep it active. I d like to schedule a standard visit to discuss prescriptions and hopefully receive a letter to not lose my drivers license    I am scheduled for an appointment in August but the letter I received from [facility] Saturday the 24th, I will no longer be able to drive! This is very problematic as I have multiple children that have appointments throughout the week that I have to get them to. I know I haven t been seen by the new endocrinologist but years of previous A1C results in control should be enough to get a letter from any endocrinologist. I m doing my best to get this taken care of. Thank you!

## 2021-07-23 NOTE — TELEPHONE ENCOUNTER
RECORDS RECEIVED FROM: internal /ce    DATE RECEIVED: 8.28.21    NOTES (FOR ALL VISITS) STATUS DETAILS   OFFICE NOTES from referring provider na Self referred    OFFICE NOTES from other specialist ce/internal  -7.22.21 Oma  internal - 10.14.20 Cassius     ED NOTES na    OPERATIVE REPORT  (thyroid, pituitary, adrenal, parathyroid) na    MEDICATION LIST internal     IMAGING      DEXASCAN na    MRI (BRAIN) na    XR (Chest) na    CT (HEAD/NECK/CHEST/ABDOMEN) na    NUCLEAR  na    ULTRASOUND (HEAD/NECK) na    LABS     DIABETES: HBGA1C, CREATININE, FASTING LIPIDS, MICROALBUMIN URINE, POTASSIUM, TSH, T4    THYROID: TSH, T4, CBC, THYRODLONULIN, TOTAL T3, FREE T4, CALCITONIN, CEA internal /ce HBGA1C- 7.22.21  LIPIDS- 2.11.21  TSH/T4- 2.11.21

## 2021-07-26 NOTE — TELEPHONE ENCOUNTER
Form was faxed and sent to scanning.  Original mailed to patient.    Becka Crandall, HARRISON  [facility]  [facility]

## 2021-08-28 ENCOUNTER — PRE VISIT (OUTPATIENT)
Dept: ENDOCRINOLOGY | Facility: CLINIC | Age: 26
End: 2021-08-28

## 2021-09-14 ENCOUNTER — PRENATAL OFFICE VISIT (OUTPATIENT)
Dept: NURSING | Facility: CLINIC | Age: 26
End: 2021-09-14
Payer: COMMERCIAL

## 2021-09-14 DIAGNOSIS — O09.91 SUPERVISION OF HIGH RISK PREGNANCY IN FIRST TRIMESTER: Primary | ICD-10-CM

## 2021-09-14 PROCEDURE — 99207 PR NO CHARGE NURSE ONLY: CPT

## 2021-09-14 NOTE — PROGRESS NOTES
"Chief Complaint   Patient presents with     Prenatal Care     New Prenatal Nurse Telephone Visit   .  6w3d  Estimated Date of Delivery: May 7, 2022    Initial LMP 2021  Estimated body mass index is 26.71 kg/m  as calculated from the following:    Height as of 21: 1.689 m (5' 6.5\").    Weight as of 21: 76.2 kg (168 lb).  BP completed using cuff size: NA (Not Taken)    Questioned patient about current smoking habits.  Pt. has never smoked.    NPN nurse visit done over the phone. Pt will be given NPN folder and book at her upcoming appt.   Discussed optional screening available to assess chromosomal anomalies. Questions answered. Pt advised to call the clinic if she has any questions or concerns related to her pregnancy. Prenatal labs will be obtained at her upcoming appt. New prenatal visit scheduled on 10/11/21 with Dr Hayward.  Patient would like 1st trimester screen with MFM. Referral placed.        Lab Results   Component Value Date    PAP NIL 2021           Patient supplied answers from flow sheet for:  Prenatal OB Questionnaire.  Past Medical History  Have you ever recieved care for your mental health? : No  Have you ever been in a major accident or suffered serious trauma?: No  Within the last year, has anyone hit, slapped, kicked or otherwise hurt you?: No  In the last year, has anyone forced you to have sex when you didn't want to?: No    Past Medical History 2   Have you ever received a blood transfusion?: No  Would you accept a blood transfusion if was medically recommended?: Yes  Does anyone in your home smoke?: No   Is your blood type Rh negative?: No  Have you ever ?: (!) Yes  Have you been hospitalized for a nonsurgical reason excluding normal delivery?: No  Have you ever had an abnormal pap smear?: No    Past Medical History (Continued)  Do you have a history of abnormalities of the uterus?: No  Did your mother take ALENA or any other hormones when she was pregnant " with you?: No  Do you have any other problems we have not asked about which you feel may be important to this pregnancy?: No    Nanci Pastor RN

## 2021-09-15 ENCOUNTER — TRANSCRIBE ORDERS (OUTPATIENT)
Dept: MATERNAL FETAL MEDICINE | Facility: CLINIC | Age: 26
End: 2021-09-15

## 2021-09-15 DIAGNOSIS — O26.90 PREGNANCY RELATED CONDITION, ANTEPARTUM: Primary | ICD-10-CM

## 2021-09-20 ENCOUNTER — HOSPITAL ENCOUNTER (EMERGENCY)
Facility: CLINIC | Age: 26
Discharge: HOME | End: 2021-09-20
Payer: COMMERCIAL

## 2021-09-20 VITALS
SYSTOLIC BLOOD PRESSURE: 148 MMHG | OXYGEN SATURATION: 99 % | TEMPERATURE: 98.1 F | HEART RATE: 111 BPM | RESPIRATION RATE: 16 BRPM | DIASTOLIC BLOOD PRESSURE: 94 MMHG

## 2021-09-21 NOTE — ED TRIAGE NOTES
Pt seen at PCP today and c/o chest pain. In first pregnancy pt had SVT. Pt is now 7 weeks pregnant. C/O mid sternal Cp without further sx. ABC in tact. A/OX4

## 2021-09-23 LAB
ATRIAL RATE - MUSE: 92 BPM
DIASTOLIC BLOOD PRESSURE - MUSE: NORMAL MMHG
INTERPRETATION ECG - MUSE: NORMAL
P AXIS - MUSE: 45 DEGREES
PR INTERVAL - MUSE: 132 MS
QRS DURATION - MUSE: 90 MS
QT - MUSE: 346 MS
QTC - MUSE: 427 MS
R AXIS - MUSE: 56 DEGREES
SYSTOLIC BLOOD PRESSURE - MUSE: NORMAL MMHG
T AXIS - MUSE: 42 DEGREES
VENTRICULAR RATE- MUSE: 92 BPM

## 2021-09-28 ENCOUNTER — LAB (OUTPATIENT)
Dept: LAB | Facility: CLINIC | Age: 26
End: 2021-09-28
Payer: COMMERCIAL

## 2021-09-28 ENCOUNTER — ANCILLARY PROCEDURE (OUTPATIENT)
Dept: ULTRASOUND IMAGING | Facility: CLINIC | Age: 26
End: 2021-09-28
Payer: COMMERCIAL

## 2021-09-28 DIAGNOSIS — O09.91 SUPERVISION OF HIGH RISK PREGNANCY IN FIRST TRIMESTER: ICD-10-CM

## 2021-09-28 LAB
ABO/RH(D): NORMAL
ANTIBODY SCREEN: NEGATIVE
ERYTHROCYTE [DISTWIDTH] IN BLOOD BY AUTOMATED COUNT: 12.8 % (ref 10–15)
HCT VFR BLD AUTO: 39.3 % (ref 35–47)
HGB BLD-MCNC: 13.2 G/DL (ref 11.7–15.7)
MCH RBC QN AUTO: 30.3 PG (ref 26.5–33)
MCHC RBC AUTO-ENTMCNC: 33.6 G/DL (ref 31.5–36.5)
MCV RBC AUTO: 90 FL (ref 78–100)
PLATELET # BLD AUTO: 254 10E3/UL (ref 150–450)
RBC # BLD AUTO: 4.35 10E6/UL (ref 3.8–5.2)
SPECIMEN EXPIRATION DATE: NORMAL
WBC # BLD AUTO: 6.9 10E3/UL (ref 4–11)

## 2021-09-28 PROCEDURE — 36415 COLL VENOUS BLD VENIPUNCTURE: CPT

## 2021-09-28 PROCEDURE — 86803 HEPATITIS C AB TEST: CPT

## 2021-09-28 PROCEDURE — 86850 RBC ANTIBODY SCREEN: CPT

## 2021-09-28 PROCEDURE — 87086 URINE CULTURE/COLONY COUNT: CPT

## 2021-09-28 PROCEDURE — 87389 HIV-1 AG W/HIV-1&-2 AB AG IA: CPT

## 2021-09-28 PROCEDURE — 86780 TREPONEMA PALLIDUM: CPT

## 2021-09-28 PROCEDURE — 85027 COMPLETE CBC AUTOMATED: CPT

## 2021-09-28 PROCEDURE — 86762 RUBELLA ANTIBODY: CPT

## 2021-09-28 PROCEDURE — 76801 OB US < 14 WKS SINGLE FETUS: CPT | Performed by: OBSTETRICS & GYNECOLOGY

## 2021-09-28 PROCEDURE — 87340 HEPATITIS B SURFACE AG IA: CPT

## 2021-09-28 PROCEDURE — 86900 BLOOD TYPING SEROLOGIC ABO: CPT

## 2021-09-28 PROCEDURE — 86901 BLOOD TYPING SEROLOGIC RH(D): CPT

## 2021-09-29 LAB
BACTERIA UR CULT: NO GROWTH
HBV SURFACE AG SERPL QL IA: NONREACTIVE
HCV AB SERPL QL IA: NONREACTIVE
HIV 1+2 AB+HIV1 P24 AG SERPL QL IA: NONREACTIVE
RUBV IGG SERPL QL IA: 6.63 INDEX
RUBV IGG SERPL QL IA: POSITIVE
T PALLIDUM AB SER QL: NONREACTIVE

## 2021-10-03 ENCOUNTER — HEALTH MAINTENANCE LETTER (OUTPATIENT)
Age: 26
End: 2021-10-03

## 2021-10-11 ENCOUNTER — PRENATAL OFFICE VISIT (OUTPATIENT)
Dept: OBGYN | Facility: CLINIC | Age: 26
End: 2021-10-11
Payer: COMMERCIAL

## 2021-10-11 VITALS — WEIGHT: 177 LBS | SYSTOLIC BLOOD PRESSURE: 108 MMHG | BODY MASS INDEX: 28.14 KG/M2 | DIASTOLIC BLOOD PRESSURE: 68 MMHG

## 2021-10-11 DIAGNOSIS — E10.9 TYPE 1 DIABETES MELLITUS WITHOUT COMPLICATION (H): Primary | ICD-10-CM

## 2021-10-11 DIAGNOSIS — O09.899 SUPERVISION OF OTHER HIGH RISK PREGNANCY, ANTEPARTUM: ICD-10-CM

## 2021-10-11 DIAGNOSIS — Z11.3 SCREENING EXAMINATION FOR SEXUALLY TRANSMITTED DISEASE: ICD-10-CM

## 2021-10-11 PROCEDURE — 99207 PR FIRST OB VISIT: CPT | Performed by: OBSTETRICS & GYNECOLOGY

## 2021-10-11 PROCEDURE — 87491 CHLMYD TRACH DNA AMP PROBE: CPT | Performed by: OBSTETRICS & GYNECOLOGY

## 2021-10-11 PROCEDURE — 87591 N.GONORRHOEAE DNA AMP PROB: CPT | Performed by: OBSTETRICS & GYNECOLOGY

## 2021-10-11 NOTE — NURSING NOTE
"Chief Complaint   Patient presents with     Prenatal Care       Initial /68   Wt 80.3 kg (177 lb)   LMP 2021   BMI 28.14 kg/m   Estimated body mass index is 28.14 kg/m  as calculated from the following:    Height as of 21: 1.689 m (5' 6.5\").    Weight as of this encounter: 80.3 kg (177 lb).  BP completed using cuff size: regular    Questioned patient about current smoking habits.  Pt. has never smoked.          The following HM Due: NONE      The following patient reported/Care Every where data was sent to:  P ABSTRACT QUALITY INITIATIVES [[id number]]        Minnie Cruz [facility]                 "

## 2021-10-11 NOTE — PATIENT INSTRUCTIONS
You can reach your [facility] Care Team any time of the day by calling [phone]. This number will put you in touch with the 24 hour nurse line if the clinic is closed.    To contact your OB/GYN Station Coordinator/Surgery Scheduler please call [phone]. This is a direct number for your care team between 8 a.m. and 4 p.m. Monday through Friday.    [facility] is open for your convenience:  Monday through Friday 8 a.m. to 6 p.m.  Closed weekends and all major holidays.

## 2021-10-11 NOTE — PROGRESS NOTES
Fransisca Cabello is a 26 year old white female  Patient's last menstrual period was 2021. Estimated Date of Delivery: May 7, 2022 confirmed by early first trimester ultrasound  who presents to the clinic for an new ob visit.    Estimated Date of Delivery: May 7, 2022 is calculated from Patient's last menstrual period was 2021.   She has not had bleeding since her LMP.   She has had mild nausea. Weigh loss has not occurred.   This was a planned pregnancy.   FOB is involved,     testing including the NIPT screen, the  1st trimester screen, the quad test, the AFP test, the modified sequential test, cystic fibrosis screening and Ultrasound and the time frames for each of these were reviewed.  The patient will let us know her wishes.  We reviewed her past history of severe preeclampsia with induction at 34 weeks gestation.  We discussed the use of ASA 81 mg daily.  The patient is amenable to this.  The patient uses a Medtronic 7/70 continuous glucose monitor device and an insulin pump.  She is presently on Humalog and follows [facility]  We do lengthy discussion regarding blood sugar control in pregnancy and the patient is very well aware of this commitment     OTHER CONCERNS: As above  INFECTION HISTORY  HIV: no  Hepatitis B: no  Hepatitis C: no  Syphilis:  no  Tuberculosis: no   PPD- no  Herpes self: no  Herpes partner:  no  Chlamydia:  no  Gonorrhea:  no  HPV: no  BV:  no  Trichomonis:  no  Chicken Pox:  YES  ====================================================  PERSONAL/SOCIAL HISTORY  Lives lives with their spouse.  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  Additional items: None  =====================================================   REVIEW OF SYSTEMS  CONSTITUTIONAL: NEGATIVE for fever, chills  EYES: NEGATIVE for vision changes   RESP: NEGATIVE for significant cough or SOB  BREAST: NEGATIVE for masses,  tenderness or discharge  CV: NEGATIVE for chest pain, palpitations   GI: NEGATIVE for nausea, abdominal pain, heartburn, or change in bowel habits  : NEGATIVE for frequency, dysuria, or hematuria   female: None  MUSCULOSKELETAL: NEGATIVE for significant arthralgias or myalgia  NEURO: NEGATIVE for weakness, dizziness or paresthesias or headache  ====================================================  PHYSICAL EXAM:  /68   Wt 80.3 kg (177 lb)   LMP 07/31/2021   BMI 28.14 kg/m    BMI- Body mass index is 28.14 kg/m .,     RECOMMENDED WEIGHT GAIN: 25-35 lbs.  GENERAL:  Pleasant pregnant female, alert, well groomed.  SKIN:  Warm and dry, without lesions or rashes  HEAD: Symmetrical features.  EYES:  PERRLA,   MOUTH:  Buccal mucosa pink, moist without lesions.    NECK:  Thyroid without enlargement and nodules.  Lymph nodes not palpable.   LUNGS:  Clear to auscultation.  BREAST:  Symmetrical.  No dominant, fixed or suspicious masses are noted.  No skin or nipple changes or axillary nodes.  Self exam is taught and encouraged.  Nipples everted.      HEART:  RRR without murmur.  ABDOMEN: Soft without masses , tenderness or organomegaly.  No CVA tenderness. No scars noted..  with a Doptone fundal height consistent with 10 weeks  MUSCULOSKELETAL:  Full range of motion  EXTREMITIES:  No edema. No significant varicosities.   GENITALIA:  BUS WNL, no lesions noted   VAGINA:  Pink, normal rugae and discharge normal and physiologic,   CERVIX:  smooth, without discharge or CMT and parous os,   firm/ closed 2 cm long.  UTERUS: Anteverted, nontender 10 weeks in size.  ADNEXA: Without masses or tenderness  PELVIS:  Arch; wide . Sacrum; deep. Spines;blunt.  Side walls; straight. Type; gynecoid  PELVIS:   Adequate, Pelvis proven to 7 pounds 9 ounces.  RECTAL:  Normal appearance.  Digital exam deferred.  WET PREP:Not done  gonorrhea  =========================================  ASSESSMENT:  (E10.9) Type 1 diabetes mellitus  without complication (H)  (primary encounter diagnosis)  Comment: Patient presently follows with endocrinology has continuous glucose monitor and insulin pump and is very knowledgeable and well motivated  Plan: Hemoglobin A1c        I will recheck a hemoglobin A1c today    (Z11.3) Screening examination for sexually transmitted disease  Comment: Denies exposure symptoms  Plan: NEISSERIA GONORRHOEA PCR, CHLAMYDIA TRACHOMATIS        PCR           (O09.899) Supervision of other high risk pregnancy, antepartum  Comment: We reviewed her past history of  induction of labor for severe preeclampsia, hypertensive disorders Center type 1 diabetes.  Patient has [facility] ultrasound and consultation follow-up ordered.  She would like to follow with Dr. Silva and has a follow-up office appointment with her.  Symptoms of concern were discussed the patient will call  Plan: Plan given        PREGNANCY AT RISK? yes  ==========================================  PLAN:  Instructed on use of triage nurse line and contacting the on call MD after hours for an urgent need such as fever, vagina bleeding, bladder or vaginal infection, rupture of membranes,  or term labor.      Instructed on best evidence for: weight gain for her BMI for pregnancy; healthy diet and foods to avoid; exercise and activity during pregnancy;avoiding exposure to toxoplasmosis; and maintenance of a generally healthy lifestyle.   Discussed the harms, benefits, side effects and alternative therapies for current prescribed and OTC medications.

## 2021-10-12 ENCOUNTER — LAB (OUTPATIENT)
Dept: LAB | Facility: CLINIC | Age: 26
End: 2021-10-12
Payer: COMMERCIAL

## 2021-10-12 DIAGNOSIS — E10.9 TYPE 1 DIABETES MELLITUS WITHOUT COMPLICATION (H): ICD-10-CM

## 2021-10-12 LAB
C TRACH DNA SPEC QL NAA+PROBE: NEGATIVE
HBA1C MFR BLD: 7.2 % (ref 0–5.6)
N GONORRHOEA DNA SPEC QL NAA+PROBE: NEGATIVE

## 2021-10-12 PROCEDURE — 36415 COLL VENOUS BLD VENIPUNCTURE: CPT

## 2021-10-12 PROCEDURE — 83036 HEMOGLOBIN GLYCOSYLATED A1C: CPT

## 2021-10-12 NOTE — RESULT ENCOUNTER NOTE
Fransisca, all your results testing for Gonorrhea and Chlamydia are negative and within normal limits  Shailesh Hayward M.D.

## 2021-10-12 NOTE — RESULT ENCOUNTER NOTE
Fransisca, your most recent hemoglobin A1c from 10/12/2021 is 7.2.  Please review this with your endocrinologist.  Please let me know if have any questions  Thank you  Shailesh Hayward M.D.

## 2021-10-22 ENCOUNTER — PRE VISIT (OUTPATIENT)
Dept: MATERNAL FETAL MEDICINE | Facility: CLINIC | Age: 26
End: 2021-10-22

## 2021-10-29 ENCOUNTER — HOSPITAL ENCOUNTER (OUTPATIENT)
Dept: ULTRASOUND IMAGING | Facility: CLINIC | Age: 26
End: 2021-10-29
Attending: OBSTETRICS & GYNECOLOGY
Payer: COMMERCIAL

## 2021-10-29 ENCOUNTER — OFFICE VISIT (OUTPATIENT)
Dept: MATERNAL FETAL MEDICINE | Facility: CLINIC | Age: 26
End: 2021-10-29
Attending: OBSTETRICS & GYNECOLOGY
Payer: COMMERCIAL

## 2021-10-29 DIAGNOSIS — O24.011 TYPE 1 DIABETES MELLITUS DURING PREGNANCY IN FIRST TRIMESTER: Primary | ICD-10-CM

## 2021-10-29 DIAGNOSIS — O26.90 PREGNANCY RELATED CONDITION, ANTEPARTUM: ICD-10-CM

## 2021-10-29 DIAGNOSIS — E10.65 TYPE 1 DIABETES MELLITUS WITH HYPERGLYCEMIA (H): ICD-10-CM

## 2021-10-29 DIAGNOSIS — Z36.0 ENCOUNTER FOR ANTENATAL SCREENING FOR CHROMOSOMAL ANOMALIES: Primary | ICD-10-CM

## 2021-10-29 PROCEDURE — 76813 OB US NUCHAL MEAS 1 GEST: CPT

## 2021-10-29 PROCEDURE — 76813 OB US NUCHAL MEAS 1 GEST: CPT | Mod: 26 | Performed by: OBSTETRICS & GYNECOLOGY

## 2021-10-29 NOTE — PROGRESS NOTES
Please see full imaging report from [employer] program under imaging tab.    Jes James MD  Maternal Fetal Medicine

## 2021-10-29 NOTE — PROGRESS NOTES
[facility] [place]  Genetic Counseling Consult    Patient: Fransisca Cabello YOB: 1995   Date of Service: 10/29/21      Fransisca Cabello was seen at [facility] for genetic consultation to discuss the options for routine screening for fetal chromosome abnormalities. Fransisca was accompanied to today's visit by her son, Liban.      Impression/Plan:   1.  Fransisca and I discussed the options for aneuploidy screening and carrier screening in this pregnancy. She endorsed significant anxiety when she has done screening in the past (in her first pregnancy after ventriculomegaly was identified, but not performed in her second pregnancy). We discussed what types of information could be learned through non-invasive screening, positive and negative results, and options for follow-up. For now, Fransisca opted to have screening through ultrasound only. Fransisca had a nuchal translucency ultrasound today, which was within normal limits    2. Maternal serum AFP (single marker screen) can be offered after 15 weeks to screen for open neural tube defects. A quad screen could also be considered.    3. Fransisca has a personal history of diabetes type 1. Her father and her daughter also have this condition. Fransisca is familiar to [facility] due to this history. She will return to [facility] for a level II ultrasound on 21.    Pregnancy History:   /Parity:    Age at Delivery: 27 year old  BETTY: 2022, by Last Menstrual Period  Gestational Age: 12w6d    No significant complications or exposures were reported in the current pregnancy.    Fransisca s pregnancy history is significant for:  o  pre-term pregnancy, female. Pregnancy complicated by the identification of ventriculomegaly in the fetus, polyhydramnios, and preeclampsia. This child also has a diagnosis of type I diabetes.   o  full-term pregnancy, male, alive and well.     Medical  History:   Fransisca's medical history is positive for type I diabetes and a history of  delivery and preeclampsia. The average pregnancy has a 3-5% chance of having a baby with a birth defect. Maternal diabetes increases that chance to 6-10% and possibly up to 20% if it is poorly controlled in the first trimester. These birth defects can include spinal cord defects (spina bifida), heart defects, skeletal defects, and defects in the urinary, reproductive, and digestive systems. Diabetes in the pregnancy can also lead to complications such as preeclampsia, polyhydramnios, and  delivery. Fransisca follows with endocrinology through [facility]. She is also taking baby ASA prophylactically.       Family History:   A three-generation pedigree was obtained, and is scanned under the  Media  tab.   The following significant findings were reported by Fransisca:    Fransisca has one full-sister and a maternal half-brother. Her brother has a son. All are reported to be healthy.     Fransisca has five maternal aunts, one of whom was recently diagnosed with cervical cancer. Her maternal uncle passed away due to a motor vehicle accident.     Fransisca's father also has a diagnosis of type I diabetes. Diabetes is a complex genetic trait (a multifactorial condition) caused by the combination of genetic and environmental factors. Having a family history of diabetes can increase one's risk of also developing diabetes. Therefore, there is currently no prenatal genetic testing we would offer the patient at this time to assess for diabetes risks in the current pregnancy. Fransisca's daughter also has a diagnosis of type I diabetes; she is very aware that the child of this current pregnancy is also at risk for developing the condition.    Fransisca's paternal grandparents experienced six miscarriages. One of Fransisca's paternal aunts experienced four miscarriages. We discussed how recurrent pregnancy loss can sometimes be due to an  underlying cause. Common causes of recurrent pregnancy loss include maternal factors, endocrine disorders, immune disorders, and chromosomal and single gene disorders. Without further information regarding an underlying cause for these relative's recurrent pregnancy loss, an accurate risk assessment cannot be provided and the possibility of a familial chromosomal rearrangement cannot be ruled out.     Fransisca has one paternal half-cousin who's son had gastroschisis. He is now three years of age, recovered, and developmentally on track. Gastroschisis is a congenital defect of the abdominal wall that occurs laterally to, and often to the right of, a normally closed umbilical ring. Visceral organs that herniate through the defect are not covered by a membrane. Gastroschisis is distinct from omphalocele, which is characterized by herniation of abdominal contents through the base of the umbilical cord; in omphalocele, the visceral organs are covered by membranes. We reviewed that  isolated gastroschisis is most commonly understood to be a sporadic congenital anomaly and not associated with an underlying genetic condition or chromosomal anomaly.    Fransisca shared that she has a set of paternal first-cousins once-removed (the three children of one of her paternal first-cousins) who all have diagnoses of autism spectrum disorders. Some forms of autism spectrum disorders can be associated with specific genetic conditions, where we discussed that approximately 15-20% of individuals who have autism will have an identifiable genetic cause for this history.  However, most often these conditions are due to the combination of genes and environmental factors that can affect development.  Since there is a genetic component to autism spectrum disorders, the recurrence risk for other family members is higher among first-degree relatives of the individual with an autism spectrum disorder (full siblings), and decreases with distance in  relationship to other second-degree relatives.      Fransisca's partner, Teja, is 34 and healthy. He has a sister who is doing well, with no children.     Otherwise, the reported family history is negative for multiple miscarriages, stillbirths, birth defects, intellectual disability, known genetic conditions, and consanguinity.       Carrier Screening:   The patient reports that she and the father of the pregnancy have  ancestry:      Cystic fibrosis is an autosomal recessive genetic condition that occurs with increased frequency in individuals of  ancestry and carrier screening for this condition is available.  In addition,  screening in the [facility] includes cystic fibrosis.      Expanded carrier screening for mutations in a large panel of genes associated with autosomal recessive conditions including cystic fibrosis, spinal muscular atrophy, and others, is now available.      The patient has declined the carrier screening options reviewed today.       Risk Assessment for Chromosome Conditions:   We explained that the risk for fetal chromosome abnormalities increases with maternal age. We discussed specific features of common chromosome abnormalities, including Down syndrome, trisomy 13, trisomy 18, and sex chromosome conditions.      - At age 27 at midtrimester, the risk to have a baby with Down syndrome is 1 in 855.    - At age 27 at midtrimester, the risk to have a baby with any chromosome abnormality is 1 in 428.        Testing Options:   We discussed the following options:   First trimester screening    First trimester ultrasound with nuchal translucency and nasal bone assessments, maternal plasma hCG, DELTA-A, and AFP measurement    Screens for fetal trisomy 21, trisomy 13, and trisomy 18    Cannot screen for open neural tube defects; maternal serum AFP after 15 weeks is recommended     Non-invasive Prenatal Testing (NIPT)    Maternal plasma cell-free DNA testing; first  trimester ultrasound with nuchal translucency and nasal bone assessment is recommended, when appropriate    Screens for fetal trisomy 21, trisomy 13, trisomy 18, and sex chromosome aneuploidy    Cannot screen for open neural tube defects; maternal serum AFP after 15 weeks is recommended     Chorionic villus sampling (CVS)    Invasive procedure typically performed in the first trimester by which placental villi are obtained for the purpose of chromosome analysis and/or other prenatal genetic analysis    Diagnostic results; >99% sensitivity for fetal chromosome abnormalities    Cannot test for open neural tube defects; maternal serum AFP after 15 weeks is recommended     Genetic Amniocentesis    Invasive procedure typically performed in the second trimester by which amniotic fluid is obtained for the purpose of chromosome analysis and/or other prenatal genetic analysis    Diagnostic results; >99% sensitivity for fetal chromosome abnormalities    AFAFP measurement tests for open neural tube defects     Comprehensive (Level II) ultrasound: Detailed ultrasound performed between 18-22 weeks gestation to screen for major birth defects and markers for aneuploidy.      We reviewed the benefits and limitations of this testing.  Screening tests provide a risk assessment specific to the pregnancy for certain fetal chromosome abnormalities, but cannot definitively diagnose or exclude a fetal chromosome abnormality.  Follow-up genetic counseling and consideration of diagnostic testing is recommended with any abnormal screening result.     Diagnostic tests carry inherent risks- including risk of miscarriage- that require careful consideration.  These tests can detect fetal chromosome abnormalities with greater than 99% certainty.  Results can be compromised by maternal cell contamination or mosaicism, and are limited by the resolution of cytogenetic G-banding technology.  There is no screening nor diagnostic test that can detect  all forms of birth defects or mental disability.     It was a pleasure to be involved with Fransisca leach care. Face-to-face time of the meeting was 35 minutes.    Beth Esquivel MS, [facility]  Licensed Genetic Counselor  [facility]  Maternal Fetal Medicine  [email or web address]  [phone]

## 2021-11-02 ENCOUNTER — PRENATAL OFFICE VISIT (OUTPATIENT)
Dept: OBGYN | Facility: CLINIC | Age: 26
End: 2021-11-02
Payer: COMMERCIAL

## 2021-11-02 VITALS
DIASTOLIC BLOOD PRESSURE: 70 MMHG | BODY MASS INDEX: 27.47 KG/M2 | HEIGHT: 67 IN | WEIGHT: 175 LBS | SYSTOLIC BLOOD PRESSURE: 108 MMHG

## 2021-11-02 DIAGNOSIS — O21.9 NAUSEA AND VOMITING IN PREGNANCY: Primary | ICD-10-CM

## 2021-11-02 DIAGNOSIS — E10.9 TYPE 1 DIABETES MELLITUS WITHOUT COMPLICATION (H): ICD-10-CM

## 2021-11-02 DIAGNOSIS — Z33.1 PREGNANCY, INCIDENTAL: ICD-10-CM

## 2021-11-02 PROCEDURE — 36415 COLL VENOUS BLD VENIPUNCTURE: CPT | Performed by: OBSTETRICS & GYNECOLOGY

## 2021-11-02 PROCEDURE — 99207 PR PRENATAL VISIT: CPT | Performed by: OBSTETRICS & GYNECOLOGY

## 2021-11-02 PROCEDURE — 80053 COMPREHEN METABOLIC PANEL: CPT | Performed by: OBSTETRICS & GYNECOLOGY

## 2021-11-02 RX ORDER — ONDANSETRON 4 MG/1
4 TABLET, ORALLY DISINTEGRATING ORAL EVERY 8 HOURS PRN
Qty: 30 TABLET | Refills: 3 | Status: SHIPPED | OUTPATIENT
Start: 2021-11-02 | End: 2022-03-14

## 2021-11-02 ASSESSMENT — MIFFLIN-ST. JEOR: SCORE: 1553.48

## 2021-11-02 NOTE — PATIENT INSTRUCTIONS
For nausea and vomiting in pregnancy:     Start by eating a little something before getting out of bed in the morning and continue with snacks every 2 hours throughout the day. Be sure to drink plenty of water. It is helpful to take small sips of water, about a tablespoon at a time, every 10-15 minutes rather than drinking a lot at one time.    You may use pyridoxine (vitamin B6), 25 mg by mouth every 6-8 hours as needed with 1/2 of unisom 25 mg tablet taken once or twice daily. These items may be purchased over the counter. The unisom can make you sleepy, so start with 1/2 tab at night and add a morning dose if needed.    If your symptoms don't improve after 1 week of regular use of unisom and B6 please contact the clinic and we can try other options.

## 2021-11-02 NOTE — NURSING NOTE
"Chief Complaint   Patient presents with     Prenatal Care     13 3/7 weeks       Initial /70 (BP Location: Left arm, Patient Position: Chair, Cuff Size: Adult Regular)   Ht 1.689 m (5' 6.5\")   Wt 79.4 kg (175 lb)   LMP 2021   Breastfeeding No   BMI 27.82 kg/m   Estimated body mass index is 27.82 kg/m  as calculated from the following:    Height as of this encounter: 1.689 m (5' 6.5\").    Weight as of this encounter: 79.4 kg (175 lb).  BP completed using cuff size: regular    Questioned patient about current smoking habits.  Pt. has never smoked.          The following HM Due: NONE    +++nausea and vomiting    Carmen Delatorre CMA        "

## 2021-11-03 LAB
ALBUMIN SERPL-MCNC: 2.6 G/DL (ref 3.4–5)
ALP SERPL-CCNC: 160 U/L (ref 40–150)
ALT SERPL W P-5'-P-CCNC: 27 U/L (ref 0–50)
ANION GAP SERPL CALCULATED.3IONS-SCNC: 5 MMOL/L (ref 3–14)
AST SERPL W P-5'-P-CCNC: 14 U/L (ref 0–45)
BILIRUB SERPL-MCNC: 0.3 MG/DL (ref 0.2–1.3)
BUN SERPL-MCNC: 7 MG/DL (ref 7–30)
CALCIUM SERPL-MCNC: 8.8 MG/DL (ref 8.5–10.1)
CHLORIDE BLD-SCNC: 105 MMOL/L (ref 94–109)
CO2 SERPL-SCNC: 25 MMOL/L (ref 20–32)
CREAT SERPL-MCNC: 0.64 MG/DL (ref 0.52–1.04)
GFR SERPL CREATININE-BSD FRML MDRD: >90 ML/MIN/1.73M2
GLUCOSE BLD-MCNC: 118 MG/DL (ref 70–99)
POTASSIUM BLD-SCNC: 4 MMOL/L (ref 3.4–5.3)
PROT SERPL-MCNC: 7.2 G/DL (ref 6.8–8.8)
SODIUM SERPL-SCNC: 135 MMOL/L (ref 133–144)

## 2021-11-03 NOTE — PROGRESS NOTES
PROBLEM LIST  LABS: Opos/RI    1. Type I DM on pump: managed by endocrine at [facility]. Plan: eye exam, TSH qTM, level II US, fetal echo, growth scans q3-4w. She will manage her own insulin via pump in labor and postpartum. Will confirm intrapartum and postpartum settings for pump with endocrine.  2. History preeclampsia (1st preg, and gestational hypertension 2nd): on ASA 81 mg daily.     Doing well, checking in every week to two weeks with her diabetes team. Saw MFM, normal NT, decline serum screening. Will plan AFP and level II US, fetal echo.    Follow up with me in 4 weeks.    Cheyanne Silva MD

## 2021-11-04 LAB
COLLECT DURATION TIME UR: 25 H
CREAT 24H UR-MRATE: 1.19 G/SPEC (ref 0.8–1.8)
CREAT UR-MCNC: 103 MG/DL
PROT 24H UR-MRATE: 0.13 G/SPEC (ref 0.04–0.23)
PROT UR-MCNC: 0.11 G/L
PROT/CREAT 24H UR: 0.11 G/G CR (ref 0–0.2)
SPECIMEN VOL UR: 1200 ML

## 2021-11-04 PROCEDURE — 81050 URINALYSIS VOLUME MEASURE: CPT | Performed by: OBSTETRICS & GYNECOLOGY

## 2021-11-04 PROCEDURE — 84156 ASSAY OF PROTEIN URINE: CPT | Performed by: OBSTETRICS & GYNECOLOGY

## 2021-11-15 DIAGNOSIS — Z79.4 ENCOUNTER FOR LONG-TERM (CURRENT) USE OF INSULIN (H): ICD-10-CM

## 2021-11-15 DIAGNOSIS — E10.9 TYPE 1 DIABETES MELLITUS WITHOUT COMPLICATION (H): ICD-10-CM

## 2021-11-15 DIAGNOSIS — Z96.41 INSULIN PUMP IN PLACE: ICD-10-CM

## 2021-11-16 RX ORDER — BLOOD SUGAR DIAGNOSTIC
STRIP MISCELLANEOUS
Qty: 550 STRIP | Refills: 1 | Status: SHIPPED | OUTPATIENT
Start: 2021-11-16

## 2021-11-16 NOTE — TELEPHONE ENCOUNTER
Prescription approved per [facility] Refill Protocol.  Shannan Green RN

## 2021-12-02 ENCOUNTER — PRENATAL OFFICE VISIT (OUTPATIENT)
Dept: OBGYN | Facility: CLINIC | Age: 26
End: 2021-12-02
Payer: COMMERCIAL

## 2021-12-02 VITALS — SYSTOLIC BLOOD PRESSURE: 110 MMHG | DIASTOLIC BLOOD PRESSURE: 72 MMHG | WEIGHT: 181 LBS | BODY MASS INDEX: 28.78 KG/M2

## 2021-12-02 DIAGNOSIS — E10.9 TYPE 1 DIABETES MELLITUS WITHOUT COMPLICATION (H): ICD-10-CM

## 2021-12-02 DIAGNOSIS — O09.899 SUPERVISION OF OTHER HIGH RISK PREGNANCY, ANTEPARTUM: ICD-10-CM

## 2021-12-02 DIAGNOSIS — Z23 NEED FOR PROPHYLACTIC VACCINATION AND INOCULATION AGAINST INFLUENZA: Primary | ICD-10-CM

## 2021-12-02 PROCEDURE — 99207 PR PRENATAL VISIT: CPT | Performed by: OBSTETRICS & GYNECOLOGY

## 2021-12-02 PROCEDURE — 90471 IMMUNIZATION ADMIN: CPT | Performed by: OBSTETRICS & GYNECOLOGY

## 2021-12-02 PROCEDURE — 90686 IIV4 VACC NO PRSV 0.5 ML IM: CPT | Performed by: OBSTETRICS & GYNECOLOGY

## 2021-12-02 NOTE — PROGRESS NOTES
PROBLEM LIST  LABS: Opos/RI    1. Type I DM on pump: managed by endocrine at [facility]. Plan: eye exam, TSH qTM, level II US, fetal echo, growth scans q3-4w. She will manage her own insulin via pump in labor and postpartum. Will confirm intrapartum and postpartum settings for pump with endocrine.  2. History preeclampsia (1st preg, and gestational hypertension 2nd): on ASA 81 mg daily.     Doing well, checking in every week to two weeks with her diabetes team. Saw MFM, normal NT, declined serum screening. Will plan level II US, fetal echo.    Follow up with me in 4 weeks.    Cheyanne Silva MD

## 2021-12-02 NOTE — NURSING NOTE
"Chief Complaint   Patient presents with     Prenatal Care       Initial /72   Wt 82.1 kg (181 lb)   LMP 2021   Breastfeeding No   BMI 28.78 kg/m   Estimated body mass index is 28.78 kg/m  as calculated from the following:    Height as of 21: 1.689 m (5' 6.5\").    Weight as of this encounter: 82.1 kg (181 lb).  BP completed using cuff size: regular    Questioned patient about current smoking habits.  Pt. has never smoked.          17w5d  ? FM noted  - bleeding  + mild cramping  + heartburn  occas headache  María Elena Arce LPN             "

## 2021-12-07 ENCOUNTER — OFFICE VISIT (OUTPATIENT)
Dept: MATERNAL FETAL MEDICINE | Facility: CLINIC | Age: 26
End: 2021-12-07
Attending: OBSTETRICS & GYNECOLOGY
Payer: COMMERCIAL

## 2021-12-07 ENCOUNTER — HOSPITAL ENCOUNTER (OUTPATIENT)
Dept: ULTRASOUND IMAGING | Facility: CLINIC | Age: 26
End: 2021-12-07
Attending: OBSTETRICS & GYNECOLOGY
Payer: COMMERCIAL

## 2021-12-07 DIAGNOSIS — O26.90 PREGNANCY RELATED CONDITION, ANTEPARTUM: ICD-10-CM

## 2021-12-07 DIAGNOSIS — O24.012 TYPE 1 DIABETES MELLITUS DURING PREGNANCY IN SECOND TRIMESTER: Primary | ICD-10-CM

## 2021-12-07 PROCEDURE — 76811 OB US DETAILED SNGL FETUS: CPT

## 2021-12-07 PROCEDURE — 76811 OB US DETAILED SNGL FETUS: CPT | Mod: 26 | Performed by: OBSTETRICS & GYNECOLOGY

## 2021-12-07 NOTE — PROGRESS NOTES
"Please see \"Imaging\" tab under \"Chart Review\" for details of today's US at the [facility].    Suleiman Scott MD  Maternal-Fetal Medicine    "

## 2021-12-28 ENCOUNTER — NURSE TRIAGE (OUTPATIENT)
Dept: NURSING | Facility: CLINIC | Age: 26
End: 2021-12-28
Payer: COMMERCIAL

## 2021-12-28 NOTE — TELEPHONE ENCOUNTER
"Triage Call    Pt calling with report of one-sided headache   that she rates 8/10 on pain scale.    Says she does have a history of migraines, but also a history of pre-eclampsia.  Currently pregnant @ 21wk,3d gest.    Pt says she took 500mg tylenol 40 min prior to call and has not worked.   Pt denies vision changes, abdominal pain, or swelling. Does not have a home B/P monitor.  Denies current fever or sinus problems.    Instructed pt to take a second 500mg tylenol, to lay down in a dark place and place an ice pack and cold cloth over eyes/forehead for 20 min.    If pain not better in 1 1/2 hours pt instructed to call back to [facility].  If pain worse, has visual changes or abdominal pain, go to the ED.    Brooke Peralta RN             Reason for Disposition    [1] Headache AND [2] has not taken pain medications    Additional Information    Negative: Difficult to awaken or acting confused (e.g., disoriented, slurred speech)    Negative: [1] Weakness of the face, arm or leg on one side of the body AND [2] new onset    Negative: [1] Numbness of the face, arm or leg on one side of the body AND [2] new onset    Negative: [1] Loss of speech or garbled speech AND [2] new onset    Negative: Sounds like a life-threatening emergency to the triager    Negative: Followed a head injury within last 3 days    Negative: Traumatic Brain Injury (TBI) is suspected    Negative: Sinus pain of forehead and yellow or green nasal discharge    Negative: Unable to walk, or can only walk with assistance (e.g., requires support)    Negative: Stiff neck (can't touch chin to chest)    Negative: Severe pain in one eye    Negative: [1] Other family members (or roommates) with headaches AND [2] possibility of carbon monoxide exposure    Negative: [1] SEVERE headache (e.g., excruciating) AND [2] \"worst headache\" of life    Negative: [1] SEVERE headache AND [2] sudden-onset (i.e., reaching maximum intensity within seconds)    Negative: [1] SEVERE " headache AND [2] fever    Negative: Loss of vision or double vision (Exception: similar to previous migraines)    Negative: [1] Fever > 100.0 F (37.8 C) AND [2] diabetes mellitus or weak immune system (e.g., HIV positive, cancer chemo, splenectomy, organ transplant, chronic steroids)    Negative: Patient sounds very sick or weak to the triager    Negative: [1] SEVERE headache (e.g., excruciating) AND [2] having contractions or other symptoms of labor    Negative: [1] Pregnant > 20 weeks AND [2] Systolic BP >= 140 OR Diastolic BP >= 90    Negative: [1] Pregnant > 20 weeks AND [2] SEVERE headache (e.g., excruciating) AND [3] not improved after 2 hours of pain medicine     Has not taken medication    Negative: [1] Pregnant > 20 weeks AND [2] new hand or face swelling    Negative: [1] Pregnant > 20 weeks AND [2] sudden weight gain (i.e., more than 3 lbs or 1.4 kg in one week)    Negative: [1] Pregnant > 20 weeks AND [2] new blurred vision or vision changes    Negative: [1] Pregnant 23 or more weeks AND [2] baby is moving less today (e.g., kick count < 5 in 1 hour or < 10 in 2 hours)    Negative: [1] SEVERE headache (e.g., excruciating) AND [2] not improved after 2 hours of pain medicine (Exception: similar to previous migraines)    Negative: [1] Vomiting AND [2] 2 or more times (Exception: similar to previous migraines)    Negative: Fever > 104 F (40 C)    Negative: [1] MODERATE headache (e.g., interferes with normal activities) AND [2] present > 24 hours AND [3] unexplained  (Exceptions: analgesics not tried, typical migraine, or headache part of viral illness)    Negative: [1] New headache AND [2] HIV positive    Negative: [1] Sinus pain of forehead AND [2] yellow or green nasal discharge    Negative: Fever present > 3 days (72 hours)    Negative: [1] MILD-MODERATE headache AND [2] present > 72 hours    Negative: Similar to previously diagnosed migraine headaches    Negative: Headache is a chronic symptom (recurrent  or ongoing AND present > 4 weeks)    Protocols used: PREGNANCY - HEADACHE-A-AH

## 2022-01-04 ENCOUNTER — OFFICE VISIT (OUTPATIENT)
Dept: MATERNAL FETAL MEDICINE | Facility: CLINIC | Age: 27
End: 2022-01-04
Attending: OBSTETRICS & GYNECOLOGY
Payer: COMMERCIAL

## 2022-01-04 ENCOUNTER — HOSPITAL ENCOUNTER (OUTPATIENT)
Dept: ULTRASOUND IMAGING | Facility: CLINIC | Age: 27
End: 2022-01-04
Attending: OBSTETRICS & GYNECOLOGY
Payer: COMMERCIAL

## 2022-01-04 VITALS — DIASTOLIC BLOOD PRESSURE: 80 MMHG | SYSTOLIC BLOOD PRESSURE: 126 MMHG

## 2022-01-04 DIAGNOSIS — O24.012 TYPE 1 DIABETES MELLITUS DURING PREGNANCY IN SECOND TRIMESTER: Primary | ICD-10-CM

## 2022-01-04 DIAGNOSIS — O24.012 TYPE 1 DIABETES MELLITUS DURING PREGNANCY IN SECOND TRIMESTER: ICD-10-CM

## 2022-01-04 PROCEDURE — 76816 OB US FOLLOW-UP PER FETUS: CPT

## 2022-01-04 PROCEDURE — 76816 OB US FOLLOW-UP PER FETUS: CPT | Mod: 26 | Performed by: OBSTETRICS & GYNECOLOGY

## 2022-01-04 NOTE — PROGRESS NOTES
Pt reports HA this past week with only mild relief with medication.  No visual changes or other s/sx of preeclampsia.  Pt anxious as she was induced at 34 wks previous pregnancy for this indication.  Blood pressure today 126/80.  Dr. Phelps aware and saw patient.  See her US report.  Patient has OBV 1/7.  Lana Grossman RN

## 2022-01-04 NOTE — PROGRESS NOTES
Fransisca Chichiakaulalit was seen for an ultrasound today at the Maternal-Fetal Medicine center.      For the details of the ultrasound please see the report which can be found under the imaging tab.      Taryn Phelps MD  , OB/GYN  Maternal-Fetal Medicine  [email or web address]  [phone] (Main MFM Office)  [phone] or [phone] (for 24 hour MFM questions)  [phone] (Pager)

## 2022-01-07 ENCOUNTER — PRENATAL OFFICE VISIT (OUTPATIENT)
Dept: OBGYN | Facility: CLINIC | Age: 27
End: 2022-01-07
Payer: COMMERCIAL

## 2022-01-07 VITALS — WEIGHT: 185 LBS | BODY MASS INDEX: 29.41 KG/M2 | SYSTOLIC BLOOD PRESSURE: 122 MMHG | DIASTOLIC BLOOD PRESSURE: 78 MMHG

## 2022-01-07 DIAGNOSIS — E10.9 TYPE 1 DIABETES MELLITUS WITHOUT COMPLICATION (H): Primary | ICD-10-CM

## 2022-01-07 DIAGNOSIS — O09.899 SUPERVISION OF OTHER HIGH RISK PREGNANCY, ANTEPARTUM: ICD-10-CM

## 2022-01-07 PROCEDURE — 99207 PR PRENATAL VISIT: CPT | Performed by: OBSTETRICS & GYNECOLOGY

## 2022-01-07 NOTE — NURSING NOTE
"Chief Complaint   Patient presents with     Prenatal Care       Initial /78   Wt 83.9 kg (185 lb)   LMP 2021   Breastfeeding No   BMI 29.41 kg/m   Estimated body mass index is 29.41 kg/m  as calculated from the following:    Height as of 21: 1.689 m (5' 6.5\").    Weight as of this encounter: 83.9 kg (185 lb).  BP completed using cuff size: regular    Questioned patient about current smoking habits.  Pt. has never smoked.        22w6d  + FM noted  + increased headache  - cramping or bleeding  + heartburn   Maíra Elena Arce LPN               "

## 2022-01-07 NOTE — PROGRESS NOTES
PROBLEM LIST  LABS: Opos/RI    1. Type I DM [facility] pump[facility] Plan: eye exam, TSH qTM, level II US, fetal echo, growth scans q3-4w. She will manage her own insulin via pump in labor and postpartum. Will confirm intrapartum and postpartum settings for pump with endocrine.  2. History preeclampsia (1st preg, and gestational hypertension 2nd): on ASA 81 mg daily.     Doing well, checking in every week to two weeks with her diabetes team. Had some higher BS in the middle of the night, adjustments made with her endocrinologist. Possible narrowing of the aorta on her follow up scan, has fetal echo scheduled for DM with Peds Cardiology. Will continue to follow up on this.    Follow up with me in 4 weeks.    Cheyanne Silva MD

## 2022-01-18 ENCOUNTER — HOSPITAL ENCOUNTER (OUTPATIENT)
Dept: CARDIOLOGY | Facility: CLINIC | Age: 27
Discharge: HOME | End: 2022-01-18
Attending: OBSTETRICS & GYNECOLOGY | Admitting: OBSTETRICS & GYNECOLOGY
Payer: COMMERCIAL

## 2022-01-18 DIAGNOSIS — O24.011 TYPE 1 DIABETES MELLITUS DURING PREGNANCY IN FIRST TRIMESTER: ICD-10-CM

## 2022-01-18 PROCEDURE — 93325 DOPPLER ECHO COLOR FLOW MAPG: CPT | Mod: 26 | Performed by: PEDIATRICS

## 2022-01-18 PROCEDURE — 76825 ECHO EXAM OF FETAL HEART: CPT | Mod: 26 | Performed by: PEDIATRICS

## 2022-01-18 PROCEDURE — 93325 DOPPLER ECHO COLOR FLOW MAPG: CPT

## 2022-01-18 PROCEDURE — 76827 ECHO EXAM OF FETAL HEART: CPT | Mod: 26 | Performed by: PEDIATRICS

## 2022-01-20 ENCOUNTER — OFFICE VISIT (OUTPATIENT)
Dept: CARDIOLOGY | Facility: CLINIC | Age: 27
End: 2022-01-20
Payer: COMMERCIAL

## 2022-01-20 DIAGNOSIS — O24.011 TYPE 1 DIABETES MELLITUS DURING PREGNANCY IN FIRST TRIMESTER: Primary | ICD-10-CM

## 2022-01-20 PROCEDURE — 99203 OFFICE O/P NEW LOW 30 MIN: CPT | Mod: 25 | Performed by: PEDIATRICS

## 2022-01-20 NOTE — PROGRESS NOTES
Fetal Cardiology Consultation    Patient:  Fransisca Cabello MRN:  [id number]   YOB: 1995 Age:  26 year old   Date of Visit:  1/18/2022 PCP:  Clinic, [facility]   BETTY: 5/7/2022, by Last Menstrual Period EGA: 24w5d weeks     Dear Doctor:    I had the pleasure of seeing Fransisca Cabello at the [facility]chocardi[facility] in [place] on 1/20/2022 in consultation for fetal echocardiography results. As you know, she is a 26 year old female with T1DM; previous child with coarctation of the aorta (unoperated).    The fetal echocardiogram was normal. Normal fetal cardiac anatomy. Normal right and left ventricular size and function without hypertrophy. No evidence of diastolic dysfunction. No pericardial effusion. No arrhythmia.     I reviewed and interpreted the fetal echocardiogram today. I discussed the normal results with Ms. Cabello. While these results are normal, it is important to note that fetal echocardiography cannot exclude small atrial or ventricular septal defects, persistent ductus arteriosus, mild coarctation of the aorta, partial anomalous pulmonary venous return, minor anatomic valve anomalies, or coronary artery anomalies.     Thank you for allowing me to participate in Ms. Cabello's care. Please don't hesitate to contact me or the Fetal Cardiology team at [facility] with any questions or concerns.     I spent a total of 15 minutes on the date of the encounter doing chart review, patient history, documentation, counseling, and coordinating care.    Pedro Arias MD  Pediatric Cardiology  [facility]  Phone [phone]    Review of the result(s) of each unique test - fetal echocardiogram

## 2022-02-01 ENCOUNTER — HOSPITAL ENCOUNTER (OUTPATIENT)
Dept: ULTRASOUND IMAGING | Facility: CLINIC | Age: 27
End: 2022-02-01
Attending: OBSTETRICS & GYNECOLOGY
Payer: COMMERCIAL

## 2022-02-01 ENCOUNTER — OFFICE VISIT (OUTPATIENT)
Dept: MATERNAL FETAL MEDICINE | Facility: CLINIC | Age: 27
End: 2022-02-01
Attending: OBSTETRICS & GYNECOLOGY
Payer: COMMERCIAL

## 2022-02-01 DIAGNOSIS — O24.012 TYPE 1 DIABETES MELLITUS DURING PREGNANCY IN SECOND TRIMESTER: ICD-10-CM

## 2022-02-01 DIAGNOSIS — O24.012 TYPE 1 DIABETES MELLITUS DURING PREGNANCY IN SECOND TRIMESTER: Primary | ICD-10-CM

## 2022-02-01 PROCEDURE — 76816 OB US FOLLOW-UP PER FETUS: CPT

## 2022-02-01 PROCEDURE — 76816 OB US FOLLOW-UP PER FETUS: CPT | Mod: 26 | Performed by: OBSTETRICS & GYNECOLOGY

## 2022-02-03 ENCOUNTER — PRENATAL OFFICE VISIT (OUTPATIENT)
Dept: OBGYN | Facility: CLINIC | Age: 27
End: 2022-02-03
Payer: COMMERCIAL

## 2022-02-03 VITALS
WEIGHT: 189 LBS | SYSTOLIC BLOOD PRESSURE: 130 MMHG | HEIGHT: 67 IN | DIASTOLIC BLOOD PRESSURE: 80 MMHG | BODY MASS INDEX: 29.66 KG/M2

## 2022-02-03 DIAGNOSIS — O09.299 HISTORY OF PRE-ECLAMPSIA IN PRIOR PREGNANCY, CURRENTLY PREGNANT: ICD-10-CM

## 2022-02-03 DIAGNOSIS — O09.899 SUPERVISION OF OTHER HIGH RISK PREGNANCY, ANTEPARTUM: Primary | ICD-10-CM

## 2022-02-03 DIAGNOSIS — E10.9 TYPE 1 DIABETES MELLITUS WITHOUT COMPLICATION (H): ICD-10-CM

## 2022-02-03 PROCEDURE — 99207 PR PRENATAL VISIT: CPT | Performed by: OBSTETRICS & GYNECOLOGY

## 2022-02-03 ASSESSMENT — MIFFLIN-ST. JEOR: SCORE: 1611.99

## 2022-02-03 NOTE — PROGRESS NOTES
PROBLEM LIST  LABS: Opos/RI    1. Type I DM on pump: managed by endocrine at [facility]. Plan: eye exam, TSH qTM, level II US, fetal echo, growth scans q3-4w. She will manage her own insulin via pump in labor and postpartum. Will confirm intrapartum and postpartum settings for pump with endocrine.  2. History preeclampsia (1st preg, and gestational hypertension 2nd): on ASA 81 mg daily.     Doing well, checking in every week to two weeks with her diabetes team--new endocrinologist, sees him next week. Possible narrowing of the aorta on her follow up scan, has fetal echo scheduled for DM with Peds Cardiology. Will continue to follow up on this. It is possible that she may need to deliver at [facility], discussed transfer to [facility] if this is the case.    Tdap next visit. History of preeclampsia, blood pressure trending up a bit, no symptoms. No high values. Will get home blood pressure cuff and check twice a week, bring to next appointment for calibration.  Labs today. Last A1C was 6.2% 2 weeks ago.    Follow up with me in 2 weeks.    Cheyanne Silva MD

## 2022-02-03 NOTE — NURSING NOTE
"Chief Complaint   Patient presents with     Prenatal Care     26 5/7 weeks       Initial /80 (BP Location: Right arm, Patient Position: Chair, Cuff Size: Adult Large)   Ht 1.689 m (5' 6.5\")   Wt 85.7 kg (189 lb)   LMP 2021   Breastfeeding No   BMI 30.05 kg/m   Estimated body mass index is 30.05 kg/m  as calculated from the following:    Height as of this encounter: 1.689 m (5' 6.5\").    Weight as of this encounter: 85.7 kg (189 lb).  BP completed using cuff size: large    Questioned patient about current smoking habits.  Pt. has never smoked.          The following HM Due: NONE    +fetal movement  -swelling    Carmen Delatorre, CMA        "

## 2022-02-15 NOTE — TELEPHONE ENCOUNTER
Completed CMN for and office notes from 6/10/2020 and 10/14/2020 faxed to [phone].    Ivon CLEVELAND CMA    
[employer] CMN form completed and signed.  Maryann Hammonds NP  Endocrinology    
Received Certificate of Medical Necessity form from [employer] for [employer].  Dx:E10.9   [employer] contact is Joycelyn Lopez  at ph# [phone] .  Fax completed form to #[phone].  Form on Maryann Hammonds's desk.  Ivon CLEVELAND CMA      
No

## 2022-02-17 ENCOUNTER — PRENATAL OFFICE VISIT (OUTPATIENT)
Dept: OBGYN | Facility: CLINIC | Age: 27
End: 2022-02-17
Payer: COMMERCIAL

## 2022-02-17 ENCOUNTER — LAB (OUTPATIENT)
Dept: LAB | Facility: CLINIC | Age: 27
End: 2022-02-17
Payer: COMMERCIAL

## 2022-02-17 VITALS — BODY MASS INDEX: 30.53 KG/M2 | SYSTOLIC BLOOD PRESSURE: 126 MMHG | DIASTOLIC BLOOD PRESSURE: 72 MMHG | WEIGHT: 192 LBS

## 2022-02-17 DIAGNOSIS — E10.9 TYPE 1 DIABETES MELLITUS WITHOUT COMPLICATION (H): ICD-10-CM

## 2022-02-17 DIAGNOSIS — O09.899 SUPERVISION OF OTHER HIGH RISK PREGNANCY, ANTEPARTUM: Primary | ICD-10-CM

## 2022-02-17 DIAGNOSIS — O21.9 NAUSEA AND VOMITING IN PREGNANCY: ICD-10-CM

## 2022-02-17 DIAGNOSIS — O09.899 SUPERVISION OF OTHER HIGH RISK PREGNANCY, ANTEPARTUM: ICD-10-CM

## 2022-02-17 DIAGNOSIS — Z23 NEED FOR TDAP VACCINATION: ICD-10-CM

## 2022-02-17 DIAGNOSIS — O09.299 HISTORY OF PRE-ECLAMPSIA IN PRIOR PREGNANCY, CURRENTLY PREGNANT: ICD-10-CM

## 2022-02-17 LAB
ERYTHROCYTE [DISTWIDTH] IN BLOOD BY AUTOMATED COUNT: 13.2 % (ref 10–15)
HCT VFR BLD AUTO: 37.5 % (ref 35–47)
HGB BLD-MCNC: 12.3 G/DL (ref 11.7–15.7)
MCH RBC QN AUTO: 30.5 PG (ref 26.5–33)
MCHC RBC AUTO-ENTMCNC: 32.8 G/DL (ref 31.5–36.5)
MCV RBC AUTO: 93 FL (ref 78–100)
PLATELET # BLD AUTO: 259 10E3/UL (ref 150–450)
RBC # BLD AUTO: 4.03 10E6/UL (ref 3.8–5.2)
T PALLIDUM AB SER QL: NONREACTIVE
WBC # BLD AUTO: 8.7 10E3/UL (ref 4–11)

## 2022-02-17 PROCEDURE — 36415 COLL VENOUS BLD VENIPUNCTURE: CPT

## 2022-02-17 PROCEDURE — 86780 TREPONEMA PALLIDUM: CPT

## 2022-02-17 PROCEDURE — 99207 PR PRENATAL VISIT: CPT | Performed by: OBSTETRICS & GYNECOLOGY

## 2022-02-17 PROCEDURE — 36415 COLL VENOUS BLD VENIPUNCTURE: CPT | Performed by: OBSTETRICS & GYNECOLOGY

## 2022-02-17 PROCEDURE — 90715 TDAP VACCINE 7 YRS/> IM: CPT | Performed by: OBSTETRICS & GYNECOLOGY

## 2022-02-17 PROCEDURE — 85027 COMPLETE CBC AUTOMATED: CPT

## 2022-02-17 PROCEDURE — 90471 IMMUNIZATION ADMIN: CPT | Performed by: OBSTETRICS & GYNECOLOGY

## 2022-02-17 PROCEDURE — 80053 COMPREHEN METABOLIC PANEL: CPT | Performed by: OBSTETRICS & GYNECOLOGY

## 2022-02-17 RX ORDER — METOCLOPRAMIDE 5 MG/1
5 TABLET ORAL 4 TIMES DAILY PRN
Qty: 40 TABLET | Refills: 3 | Status: SHIPPED | OUTPATIENT
Start: 2022-02-17

## 2022-02-17 NOTE — NURSING NOTE
"Chief Complaint   Patient presents with     Prenatal Care       Initial /72   Wt 87.1 kg (192 lb)   LMP 2021   Breastfeeding No   BMI 30.53 kg/m   Estimated body mass index is 30.53 kg/m  as calculated from the following:    Height as of 2/3/22: 1.689 m (5' 6.5\").    Weight as of this encounter: 87.1 kg (192 lb).  BP completed using cuff size: regular    Questioned patient about current smoking habits.  Pt. has never smoked.          28w5d  + FM daily  + mild cramping  - bleeding  + heartburn  + vomiting  - headache  María Elena Arce LPN               "

## 2022-02-17 NOTE — PROGRESS NOTES
PROBLEM LIST  LABS: Opos/RI    1. Type I DM on pump: managed by endocrine at [facility]. Plan: eye exam, TSH qTM, level II US, fetal echo, growth scans q3-4w. She will manage her own insulin via pump in labor and postpartum. Will confirm intrapartum and postpartum settings for pump with endocrine.  2. History preeclampsia (1st preg, and gestational hypertension 2nd): on ASA 81 mg daily.     Possible narrowing of the aorta on her follow up scan, has fetal echo scheduled for DM with Peds Cardiology. Will continue to follow up on this. It is possible that she may need to deliver at [facility], discussed transfer to [facility] if this is the case.    Tdap today, done. History of preeclampsia, blood pressure trending up a bit at last visit, no other symptoms but is nauseated a lot since early pregnancy. Will get home blood pressure cuff today, and check twice a week, bring to next appointment for calibration. Check labs today. States BS are mostly good even with the nausea, but hard to eat at times. Will add Reglan to her Zofran.    Follow up with me in 2 weeks. Discussed when to call for sickness/signs of DKA, etc. She is well versed in this and reliable for follow up.    Cheyanne Silva MD

## 2022-02-18 LAB
ALBUMIN SERPL-MCNC: 2.3 G/DL (ref 3.4–5)
ALP SERPL-CCNC: 105 U/L (ref 40–150)
ALT SERPL W P-5'-P-CCNC: 18 U/L (ref 0–50)
ANION GAP SERPL CALCULATED.3IONS-SCNC: 8 MMOL/L (ref 3–14)
AST SERPL W P-5'-P-CCNC: 10 U/L (ref 0–45)
BILIRUB SERPL-MCNC: 0.4 MG/DL (ref 0.2–1.3)
BUN SERPL-MCNC: 5 MG/DL (ref 7–30)
CALCIUM SERPL-MCNC: 9.7 MG/DL (ref 8.5–10.1)
CHLORIDE BLD-SCNC: 105 MMOL/L (ref 94–109)
CO2 SERPL-SCNC: 24 MMOL/L (ref 20–32)
CREAT SERPL-MCNC: 0.71 MG/DL (ref 0.52–1.04)
GFR SERPL CREATININE-BSD FRML MDRD: >90 ML/MIN/1.73M2
GLUCOSE BLD-MCNC: 105 MG/DL (ref 70–99)
POTASSIUM BLD-SCNC: 3.9 MMOL/L (ref 3.4–5.3)
PROT SERPL-MCNC: 6.6 G/DL (ref 6.8–8.8)
SODIUM SERPL-SCNC: 137 MMOL/L (ref 133–144)

## 2022-02-23 ENCOUNTER — PRENATAL OFFICE VISIT (OUTPATIENT)
Dept: MIDWIFE SERVICES | Facility: CLINIC | Age: 27
End: 2022-02-23
Payer: COMMERCIAL

## 2022-02-23 VITALS
BODY MASS INDEX: 30.43 KG/M2 | WEIGHT: 193.9 LBS | HEIGHT: 67 IN | DIASTOLIC BLOOD PRESSURE: 80 MMHG | SYSTOLIC BLOOD PRESSURE: 140 MMHG

## 2022-02-23 DIAGNOSIS — N30.01 ACUTE CYSTITIS WITH HEMATURIA: ICD-10-CM

## 2022-02-23 DIAGNOSIS — B37.31 YEAST VAGINITIS: ICD-10-CM

## 2022-02-23 DIAGNOSIS — R30.0 DYSURIA: ICD-10-CM

## 2022-02-23 DIAGNOSIS — N89.8 VAGINAL ITCHING: ICD-10-CM

## 2022-02-23 DIAGNOSIS — O24.913 DIABETES MELLITUS AFFECTING PREGNANCY IN THIRD TRIMESTER: Primary | ICD-10-CM

## 2022-02-23 LAB
ALBUMIN UR-MCNC: 30 MG/DL
APPEARANCE UR: CLEAR
BACTERIA #/AREA URNS HPF: ABNORMAL /HPF
BILIRUB UR QL STRIP: NEGATIVE
CLUE CELLS: ABNORMAL
COLOR UR AUTO: YELLOW
GLUCOSE UR STRIP-MCNC: NEGATIVE MG/DL
HGB UR QL STRIP: ABNORMAL
KETONES UR STRIP-MCNC: 15 MG/DL
LEUKOCYTE ESTERASE UR QL STRIP: NEGATIVE
MUCOUS THREADS #/AREA URNS LPF: PRESENT /LPF
NITRATE UR QL: NEGATIVE
PH UR STRIP: 8.5 [PH] (ref 5–7)
RBC #/AREA URNS AUTO: ABNORMAL /HPF
SP GR UR STRIP: 1.01 (ref 1–1.03)
SQUAMOUS #/AREA URNS AUTO: ABNORMAL /LPF
TRICHOMONAS, WET PREP: ABNORMAL
UROBILINOGEN UR STRIP-ACNC: 0.2 E.U./DL
WBC #/AREA URNS AUTO: ABNORMAL /HPF
WBC'S/HIGH POWER FIELD, WET PREP: ABNORMAL
YEAST, WET PREP: PRESENT

## 2022-02-23 PROCEDURE — 87088 URINE BACTERIA CULTURE: CPT | Performed by: ADVANCED PRACTICE MIDWIFE

## 2022-02-23 PROCEDURE — 87186 SC STD MICRODIL/AGAR DIL: CPT | Performed by: ADVANCED PRACTICE MIDWIFE

## 2022-02-23 PROCEDURE — 81001 URINALYSIS AUTO W/SCOPE: CPT | Performed by: ADVANCED PRACTICE MIDWIFE

## 2022-02-23 PROCEDURE — 87210 SMEAR WET MOUNT SALINE/INK: CPT | Performed by: ADVANCED PRACTICE MIDWIFE

## 2022-02-23 PROCEDURE — 99213 OFFICE O/P EST LOW 20 MIN: CPT | Performed by: ADVANCED PRACTICE MIDWIFE

## 2022-02-23 PROCEDURE — 87086 URINE CULTURE/COLONY COUNT: CPT | Performed by: ADVANCED PRACTICE MIDWIFE

## 2022-02-23 RX ORDER — NITROFURANTOIN 25; 75 MG/1; MG/1
100 CAPSULE ORAL 2 TIMES DAILY
Qty: 14 CAPSULE | Refills: 0 | Status: SHIPPED | OUTPATIENT
Start: 2022-02-23

## 2022-02-23 RX ORDER — FLUCONAZOLE 150 MG/1
150 TABLET ORAL ONCE
Qty: 2 TABLET | Refills: 0 | Status: SHIPPED | OUTPATIENT
Start: 2022-02-23 | End: 2022-02-23

## 2022-02-23 NOTE — PROGRESS NOTES
"S: Has been noting vaginal itching and pain with urination for past few days. Urine has been dark.  Baby active.  Denies uterine cramping, vaginal bleeding or leaking of fluid  O: Vitals: BP (!) 140/80   Ht 1.689 m (5' 6.5\")   Wt 88 kg (193 lb 14.4 oz)   LMP 07/31/2021   BMI 30.83 kg/m    BMI= Body mass index is 30.83 kg/m .  Exam:  Constitutional: healthy, alert and no distress  Respiratory: respirations even and unlabored  Gastrointestinal: Abdomen soft, non-tender. Fundus measures appropriate for gestational age. Fetal heart tones hear without difficulty and within normal limits  : Deferred  Psychiatric: mentation appears normal and affect normal/bright    Results for orders placed or performed in visit on 02/23/22   UA with Microscopic reflex to Culture - Clinic Collect     Status: Abnormal    Specimen: Urine, Midstream   Result Value Ref Range    Color Urine Yellow Colorless, Straw, Light Yellow, Yellow    Appearance Urine Clear Clear    Glucose Urine Negative Negative mg/dL    Bilirubin Urine Negative Negative    Ketones Urine 15  (A) Negative mg/dL    Specific Gravity Urine 1.010 1.003 - 1.035    Blood Urine Moderate (A) Negative    pH Urine 8.5 (H) 5.0 - 7.0    Protein Albumin Urine 30  (A) Negative mg/dL    Urobilinogen Urine 0.2 0.2, 1.0 E.U./dL    Nitrite Urine Negative Negative    Leukocyte Esterase Urine Negative Negative   Urine Microscopic     Status: Abnormal   Result Value Ref Range    Bacteria Urine Moderate (A) None Seen /HPF    RBC Urine 10-25 (A) 0-2 /HPF /HPF    WBC Urine 0-5 0-5 /HPF /HPF    Squamous Epithelials Urine Few (A) None Seen /LPF    Mucus Urine Present (A) None Seen /LPF    Narrative    Urine Culture not indicated   Wet prep - Clinic Collect     Status: Abnormal    Specimen: Vagina; Swab   Result Value Ref Range    Trichomonas Absent Absent    Yeast Present (A) Absent    Clue Cells Absent Absent    WBCs/high power field 3+ (A) None     A:       ICD-10-CM    1. Diabetes " mellitus affecting pregnancy in third trimester  O24.913    2. Dysuria  R30.0 Wet prep - Clinic Collect     Urine Culture Aerobic Bacterial - lab collect   3. Vaginal itching  N89.8 UA with Microscopic reflex to Culture - Clinic Collect     Urine Microscopic   4. Acute cystitis with hematuria  N30.01 nitroFURantoin macrocrystal-monohydrate (MACROBID) 100 MG capsule   5. Yeast vaginitis  B37.3 fluconazole (DIFLUCAN) 150 MG tablet       P:   Patient treated for UTI and yeast vaginitis. UC pending. If culture negative, patient can discontinue antibiotics. Advised increased fluids and rest. Call if symptoms persist or become worse.  Encouraged patient to call with any questions or concerns.  Return to clinic 1 weeks    HORTENSIA Ralph, TANIYA

## 2022-02-23 NOTE — NURSING NOTE
"29w4d    Chief Complaint   Patient presents with     Prenatal Care     had headaches--takes tylenol--pt's eyes \"hurt\" but notices spots sometimes--checking blood pressures at home     Vaginal Itching     going on for 3-4 days     UTI     notices frequency--very dark urine--noticed it the past 5 days       Initial BP (!) 140/80   Ht 1.689 m (5' 6.5\")   Wt 88 kg (193 lb 14.4 oz)   LMP 2021   BMI 30.83 kg/m   Estimated body mass index is 30.83 kg/m  as calculated from the following:    Height as of this encounter: 1.689 m (5' 6.5\").    Weight as of this encounter: 88 kg (193 lb 14.4 oz).  BP completed using cuff size: regular long    Questioned patient about current smoking habits.  Pt. has never smoked.          The following HM Due: NONE    "

## 2022-02-23 NOTE — PATIENT INSTRUCTIONS
Vaginitis (Vaginal Irritation/Infection)    Vaginitis is very common!  The most common vaginal infections are bacterial vaginosis or yeast. These infections are not sexually transmitted but can be incredibly uncomfortable. Seek care from your midwife if signs or symptoms arise.     Normal vaginal discharge:      Is white, clear, thick or thin (it may change depending on where you are in your cycle)    Does not have a foul odor    The amount of discharge varies    Abnormal discharge/symptoms:       Itching in and around the vagina    Redness, pain or swelling    Discharge that is foamy, greenish, curd like, or bloody    Foul smelling odor    Pain when urinating or having sex    Fever    Causes of vaginal infections:      Good bacteria from the vagina have been destroyed by bad bacteria    Reaction to something in the vagina such as a tampon or scented/perfumed soaps or bubble bath    STI's    Sensitivities to soaps/detergents/dryer sheets, lubricants, etc.    Hormonal changes    Recent use of antibiotics     Infections can also occur after you've had intercourse with a new partner or if you have had frequent intercourse         Here is a list of suggestions that may help prevent/treat vaginal infections and will help maintain a healthy vaginal environment:      1.  Boosting your immune system so you can heal faster      Make sure you are getting adequate sleep    Drink 2-3 quarts of fluids per day, Cranberries or cranberry juice (unsweetened)    Eat more nuts, grains, raw veggies, yogurt, wendy, grapefruit    Decrease intake of refined sugar, red meat and alcohol    Echinacea - 3 times a day for chronic problem or every 2 hours for acute symptoms; use as directed on bottle          2.  Changing the vaginal environment to a more acid state       Soak in a warm bath tub with one cup of vinegar or lemon juice. Do not use scented soap, bubble bath, or oils.     Acidophilus capsules:  1 in your vagina at bedtime for 5-7  nights    Herbal sitz bath or britany-wash with:  TBSP tea tree oil or 2 TBS cider vinegar      3.  Increasing the good healthy bacteria      At each meal drink 1 tsp apple cider vinegar and 1 tsp honey in   cup warm water    Eat garlic daily, capsules or fresh.      Take probiotics 4-8 billion units/day      4.  Preventive measures      Wear cotton underwear, no thongs.  Do not wear tight clothes or pantyhose    Shower soon after working or change out of sweaty clothing     Do not wear underwear to bed.  The vaginal environment needs to breathe    Never douche or use vaginal , the vagina is self-cleaning!    Use white, unscented toilet paper.  Do not use baby wipes.  Wipe from front to back    Use only unscented tampons and pads, buy organic products if desired    Do not use perfumes/oils/lotions near your vagina or take bubble baths    Use only mild, unscented soaps around your vaginal area     Do not use fabric softeners/dryer sheets    Use gentle, unscented detergent, consider buying non-petroleum based detergents    Use only water based lubricants during sexual contact    Abstain from intercourse during times of infection    Alternative Treatment  Boric acid capsules one per vagina (not by mouth!!! Very toxic if taken orally) at bedtime for 5 days (or as suggested by your provider) may be an effective alternative treatment and also more effective for those with chronic yeast vaginitis. Boric acid is available at the pharmacy but must be purchased along with gelatin caps for insertion. It might also be available at a local compounding pharmacy. Boric acid is not safe for pregnant women. Discuss with your midwife if this treatment interests you.     If your symptoms do not resolve or if you have questions please call:     [facility]  [phone]

## 2022-02-24 ENCOUNTER — TELEPHONE (OUTPATIENT)
Dept: OBGYN | Facility: CLINIC | Age: 27
End: 2022-02-24

## 2022-02-24 ENCOUNTER — HOSPITAL ENCOUNTER (OUTPATIENT)
Facility: CLINIC | Age: 27
Discharge: HOME | End: 2022-02-24
Attending: OBSTETRICS & GYNECOLOGY | Admitting: OBSTETRICS & GYNECOLOGY
Payer: COMMERCIAL

## 2022-02-24 VITALS — SYSTOLIC BLOOD PRESSURE: 128 MMHG | DIASTOLIC BLOOD PRESSURE: 75 MMHG

## 2022-02-24 DIAGNOSIS — Z36.89 ENCOUNTER FOR TRIAGE IN PREGNANT PATIENT: Primary | ICD-10-CM

## 2022-02-24 LAB
ALBUMIN SERPL-MCNC: 2.2 G/DL (ref 3.4–5)
ALBUMIN UR-MCNC: 10 MG/DL
ALP SERPL-CCNC: 108 U/L (ref 40–150)
ALT SERPL W P-5'-P-CCNC: 16 U/L (ref 0–50)
ANION GAP SERPL CALCULATED.3IONS-SCNC: 8 MMOL/L (ref 3–14)
APPEARANCE UR: CLEAR
AST SERPL W P-5'-P-CCNC: 13 U/L (ref 0–45)
BILIRUB SERPL-MCNC: 0.3 MG/DL (ref 0.2–1.3)
BILIRUB UR QL STRIP: NEGATIVE
BUN SERPL-MCNC: 4 MG/DL (ref 7–30)
CALCIUM SERPL-MCNC: 8.6 MG/DL (ref 8.5–10.1)
CHLORIDE BLD-SCNC: 105 MMOL/L (ref 94–109)
CO2 SERPL-SCNC: 24 MMOL/L (ref 20–32)
COLOR UR AUTO: ABNORMAL
CREAT SERPL-MCNC: 0.61 MG/DL (ref 0.52–1.04)
CREAT UR-MCNC: 111 MG/DL
ERYTHROCYTE [DISTWIDTH] IN BLOOD BY AUTOMATED COUNT: 13.2 % (ref 10–15)
GFR SERPL CREATININE-BSD FRML MDRD: >90 ML/MIN/1.73M2
GLUCOSE BLD-MCNC: 135 MG/DL (ref 70–99)
GLUCOSE BLDC GLUCOMTR-MCNC: 122 MG/DL (ref 70–99)
GLUCOSE UR STRIP-MCNC: 500 MG/DL
HCT VFR BLD AUTO: 35.1 % (ref 35–47)
HGB BLD-MCNC: 11.4 G/DL (ref 11.7–15.7)
HGB UR QL STRIP: NEGATIVE
KETONES UR STRIP-MCNC: 10 MG/DL
LEUKOCYTE ESTERASE UR QL STRIP: NEGATIVE
MCH RBC QN AUTO: 30.6 PG (ref 26.5–33)
MCHC RBC AUTO-ENTMCNC: 32.5 G/DL (ref 31.5–36.5)
MCV RBC AUTO: 94 FL (ref 78–100)
MUCOUS THREADS #/AREA URNS LPF: PRESENT /LPF
NITRATE UR QL: NEGATIVE
PH UR STRIP: 6.5 [PH] (ref 5–7)
PLATELET # BLD AUTO: 220 10E3/UL (ref 150–450)
POTASSIUM BLD-SCNC: 3.7 MMOL/L (ref 3.4–5.3)
PROT SERPL-MCNC: 6.3 G/DL (ref 6.8–8.8)
PROT UR-MCNC: 0.16 G/L
PROT/CREAT 24H UR: 0.14 G/G CR (ref 0–0.2)
RBC # BLD AUTO: 3.73 10E6/UL (ref 3.8–5.2)
RBC URINE: 11 /HPF
SODIUM SERPL-SCNC: 137 MMOL/L (ref 133–144)
SP GR UR STRIP: 1.02 (ref 1–1.03)
SQUAMOUS EPITHELIAL: <1 /HPF
URATE SERPL-MCNC: 4.5 MG/DL (ref 2.6–6)
UROBILINOGEN UR STRIP-MCNC: NORMAL MG/DL
WBC # BLD AUTO: 7.7 10E3/UL (ref 4–11)
WBC URINE: 2 /HPF

## 2022-02-24 PROCEDURE — 81003 URINALYSIS AUTO W/O SCOPE: CPT | Performed by: OBSTETRICS & GYNECOLOGY

## 2022-02-24 PROCEDURE — 85027 COMPLETE CBC AUTOMATED: CPT | Performed by: OBSTETRICS & GYNECOLOGY

## 2022-02-24 PROCEDURE — 250N000013 HC RX MED GY IP 250 OP 250 PS 637: Performed by: OBSTETRICS & GYNECOLOGY

## 2022-02-24 PROCEDURE — 59025 FETAL NON-STRESS TEST: CPT | Mod: 26 | Performed by: OBSTETRICS & GYNECOLOGY

## 2022-02-24 PROCEDURE — 82947 ASSAY GLUCOSE BLOOD QUANT: CPT | Performed by: OBSTETRICS & GYNECOLOGY

## 2022-02-24 PROCEDURE — 87086 URINE CULTURE/COLONY COUNT: CPT | Performed by: OBSTETRICS & GYNECOLOGY

## 2022-02-24 PROCEDURE — 84550 ASSAY OF BLOOD/URIC ACID: CPT | Performed by: OBSTETRICS & GYNECOLOGY

## 2022-02-24 PROCEDURE — 36415 COLL VENOUS BLD VENIPUNCTURE: CPT | Performed by: OBSTETRICS & GYNECOLOGY

## 2022-02-24 PROCEDURE — 82962 GLUCOSE BLOOD TEST: CPT

## 2022-02-24 PROCEDURE — G0463 HOSPITAL OUTPT CLINIC VISIT: HCPCS | Mod: 25

## 2022-02-24 PROCEDURE — 84156 ASSAY OF PROTEIN URINE: CPT | Performed by: OBSTETRICS & GYNECOLOGY

## 2022-02-24 PROCEDURE — 59025 FETAL NON-STRESS TEST: CPT

## 2022-02-24 PROCEDURE — 82435 ASSAY OF BLOOD CHLORIDE: CPT | Performed by: OBSTETRICS & GYNECOLOGY

## 2022-02-24 RX ORDER — ACETAMINOPHEN 325 MG/1
975 TABLET ORAL ONCE
Status: COMPLETED | OUTPATIENT
Start: 2022-02-24 | End: 2022-02-24

## 2022-02-24 RX ADMIN — ACETAMINOPHEN 975 MG: 325 TABLET, FILM COATED ORAL at 19:20

## 2022-02-24 NOTE — TELEPHONE ENCOUNTER
Discussed with Dr Silva, she is advised to go to L&D for eval now.    Called pt and advised her to go into L&D.  She will do so, but has to wait for her  to be available to care for her children as they cannot come to L&D.    I did confirm that no children are allowed in L&D with the charge nurse.    Frances Gonzales RN

## 2022-02-24 NOTE — PLAN OF CARE
Fransisca here after her Endocrine appt.  Bp's were 150-160's/90's.  States has had a HA for the last couple of days.  No other s/s Pre-E.  Explained will do urine test for protein and blood work.  Will update Dr. Silva from there to make plan.  Baby active and fetal tracing appropriate for gestational age

## 2022-02-24 NOTE — TELEPHONE ENCOUNTER
"Pt calling, was seen today in Endocrinology.  BP readings were elevated while she was there.  Was told to follow-up with OB.  150/95, 150/99    Has been taking Tylenol around the clock for a headache, eyes feel \"fatigued\" but denies blurred vision.  Still has headache but Tylenol has \"taken the edge off.\"    Normal FM.  No significant swelling.  No other complaints.    Was in clinic yesterday, see CNM note.  Please advise.    Frances Gonzales RN        "

## 2022-02-25 NOTE — DISCHARGE INSTRUCTIONS
Discharge Instruction for Undelivered Patients      You were seen for: Fetal Assessment  We Consulted: Dr. Silva  You had (Test or Medicine):fetal monitoring, lab work (Normal)     Diet:   Drink 8 to 12 glasses of liquids (milk, juice, water) every day.  You may eat meals and snacks.     Activity:  Call your doctor or nurse midwife if your baby is moving less than usual.     Call your provider if you notice:  Swelling in your face or increased swelling in your hands or legs.  Headaches that are not relieved by Tylenol (acetaminophen).  Changes in your vision (blurring: seeing spots or stars.)  Nausea (sick to your stomach) and vomiting (throwing up).   Weight gain of 5 pounds or more per week.  Heartburn that doesn't go away.  Signs of bladder infection: pain when you urinate (use the toilet), need to go more often and more urgently.  The bag of mccann (rupture of membranes) breaks, or you notice leaking in your underwear.  Bright red blood in your underwear.  Abdominal (lower belly) or stomach pain.  If less than 34 weeks: Contractions (tightening) more than 6 times in one hour.  Increase or change in vaginal discharge (note the color and amount)      Follow-up:  As scheduled in the clinic

## 2022-02-25 NOTE — PLAN OF CARE
Data: Patient assessed in the Birthplace for elevated blood pressures.  Cervical exam not examined.  Membranes intact.  Contractions/uterine assessment none.  Action:  Presumed adequate fetal oxygenation documented (see flow record). Discharge instructions reviewed.  Patient instructed to report change in fetal movement, vaginal leaking of fluid or bleeding, abdominal pain, or any concerns related to the pregnancy to her nurse/physician.    Response: Orders to discharge home per Cheyanne Silva.  Patient verbalized understanding of education and verbalized agreement with plan. Discharged to home.

## 2022-02-25 NOTE — PROGRESS NOTES
Patient here at 29w5d for headache and elevation in blood pressure at home.  Brought her home cuff with her today as well.    Pregnancy complicated by:  Type I DM on a pump  History preeclampsia with severe features with delivery at 34 w first pregnancy  Gestational hypertension with delivery at 37 weeks 2nd pregnancy  Possible narrow aorta on ultrasound--echo pending.    Follows with endocrinology at [facility]; blood pressure elevated in the 150s systolic there today. She was also getting values at home in the 150s. Has been nauseated this entire pregnancy, a little better recently with Reglan prescription from last week.    Good fetal movement, no leakage of fluid, no vaginal bleeding, no contractions    /80   LMP 07/31/2021     General: appears well, no distress.  Abdomen: gravid, nontender.  Extremities: nontender, no edema. Reflexes within normal limits. No clonus.    Impression: gestational hypertension    Plan: given lack of proteinuria and normal labs, suspicion for preeclampsia is low. However, given her history and risk factors, will plan 4 hours of monitoring for blood pressures. If they are all normal or with occasional mild range pressures, will discharge to home with close outpatient follow up. If consistently over 150/100, would keep overnight for observation and repeat labs in the morning, and plan MFM consult tomorrow. She is in agreement with this plan.    Addendum: checked her blood pressure cuff against ours--it is consistently running 20 points higher systolic, 10 points higher diastolic than our cuff.        Results for orders placed or performed during the hospital encounter of 02/24/22 (from the past 24 hour(s))   CBC with platelets   Result Value Ref Range    WBC Count 7.7 4.0 - 11.0 10e3/uL    RBC Count 3.73 (L) 3.80 - 5.20 10e6/uL    Hemoglobin 11.4 (L) 11.7 - 15.7 g/dL    Hematocrit 35.1 35.0 - 47.0 %    MCV 94 78 - 100 fL    MCH 30.6 26.5 - 33.0 pg    MCHC 32.5 31.5 - 36.5  g/dL    RDW 13.2 10.0 - 15.0 %    Platelet Count 220 150 - 450 10e3/uL   Comprehensive metabolic panel   Result Value Ref Range    Sodium 137 133 - 144 mmol/L    Potassium 3.7 3.4 - 5.3 mmol/L    Chloride 105 94 - 109 mmol/L    Carbon Dioxide (CO2) 24 20 - 32 mmol/L    Anion Gap 8 3 - 14 mmol/L    Urea Nitrogen 4 (L) 7 - 30 mg/dL    Creatinine 0.61 0.52 - 1.04 mg/dL    Calcium 8.6 8.5 - 10.1 mg/dL    Glucose 135 (H) 70 - 99 mg/dL    Alkaline Phosphatase 108 40 - 150 U/L    AST 13 0 - 45 U/L    ALT 16 0 - 50 U/L    Protein Total 6.3 (L) 6.8 - 8.8 g/dL    Albumin 2.2 (L) 3.4 - 5.0 g/dL    Bilirubin Total 0.3 0.2 - 1.3 mg/dL    GFR Estimate >90 >60 mL/min/1.73m2   Uric acid   Result Value Ref Range    Uric Acid 4.5 2.6 - 6.0 mg/dL   Protein  random urine   Result Value Ref Range    Total Protein Random Urine g/L 0.16 g/L    Total Protein Urine g/gr Creatinine 0.14 0.00 - 0.20 g/g Cr    Creatinine Urine mg/dL 111 mg/dL   UA with Microscopic reflex to Culture    Specimen: Urine, Clean Catch   Result Value Ref Range    Color Urine Light Yellow Colorless, Straw, Light Yellow, Yellow    Appearance Urine Clear Clear    Glucose Urine 500  (A) Negative mg/dL    Bilirubin Urine Negative Negative    Ketones Urine 10  (A) Negative mg/dL    Specific Gravity Urine 1.019 1.003 - 1.035    Blood Urine Negative Negative    pH Urine 6.5 5.0 - 7.0    Protein Albumin Urine 10  (A) Negative mg/dL    Urobilinogen Urine Normal Normal, 2.0 mg/dL    Nitrite Urine Negative Negative    Leukocyte Esterase Urine Negative Negative    Mucus Urine Present (A) None Seen /LPF    RBC Urine 11 (H) <=2 /HPF    WBC Urine 2 <=5 /HPF    Squamous Epithelials Urine <1 <=1 /HPF    Narrative    Urine Culture not indicated   Glucose by meter   Result Value Ref Range    GLUCOSE BY METER POCT 122 (H) 70 - 99 mg/dL

## 2022-02-26 LAB
BACTERIA UR CULT: ABNORMAL
BACTERIA UR CULT: NO GROWTH

## 2022-03-01 ENCOUNTER — HOSPITAL ENCOUNTER (OUTPATIENT)
Dept: ULTRASOUND IMAGING | Facility: CLINIC | Age: 27
End: 2022-03-01
Attending: OBSTETRICS & GYNECOLOGY
Payer: COMMERCIAL

## 2022-03-01 ENCOUNTER — HOSPITAL ENCOUNTER (OUTPATIENT)
Facility: CLINIC | Age: 27
Discharge: HOME | End: 2022-03-01
Attending: OBSTETRICS & GYNECOLOGY | Admitting: OBSTETRICS & GYNECOLOGY
Payer: COMMERCIAL

## 2022-03-01 ENCOUNTER — OFFICE VISIT (OUTPATIENT)
Dept: MATERNAL FETAL MEDICINE | Facility: CLINIC | Age: 27
End: 2022-03-01
Attending: OBSTETRICS & GYNECOLOGY
Payer: COMMERCIAL

## 2022-03-01 VITALS — SYSTOLIC BLOOD PRESSURE: 126 MMHG | DIASTOLIC BLOOD PRESSURE: 65 MMHG | RESPIRATION RATE: 18 BRPM | TEMPERATURE: 98.2 F

## 2022-03-01 VITALS — HEART RATE: 98 BPM | DIASTOLIC BLOOD PRESSURE: 89 MMHG | SYSTOLIC BLOOD PRESSURE: 130 MMHG

## 2022-03-01 DIAGNOSIS — O24.013 TYPE 1 DIABETES MELLITUS DURING PREGNANCY IN THIRD TRIMESTER: Primary | ICD-10-CM

## 2022-03-01 DIAGNOSIS — O13.9 GESTATIONAL HYPERTENSION, ANTEPARTUM: ICD-10-CM

## 2022-03-01 DIAGNOSIS — O24.012 TYPE 1 DIABETES MELLITUS DURING PREGNANCY IN SECOND TRIMESTER: ICD-10-CM

## 2022-03-01 DIAGNOSIS — Z36.89 ENCOUNTER FOR TRIAGE IN PREGNANT PATIENT: Primary | ICD-10-CM

## 2022-03-01 LAB
ALBUMIN SERPL-MCNC: 2.3 G/DL (ref 3.4–5)
ALP SERPL-CCNC: 128 U/L (ref 40–150)
ALT SERPL W P-5'-P-CCNC: 19 U/L (ref 0–50)
ANION GAP SERPL CALCULATED.3IONS-SCNC: 9 MMOL/L (ref 3–14)
AST SERPL W P-5'-P-CCNC: 15 U/L (ref 0–45)
BILIRUB SERPL-MCNC: 0.4 MG/DL (ref 0.2–1.3)
BUN SERPL-MCNC: 6 MG/DL (ref 7–30)
CALCIUM SERPL-MCNC: 9.4 MG/DL (ref 8.5–10.1)
CHLORIDE BLD-SCNC: 104 MMOL/L (ref 94–109)
CO2 SERPL-SCNC: 21 MMOL/L (ref 20–32)
CREAT SERPL-MCNC: 0.67 MG/DL (ref 0.52–1.04)
CREAT UR-MCNC: 155 MG/DL
GFR SERPL CREATININE-BSD FRML MDRD: >90 ML/MIN/1.73M2
GLUCOSE BLD-MCNC: 137 MG/DL (ref 70–99)
KETONES BLD-SCNC: 0.5 MMOL/L (ref 0–0.6)
POTASSIUM BLD-SCNC: 3.8 MMOL/L (ref 3.4–5.3)
PROT SERPL-MCNC: 7 G/DL (ref 6.8–8.8)
PROT UR-MCNC: 0.33 G/L
PROT/CREAT 24H UR: 0.21 G/G CR (ref 0–0.2)
SODIUM SERPL-SCNC: 134 MMOL/L (ref 133–144)
TSH SERPL DL<=0.005 MIU/L-ACNC: 1.35 MU/L (ref 0.4–4)

## 2022-03-01 PROCEDURE — 96360 HYDRATION IV INFUSION INIT: CPT

## 2022-03-01 PROCEDURE — 84443 ASSAY THYROID STIM HORMONE: CPT | Performed by: OBSTETRICS & GYNECOLOGY

## 2022-03-01 PROCEDURE — 999N000105 HC STATISTIC NO DOCUMENTATION TO SUPPORT CHARGE

## 2022-03-01 PROCEDURE — 76816 OB US FOLLOW-UP PER FETUS: CPT

## 2022-03-01 PROCEDURE — 250N000013 HC RX MED GY IP 250 OP 250 PS 637: Performed by: OBSTETRICS & GYNECOLOGY

## 2022-03-01 PROCEDURE — 80053 COMPREHEN METABOLIC PANEL: CPT | Performed by: OBSTETRICS & GYNECOLOGY

## 2022-03-01 PROCEDURE — 76816 OB US FOLLOW-UP PER FETUS: CPT | Mod: 26 | Performed by: OBSTETRICS & GYNECOLOGY

## 2022-03-01 PROCEDURE — 87086 URINE CULTURE/COLONY COUNT: CPT | Performed by: OBSTETRICS & GYNECOLOGY

## 2022-03-01 PROCEDURE — 76819 FETAL BIOPHYS PROFIL W/O NST: CPT | Mod: 26 | Performed by: OBSTETRICS & GYNECOLOGY

## 2022-03-01 PROCEDURE — 84156 ASSAY OF PROTEIN URINE: CPT | Performed by: OBSTETRICS & GYNECOLOGY

## 2022-03-01 PROCEDURE — 59025 FETAL NON-STRESS TEST: CPT

## 2022-03-01 PROCEDURE — 82040 ASSAY OF SERUM ALBUMIN: CPT | Performed by: OBSTETRICS & GYNECOLOGY

## 2022-03-01 PROCEDURE — 82010 KETONE BODYS QUAN: CPT | Performed by: OBSTETRICS & GYNECOLOGY

## 2022-03-01 PROCEDURE — 96361 HYDRATE IV INFUSION ADD-ON: CPT

## 2022-03-01 PROCEDURE — G0463 HOSPITAL OUTPT CLINIC VISIT: HCPCS | Mod: 25

## 2022-03-01 PROCEDURE — 258N000003 HC RX IP 258 OP 636: Performed by: OBSTETRICS & GYNECOLOGY

## 2022-03-01 RX ORDER — LIDOCAINE 40 MG/G
CREAM TOPICAL
Status: DISCONTINUED | OUTPATIENT
Start: 2022-03-01 | End: 2022-03-01 | Stop reason: HOSPADM

## 2022-03-01 RX ORDER — ACETAMINOPHEN 325 MG/1
975 TABLET ORAL ONCE
Status: COMPLETED | OUTPATIENT
Start: 2022-03-01 | End: 2022-03-01

## 2022-03-01 RX ADMIN — ACETAMINOPHEN 975 MG: 325 TABLET, FILM COATED ORAL at 13:59

## 2022-03-01 RX ADMIN — SODIUM CHLORIDE, POTASSIUM CHLORIDE, SODIUM LACTATE AND CALCIUM CHLORIDE 500 ML: 600; 310; 30; 20 INJECTION, SOLUTION INTRAVENOUS at 13:00

## 2022-03-01 NOTE — PROVIDER NOTIFICATION
03/01/22 1341   Provider Notification   Provider Name/Title Dr. East   Method of Notification In Department   Request Evaluate - Remote   Notification Reason Pain     RN updated MD that patient is reporting a mild HA, order received to offer 975mg tylenol.     Ketone lab resulted WNL (0.5) with other labs still pending, ctx have decreased in frequency since IV bolus. FHR tracing shows moderate variability with 2-3 variable decels prior to IV bolus, now appropriate for gestational age. Patient is ok to eat lunch. RN will update MD with additional lab results.

## 2022-03-01 NOTE — PLAN OF CARE
Data: Patient assessed in the Birthplace for uterine contractions, elevated blood pressures and abnormal FHR during MFM evaluation. Cervical exam not examined.  Membranes intact.  Contractions occurring Q2-4 and palpate as very mild, MD aware.  Action:  Presumed adequate fetal oxygenation documented (see flow record). Discharge instructions reviewed.  Patient instructed to report change in fetal movement, vaginal leaking of fluid or bleeding, abdominal pain, or any concerns related to the pregnancy to her nurse/physician.    Response: Orders to discharge home per Marina Desai.  Patient verbalized understanding of education and verbalized agreement with plan. Discharged to home at 1500.

## 2022-03-01 NOTE — PROVIDER NOTIFICATION
03/01/22 1237   Provider Notification   Provider Name/Title Dr. Gertrudis Desai   Method of Notification Phone   Request Evaluate - Remote   Dr. Josué Desai called unit after talking with MFM. Updated on FHR tracing with baseline 150-155bpm with accelerations present, patient pasquale every 2-3 minutes. Orders received to collect CMP, beta hydroxybutyrate, administer 500mL bolus LR now and update MD with lab results. Primary RN Alissa Ahmadi updated.

## 2022-03-01 NOTE — DISCHARGE INSTRUCTIONS
Discharge Instruction for Undelivered Patients      You were seen for: Fetal assessment, elevated blood pressures  We Consulted: Dr. East  You had (Test or Medicine):Fetal monitoring, HELLP labs, urinalysis     Diet:   Drink 8 to 12 glasses of liquids (milk, juice, water) every day.  You may eat meals and snacks.  To manage your diabetes, follow the guidelines for eating and drinking given to you by your Clinic Provider or Diabetes Educator.       Activity:  Count fetal kicks everyday (see handout)  Call your doctor or nurse midwife if your baby is moving less than usual.     Call your provider if you notice:  Swelling in your face or increased swelling in your hands or legs.  Headaches that are not relieved by Tylenol (acetaminophen).  Changes in your vision (blurring: seeing spots or stars.)  Nausea (sick to your stomach) and vomiting (throwing up).   Weight gain of 5 pounds or more per week.  Heartburn that doesn't go away.  Signs of bladder infection: pain when you urinate (use the toilet), need to go more often and more urgently.  The bag of mccann (rupture of membranes) breaks, or you notice leaking in your underwear.  Bright red blood in your underwear.  Abdominal (lower belly) or stomach pain.  Second (plus) baby: Contractions (tightening) less than 10 minutes apart and getting stronger.  *If less than 34 weeks: Contractions (tightening) more than 6 times in one hour.  Increase or change in vaginal discharge (note the color and amount)      Follow-up:  As scheduled in the clinic. MFM will follow up with you to schedule twice weekly BPPs.  clinic RNs will follow up with you to schedule weekly HELLP labs and blood pressure checks.

## 2022-03-01 NOTE — PROVIDER NOTIFICATION
"   03/01/22 1434   Provider Notification   Provider Name/Title Dr. East   Method of Notification Phone   Request Evaluate - Remote   Notification Reason Status Update     RN updated MD that all labs resulted WNL, FHR tracing shows moderate variability and accels with occasional variable decels, ctx still occurring Q2-4, patient reports ctx as \"mild occasional tightening.\" Order received to send urine culture to confirm that UTI has resolved. Per MFM and MD, patient is ok to discharge home at this time with plans to transition to twice weekly BBP d/t gestational hypertension. Additionally, patient will be seen in clinic weekly for BP check and HELLP labs. MFM and [facility] clinic RNs will coordinate appointments, primary RN will communicate plan to patient. Patient is agreeable with POC.   "

## 2022-03-01 NOTE — NURSING NOTE
Patient here for MFM visit. Patient reports she was recently evaluated in L/D due to increasing blood pressure and HA/visual changes. B/P today 130/89, denies HA, visual changes or epigastric pain today. Reviewed above with Dr. Tabor who met with patient following ultrasound, see MD comments in U/S report for details.

## 2022-03-01 NOTE — PROVIDER NOTIFICATION
03/01/22 1254   Provider Notification   Provider Name/Title Dr. Gertrudis Desai   Method of Notification Phone   Request Evaluate - Remote   Notification Reason Maternal Vital Sign Change   Dr. Gertrudis Desai paged and updated on /95, orders received for preeclampsia labs with urine protein/creatinine ration. Primary RN Alissa Ahmadi updated.

## 2022-03-02 ENCOUNTER — TELEPHONE (OUTPATIENT)
Dept: OBGYN | Facility: CLINIC | Age: 27
End: 2022-03-02
Payer: COMMERCIAL

## 2022-03-02 DIAGNOSIS — O09.899 SUPERVISION OF OTHER HIGH RISK PREGNANCY, ANTEPARTUM: Primary | ICD-10-CM

## 2022-03-02 NOTE — TELEPHONE ENCOUNTER
States has BP cuff at home, but states is not accurate.  (double checked in L&D)  Lab orders entered. (can have done at weekly appts)  Will have MFM check BP at one of her twice weekly appts. If they won't, she will come to OB clinic for BP check.  Patient voiced understanding of plan.  Nanci Pastor RN

## 2022-03-02 NOTE — TELEPHONE ENCOUNTER
----- Message from Marina Desai MD sent at 3/1/2022  4:26 PM CST -----  Hello RN team,    This patient was in triage today and officially is diagnosed with gestational HTN.  She is a Silva patient.  For follow-up she needs  - twice weekly BPPs (already ordered from [facility])  - weekly HEL labs (AST, ALT, CBC, creatinine, P:C ratio) - please order under Silva who is her primary  - home BP cuff - please order  - [facility] recommends twice weekly BP checks, she could do these at her [facility] appt or if they don't check it there could come downstairs for a RN only visit in our clinic    Could you please order these labs and let the patient know the plan?  We told her this in triage as well but she might just need a reinforcement.    Thanks  Marina Desai MD, MPH  [facility]

## 2022-03-03 ENCOUNTER — HOSPITAL ENCOUNTER (OUTPATIENT)
Dept: ULTRASOUND IMAGING | Facility: CLINIC | Age: 27
End: 2022-03-03
Attending: OBSTETRICS & GYNECOLOGY
Payer: COMMERCIAL

## 2022-03-03 ENCOUNTER — OFFICE VISIT (OUTPATIENT)
Dept: MATERNAL FETAL MEDICINE | Facility: CLINIC | Age: 27
End: 2022-03-03
Attending: OBSTETRICS & GYNECOLOGY
Payer: COMMERCIAL

## 2022-03-03 VITALS — DIASTOLIC BLOOD PRESSURE: 87 MMHG | SYSTOLIC BLOOD PRESSURE: 140 MMHG

## 2022-03-03 DIAGNOSIS — O13.9 GESTATIONAL HYPERTENSION, ANTEPARTUM: ICD-10-CM

## 2022-03-03 DIAGNOSIS — O24.013 TYPE 1 DIABETES MELLITUS DURING PREGNANCY IN THIRD TRIMESTER: Primary | ICD-10-CM

## 2022-03-03 DIAGNOSIS — O24.013 TYPE 1 DIABETES MELLITUS DURING PREGNANCY IN THIRD TRIMESTER: ICD-10-CM

## 2022-03-03 LAB — BACTERIA UR CULT: NO GROWTH

## 2022-03-03 PROCEDURE — 76819 FETAL BIOPHYS PROFIL W/O NST: CPT

## 2022-03-03 PROCEDURE — 76819 FETAL BIOPHYS PROFIL W/O NST: CPT | Mod: 26 | Performed by: OBSTETRICS & GYNECOLOGY

## 2022-03-04 NOTE — PROGRESS NOTES
"Please see \"Imaging\" tab under \"Chart Review\" for details of today's ultrasound.    Tigre Angeles M.D.  Specialist in Maternal-Fetal Medicine     "

## 2022-03-06 ENCOUNTER — HOSPITAL ENCOUNTER (OUTPATIENT)
Facility: CLINIC | Age: 27
Discharge: HOME | End: 2022-03-06
Attending: OBSTETRICS & GYNECOLOGY | Admitting: OBSTETRICS & GYNECOLOGY
Payer: COMMERCIAL

## 2022-03-06 ENCOUNTER — NURSE TRIAGE (OUTPATIENT)
Dept: NURSING | Facility: CLINIC | Age: 27
End: 2022-03-06
Payer: COMMERCIAL

## 2022-03-06 ENCOUNTER — HOSPITAL ENCOUNTER (OUTPATIENT)
Facility: CLINIC | Age: 27
End: 2022-03-06
Admitting: OBSTETRICS & GYNECOLOGY
Payer: COMMERCIAL

## 2022-03-06 VITALS
RESPIRATION RATE: 16 BRPM | SYSTOLIC BLOOD PRESSURE: 134 MMHG | TEMPERATURE: 98.2 F | WEIGHT: 194 LBS | BODY MASS INDEX: 30.45 KG/M2 | HEIGHT: 67 IN | DIASTOLIC BLOOD PRESSURE: 93 MMHG

## 2022-03-06 DIAGNOSIS — Z36.89 ENCOUNTER FOR TRIAGE IN PREGNANT PATIENT: Primary | ICD-10-CM

## 2022-03-06 LAB
ALT SERPL W P-5'-P-CCNC: 18 U/L (ref 0–50)
AST SERPL W P-5'-P-CCNC: 17 U/L (ref 0–45)
CREAT SERPL-MCNC: 0.86 MG/DL (ref 0.52–1.04)
CREAT UR-MCNC: 112 MG/DL
ERYTHROCYTE [DISTWIDTH] IN BLOOD BY AUTOMATED COUNT: 13 % (ref 10–15)
FASTING STATUS PATIENT QL REPORTED: NO
GFR SERPL CREATININE-BSD FRML MDRD: >90 ML/MIN/1.73M2
GLUCOSE BLD-MCNC: 99 MG/DL (ref 70–99)
HCT VFR BLD AUTO: 35.1 % (ref 35–47)
HGB BLD-MCNC: 11.5 G/DL (ref 11.7–15.7)
KETONES BLD-SCNC: 0.6 MMOL/L (ref 0–0.6)
MCH RBC QN AUTO: 30.5 PG (ref 26.5–33)
MCHC RBC AUTO-ENTMCNC: 32.8 G/DL (ref 31.5–36.5)
MCV RBC AUTO: 93 FL (ref 78–100)
PLATELET # BLD AUTO: 227 10E3/UL (ref 150–450)
PROT UR-MCNC: 0.3 G/L
PROT/CREAT 24H UR: 0.27 G/G CR (ref 0–0.2)
RBC # BLD AUTO: 3.77 10E6/UL (ref 3.8–5.2)
WBC # BLD AUTO: 9.5 10E3/UL (ref 4–11)

## 2022-03-06 PROCEDURE — 96360 HYDRATION IV INFUSION INIT: CPT

## 2022-03-06 PROCEDURE — 82565 ASSAY OF CREATININE: CPT | Performed by: OBSTETRICS & GYNECOLOGY

## 2022-03-06 PROCEDURE — 82010 KETONE BODYS QUAN: CPT | Performed by: OBSTETRICS & GYNECOLOGY

## 2022-03-06 PROCEDURE — G0463 HOSPITAL OUTPT CLINIC VISIT: HCPCS | Mod: 25

## 2022-03-06 PROCEDURE — 36415 COLL VENOUS BLD VENIPUNCTURE: CPT | Performed by: OBSTETRICS & GYNECOLOGY

## 2022-03-06 PROCEDURE — 250N000013 HC RX MED GY IP 250 OP 250 PS 637: Performed by: OBSTETRICS & GYNECOLOGY

## 2022-03-06 PROCEDURE — 96374 THER/PROPH/DIAG INJ IV PUSH: CPT

## 2022-03-06 PROCEDURE — 258N000003 HC RX IP 258 OP 636: Performed by: OBSTETRICS & GYNECOLOGY

## 2022-03-06 PROCEDURE — 96361 HYDRATE IV INFUSION ADD-ON: CPT

## 2022-03-06 PROCEDURE — 85027 COMPLETE CBC AUTOMATED: CPT | Performed by: OBSTETRICS & GYNECOLOGY

## 2022-03-06 PROCEDURE — 250N000011 HC RX IP 250 OP 636: Performed by: OBSTETRICS & GYNECOLOGY

## 2022-03-06 PROCEDURE — 82947 ASSAY GLUCOSE BLOOD QUANT: CPT | Performed by: OBSTETRICS & GYNECOLOGY

## 2022-03-06 PROCEDURE — 84450 TRANSFERASE (AST) (SGOT): CPT | Performed by: OBSTETRICS & GYNECOLOGY

## 2022-03-06 PROCEDURE — 84460 ALANINE AMINO (ALT) (SGPT): CPT | Performed by: OBSTETRICS & GYNECOLOGY

## 2022-03-06 PROCEDURE — 84156 ASSAY OF PROTEIN URINE: CPT | Performed by: OBSTETRICS & GYNECOLOGY

## 2022-03-06 RX ORDER — ACETAMINOPHEN 325 MG/1
975 TABLET ORAL ONCE
Status: COMPLETED | OUTPATIENT
Start: 2022-03-06 | End: 2022-03-06

## 2022-03-06 RX ORDER — ONDANSETRON 4 MG/1
4 TABLET, ORALLY DISINTEGRATING ORAL EVERY 6 HOURS PRN
Status: DISCONTINUED | OUTPATIENT
Start: 2022-03-06 | End: 2022-03-07 | Stop reason: HOSPADM

## 2022-03-06 RX ORDER — ONDANSETRON 2 MG/ML
4 INJECTION INTRAMUSCULAR; INTRAVENOUS EVERY 6 HOURS PRN
Status: DISCONTINUED | OUTPATIENT
Start: 2022-03-06 | End: 2022-03-07 | Stop reason: HOSPADM

## 2022-03-06 RX ORDER — HYDROXYZINE HYDROCHLORIDE 50 MG/1
50 TABLET, FILM COATED ORAL ONCE
Status: COMPLETED | OUTPATIENT
Start: 2022-03-06 | End: 2022-03-06

## 2022-03-06 RX ORDER — METOCLOPRAMIDE HYDROCHLORIDE 5 MG/ML
10 INJECTION INTRAMUSCULAR; INTRAVENOUS EVERY 6 HOURS PRN
Status: DISCONTINUED | OUTPATIENT
Start: 2022-03-06 | End: 2022-03-07 | Stop reason: HOSPADM

## 2022-03-06 RX ORDER — METOCLOPRAMIDE 10 MG/1
10 TABLET ORAL EVERY 6 HOURS PRN
Status: DISCONTINUED | OUTPATIENT
Start: 2022-03-06 | End: 2022-03-07 | Stop reason: HOSPADM

## 2022-03-06 RX ORDER — HYDROXYZINE HYDROCHLORIDE 50 MG/1
100 TABLET, FILM COATED ORAL ONCE
Status: COMPLETED | OUTPATIENT
Start: 2022-03-06 | End: 2022-03-06

## 2022-03-06 RX ADMIN — HYDROXYZINE HYDROCHLORIDE 100 MG: 50 TABLET, FILM COATED ORAL at 19:59

## 2022-03-06 RX ADMIN — ACETAMINOPHEN 975 MG: 325 TABLET, FILM COATED ORAL at 19:14

## 2022-03-06 RX ADMIN — SODIUM CHLORIDE 500 ML: 9 INJECTION, SOLUTION INTRAVENOUS at 19:08

## 2022-03-06 RX ADMIN — METOCLOPRAMIDE HYDROCHLORIDE 10 MG: 5 INJECTION INTRAMUSCULAR; INTRAVENOUS at 19:14

## 2022-03-06 NOTE — TELEPHONE ENCOUNTER
Pt is calling.    OB Triage Call      Is patient's OB/Midwife with the [facility] or [facility]? [facility]- Proceed with triage     Reason for call:  Pain in her right side and lower back that is constant but worsens with contractions. Contractions every 2-3 min lasting 45 sec.  On modified bedrest. History of  delivery    Assessment:  History of  labor and delivery with last pregnancy. Type 1 Diabetic.  Contractions are painful and are every 2-3 min lasting 45 seconds long despite modified bedrest. Good fetal movement. Jayant LOF or vaginal bleeding. Increase blood pressure's at home, but BP cuff is not accurate..    Plan: To Labor and delivery now.    Patient plans to deliver at [facility]     Patient's primary OB Provider is Dr Silva.    Per protocol recommendations Patient to be evaluated in L&D. Patient's primary OB is Debi Physician.  Labor and delivery at [facility] ([phone]) notified of patient's pending arrival.  Report given to ADOLFO Zuluaga.    Is patient's delivering hospital on divert? No      31w1d    Estimated Date of Delivery: May 7, 2022        OB History    Para Term  AB Living   3 2 1 1 0 2   SAB IAB Ectopic Multiple Live Births   0 0 0 0 2      # Outcome Date GA Lbr Marcellus/2nd Weight Sex Delivery Anes PTL Lv   3 Current            2 Term 18 37w0d 03:31 :16 2.98 kg (6 lb 9.1 oz) M Vag-Spont EPI N VINNY      Name: NATALIE,BABY1 RAMON      Apgar1: 9  Apgar5: 9   1  14 34w2d 07:13  01:56 3.45 kg (7 lb 9.7 oz) F Vag-Spont EPI  VINNY      Complications: Preeclampsia/Hypertension      Name: Cinthia      Apgar1: 7  Apgar5: 8       Lab Results   Component Value Date    GBS Negative 2017          Giovanna Caraballo 22 5:25 PM  [facility] Nurse Advisor    Reason for Disposition    Contractions < 10 minutes apart for 1 hour (i.e., 6 or more contractions an hour)    Additional Information    Negative: Passed out (i.e., lost consciousness,  "collapsed and was not responding)    Negative: Shock suspected (e.g., cold/pale/clammy skin, too weak to stand, low BP, rapid pulse)    Negative: Difficult to awaken or acting confused (e.g., disoriented, slurred speech)    Negative: [1] SEVERE abdominal pain (e.g., excruciating) AND [2] constant AND [3] present > 1 hour    Negative: Severe bleeding (e.g., continuous red blood from vagina, or large blood clots)    Negative: Umbilical cord hanging out of the vagina (shiny, white, curled appearance, \"like telephone cord\")    Negative: Uncontrollable urge to push (i.e., feels like baby is coming out now)    Negative: Can see baby    Negative: Sounds like a life-threatening emergency to the triager    Negative: MODERATE-SEVERE abdominal pain    Protocols used: PREGNANCY - LABOR - [id number]    Giovanna Caraballo RN  [facility] Nurse Advisor  3/6/2022 at 5:30 PM      "

## 2022-03-06 NOTE — TELEPHONE ENCOUNTER
Rec'd call from pt c/o back pain and contractions. A few minutes into our call we became disconnected which nurse was unaware of until another incoming call came in. Finished this brief call and called pt back. She said she called back after we got cut off and spoke w/ another nurse and was triaged to L&D where she is headed now.      Reason for Disposition    Contractions < 10 minutes apart for 1 hour (i.e., 6 or more contractions an hour)    Additional Information    Negative: MODERATE-SEVERE abdominal pain    Protocols used: PREGNANCY - LABOR - [id number]

## 2022-03-07 NOTE — PROVIDER NOTIFICATION
03/06/22 1831   Provider Notification   Provider Name/Title Dr Gertrudis Desai   Method of Notification Phone   Request Evaluate - Remote   Notification Reason Patient Arrived   Updated MD of pt's arrival, that ptatient reports that she always has abdominal pain but today at 1330 it became worse and constant on her right upper abdomen, radiating to her right lower abdomen and back. Pt states she always has nausea and vomits in which reglan and zofran help but today it did not, pt has a history of pre-e with an 34 week delivery due to it, that she is a type 1 dm, some uterine irritability noted on monitor. MD gave TORB to start an IV, give 500ml NS bolus, draw pre-labs, and pt may have tylenol, reglan and zofran prn.

## 2022-03-07 NOTE — CARE PLAN
Data: Patient presented to [facility]: 3/6/2022  6:07 PM.  Reason for maternal/fetal assessment is abdominal pain. Patient reports that she always has abdominal pain but today at 1330 it became worse and constant on her right upper abdomen, radiating to her right lower abdomen and back. Pt states she always has nausea and vomits in which reglan and zofran help but today it did not.  Patient is a .  Prenatal record reviewed. Pregnancy  has been complicated by gestational hypertension.  Gestational Age 31w1d. VSS. Fetal movement active. Patient denies leaking of vaginal fluid/rupture of membranes, vaginal bleeding, pelvic pressure, headache, visual disturbances, significant edema. Support person is not present.   Action: Verbal consent for EFM. Triage assessment completed. Bill of rights reviewed.  Response: Patient verbalized agreement with plan. Will contact Dr Marina Desai with update and for further orders.

## 2022-03-07 NOTE — DISCHARGE INSTRUCTIONS
Discharge Instruction for Undelivered Patients      You were seen for: Fetal Assessment  We Consulted: Dr. Gertrudis Desai  You had (Test or Medicine):fetal monitoring, Hypertension labs     Diet:   Drink 8 to 12 glasses of liquids (milk, juice, water) every day.  You may eat meals and snacks.     Activity:  Call your doctor or nurse midwife if your baby is moving less than usual.     Call your provider if you notice:  Swelling in your face or increased swelling in your hands or legs.  Headaches that are not relieved by Tylenol (acetaminophen).  Changes in your vision (blurring: seeing spots or stars.)  Nausea (sick to your stomach) and vomiting (throwing up).   Weight gain of 5 pounds or more per week.  Heartburn that doesn't go away.  Signs of bladder infection: pain when you urinate (use the toilet), need to go more often and more urgently.  The bag of mccann (rupture of membranes) breaks, or you notice leaking in your underwear.  Bright red blood in your underwear.  Abdominal (lower belly) or stomach pain.  *If less than 34 weeks: Contractions (tightening) more than 6 times in one hour.  Increase or change in vaginal discharge (note the color and amount)      Follow-up:  As scheduled in the clinic

## 2022-03-07 NOTE — PROGRESS NOTES
Data: Patient assessed in the [facility] for abdominal pain. Pain still occurs but less then on arrival.  Hoping to feel better at home.  Knows to call back if get works or still happening by morning. Cervical exam not examined.  Membranes intact.  Contractions/uterine assessment occas cramping.  Action:  Presumed adequate fetal oxygenation documented (see flow record). Discharge instructions reviewed.  Patient instructed to report change in fetal movement, vaginal leaking of fluid or bleeding, abdominal pain, or any concerns related to the pregnancy to her nurse/physician.    Response: Orders to discharge home per Marina Desai.  Patient verbalized understanding of education and verbalized agreement with plan. Discharged to home.

## 2022-03-07 NOTE — PLAN OF CARE
Moving around the room-  Uncomfortable sitting in the bed.  Does feel the IV fluids and Vistaril/Tylenol is starting to work.  The pain is decreasing.  Labs are normal and she feels reassured knowing that.  Will watch a little longer to see if pain gets even less with the rest of IV fluids.  She does feel comfortable going home and no need for more medication.

## 2022-03-08 ENCOUNTER — PRENATAL OFFICE VISIT (OUTPATIENT)
Dept: OBGYN | Facility: CLINIC | Age: 27
End: 2022-03-08
Payer: COMMERCIAL

## 2022-03-08 ENCOUNTER — HOSPITAL ENCOUNTER (OUTPATIENT)
Dept: ULTRASOUND IMAGING | Facility: CLINIC | Age: 27
End: 2022-03-08
Attending: OBSTETRICS & GYNECOLOGY
Payer: COMMERCIAL

## 2022-03-08 ENCOUNTER — OFFICE VISIT (OUTPATIENT)
Dept: MATERNAL FETAL MEDICINE | Facility: CLINIC | Age: 27
End: 2022-03-08
Attending: OBSTETRICS & GYNECOLOGY
Payer: COMMERCIAL

## 2022-03-08 VITALS — DIASTOLIC BLOOD PRESSURE: 82 MMHG | BODY MASS INDEX: 31.17 KG/M2 | SYSTOLIC BLOOD PRESSURE: 140 MMHG | WEIGHT: 199 LBS

## 2022-03-08 DIAGNOSIS — O24.013 TYPE 1 DIABETES MELLITUS DURING PREGNANCY IN THIRD TRIMESTER: ICD-10-CM

## 2022-03-08 DIAGNOSIS — E10.9 TYPE 1 DIABETES MELLITUS WITHOUT COMPLICATION (H): Primary | ICD-10-CM

## 2022-03-08 DIAGNOSIS — N39.0 URINARY TRACT INFECTION: ICD-10-CM

## 2022-03-08 DIAGNOSIS — O13.3 GESTATIONAL HYPERTENSION, THIRD TRIMESTER: ICD-10-CM

## 2022-03-08 DIAGNOSIS — O09.899 SUPERVISION OF OTHER HIGH RISK PREGNANCY, ANTEPARTUM: ICD-10-CM

## 2022-03-08 DIAGNOSIS — O24.013 TYPE 1 DIABETES MELLITUS DURING PREGNANCY IN THIRD TRIMESTER: Primary | ICD-10-CM

## 2022-03-08 LAB
ALBUMIN UR-MCNC: 30 MG/DL
AMORPH CRY #/AREA URNS HPF: ABNORMAL /HPF
APPEARANCE UR: CLEAR
BACTERIA #/AREA URNS HPF: ABNORMAL /HPF
BASOPHILS # BLD AUTO: 0 10E3/UL (ref 0–0.2)
BASOPHILS NFR BLD AUTO: 0 %
BILIRUB UR QL STRIP: NEGATIVE
COLOR UR AUTO: YELLOW
EOSINOPHIL # BLD AUTO: 0 10E3/UL (ref 0–0.7)
EOSINOPHIL NFR BLD AUTO: 0 %
ERYTHROCYTE [DISTWIDTH] IN BLOOD BY AUTOMATED COUNT: 13.5 % (ref 10–15)
GLUCOSE UR STRIP-MCNC: NEGATIVE MG/DL
HCT VFR BLD AUTO: 33.8 % (ref 35–47)
HGB BLD-MCNC: 11.2 G/DL (ref 11.7–15.7)
HGB UR QL STRIP: ABNORMAL
KETONES UR STRIP-MCNC: ABNORMAL MG/DL
LEUKOCYTE ESTERASE UR QL STRIP: ABNORMAL
LYMPHOCYTES # BLD AUTO: 1.5 10E3/UL (ref 0.8–5.3)
LYMPHOCYTES NFR BLD AUTO: 15 %
MCH RBC QN AUTO: 30.9 PG (ref 26.5–33)
MCHC RBC AUTO-ENTMCNC: 33.1 G/DL (ref 31.5–36.5)
MCV RBC AUTO: 93 FL (ref 78–100)
MONOCYTES # BLD AUTO: 0.6 10E3/UL (ref 0–1.3)
MONOCYTES NFR BLD AUTO: 6 %
NEUTROPHILS # BLD AUTO: 7.6 10E3/UL (ref 1.6–8.3)
NEUTROPHILS NFR BLD AUTO: 78 %
NITRATE UR QL: NEGATIVE
PH UR STRIP: 7.5 [PH] (ref 5–7)
PLATELET # BLD AUTO: 219 10E3/UL (ref 150–450)
RBC # BLD AUTO: 3.63 10E6/UL (ref 3.8–5.2)
RBC #/AREA URNS AUTO: ABNORMAL /HPF
SP GR UR STRIP: 1.02 (ref 1–1.03)
SQUAMOUS #/AREA URNS AUTO: ABNORMAL /LPF
UROBILINOGEN UR STRIP-ACNC: 0.2 E.U./DL
WBC # BLD AUTO: 9.8 10E3/UL (ref 4–11)
WBC #/AREA URNS AUTO: ABNORMAL /HPF

## 2022-03-08 PROCEDURE — 84460 ALANINE AMINO (ALT) (SGPT): CPT | Performed by: OBSTETRICS & GYNECOLOGY

## 2022-03-08 PROCEDURE — 85025 COMPLETE CBC W/AUTO DIFF WBC: CPT | Performed by: OBSTETRICS & GYNECOLOGY

## 2022-03-08 PROCEDURE — 36415 COLL VENOUS BLD VENIPUNCTURE: CPT | Performed by: OBSTETRICS & GYNECOLOGY

## 2022-03-08 PROCEDURE — 81001 URINALYSIS AUTO W/SCOPE: CPT | Performed by: OBSTETRICS & GYNECOLOGY

## 2022-03-08 PROCEDURE — 76819 FETAL BIOPHYS PROFIL W/O NST: CPT | Mod: 26 | Performed by: OBSTETRICS & GYNECOLOGY

## 2022-03-08 PROCEDURE — 87086 URINE CULTURE/COLONY COUNT: CPT | Performed by: OBSTETRICS & GYNECOLOGY

## 2022-03-08 PROCEDURE — 84450 TRANSFERASE (AST) (SGOT): CPT | Performed by: OBSTETRICS & GYNECOLOGY

## 2022-03-08 PROCEDURE — 99207 PR PRENATAL VISIT: CPT | Performed by: OBSTETRICS & GYNECOLOGY

## 2022-03-08 PROCEDURE — 84550 ASSAY OF BLOOD/URIC ACID: CPT | Performed by: OBSTETRICS & GYNECOLOGY

## 2022-03-08 PROCEDURE — 82565 ASSAY OF CREATININE: CPT | Performed by: OBSTETRICS & GYNECOLOGY

## 2022-03-08 PROCEDURE — 84520 ASSAY OF UREA NITROGEN: CPT | Performed by: OBSTETRICS & GYNECOLOGY

## 2022-03-08 PROCEDURE — 76819 FETAL BIOPHYS PROFIL W/O NST: CPT

## 2022-03-08 RX ORDER — NITROFURANTOIN 25; 75 MG/1; MG/1
100 CAPSULE ORAL 2 TIMES DAILY
Qty: 14 CAPSULE | Refills: 0 | Status: SHIPPED | OUTPATIENT
Start: 2022-03-08

## 2022-03-08 NOTE — NURSING NOTE
"Chief Complaint   Patient presents with     Prenatal Care       Initial BP (!) 140/82   Wt 90.3 kg (199 lb)   LMP 2021   Breastfeeding No   BMI 31.17 kg/m   Estimated body mass index is 31.17 kg/m  as calculated from the following:    Height as of 3/6/22: 1.702 m (5' 7\").    Weight as of this encounter: 90.3 kg (199 lb).  BP completed using cuff size: regular    Questioned patient about current smoking habits.  Pt. has never smoked.          31w3d  + FM daily  - bleeding or leaking of fluid  + R Flank/side pain x 2-3 days  + headache  + nausea and vomiting   María Elena Arce LPN                 "

## 2022-03-08 NOTE — PROGRESS NOTES
PROBLEM LIST  LABS: Opos/RI    1. Type I DM on pump: managed by endocrine at [facility]. Plan: eye exam, TSH qTM, level II US, fetal echo, growth scans q3-4w. She will manage her own insulin via pump in labor and postpartum. Will confirm intrapartum and postpartum settings for pump with endocrine.  2. History preeclampsia (1st preg, and gestational hypertension 2nd): on ASA 81 mg daily.   3. Gestational hypertension: weekly labs, has home blood pressure cuff. Twice weekly BPPs.    Possible narrowing of the aorta on her follow up scan, has fetal echo scheduled for DM with Peds Cardiology. Will continue to follow up on this. It is possible that she may need to deliver at [facility], discussed transfer to [facility] if this is the case.    States BS are mostly good even with the nausea, but hard to eat at times. Reglan has helped a bit.  Good fetal movement.  Has some right sided back/flank pain that radiates around to the front at times. No other symptoms. Labs within normal limits in L&D on 3/6 and will check again today. Will add UA and UC as well.  Discussed when to call/come in. Discussed delivery at no later than 37 weeks, sooner if needed.    Follow up with me in 1-2 weeks. Discussed when to call for sickness/signs of DKA, etc. She is well versed in this and reliable for follow up. Discussed signs and symptoms of preeclampsia/htn. Kick counts. Follow up for any issues.    Cheyanne Silva MD

## 2022-03-08 NOTE — PROGRESS NOTES
Please see full imaging report from [employer] program under imaging tab.    BPP 8/8  Seeing peds cards for follow up echo tomorrow.   Still delivering in [place] at this time.    Jes James MD  Maternal Fetal Medicine

## 2022-03-09 ENCOUNTER — HOSPITAL ENCOUNTER (OUTPATIENT)
Dept: CARDIOLOGY | Facility: CLINIC | Age: 27
Discharge: HOME | End: 2022-03-09
Attending: OBSTETRICS & GYNECOLOGY | Admitting: OBSTETRICS & GYNECOLOGY
Payer: COMMERCIAL

## 2022-03-09 ENCOUNTER — OFFICE VISIT (OUTPATIENT)
Dept: CARDIOLOGY | Facility: CLINIC | Age: 27
End: 2022-03-09
Payer: COMMERCIAL

## 2022-03-09 DIAGNOSIS — O35.BXX0 FETAL CARDIAC DISEASE AFFECTING PREGNANCY, SINGLE OR UNSPECIFIED FETUS: Primary | ICD-10-CM

## 2022-03-09 DIAGNOSIS — O24.012 TYPE 1 DIABETES MELLITUS DURING PREGNANCY IN SECOND TRIMESTER: ICD-10-CM

## 2022-03-09 LAB
ALT SERPL W P-5'-P-CCNC: 15 U/L (ref 0–50)
AST SERPL W P-5'-P-CCNC: 11 U/L (ref 0–45)
BUN SERPL-MCNC: 8 MG/DL (ref 7–30)
CREAT SERPL-MCNC: 1.02 MG/DL (ref 0.52–1.04)
GFR SERPL CREATININE-BSD FRML MDRD: 76 ML/MIN/1.73M2
URATE SERPL-MCNC: 6 MG/DL (ref 2.6–6)

## 2022-03-09 PROCEDURE — 99213 OFFICE O/P EST LOW 20 MIN: CPT | Mod: 25 | Performed by: PEDIATRICS

## 2022-03-09 PROCEDURE — 76825 ECHO EXAM OF FETAL HEART: CPT | Mod: 26 | Performed by: PEDIATRICS

## 2022-03-09 PROCEDURE — 93325 DOPPLER ECHO COLOR FLOW MAPG: CPT | Mod: 26 | Performed by: PEDIATRICS

## 2022-03-09 PROCEDURE — 93325 DOPPLER ECHO COLOR FLOW MAPG: CPT

## 2022-03-09 PROCEDURE — 76827 ECHO EXAM OF FETAL HEART: CPT | Mod: 26 | Performed by: PEDIATRICS

## 2022-03-09 NOTE — PROGRESS NOTES
[facility]   Heart Center Fetal Consult Note    Patient:  Fransisca Cabello MRN:  [id number]   YOB: 1995 Age:  27 year old   Date of Visit:  3/9/2022 PCP:  Frank Barney Culver     Dear Doctor,     I had the pleasure of seeing Fransisca Cabello at the [facility] on 3/9/2022 in fetal cardiology consultation for fetal echocardiogram. She presented today accompanied by herself. As you know, she is a 27 year old  at 31w4d who presented for fetal echocardiogram today because of type I diabetes mellitus; follow up for PA > Ao.    I performed and interpreted the fetal echocardiogram today, which demonstrated likely normal fetal echocardiogram. Normal fetal intracardiac connections. Normal right and left ventricular size and function. No hydrops.     I reviewed the echo findings today with Fransisca Cabello. She is aware that the study was within normal limits with no major cardiac abnormalities. She is aware of the general limitations of fetal echocardiography. No additional fetal echocardiograms are recommended. Post- echocardiogram should be performed within 24-48 hours of life.     Plan:    1. Transthoracic echocardiogram to be obtained in non-urgent fashion prior to discharge.    Thank you for allowing me to participate in Fransisca's care. Please do not hesitate to contact me with questions or concerns.    This visit was separate from the performance and interpretation of the ultrasound. The majority of the time (>50%) was spent in counseling and coordination of care. I spent approximately 25 minutes in face-to-face time reviewing the above considerations.    Mariam Swenson M.D.  Pediatric Cardiology  [address], 5th floor, [place] [address]  Phone [phone]  Fax [phone]

## 2022-03-10 LAB — BACTERIA UR CULT: NO GROWTH

## 2022-03-11 ENCOUNTER — HOSPITAL ENCOUNTER (OUTPATIENT)
Dept: ULTRASOUND IMAGING | Facility: CLINIC | Age: 27
Discharge: HOME | End: 2022-03-11
Attending: OBSTETRICS & GYNECOLOGY
Payer: COMMERCIAL

## 2022-03-11 ENCOUNTER — OFFICE VISIT (OUTPATIENT)
Dept: MATERNAL FETAL MEDICINE | Facility: CLINIC | Age: 27
End: 2022-03-11
Attending: OBSTETRICS & GYNECOLOGY
Payer: COMMERCIAL

## 2022-03-11 VITALS — DIASTOLIC BLOOD PRESSURE: 87 MMHG | SYSTOLIC BLOOD PRESSURE: 127 MMHG

## 2022-03-11 DIAGNOSIS — O13.9 GESTATIONAL HYPERTENSION, ANTEPARTUM: ICD-10-CM

## 2022-03-11 DIAGNOSIS — O24.013 TYPE 1 DIABETES MELLITUS DURING PREGNANCY IN THIRD TRIMESTER: Primary | ICD-10-CM

## 2022-03-11 DIAGNOSIS — O21.9 NAUSEA AND VOMITING IN PREGNANCY: ICD-10-CM

## 2022-03-11 DIAGNOSIS — O24.013 TYPE 1 DIABETES MELLITUS DURING PREGNANCY IN THIRD TRIMESTER: ICD-10-CM

## 2022-03-11 PROCEDURE — 76819 FETAL BIOPHYS PROFIL W/O NST: CPT

## 2022-03-11 PROCEDURE — 76819 FETAL BIOPHYS PROFIL W/O NST: CPT | Mod: 26 | Performed by: OBSTETRICS & GYNECOLOGY

## 2022-03-11 NOTE — PROGRESS NOTES
"Please see \"Imaging\" tab under \"Chart Review\" for details of today's US at the [facility].    Suleiman Scott MD  Maternal-Fetal Medicine    "

## 2022-03-12 NOTE — TELEPHONE ENCOUNTER
See telephone visit.  Jenn Chua RN,CDE   
 - ENT  Otolaryngology (ENT)  [address] [place] [address]  Phone: [phone]  Fax:

## 2022-03-14 RX ORDER — ONDANSETRON 4 MG/1
4 TABLET, ORALLY DISINTEGRATING ORAL EVERY 8 HOURS PRN
Qty: 30 TABLET | Refills: 3 | Status: SHIPPED | OUTPATIENT
Start: 2022-03-14

## 2022-03-14 NOTE — TELEPHONE ENCOUNTER
Prescription approved per [facility] Refill Protocol.  Nanci Pastor RN

## 2022-03-15 ENCOUNTER — PRENATAL OFFICE VISIT (OUTPATIENT)
Dept: OBGYN | Facility: CLINIC | Age: 27
End: 2022-03-15
Payer: COMMERCIAL

## 2022-03-15 ENCOUNTER — OFFICE VISIT (OUTPATIENT)
Dept: MATERNAL FETAL MEDICINE | Facility: CLINIC | Age: 27
End: 2022-03-15
Attending: OBSTETRICS & GYNECOLOGY
Payer: COMMERCIAL

## 2022-03-15 ENCOUNTER — HOSPITAL ENCOUNTER (OUTPATIENT)
Dept: ULTRASOUND IMAGING | Facility: CLINIC | Age: 27
Discharge: HOME | End: 2022-03-15
Attending: OBSTETRICS & GYNECOLOGY
Payer: COMMERCIAL

## 2022-03-15 VITALS — BODY MASS INDEX: 30.38 KG/M2 | WEIGHT: 194 LBS | DIASTOLIC BLOOD PRESSURE: 84 MMHG | SYSTOLIC BLOOD PRESSURE: 134 MMHG

## 2022-03-15 DIAGNOSIS — O24.013 TYPE 1 DIABETES MELLITUS DURING PREGNANCY IN THIRD TRIMESTER: ICD-10-CM

## 2022-03-15 DIAGNOSIS — Z87.59 H/O SEVERE PRE-ECLAMPSIA: ICD-10-CM

## 2022-03-15 DIAGNOSIS — O13.9 GESTATIONAL HYPERTENSION, ANTEPARTUM: Primary | ICD-10-CM

## 2022-03-15 DIAGNOSIS — O09.899 SUPERVISION OF OTHER HIGH RISK PREGNANCY, ANTEPARTUM: ICD-10-CM

## 2022-03-15 DIAGNOSIS — O13.3 GESTATIONAL HYPERTENSION, THIRD TRIMESTER: ICD-10-CM

## 2022-03-15 DIAGNOSIS — Z87.59 H/O SEVERE PRE-ECLAMPSIA: Primary | ICD-10-CM

## 2022-03-15 LAB
BASOPHILS # BLD AUTO: 0 10E3/UL (ref 0–0.2)
BASOPHILS NFR BLD AUTO: 1 %
CREAT UR-MCNC: 58 MG/DL
EOSINOPHIL # BLD AUTO: 0 10E3/UL (ref 0–0.7)
EOSINOPHIL NFR BLD AUTO: 0 %
ERYTHROCYTE [DISTWIDTH] IN BLOOD BY AUTOMATED COUNT: 13.6 % (ref 10–15)
HCT VFR BLD AUTO: 33.7 % (ref 35–47)
HGB BLD-MCNC: 10.9 G/DL (ref 11.7–15.7)
LYMPHOCYTES # BLD AUTO: 1.6 10E3/UL (ref 0.8–5.3)
LYMPHOCYTES NFR BLD AUTO: 23 %
MCH RBC QN AUTO: 30.4 PG (ref 26.5–33)
MCHC RBC AUTO-ENTMCNC: 32.3 G/DL (ref 31.5–36.5)
MCV RBC AUTO: 94 FL (ref 78–100)
MONOCYTES # BLD AUTO: 0.4 10E3/UL (ref 0–1.3)
MONOCYTES NFR BLD AUTO: 5 %
NEUTROPHILS # BLD AUTO: 5 10E3/UL (ref 1.6–8.3)
NEUTROPHILS NFR BLD AUTO: 71 %
PLATELET # BLD AUTO: 232 10E3/UL (ref 150–450)
PROT UR-MCNC: 0.11 G/L
PROT/CREAT 24H UR: 0.19 G/G CR (ref 0–0.2)
RBC # BLD AUTO: 3.58 10E6/UL (ref 3.8–5.2)
WBC # BLD AUTO: 7 10E3/UL (ref 4–11)

## 2022-03-15 PROCEDURE — 85025 COMPLETE CBC W/AUTO DIFF WBC: CPT | Performed by: OBSTETRICS & GYNECOLOGY

## 2022-03-15 PROCEDURE — 84550 ASSAY OF BLOOD/URIC ACID: CPT | Performed by: OBSTETRICS & GYNECOLOGY

## 2022-03-15 PROCEDURE — 99207 PR PRENATAL VISIT: CPT | Performed by: OBSTETRICS & GYNECOLOGY

## 2022-03-15 PROCEDURE — 84450 TRANSFERASE (AST) (SGOT): CPT | Performed by: OBSTETRICS & GYNECOLOGY

## 2022-03-15 PROCEDURE — 36415 COLL VENOUS BLD VENIPUNCTURE: CPT | Performed by: OBSTETRICS & GYNECOLOGY

## 2022-03-15 PROCEDURE — 84520 ASSAY OF UREA NITROGEN: CPT | Performed by: OBSTETRICS & GYNECOLOGY

## 2022-03-15 PROCEDURE — 84460 ALANINE AMINO (ALT) (SGPT): CPT | Performed by: OBSTETRICS & GYNECOLOGY

## 2022-03-15 PROCEDURE — 84156 ASSAY OF PROTEIN URINE: CPT | Performed by: OBSTETRICS & GYNECOLOGY

## 2022-03-15 PROCEDURE — 76819 FETAL BIOPHYS PROFIL W/O NST: CPT | Mod: 26 | Performed by: OBSTETRICS & GYNECOLOGY

## 2022-03-15 PROCEDURE — 82565 ASSAY OF CREATININE: CPT | Performed by: OBSTETRICS & GYNECOLOGY

## 2022-03-15 PROCEDURE — 76819 FETAL BIOPHYS PROFIL W/O NST: CPT

## 2022-03-15 NOTE — PROGRESS NOTES
PROBLEM LIST  LABS: Opos/RI    1. Type I DM on pump: managed by endocrine at [facility]. Plan: eye exam, TSH qTM, level II US, fetal echo, growth scans q3-4w. She will manage her own insulin via pump in labor and postpartum. Will confirm intrapartum and postpartum settings for pump with endocrine.  2. History preeclampsia (1st preg, and gestational hypertension 2nd): on ASA 81 mg daily.   3. Gestational hypertension: weekly labs, has home blood pressure cuff. Twice weekly BPPs.    According to peds cardiology, OK to deliver at [facility] but  will need  echo in 24-48 hours after delivery.    States BS are mostly good even with the nausea, but hard to eat at times. Reglan helped a bit at first but less so now.  Good fetal movement.  Discussed when to call/come in. Discussed delivery at no later than 37 weeks, sooner if needed.    Follow up with me in 1-2 weeks. Discussed when to call for sickness/signs of DKA, etc. She is well versed in this and reliable for follow up. Discussed signs and symptoms of preeclampsia/htn. Kick counts. Follow up for any issues.    Cheyanne Silva MD

## 2022-03-15 NOTE — NURSING NOTE
"Chief Complaint   Patient presents with     Prenatal Care       Initial /84   Wt 88 kg (194 lb)   LMP 2021   Breastfeeding No   BMI 30.38 kg/m   Estimated body mass index is 30.38 kg/m  as calculated from the following:    Height as of 3/6/22: 1.702 m (5' 7\").    Weight as of this encounter: 88 kg (194 lb).  BP completed using cuff size: regular    Questioned patient about current smoking habits.  Pt. has never smoked.          32w3d  + FM daily - slower some days  + contractions  - bleeding  + headache  + heartburn  - edema  María Elena Arce LPN                 "

## 2022-03-15 NOTE — PROGRESS NOTES
The patient was seen for an ultrasound in the Maternal-Fetal Medicine Center at the [facility] today.  For a detailed report of the ultrasound examination, please see the ultrasound report which can be found under the imaging tab.    Jyoti Kim MD  , OB/GYN  Maternal-Fetal Medicine  [phone] (Pager)

## 2022-03-16 LAB
ALT SERPL W P-5'-P-CCNC: 18 U/L (ref 0–50)
AST SERPL W P-5'-P-CCNC: 12 U/L (ref 0–45)
BUN SERPL-MCNC: 6 MG/DL (ref 7–30)
CREAT SERPL-MCNC: 0.84 MG/DL (ref 0.52–1.04)
GFR SERPL CREATININE-BSD FRML MDRD: >90 ML/MIN/1.73M2
URATE SERPL-MCNC: 5.5 MG/DL (ref 2.6–6)

## 2022-03-18 ENCOUNTER — OFFICE VISIT (OUTPATIENT)
Dept: MATERNAL FETAL MEDICINE | Facility: CLINIC | Age: 27
End: 2022-03-18
Attending: OBSTETRICS & GYNECOLOGY
Payer: COMMERCIAL

## 2022-03-18 ENCOUNTER — HOSPITAL ENCOUNTER (OUTPATIENT)
Dept: ULTRASOUND IMAGING | Facility: CLINIC | Age: 27
Discharge: HOME | End: 2022-03-18
Attending: OBSTETRICS & GYNECOLOGY
Payer: COMMERCIAL

## 2022-03-18 VITALS — SYSTOLIC BLOOD PRESSURE: 157 MMHG | DIASTOLIC BLOOD PRESSURE: 97 MMHG

## 2022-03-18 DIAGNOSIS — O24.013 TYPE 1 DIABETES MELLITUS DURING PREGNANCY IN THIRD TRIMESTER: ICD-10-CM

## 2022-03-18 DIAGNOSIS — O13.9 GESTATIONAL HYPERTENSION, ANTEPARTUM: Primary | ICD-10-CM

## 2022-03-18 PROCEDURE — 76819 FETAL BIOPHYS PROFIL W/O NST: CPT | Mod: 26 | Performed by: OBSTETRICS & GYNECOLOGY

## 2022-03-18 PROCEDURE — 76819 FETAL BIOPHYS PROFIL W/O NST: CPT

## 2022-03-18 NOTE — PROGRESS NOTES
"Please see \"Imaging\" tab under \"Chart Review\" for details of today's US at the [facility].    Suleiman Scott MD  Maternal-Fetal Medicine    "

## 2022-03-18 NOTE — NURSING NOTE
Pt here for ultrasound for Type 1 DM and GHTN. Per Dr. Tabor's previous note, BP to be taken every Friday in our clinic. BP on right arm was 156/103 and on left arm was 157/97. Pt denies any headache, visual changes, nausea/vomiting, epigastric or right upper quadrant pain. Pt has no edema. Dr. Scott made aware. Pt educated on signs and symptoms of preeclampsia and will check blood pressure throughout weekend. Pt educated on when to call primary clinic or labor and delivery. All of pt's questions answered.    
Mother/EMS

## 2022-03-20 ENCOUNTER — HEALTH MAINTENANCE LETTER (OUTPATIENT)
Age: 27
End: 2022-03-20

## 2022-03-22 ENCOUNTER — HOSPITAL ENCOUNTER (INPATIENT)
Facility: CLINIC | Age: 27
LOS: 6 days | Discharge: HOME | End: 2022-03-28
Attending: OBSTETRICS & GYNECOLOGY | Admitting: OBSTETRICS & GYNECOLOGY
Payer: COMMERCIAL

## 2022-03-22 ENCOUNTER — PRENATAL OFFICE VISIT (OUTPATIENT)
Dept: OBGYN | Facility: CLINIC | Age: 27
End: 2022-03-22
Payer: COMMERCIAL

## 2022-03-22 ENCOUNTER — HOSPITAL ENCOUNTER (OUTPATIENT)
Dept: ULTRASOUND IMAGING | Facility: CLINIC | Age: 27
Discharge: HOME | End: 2022-03-22
Attending: OBSTETRICS & GYNECOLOGY
Payer: COMMERCIAL

## 2022-03-22 ENCOUNTER — OFFICE VISIT (OUTPATIENT)
Dept: MATERNAL FETAL MEDICINE | Facility: CLINIC | Age: 27
End: 2022-03-22
Attending: OBSTETRICS & GYNECOLOGY
Payer: COMMERCIAL

## 2022-03-22 VITALS — SYSTOLIC BLOOD PRESSURE: 170 MMHG | BODY MASS INDEX: 31.32 KG/M2 | DIASTOLIC BLOOD PRESSURE: 100 MMHG | WEIGHT: 200 LBS

## 2022-03-22 DIAGNOSIS — O09.899 SUPERVISION OF OTHER HIGH RISK PREGNANCY, ANTEPARTUM: ICD-10-CM

## 2022-03-22 DIAGNOSIS — O13.9 GESTATIONAL HYPERTENSION, ANTEPARTUM: ICD-10-CM

## 2022-03-22 DIAGNOSIS — Z87.59 H/O SEVERE PRE-ECLAMPSIA: Primary | ICD-10-CM

## 2022-03-22 DIAGNOSIS — O24.013 TYPE 1 DIABETES MELLITUS DURING PREGNANCY IN THIRD TRIMESTER: ICD-10-CM

## 2022-03-22 DIAGNOSIS — E10.9 TYPE 1 DIABETES MELLITUS WITHOUT COMPLICATION (H): ICD-10-CM

## 2022-03-22 DIAGNOSIS — O10.019 PRE-EXISTING ESSENTIAL HYPERTENSION DURING PREGNANCY, ANTEPARTUM: ICD-10-CM

## 2022-03-22 DIAGNOSIS — O24.013 TYPE 1 DIABETES MELLITUS DURING PREGNANCY IN THIRD TRIMESTER: Primary | ICD-10-CM

## 2022-03-22 PROBLEM — O14.90 PREECLAMPSIA: Status: ACTIVE | Noted: 2022-03-22

## 2022-03-22 LAB
ABO/RH(D): NORMAL
ALBUMIN SERPL-MCNC: 2.3 G/DL (ref 3.4–5)
ALP SERPL-CCNC: 138 U/L (ref 40–150)
ALT SERPL W P-5'-P-CCNC: 16 U/L (ref 0–50)
ANION GAP SERPL CALCULATED.3IONS-SCNC: 6 MMOL/L (ref 3–14)
AST SERPL W P-5'-P-CCNC: 15 U/L (ref 0–45)
BILIRUB SERPL-MCNC: 0.3 MG/DL (ref 0.2–1.3)
BUN SERPL-MCNC: 6 MG/DL (ref 7–30)
CALCIUM SERPL-MCNC: 9.6 MG/DL (ref 8.5–10.1)
CHLORIDE BLD-SCNC: 106 MMOL/L (ref 94–109)
CO2 SERPL-SCNC: 23 MMOL/L (ref 20–32)
CREAT SERPL-MCNC: 0.78 MG/DL (ref 0.52–1.04)
CREAT UR-MCNC: 146 MG/DL
ERYTHROCYTE [DISTWIDTH] IN BLOOD BY AUTOMATED COUNT: 13 % (ref 10–15)
GFR SERPL CREATININE-BSD FRML MDRD: >90 ML/MIN/1.73M2
GLUCOSE BLD-MCNC: 101 MG/DL (ref 70–99)
GLUCOSE BLDC GLUCOMTR-MCNC: 100 MG/DL (ref 70–99)
GLUCOSE BLDC GLUCOMTR-MCNC: 79 MG/DL (ref 70–99)
HCT VFR BLD AUTO: 34.3 % (ref 35–47)
HGB BLD-MCNC: 11.2 G/DL (ref 11.7–15.7)
HOLD SPECIMEN: NORMAL
HOLD SPECIMEN: NORMAL
MCH RBC QN AUTO: 29.4 PG (ref 26.5–33)
MCHC RBC AUTO-ENTMCNC: 32.7 G/DL (ref 31.5–36.5)
MCV RBC AUTO: 90 FL (ref 78–100)
PLATELET # BLD AUTO: 195 10E3/UL (ref 150–450)
POTASSIUM BLD-SCNC: 4 MMOL/L (ref 3.4–5.3)
PROT SERPL-MCNC: 6.7 G/DL (ref 6.8–8.8)
PROT UR-MCNC: 0.74 G/L
PROT/CREAT 24H UR: 0.51 G/G CR (ref 0–0.2)
RBC # BLD AUTO: 3.81 10E6/UL (ref 3.8–5.2)
SARS-COV-2 RNA RESP QL NAA+PROBE: NEGATIVE
SODIUM SERPL-SCNC: 135 MMOL/L (ref 133–144)
SPECIMEN EXPIRATION DATE: NORMAL
T PALLIDUM AB SER QL: NONREACTIVE
WBC # BLD AUTO: 7.1 10E3/UL (ref 4–11)

## 2022-03-22 PROCEDURE — 76816 OB US FOLLOW-UP PER FETUS: CPT

## 2022-03-22 PROCEDURE — 99207 PR PRENATAL VISIT: CPT | Performed by: OBSTETRICS & GYNECOLOGY

## 2022-03-22 PROCEDURE — 250N000013 HC RX MED GY IP 250 OP 250 PS 637: Performed by: OBSTETRICS & GYNECOLOGY

## 2022-03-22 PROCEDURE — 84156 ASSAY OF PROTEIN URINE: CPT | Performed by: OBSTETRICS & GYNECOLOGY

## 2022-03-22 PROCEDURE — G0463 HOSPITAL OUTPT CLINIC VISIT: HCPCS | Mod: 25

## 2022-03-22 PROCEDURE — 87635 SARS-COV-2 COVID-19 AMP PRB: CPT | Performed by: OBSTETRICS & GYNECOLOGY

## 2022-03-22 PROCEDURE — 36415 COLL VENOUS BLD VENIPUNCTURE: CPT | Performed by: OBSTETRICS & GYNECOLOGY

## 2022-03-22 PROCEDURE — 59025 FETAL NON-STRESS TEST: CPT

## 2022-03-22 PROCEDURE — 85027 COMPLETE CBC AUTOMATED: CPT | Performed by: OBSTETRICS & GYNECOLOGY

## 2022-03-22 PROCEDURE — 120N000001 HC R&B MED SURG/OB

## 2022-03-22 PROCEDURE — 87653 STREP B DNA AMP PROBE: CPT | Performed by: OBSTETRICS & GYNECOLOGY

## 2022-03-22 PROCEDURE — 76819 FETAL BIOPHYS PROFIL W/O NST: CPT | Mod: 26 | Performed by: OBSTETRICS & GYNECOLOGY

## 2022-03-22 PROCEDURE — 250N000011 HC RX IP 250 OP 636: Performed by: OBSTETRICS & GYNECOLOGY

## 2022-03-22 PROCEDURE — 99215 OFFICE O/P EST HI 40 MIN: CPT | Performed by: OBSTETRICS & GYNECOLOGY

## 2022-03-22 PROCEDURE — 258N000003 HC RX IP 258 OP 636: Performed by: OBSTETRICS & GYNECOLOGY

## 2022-03-22 PROCEDURE — 76816 OB US FOLLOW-UP PER FETUS: CPT | Mod: 26 | Performed by: OBSTETRICS & GYNECOLOGY

## 2022-03-22 PROCEDURE — 86780 TREPONEMA PALLIDUM: CPT | Performed by: OBSTETRICS & GYNECOLOGY

## 2022-03-22 PROCEDURE — 80053 COMPREHEN METABOLIC PANEL: CPT | Performed by: OBSTETRICS & GYNECOLOGY

## 2022-03-22 PROCEDURE — 86901 BLOOD TYPING SEROLOGIC RH(D): CPT | Performed by: OBSTETRICS & GYNECOLOGY

## 2022-03-22 PROCEDURE — 83036 HEMOGLOBIN GLYCOSYLATED A1C: CPT | Performed by: OBSTETRICS & GYNECOLOGY

## 2022-03-22 RX ORDER — LORAZEPAM 2 MG/ML
2 INJECTION INTRAMUSCULAR
Status: DISCONTINUED | OUTPATIENT
Start: 2022-03-22 | End: 2022-03-28 | Stop reason: HOSPADM

## 2022-03-22 RX ORDER — LABETALOL HYDROCHLORIDE 5 MG/ML
20-80 INJECTION, SOLUTION INTRAVENOUS EVERY 10 MIN PRN
Status: DISCONTINUED | OUTPATIENT
Start: 2022-03-22 | End: 2022-03-28 | Stop reason: HOSPADM

## 2022-03-22 RX ORDER — NICOTINE POLACRILEX 4 MG
15-30 LOZENGE BUCCAL
Status: DISCONTINUED | OUTPATIENT
Start: 2022-03-22 | End: 2022-03-26

## 2022-03-22 RX ORDER — CALCIUM GLUCONATE 94 MG/ML
1 INJECTION, SOLUTION INTRAVENOUS
Status: DISCONTINUED | OUTPATIENT
Start: 2022-03-22 | End: 2022-03-28 | Stop reason: HOSPADM

## 2022-03-22 RX ORDER — ASPIRIN 81 MG/1
81 TABLET, CHEWABLE ORAL DAILY
COMMUNITY

## 2022-03-22 RX ORDER — DEXTROSE MONOHYDRATE 25 G/50ML
25-50 INJECTION, SOLUTION INTRAVENOUS
Status: DISCONTINUED | OUTPATIENT
Start: 2022-03-22 | End: 2022-03-26

## 2022-03-22 RX ORDER — ACETAMINOPHEN 325 MG/1
975 TABLET ORAL EVERY 6 HOURS PRN
Status: DISCONTINUED | OUTPATIENT
Start: 2022-03-22 | End: 2022-03-26

## 2022-03-22 RX ORDER — MAGNESIUM SULFATE IN WATER 40 MG/ML
2 INJECTION, SOLUTION INTRAVENOUS CONTINUOUS
Status: DISPENSED | OUTPATIENT
Start: 2022-03-22 | End: 2022-03-23

## 2022-03-22 RX ORDER — MAGNESIUM SULFATE HEPTAHYDRATE 40 MG/ML
4 INJECTION, SOLUTION INTRAVENOUS ONCE
Status: COMPLETED | OUTPATIENT
Start: 2022-03-22 | End: 2022-03-22

## 2022-03-22 RX ORDER — ONDANSETRON 2 MG/ML
4 INJECTION INTRAMUSCULAR; INTRAVENOUS EVERY 6 HOURS PRN
Status: DISCONTINUED | OUTPATIENT
Start: 2022-03-22 | End: 2022-03-25 | Stop reason: HOSPADM

## 2022-03-22 RX ORDER — MAGNESIUM SULFATE HEPTAHYDRATE 40 MG/ML
4 INJECTION, SOLUTION INTRAVENOUS
Status: DISCONTINUED | OUTPATIENT
Start: 2022-03-22 | End: 2022-03-28 | Stop reason: HOSPADM

## 2022-03-22 RX ORDER — DIPHENHYDRAMINE HCL 25 MG
50 CAPSULE ORAL
Status: DISCONTINUED | OUTPATIENT
Start: 2022-03-22 | End: 2022-03-26

## 2022-03-22 RX ORDER — HYDRALAZINE HYDROCHLORIDE 20 MG/ML
10 INJECTION INTRAMUSCULAR; INTRAVENOUS
Status: DISCONTINUED | OUTPATIENT
Start: 2022-03-22 | End: 2022-03-28 | Stop reason: HOSPADM

## 2022-03-22 RX ORDER — NIFEDIPINE 10 MG/1
20 CAPSULE ORAL ONCE
Status: COMPLETED | OUTPATIENT
Start: 2022-03-22 | End: 2022-03-22

## 2022-03-22 RX ORDER — SODIUM CHLORIDE, SODIUM LACTATE, POTASSIUM CHLORIDE, CALCIUM CHLORIDE 600; 310; 30; 20 MG/100ML; MG/100ML; MG/100ML; MG/100ML
INJECTION, SOLUTION INTRAVENOUS CONTINUOUS
Status: DISCONTINUED | OUTPATIENT
Start: 2022-03-22 | End: 2022-03-26

## 2022-03-22 RX ORDER — MAGNESIUM SULFATE HEPTAHYDRATE 40 MG/ML
2 INJECTION, SOLUTION INTRAVENOUS
Status: DISCONTINUED | OUTPATIENT
Start: 2022-03-22 | End: 2022-03-28 | Stop reason: HOSPADM

## 2022-03-22 RX ORDER — ONDANSETRON 4 MG/1
4 TABLET, ORALLY DISINTEGRATING ORAL EVERY 6 HOURS PRN
Status: DISCONTINUED | OUTPATIENT
Start: 2022-03-22 | End: 2022-03-25 | Stop reason: HOSPADM

## 2022-03-22 RX ORDER — METOCLOPRAMIDE 10 MG/1
10 TABLET ORAL EVERY 6 HOURS PRN
Status: DISCONTINUED | OUTPATIENT
Start: 2022-03-22 | End: 2022-03-25 | Stop reason: HOSPADM

## 2022-03-22 RX ORDER — METOCLOPRAMIDE HYDROCHLORIDE 5 MG/ML
10 INJECTION INTRAMUSCULAR; INTRAVENOUS EVERY 6 HOURS PRN
Status: DISCONTINUED | OUTPATIENT
Start: 2022-03-22 | End: 2022-03-25 | Stop reason: HOSPADM

## 2022-03-22 RX ORDER — MAGNESIUM SULFATE HEPTAHYDRATE 500 MG/ML
10 INJECTION, SOLUTION INTRAMUSCULAR; INTRAVENOUS
Status: DISCONTINUED | OUTPATIENT
Start: 2022-03-22 | End: 2022-03-28 | Stop reason: HOSPADM

## 2022-03-22 RX ADMIN — ACETAMINOPHEN 975 MG: 325 TABLET, FILM COATED ORAL at 15:26

## 2022-03-22 RX ADMIN — MAGNESIUM SULFATE IN WATER 2 G/HR: 40 INJECTION, SOLUTION INTRAVENOUS at 16:57

## 2022-03-22 RX ADMIN — NIFEDIPINE 20 MG: 10 CAPSULE ORAL at 11:40

## 2022-03-22 RX ADMIN — ACETAMINOPHEN 975 MG: 325 TABLET, FILM COATED ORAL at 21:34

## 2022-03-22 RX ADMIN — MAGNESIUM SULFATE HEPTAHYDRATE 4 G: 40 INJECTION, SOLUTION INTRAVENOUS at 16:26

## 2022-03-22 RX ADMIN — SODIUM CHLORIDE, POTASSIUM CHLORIDE, SODIUM LACTATE AND CALCIUM CHLORIDE: 600; 310; 30; 20 INJECTION, SOLUTION INTRAVENOUS at 16:24

## 2022-03-22 ASSESSMENT — ACTIVITIES OF DAILY LIVING (ADL)
ADLS_ACUITY_SCORE: 5
WEAR_GLASSES_OR_BLIND: NO
ADLS_ACUITY_SCORE: 12
CHANGE_IN_FUNCTIONAL_STATUS_SINCE_ONSET_OF_CURRENT_ILLNESS/INJURY: NO
ADLS_ACUITY_SCORE: 12
CONCENTRATING,_REMEMBERING_OR_MAKING_DECISIONS_DIFFICULTY: NO
ADLS_ACUITY_SCORE: 5
ADLS_ACUITY_SCORE: 12
ADLS_ACUITY_SCORE: 12
HEARING_DIFFICULTY_OR_DEAF: NO
ADLS_ACUITY_SCORE: 12
DRESSING/BATHING_DIFFICULTY: NO
DIFFICULTY_EATING/SWALLOWING: NO
TOILETING_ISSUES: NO
WALKING_OR_CLIMBING_STAIRS_DIFFICULTY: NO
ADLS_ACUITY_SCORE: 5
ADLS_ACUITY_SCORE: 12
FALL_HISTORY_WITHIN_LAST_SIX_MONTHS: NO
DOING_ERRANDS_INDEPENDENTLY_DIFFICULTY: NO
ADLS_ACUITY_SCORE: 12
DIFFICULTY_COMMUNICATING: NO

## 2022-03-22 NOTE — NURSING NOTE
"Chief Complaint   Patient presents with     Prenatal Care       Initial BP (!) 170/100   Wt 90.7 kg (200 lb)   LMP 2021   Breastfeeding No   BMI 31.32 kg/m   Estimated body mass index is 31.32 kg/m  as calculated from the following:    Height as of 3/6/22: 1.702 m (5' 7\").    Weight as of this encounter: 90.7 kg (200 lb).  BP completed using cuff size: regular    Questioned patient about current smoking habits.  Pt. has never smoked.          33w3d  + FM daily  + headache   + edema  María Elena Arce LPN               "

## 2022-03-22 NOTE — PROVIDER NOTIFICATION
03/22/22 1810   Provider Notification   Provider Name/Title Dr Gertrudis Desai   Method of Notification In Department   Request Evaluate - Remote   Notification Reason Status Update     MD notified of O2 saturation and HR over last 4 hours. Orders for EKG.

## 2022-03-22 NOTE — PROVIDER NOTIFICATION
03/22/22 1613   Provider Notification   Provider Name/Title Dr Gertrudis Desai   Method of Notification In Department   Request Evaluate - Remote   Notification Reason Status Update     MD in department. Notified of urine protein creatinine ratio and that pt states she would like to start magnesium as previously discussed with MD. Dr Gertrudis Desai orders 4gr loading and 2gr maintenance dose of magnesium sulfate for 24hrs. Orders to run type and screen and add magnesium level to AM labs tomorrow morning. Orders to collect GBS swab. MD would like [facility] BPP ordered for tomorrow to help establish plan. MD would also like continuous fetal monitoring. Per MD check OT q2hrs post prandial and AM fasting

## 2022-03-22 NOTE — PROVIDER NOTIFICATION
03/22/22 1318   Provider Notification   Provider Name/Title Dr. Gertrudis Desai   Method of Notification At Bedside   Request Evaluate in Person   Dr. Gertrudis Desai at bedside to evaluate patient and discuss plan of care. MD updated on BPs, patient report of headache with little relief after Tylenol, CMP and CBC within normal limits, although platelets lower than last week, protein random urine pending, pt with type 1 diabetes on insulin pump.  Discussed timing of delivery, need for magnesium sulfate, betamethasone, recommendations from [facility]. Patient questions answered and preferences discussed. Plan per provider is to keep patient overnight for further observation, continue with serial BPs every 2 hours, NST every shift after SVE checked, repeat preE labs in AM, collect covid swab and hypertensive crisis order set received. Will update MD as needed.

## 2022-03-22 NOTE — PLAN OF CARE
Data: Patient presented to [facility]: 3/22/2022 11:22 AM.  Reason for maternal/fetal assessment is elevated blood pressures. Patient reports severe range BPs this morning at home with subsequent severe range /100 in clinic prior to arrival.  Patient reports headache, tylenol taken at 0800 this AM with no relief. Patient also reports increased swelling in lower extremities and 6lb weight gain over last week.  Patient is a .  Prenatal record reviewed. Pregnancy  has been complicated by gestational hypertension and type I diabetes on insulin pump.  Gestational Age 33w3d. VSS. Fetal movement active. Patient denies uterine contractions, leaking of vaginal fluid/rupture of membranes, vaginal bleeding, abdominal pain, pelvic pressure, nausea, vomiting, visual disturbances, epigastric or URQ pain. Support person is not present.   Action: Verbal consent for EFM. Triage assessment completed. Bill of rights reviewed.  Orders received from Dr. Silva in clinic for repeat BP at 1130, if severe range administer 20mg nifedipine PO and place saline lock, collect CBC, CMP, serial BPs, and NST.  BP upon arrival 166/100, 20 mg nifedipine given and saline lock placed.  Response: Patient verbalized agreement with plan. Will contact Dr Marina Desai with update on labs, BPs and for further orders.

## 2022-03-22 NOTE — PROGRESS NOTES
Given severe range blood pressure and history, to L&D for serial BPs, labs.  Spoke with charge RN and on call MD.  Printed pump settings from endocrinology at [facility].    Cheyanne Silva MD

## 2022-03-22 NOTE — H&P
L&D History and Physical   2022  Fransisca Estrada  [id number]      HPI: Fransisca Estrada is a 27 year old  at 33w3d here from clinic for severe range BP.  She had a mild HA this morning of 4/10 which decreased to 2/10 after tylenol and treatment of severe range BP.  She was in MFM this morning for a BPP and growth which were normal with EFW 96%ile.      She states that she is feeling well today otherwise, no scotoma or RUQ pain.  Did note a 6lb weight gain and had increased swelling f BLE last night, though it was better this morning.  + FM.       Pregnancy notable for:  --h/o preE with 34 wk delivery, on baby ASA  --current dx gHTN, doing twice weekly BPPs and weekly HELLP labs which have all been normal  --type 1 diabetes, on insulin pump which is managed through endocrinology at       OBHX:   OB History    Para Term  AB Living   3 2 1 1 0 2   SAB IAB Ectopic Multiple Live Births   0 0 0 0 2      # Outcome Date GA Lbr Marcellus/2nd Weight Sex Delivery Anes PTL Lv   3 Current            2 Term 18 37w0d 03:31 / :16 2.98 kg (6 lb 9.1 oz) M Vag-Spont EPI N VINNY      Name: MARIBELL ESTRADA      Apgar1: 9  Apgar5: 9   1  14 34w2d 07:13 / 01:56 3.45 kg (7 lb 9.7 oz) F Vag-Spont EPI  VINNY      Complications: Preeclampsia/Hypertension      Name: Cinthia      Apgar1: 7  Apgar5: 8       MedicalHX:   Past Medical History:   Diagnosis Date     Depressive disorder     post partum depression after 1st     Hypertension     hx of severe preecclampsia w first     Migraines      Postpartum depression     On medication for 6 months-      Type 1 diabetes (H)     three years at diagnosis     Varicella        SurgicalHX:   Past Surgical History:   Procedure Laterality Date     DENTAL SURGERY       ZZC ROOT CANAL THERAPY 2 CANALS         Medications:   No current facility-administered medications on file prior to encounter.  aspirin (ASA) 81 MG chewable tablet,  "Take 81 mg by mouth daily  metoclopramide (REGLAN) 5 MG tablet, Take 1 tablet (5 mg) by mouth 4 times daily as needed (nausea)  ondansetron (ZOFRAN-ODT) 4 MG ODT tab, TAKE 1 TABLET (4 MG) BY MOUTH EVERY 8 HOURS AS NEEDED FOR NAUSEA  Prenatal Multivit-Min-Fe-FA (PRENATAL VITAMINS PO),   ACCU-CHEK GUIDE test strip, USE TO TEST BLOOD SUGAR 6 TIMES DAILY OR AS DIRECTED.  acetone urine (KETOSTIX) test strip, Use as directed to test urine ketone if blood sugars are >250 or during illness with fever, nausea, vomiting, or abdominal pain. Do not dispense until requested by patient, keep on file.  glucagon (GLUCAGON EMERGENCY) 1 MG kit, Inject 1 mg into the muscle once for 1 dose Do not dispense until requested by patient.  insulin glargine (LANTUS VIAL) 100 UNIT/ML vial, Inject 22 u sq every 24 hours in the event of insulin pump therapy.  DO NOT FILL RX until requested by patient.  insulin lispro (HUMALOG) 100 UNIT/ML vial, USE 70 UNITS SUBCUTANEOUSLY DAILY VIA INSULIN PUMP  INSULIN PUMP - OUTPATIENT, Date last updated:  12/23/16  PriceAdvice Minimed: Model 630G  BASAL RATES and times:  12   AM (midnight): 0.85 units/hour    8     AM: 0.8 units/hour   6pm: 0.85  Basal Pattern A:  Fransisca Cabello will use when NA.  CARB RATIO and times:  12   AM (midnight): 12  Corection Factor (Sensitivity) and times:  12   AM (midnight): 47 mg/dL  BLOOD GLUCOSE TARGET and times:  12   AM (midnight): 100 - 120  5     AM:  90 - 110  9:30    PM:  100 - 120  Active Insulin Time:  3 hours  Sensor:  No  Carelink / Diasend username: [email or web address]  Carelink / Diasend Password:  [email or web address]  insulin syringe-needle U-100 (BD INSULIN SYRINGE ULTRAFINE) 31G X 5/16\" 0.3 ML miscellaneous, Use one syringe 4 daily or as directed in the event of insulin pump failure.  DO NOT DISPENSE until requested by patient  MICROLET LANCETS MISC, Use one lancet 6 times daily for blood sugar testing  nitroFURantoin macrocrystal-monohydrate (MACROBID) 100 MG capsule, Take " 1 capsule (100 mg) by mouth 2 times daily  nitroFURantoin macrocrystal-monohydrate (MACROBID) 100 MG capsule, Take 1 capsule (100 mg) by mouth 2 times daily (Patient not taking: Reported on 3/8/2022)        Allergies:  Allergies   Allergen Reactions     Cefzil [Cefprozil] Swelling     Phenergan [Promethazine] Anxiety and Swelling       FamilyHX:  Family History   Problem Relation Age of Onset     Family History Negative Mother      Diabetes Father 35        Type 1      Diabetes Type 1 Daughter 1       SocialHX:   Social History     Socioeconomic History     Marital status:      Spouse name: Teja     Number of children: 2     Years of education: None     Highest education level: Some college, no degree   Occupational History     Occupation: stay at home Mom   Tobacco Use     Smoking status: Never Smoker     Smokeless tobacco: Never Used   Vaping Use     Vaping Use: Never used   Substance and Sexual Activity     Alcohol use: No     Alcohol/week: 0.0 standard drinks     Drug use: No     Sexual activity: Yes     Partners: Male   Other Topics Concern      Service Not Asked     Blood Transfusions No     Caffeine Concern Not Asked     Occupational Exposure Not Asked     Hobby Hazards Not Asked     Sleep Concern Not Asked     Stress Concern Not Asked     Weight Concern Not Asked     Special Diet Not Asked     Back Care Not Asked     Exercise Not Asked     Bike Helmet Not Asked     Seat Belt Not Asked     Self-Exams Not Asked     Parent/sibling w/ CABG, MI or angioplasty before 65F 55M? Not Asked   Social History Narrative    Fransisca lives with her spouse      Social Determinants of Health     Financial Resource Strain: Not on file   Food Insecurity: Not on file   Transportation Needs: Not on file   Physical Activity: Not on file   Stress: Not on file   Social Connections: Not on file   Intimate Partner Violence: Not on file   Housing Stability: Not on file       ROS: 10-point ROS negative except as in HPI      Physical Exam:  Vitals:    22 1230 22 1245 22 1300 22 1315   BP: 116/68 122/74 127/80 131/83   BP Location:       Patient Position:       Resp:       Temp:       TempSrc:       Weight:       Height:         GEN: resting comfortably in bed, NAD, slightly tearful when discussing inpatient stay  CV: regular rate, ext WWP  PULM: no increased work of breathing, no cough/wheeze  ABD: soft, gravid, non-tender, non-distended  EXT: trace edema, non tender to palpation  CVX: FT/posterior per RN  Presentation: vtx on BPP today  EFW: 96%ile, 6 lb 2 oz on growth US today     Labs:      Lab Results   Component Value Date    ABO O 2018    RH Pos 2018    AS Negative 2021    HEPBANG Nonreactive 2021    CHPCRT Negative 10/11/2021    GCPCRT Negative 10/11/2021    TREPAB Negative 2018    HGB 11.2 (L) 2022       GBS Status:   Lab Results   Component Value Date    GBS Negative 2017       Lab Results   Component Value Date    PAP NIL 2021     GBS ordered  Creat 0.78  Glucose 101  P:C 0.51  ALT/AST WNL  plts 195  hgb 11.2    A/P: Fransisca Cabello is a 27 year old female  at 33w3d, here for pre-eclampsia with severe features.     1) preE w SF:    -recommend tx of any sustained BP >160/110 per unit algorithm.  S/p 20mg po nifedipine x1, Plan to start with IV labetalol if needing further meds as she now has an IV in place.    -IV magnesium now, plan to continue at least 24 hours, if stable may consider turning off until delivery/induction.   - reviewed r/b/a of BMZ.  Will hold off at this time due to type 1 diabetes on insulin pump, risk of DKA (discussed with MFM and patient, both agree with this plan).  It would not be unreasonable if the patient changes her mind to initiate this just prior to delivery if the plan is to deliver at 34 weeks.     - AM HELLP labs with magnesium level   - Plan for delivery at 34 weeks or sooner based on deteriorating  clinical picture    2) type 1 DM on insulin pump:   -Insulin pump settings entered in Epic, pt will continue to manage her own pump at this time.  Aware that if she gets BMZ she will most likely require an insulin drip.    3) FWB:   Continuous fetal monitoring while on magnesium   S/p BPP with growth today (EFW 96%ile)   Plan repeat BPP tomorrow and consult with MFM    Pt declines NICU consult at this time; she has history of prior PTD at 34 wks for preE   GBS ordered    Marina Desai MD, MPH  [facility] OB/Gyn     Total time spent was 45 minutes; this includes pre-work time, intra-service time, and post-work time spent on chart review, patient visit, review of tests, documentation, counseling, and coordination of care which occurred on the date of service.

## 2022-03-22 NOTE — PLAN OF CARE
Dr Gertrudis Desai notified of FHR baseline change with moderate variability. Notified that pt says baby does this from time to time and settles down. No new orders, continue to monitor.

## 2022-03-22 NOTE — PROGRESS NOTES
"Please see \"Imaging\" tab under \"Chart Review\" for details of today's US at the [facility].    Suleiman Scott MD  Maternal-Fetal Medicine    "

## 2022-03-23 PROBLEM — O36.63X0 EXCESSIVE FETAL GROWTH AFFECTING MANAGEMENT OF PREGNANCY IN THIRD TRIMESTER: Status: ACTIVE | Noted: 2022-03-23

## 2022-03-23 PROBLEM — Z30.430 ENCOUNTER FOR IUD INSERTION: Status: RESOLVED | Noted: 2018-02-16 | Resolved: 2022-03-23

## 2022-03-23 PROBLEM — Z36.89 ENCOUNTER FOR TRIAGE IN PREGNANT PATIENT: Status: RESOLVED | Noted: 2022-02-24 | Resolved: 2022-03-23

## 2022-03-23 PROBLEM — O24.013 PRE-EXISTING TYPE 1 DIABETES MELLITUS DURING PREGNANCY IN THIRD TRIMESTER: Status: ACTIVE | Noted: 2017-07-12

## 2022-03-23 PROBLEM — O99.019 ANEMIA AFFECTING THIRD PREGNANCY: Status: ACTIVE | Noted: 2022-03-23

## 2022-03-23 LAB
ALBUMIN SERPL-MCNC: 1.8 G/DL (ref 3.4–5)
ALP SERPL-CCNC: 114 U/L (ref 40–150)
ALT SERPL W P-5'-P-CCNC: 11 U/L (ref 0–50)
ANION GAP SERPL CALCULATED.3IONS-SCNC: 7 MMOL/L (ref 3–14)
AST SERPL W P-5'-P-CCNC: 11 U/L (ref 0–45)
BILIRUB SERPL-MCNC: 0.3 MG/DL (ref 0.2–1.3)
BUN SERPL-MCNC: 7 MG/DL (ref 7–30)
CALCIUM SERPL-MCNC: 7.5 MG/DL (ref 8.5–10.1)
CHLORIDE BLD-SCNC: 105 MMOL/L (ref 94–109)
CO2 SERPL-SCNC: 23 MMOL/L (ref 20–32)
CREAT SERPL-MCNC: 0.62 MG/DL (ref 0.52–1.04)
ERYTHROCYTE [DISTWIDTH] IN BLOOD BY AUTOMATED COUNT: 13.1 % (ref 10–15)
GFR SERPL CREATININE-BSD FRML MDRD: >90 ML/MIN/1.73M2
GLUCOSE BLD-MCNC: 99 MG/DL (ref 70–99)
GLUCOSE BLDC GLUCOMTR-MCNC: 142 MG/DL (ref 70–99)
GP B STREP DNA SPEC QL NAA+PROBE: NEGATIVE
HBA1C MFR BLD: 6.8 % (ref 0–5.6)
HCT VFR BLD AUTO: 29 % (ref 35–47)
HGB BLD-MCNC: 9.4 G/DL (ref 11.7–15.7)
MAGNESIUM SERPL-MCNC: 5.3 MG/DL (ref 1.6–2.3)
MCH RBC QN AUTO: 30.1 PG (ref 26.5–33)
MCHC RBC AUTO-ENTMCNC: 32.4 G/DL (ref 31.5–36.5)
MCV RBC AUTO: 93 FL (ref 78–100)
PLATELET # BLD AUTO: 192 10E3/UL (ref 150–450)
POTASSIUM BLD-SCNC: 3.8 MMOL/L (ref 3.4–5.3)
PROT SERPL-MCNC: 5.4 G/DL (ref 6.8–8.8)
RBC # BLD AUTO: 3.12 10E6/UL (ref 3.8–5.2)
SODIUM SERPL-SCNC: 135 MMOL/L (ref 133–144)
WBC # BLD AUTO: 7.3 10E3/UL (ref 4–11)

## 2022-03-23 PROCEDURE — 258N000003 HC RX IP 258 OP 636: Performed by: OBSTETRICS & GYNECOLOGY

## 2022-03-23 PROCEDURE — 36415 COLL VENOUS BLD VENIPUNCTURE: CPT | Performed by: OBSTETRICS & GYNECOLOGY

## 2022-03-23 PROCEDURE — 85027 COMPLETE CBC AUTOMATED: CPT | Performed by: OBSTETRICS & GYNECOLOGY

## 2022-03-23 PROCEDURE — 250N000013 HC RX MED GY IP 250 OP 250 PS 637: Performed by: OBSTETRICS & GYNECOLOGY

## 2022-03-23 PROCEDURE — 80053 COMPREHEN METABOLIC PANEL: CPT | Performed by: OBSTETRICS & GYNECOLOGY

## 2022-03-23 PROCEDURE — 120N000001 HC R&B MED SURG/OB

## 2022-03-23 PROCEDURE — 83735 ASSAY OF MAGNESIUM: CPT | Performed by: OBSTETRICS & GYNECOLOGY

## 2022-03-23 PROCEDURE — 82040 ASSAY OF SERUM ALBUMIN: CPT | Performed by: OBSTETRICS & GYNECOLOGY

## 2022-03-23 PROCEDURE — 99232 SBSQ HOSP IP/OBS MODERATE 35: CPT | Performed by: OBSTETRICS & GYNECOLOGY

## 2022-03-23 PROCEDURE — 250N000011 HC RX IP 250 OP 636: Performed by: OBSTETRICS & GYNECOLOGY

## 2022-03-23 RX ORDER — ASPIRIN 81 MG/1
81 TABLET, CHEWABLE ORAL DAILY
Status: DISCONTINUED | OUTPATIENT
Start: 2022-03-23 | End: 2022-03-26

## 2022-03-23 RX ORDER — METOCLOPRAMIDE HYDROCHLORIDE 5 MG/ML
10 INJECTION INTRAMUSCULAR; INTRAVENOUS EVERY 6 HOURS PRN
Status: DISCONTINUED | OUTPATIENT
Start: 2022-03-23 | End: 2022-03-23

## 2022-03-23 RX ORDER — PRENATAL VIT/IRON FUM/FOLIC AC 27MG-0.8MG
1 TABLET ORAL DAILY
Status: DISCONTINUED | OUTPATIENT
Start: 2022-03-23 | End: 2022-03-26

## 2022-03-23 RX ORDER — HYDROXYZINE HYDROCHLORIDE 50 MG/1
50 TABLET, FILM COATED ORAL ONCE
Status: COMPLETED | OUTPATIENT
Start: 2022-03-23 | End: 2022-03-23

## 2022-03-23 RX ADMIN — METOCLOPRAMIDE HYDROCHLORIDE 10 MG: 5 INJECTION INTRAMUSCULAR; INTRAVENOUS at 20:21

## 2022-03-23 RX ADMIN — HYDROXYZINE HYDROCHLORIDE 50 MG: 50 TABLET, FILM COATED ORAL at 00:38

## 2022-03-23 RX ADMIN — ACETAMINOPHEN 975 MG: 325 TABLET, FILM COATED ORAL at 05:09

## 2022-03-23 RX ADMIN — METOCLOPRAMIDE HYDROCHLORIDE 10 MG: 5 INJECTION INTRAMUSCULAR; INTRAVENOUS at 04:55

## 2022-03-23 RX ADMIN — ONDANSETRON 4 MG: 2 INJECTION INTRAMUSCULAR; INTRAVENOUS at 04:37

## 2022-03-23 RX ADMIN — PRENATAL VITAMINS-IRON FUMARATE 27 MG IRON-FOLIC ACID 0.8 MG TABLET 1 TABLET: at 19:25

## 2022-03-23 RX ADMIN — MAGNESIUM SULFATE IN WATER 2 G/HR: 40 INJECTION, SOLUTION INTRAVENOUS at 13:20

## 2022-03-23 RX ADMIN — SODIUM CHLORIDE, POTASSIUM CHLORIDE, SODIUM LACTATE AND CALCIUM CHLORIDE: 600; 310; 30; 20 INJECTION, SOLUTION INTRAVENOUS at 04:45

## 2022-03-23 RX ADMIN — ONDANSETRON 4 MG: 2 INJECTION INTRAMUSCULAR; INTRAVENOUS at 22:09

## 2022-03-23 RX ADMIN — ACETAMINOPHEN 975 MG: 325 TABLET, FILM COATED ORAL at 19:26

## 2022-03-23 RX ADMIN — ACETAMINOPHEN 975 MG: 325 TABLET, FILM COATED ORAL at 12:22

## 2022-03-23 RX ADMIN — ASPIRIN 81 MG CHEWABLE TABLET 81 MG: 81 TABLET CHEWABLE at 19:24

## 2022-03-23 ASSESSMENT — ACTIVITIES OF DAILY LIVING (ADL)
ADLS_ACUITY_SCORE: 5

## 2022-03-23 NOTE — PROVIDER NOTIFICATION
03/23/22 0017   Provider Notification   Request Evaluate-Remote   Notification Reason Status Update      updated that pt has a persistent headache 5/10, after tylenol rating it at 4/10. Pt was able to take a nap. BP's 135/87 & 129/72. Will try vistaril 50mg PO x 1 for rest. CBC with plt, CMP, magnesium labs will be at 0600. FHR category 1. 2 hrs post prandial BG 79. Clarified on the glucose monitoring POCT order for before meals, bedtime, 0200, and 0500 - will modify order to POCT 2 hrs post prandial and fasting in the AM.

## 2022-03-23 NOTE — PROVIDER NOTIFICATION
03/23/22 1735   Provider Notification   Provider Name/Title Dr. Dominique   Method of Notification In Department   Request Evaluate - Remote   Notification Reason Maternal Vital Sign Change     MD notified that immediately after discontinuation of magnesium sulfate pt was found to have severe range BP. Will repeat in 15 mins; treat per orders if indicated; resume magnesium sulfate if severe bps x2.

## 2022-03-23 NOTE — UTILIZATION REVIEW
Admission Status; Secondary Review Determination         Under the authority of the Utilization Management Committee, the utilization review process indicated a secondary review on the above patient.  The review outcome is based on review of the medical records, discussions with staff, and applying clinical experience noted on the date of the review.        (x)      Inpatient Status Appropriate - This patient's medical care is consistent with medical management for inpatient care and reasonable inpatient medical practice.     RATIONALE FOR DETERMINATION   The patient is a 27-year-old female  3 para 1-1-0-2 admitted on 3/22/2021.  She was admitted for preeclampsia with high blood pressure and persisting headache.  She is currently 33 weeks gestation.  She is on IV magnesium sulfate with a plan to stop after 24 hours and if symptoms persist and blood pressure elevates further will consider additional antihypertensives versus potential need for delivery.  Dr. Dominique, her OB, has been in contact with Dr. Scott of [facility] regarding current plan.  Hellp labs are currently within normal limits.  The patient is anemic in the 9 range.  The patient's pregnancy is also complicated by type 1 diabetes on insulin pump being managed to endocrinology at [facility].  Due to recurrent situation of a 33-week gestation and severe preeclamptic features on IV magnesium sulfate, recommendation is to remain with inpatient status.      The severity of illness, intensity of service provided, expected LOS and risk for adverse outcome make the care complex, high risk and appropriate for hospital admission.        The information on this document is developed by the utilization review team in order for the business office to ensure compliance.  This only denotes the appropriateness of proper admission status and does not reflect the quality of care rendered.         The definitions of Inpatient Status and Observation Status used in  making the determination above are those provided in the CMS Coverage Manual, Chapter 1 and Chapter 6, section 70.4.      Sincerely,     Filippo Sims MD  Physician Advisor  Utilization Review/ Case Management  [facility].

## 2022-03-23 NOTE — PROVIDER NOTIFICATION
03/23/22 2588   Provider Notification   Provider Name/Title Dr. Dominique   Method of Notification In Department   Request Evaluate - Remote   Notification Reason Patient Request     MD notified in department that after 1 severe range BP (see previous note) they have not been severe since. Magnesium sulfate remains off. Additionally pt requested Reglan for nausea that has persisted throughout pregnancy after meals (pt states this is not new onset and is not accompanied by RUQ pain). TORB per MD reglan 10mg IV q6h PRN for nausea.

## 2022-03-23 NOTE — PROVIDER NOTIFICATION
03/23/22 1630   Provider Notification   Provider Name/Title Dr. Dominique   Method of Notification Phone   Request Evaluate - Remote     Called MD to review pt POC for the evening. Magnesium sulfate infusion to be discontinued at 1700 which is 24h after initiation of infusion. After that time continue to monitor Bps and if stable 2-4 hours after discontinuation of magnesium sulfate and without change or onset of other pre-e s/sx may proceed to less frequent monitoring, TORB to obtain reactive NST Q4h pending VSS.

## 2022-03-24 LAB
ALBUMIN SERPL-MCNC: 1.9 G/DL (ref 3.4–5)
ALP SERPL-CCNC: 115 U/L (ref 40–150)
ALT SERPL W P-5'-P-CCNC: 10 U/L (ref 0–50)
ANION GAP SERPL CALCULATED.3IONS-SCNC: 5 MMOL/L (ref 3–14)
AST SERPL W P-5'-P-CCNC: 11 U/L (ref 0–45)
BILIRUB SERPL-MCNC: 0.2 MG/DL (ref 0.2–1.3)
BUN SERPL-MCNC: 9 MG/DL (ref 7–30)
CALCIUM SERPL-MCNC: 8.1 MG/DL (ref 8.5–10.1)
CHLORIDE BLD-SCNC: 109 MMOL/L (ref 94–109)
CO2 SERPL-SCNC: 24 MMOL/L (ref 20–32)
CREAT SERPL-MCNC: 0.69 MG/DL (ref 0.52–1.04)
ERYTHROCYTE [DISTWIDTH] IN BLOOD BY AUTOMATED COUNT: 13.2 % (ref 10–15)
GFR SERPL CREATININE-BSD FRML MDRD: >90 ML/MIN/1.73M2
GLUCOSE BLD-MCNC: 89 MG/DL (ref 70–99)
GLUCOSE BLDC GLUCOMTR-MCNC: 85 MG/DL (ref 70–99)
HCT VFR BLD AUTO: 29.9 % (ref 35–47)
HGB BLD-MCNC: 9.6 G/DL (ref 11.7–15.7)
MCH RBC QN AUTO: 29.4 PG (ref 26.5–33)
MCHC RBC AUTO-ENTMCNC: 32.1 G/DL (ref 31.5–36.5)
MCV RBC AUTO: 92 FL (ref 78–100)
PLATELET # BLD AUTO: 175 10E3/UL (ref 150–450)
POTASSIUM BLD-SCNC: 3.8 MMOL/L (ref 3.4–5.3)
PROT SERPL-MCNC: 5.7 G/DL (ref 6.8–8.8)
RBC # BLD AUTO: 3.26 10E6/UL (ref 3.8–5.2)
SODIUM SERPL-SCNC: 138 MMOL/L (ref 133–144)
WBC # BLD AUTO: 7.2 10E3/UL (ref 4–11)

## 2022-03-24 PROCEDURE — 36415 COLL VENOUS BLD VENIPUNCTURE: CPT | Performed by: OBSTETRICS & GYNECOLOGY

## 2022-03-24 PROCEDURE — 250N000009 HC RX 250: Performed by: OBSTETRICS & GYNECOLOGY

## 2022-03-24 PROCEDURE — 250N000013 HC RX MED GY IP 250 OP 250 PS 637: Performed by: OBSTETRICS & GYNECOLOGY

## 2022-03-24 PROCEDURE — 250N000011 HC RX IP 250 OP 636: Performed by: OBSTETRICS & GYNECOLOGY

## 2022-03-24 PROCEDURE — 80053 COMPREHEN METABOLIC PANEL: CPT | Performed by: OBSTETRICS & GYNECOLOGY

## 2022-03-24 PROCEDURE — 99232 SBSQ HOSP IP/OBS MODERATE 35: CPT | Performed by: OBSTETRICS & GYNECOLOGY

## 2022-03-24 PROCEDURE — 85014 HEMATOCRIT: CPT | Performed by: OBSTETRICS & GYNECOLOGY

## 2022-03-24 PROCEDURE — 120N000001 HC R&B MED SURG/OB

## 2022-03-24 RX ORDER — OMEPRAZOLE
20 KIT
Status: DISCONTINUED | OUTPATIENT
Start: 2022-03-25 | End: 2022-03-24 | Stop reason: CLARIF

## 2022-03-24 RX ORDER — MAGNESIUM HYDROXIDE/ALUMINUM HYDROXICE/SIMETHICONE 120; 1200; 1200 MG/30ML; MG/30ML; MG/30ML
30 SUSPENSION ORAL EVERY 4 HOURS PRN
Status: DISCONTINUED | OUTPATIENT
Start: 2022-03-24 | End: 2022-03-26

## 2022-03-24 RX ORDER — SCOLOPAMINE TRANSDERMAL SYSTEM 1 MG/1
1 PATCH, EXTENDED RELEASE TRANSDERMAL
Status: DISCONTINUED | OUTPATIENT
Start: 2022-03-24 | End: 2022-03-26

## 2022-03-24 RX ADMIN — METOCLOPRAMIDE 10 MG: 10 TABLET ORAL at 20:48

## 2022-03-24 RX ADMIN — ASPIRIN 81 MG CHEWABLE TABLET 81 MG: 81 TABLET CHEWABLE at 19:43

## 2022-03-24 RX ADMIN — PRENATAL VITAMINS-IRON FUMARATE 27 MG IRON-FOLIC ACID 0.8 MG TABLET 1 TABLET: at 19:43

## 2022-03-24 RX ADMIN — METOCLOPRAMIDE HYDROCHLORIDE 10 MG: 5 INJECTION INTRAMUSCULAR; INTRAVENOUS at 13:01

## 2022-03-24 RX ADMIN — ONDANSETRON 4 MG: 4 TABLET, ORALLY DISINTEGRATING ORAL at 20:48

## 2022-03-24 RX ADMIN — SCOPALAMINE 1 PATCH: 1 PATCH, EXTENDED RELEASE TRANSDERMAL at 01:21

## 2022-03-24 RX ADMIN — ALUMINUM HYDROXIDE, MAGNESIUM HYDROXIDE, AND SIMETHICONE 30 ML: 200; 200; 20 SUSPENSION ORAL at 23:18

## 2022-03-24 ASSESSMENT — ACTIVITIES OF DAILY LIVING (ADL)
ADLS_ACUITY_SCORE: 5

## 2022-03-24 NOTE — PROVIDER NOTIFICATION
03/24/22 0055   Provider Notification   Provider Name/Title Dr. Dominique   Method of Notification Phone     Patient has a reaction to both phenergan and compazine; orders received for a scopolamine patch.

## 2022-03-24 NOTE — PLAN OF CARE
"Patient stable. VSS WDL; BP's 130-140's over 70-80's. Patient denies preeclampsia symptoms. Nausea resolved with scope patch. FHR moderate with accels and no decels, and occasional contractions that patient states \"is normal for me.\" Labs drawn this AM. Report given to ADOLFO Morales who resumes cares.   "

## 2022-03-24 NOTE — PROVIDER NOTIFICATION
03/24/22 0041   Provider Notification   Provider Name/Title Dr. Dominique   Method of Notification Phone     Pt feeling nauseated and has had both zofran and reglan with little to no relief. Orders received for phenergan 25 mg rectally or compazine 10 mg IV.

## 2022-03-24 NOTE — PROVIDER NOTIFICATION
03/23/22 5621   Provider Notification   Provider Name/Title Dr. Dominique   Method of Notification In Department     Orders received for a CBC and CMP tomorrow morning.

## 2022-03-24 NOTE — PROGRESS NOTES
"S:  Pt is doing well without concerns, pt had a BM this am  Good FM  Denies H/A visual changes or RUQ discomfort, pt has 'occasional ctx's\" no PTL    O:  /84   Temp 98.4  F (36.9  C) (Oral)   Resp 16   Ht 1.702 m (5' 7\")   Wt 90.5 kg (199 lb 8 oz)   LMP 07/31/2021   SpO2 98%   BMI 31.25 kg/m      Constitutional: healthy, alert and no distress  Cardiovascular: negative, PMI normal. No lifts, heaves, or thrills. RRR. No murmurs, clicks gallops or rub  Respiratory: negative, Percussion normal. Good diaphragmatic excursion. Lungs clear  Gastrointestinal: Abdomen soft, non-tender. BS normal. Gravid uterus consistent with dates,    Musculoskeletalextremities normal- no gross deformities noted, gait normal and normal muscle tone    Lab:  Recent values reviewed    A/P:    IUP 33.5 weeks    1) preeclampsia with severe features now off magnesium sulfate intermittent mild BP elevations   If BPs worsen requiring anti-HTN Rx, HA worsens, or labs show abnl findings, then we'll move towards delivery at that time.  Will restart Mg sulfate in that event. Plan as outlined was reviewed w/ Dr. Sctot of [facility] who concurs.    2).  T1 DM on insulin pump with adquate control    3)   LGA by [facility] U/S 3/22/2022 - EFW 2766 g (96%) w/ AC 93%.    4).  Anemia in 3rd trimester  No evidence of acute blood loss will follow and observe closely   "

## 2022-03-24 NOTE — PLAN OF CARE
"1300- ate lunch, nausea and vomiting (states same hyperemesis she has been having. 1330- blood sugar 40,, had crackers, peanut butter and juice. 1340- BS 57. BS now is 61 and patient states \"I feel great\".   "

## 2022-03-25 ENCOUNTER — APPOINTMENT (OUTPATIENT)
Dept: ULTRASOUND IMAGING | Facility: CLINIC | Age: 27
End: 2022-03-25
Attending: OBSTETRICS & GYNECOLOGY
Payer: COMMERCIAL

## 2022-03-25 LAB
ABO/RH(D): NORMAL
ALT SERPL W P-5'-P-CCNC: 13 U/L (ref 0–50)
ANTIBODY SCREEN: NEGATIVE
AST SERPL W P-5'-P-CCNC: 15 U/L (ref 0–45)
ATRIAL RATE - MUSE: 101 BPM
CREAT SERPL-MCNC: 0.66 MG/DL (ref 0.52–1.04)
DIASTOLIC BLOOD PRESSURE - MUSE: NORMAL MMHG
ERYTHROCYTE [DISTWIDTH] IN BLOOD BY AUTOMATED COUNT: 13.2 % (ref 10–15)
GFR SERPL CREATININE-BSD FRML MDRD: >90 ML/MIN/1.73M2
GLUCOSE BLDC GLUCOMTR-MCNC: 91 MG/DL (ref 70–99)
HCT VFR BLD AUTO: 31.4 % (ref 35–47)
HGB BLD-MCNC: 9.9 G/DL (ref 11.7–15.7)
INTERPRETATION ECG - MUSE: NORMAL
MCH RBC QN AUTO: 29.6 PG (ref 26.5–33)
MCHC RBC AUTO-ENTMCNC: 31.5 G/DL (ref 31.5–36.5)
MCV RBC AUTO: 94 FL (ref 78–100)
P AXIS - MUSE: 30 DEGREES
PLATELET # BLD AUTO: 182 10E3/UL (ref 150–450)
PR INTERVAL - MUSE: 122 MS
QRS DURATION - MUSE: 78 MS
QT - MUSE: 332 MS
QTC - MUSE: 430 MS
R AXIS - MUSE: 6 DEGREES
RBC # BLD AUTO: 3.34 10E6/UL (ref 3.8–5.2)
SPECIMEN EXPIRATION DATE: NORMAL
SYSTOLIC BLOOD PRESSURE - MUSE: NORMAL MMHG
T AXIS - MUSE: 18 DEGREES
VENTRICULAR RATE- MUSE: 101 BPM
WBC # BLD AUTO: 7.5 10E3/UL (ref 4–11)

## 2022-03-25 PROCEDURE — 86850 RBC ANTIBODY SCREEN: CPT | Performed by: OBSTETRICS & GYNECOLOGY

## 2022-03-25 PROCEDURE — 250N000011 HC RX IP 250 OP 636: Performed by: OBSTETRICS & GYNECOLOGY

## 2022-03-25 PROCEDURE — 250N000013 HC RX MED GY IP 250 OP 250 PS 637: Performed by: OBSTETRICS & GYNECOLOGY

## 2022-03-25 PROCEDURE — 120N000001 HC R&B MED SURG/OB

## 2022-03-25 PROCEDURE — 86901 BLOOD TYPING SEROLOGIC RH(D): CPT | Performed by: OBSTETRICS & GYNECOLOGY

## 2022-03-25 PROCEDURE — 76819 FETAL BIOPHYS PROFIL W/O NST: CPT | Mod: 26 | Performed by: OBSTETRICS & GYNECOLOGY

## 2022-03-25 PROCEDURE — 36415 COLL VENOUS BLD VENIPUNCTURE: CPT | Performed by: OBSTETRICS & GYNECOLOGY

## 2022-03-25 PROCEDURE — 82565 ASSAY OF CREATININE: CPT | Performed by: OBSTETRICS & GYNECOLOGY

## 2022-03-25 PROCEDURE — 84460 ALANINE AMINO (ALT) (SGPT): CPT | Performed by: OBSTETRICS & GYNECOLOGY

## 2022-03-25 PROCEDURE — 84450 TRANSFERASE (AST) (SGOT): CPT | Performed by: OBSTETRICS & GYNECOLOGY

## 2022-03-25 PROCEDURE — 76819 FETAL BIOPHYS PROFIL W/O NST: CPT

## 2022-03-25 PROCEDURE — 85027 COMPLETE CBC AUTOMATED: CPT | Performed by: OBSTETRICS & GYNECOLOGY

## 2022-03-25 RX ORDER — ONDANSETRON 2 MG/ML
4 INJECTION INTRAMUSCULAR; INTRAVENOUS EVERY 6 HOURS PRN
Status: DISCONTINUED | OUTPATIENT
Start: 2022-03-25 | End: 2022-03-26

## 2022-03-25 RX ORDER — ONDANSETRON 4 MG/1
4 TABLET, ORALLY DISINTEGRATING ORAL EVERY 6 HOURS PRN
Status: DISCONTINUED | OUTPATIENT
Start: 2022-03-25 | End: 2022-03-26

## 2022-03-25 RX ORDER — METOCLOPRAMIDE HYDROCHLORIDE 5 MG/ML
10 INJECTION INTRAMUSCULAR; INTRAVENOUS EVERY 6 HOURS PRN
Status: DISCONTINUED | OUTPATIENT
Start: 2022-03-25 | End: 2022-03-26 | Stop reason: HOSPADM

## 2022-03-25 RX ORDER — NALOXONE HYDROCHLORIDE 0.4 MG/ML
0.2 INJECTION, SOLUTION INTRAMUSCULAR; INTRAVENOUS; SUBCUTANEOUS
Status: DISCONTINUED | OUTPATIENT
Start: 2022-03-25 | End: 2022-03-26 | Stop reason: HOSPADM

## 2022-03-25 RX ORDER — IBUPROFEN 800 MG/1
800 TABLET, FILM COATED ORAL
Status: DISCONTINUED | OUTPATIENT
Start: 2022-03-25 | End: 2022-03-26

## 2022-03-25 RX ORDER — NALOXONE HYDROCHLORIDE 0.4 MG/ML
0.4 INJECTION, SOLUTION INTRAMUSCULAR; INTRAVENOUS; SUBCUTANEOUS
Status: DISCONTINUED | OUTPATIENT
Start: 2022-03-25 | End: 2022-03-26 | Stop reason: HOSPADM

## 2022-03-25 RX ORDER — LORAZEPAM 2 MG/ML
1 INJECTION INTRAMUSCULAR ONCE
Status: COMPLETED | OUTPATIENT
Start: 2022-03-26 | End: 2022-03-26

## 2022-03-25 RX ORDER — MISOPROSTOL 200 UG/1
800 TABLET ORAL
Status: DISCONTINUED | OUTPATIENT
Start: 2022-03-25 | End: 2022-03-26 | Stop reason: HOSPADM

## 2022-03-25 RX ORDER — PANTOPRAZOLE SODIUM 40 MG/1
40 TABLET, DELAYED RELEASE ORAL
Status: DISCONTINUED | OUTPATIENT
Start: 2022-03-25 | End: 2022-03-26

## 2022-03-25 RX ORDER — METOCLOPRAMIDE 10 MG/1
10 TABLET ORAL EVERY 6 HOURS PRN
Status: DISCONTINUED | OUTPATIENT
Start: 2022-03-25 | End: 2022-03-26 | Stop reason: HOSPADM

## 2022-03-25 RX ORDER — MISOPROSTOL 200 UG/1
400 TABLET ORAL
Status: DISCONTINUED | OUTPATIENT
Start: 2022-03-25 | End: 2022-03-26 | Stop reason: HOSPADM

## 2022-03-25 RX ORDER — OXYTOCIN 10 [USP'U]/ML
10 INJECTION, SOLUTION INTRAMUSCULAR; INTRAVENOUS
Status: DISCONTINUED | OUTPATIENT
Start: 2022-03-25 | End: 2022-03-26

## 2022-03-25 RX ORDER — MISOPROSTOL 100 UG/1
25 TABLET ORAL
Status: DISCONTINUED | OUTPATIENT
Start: 2022-03-25 | End: 2022-03-26 | Stop reason: HOSPADM

## 2022-03-25 RX ORDER — OXYTOCIN/0.9 % SODIUM CHLORIDE 30/500 ML
340 PLASTIC BAG, INJECTION (ML) INTRAVENOUS CONTINUOUS PRN
Status: DISCONTINUED | OUTPATIENT
Start: 2022-03-25 | End: 2022-03-26 | Stop reason: HOSPADM

## 2022-03-25 RX ORDER — OXYTOCIN 10 [USP'U]/ML
10 INJECTION, SOLUTION INTRAMUSCULAR; INTRAVENOUS
Status: DISCONTINUED | OUTPATIENT
Start: 2022-03-25 | End: 2022-03-26 | Stop reason: HOSPADM

## 2022-03-25 RX ORDER — CARBOPROST TROMETHAMINE 250 UG/ML
250 INJECTION, SOLUTION INTRAMUSCULAR
Status: DISCONTINUED | OUTPATIENT
Start: 2022-03-25 | End: 2022-03-26 | Stop reason: HOSPADM

## 2022-03-25 RX ORDER — OXYTOCIN/0.9 % SODIUM CHLORIDE 30/500 ML
100-340 PLASTIC BAG, INJECTION (ML) INTRAVENOUS CONTINUOUS PRN
Status: DISCONTINUED | OUTPATIENT
Start: 2022-03-25 | End: 2022-03-26

## 2022-03-25 RX ORDER — METHYLERGONOVINE MALEATE 0.2 MG/ML
200 INJECTION INTRAVENOUS
Status: DISCONTINUED | OUTPATIENT
Start: 2022-03-25 | End: 2022-03-26 | Stop reason: HOSPADM

## 2022-03-25 RX ORDER — KETOROLAC TROMETHAMINE 30 MG/ML
30 INJECTION, SOLUTION INTRAMUSCULAR; INTRAVENOUS
Status: DISCONTINUED | OUTPATIENT
Start: 2022-03-25 | End: 2022-03-26

## 2022-03-25 RX ORDER — DIPHENHYDRAMINE HYDROCHLORIDE 50 MG/ML
50 INJECTION INTRAMUSCULAR; INTRAVENOUS
Status: DISCONTINUED | OUTPATIENT
Start: 2022-03-25 | End: 2022-03-26

## 2022-03-25 RX ORDER — TRANEXAMIC ACID 10 MG/ML
1 INJECTION, SOLUTION INTRAVENOUS EVERY 30 MIN PRN
Status: DISCONTINUED | OUTPATIENT
Start: 2022-03-25 | End: 2022-03-26 | Stop reason: HOSPADM

## 2022-03-25 RX ORDER — CITRIC ACID/SODIUM CITRATE 334-500MG
30 SOLUTION, ORAL ORAL
Status: DISCONTINUED | OUTPATIENT
Start: 2022-03-25 | End: 2022-03-26 | Stop reason: HOSPADM

## 2022-03-25 RX ADMIN — METOCLOPRAMIDE HYDROCHLORIDE 10 MG: 5 INJECTION INTRAMUSCULAR; INTRAVENOUS at 22:59

## 2022-03-25 RX ADMIN — ALUMINUM HYDROXIDE, MAGNESIUM HYDROXIDE, AND SIMETHICONE 30 ML: 200; 200; 20 SUSPENSION ORAL at 04:04

## 2022-03-25 RX ADMIN — PRENATAL VITAMINS-IRON FUMARATE 27 MG IRON-FOLIC ACID 0.8 MG TABLET 1 TABLET: at 20:38

## 2022-03-25 RX ADMIN — ONDANSETRON 4 MG: 2 INJECTION INTRAMUSCULAR; INTRAVENOUS at 04:04

## 2022-03-25 RX ADMIN — DIPHENHYDRAMINE HYDROCHLORIDE 50 MG: 50 INJECTION INTRAMUSCULAR; INTRAVENOUS at 23:06

## 2022-03-25 RX ADMIN — ASPIRIN 81 MG CHEWABLE TABLET 81 MG: 81 TABLET CHEWABLE at 20:38

## 2022-03-25 RX ADMIN — ACETAMINOPHEN 975 MG: 325 TABLET, FILM COATED ORAL at 16:46

## 2022-03-25 RX ADMIN — METOCLOPRAMIDE HYDROCHLORIDE 10 MG: 5 INJECTION INTRAMUSCULAR; INTRAVENOUS at 04:04

## 2022-03-25 RX ADMIN — ONDANSETRON 4 MG: 2 INJECTION INTRAMUSCULAR; INTRAVENOUS at 20:47

## 2022-03-25 ASSESSMENT — ACTIVITIES OF DAILY LIVING (ADL)
ADLS_ACUITY_SCORE: 5

## 2022-03-25 NOTE — PROVIDER NOTIFICATION
03/25/22 0857   Provider Notification   Provider Name/Title Dr Gertrudis Desai   Method of Notification In Department   Request Evaluate - Remote   Notification Reason Lab/Diagnostic Study   MD gave VORB to repeat CBC, ALT, AST AND CREATININE THIS MORNING.

## 2022-03-25 NOTE — PROGRESS NOTES
The patient was seen for an ultrasound with Maternal-Fetal Medicine today.  For a detailed report of the ultrasound examination, please see the ultrasound report which can be found under the imaging tab.    Jyoti Kim MD  , OB/GYN  Maternal-Fetal Medicine  [phone] (Pager)

## 2022-03-25 NOTE — PROGRESS NOTES
"Antepartum progress note  3/25/2022     S:  Didn't sleep much last night.  Understanding of plan for delivery tomorrow. Has repeat BPP today.     O:  /86   Temp 97.9  F (36.6  C) (Oral)   Resp 18   Ht 1.702 m (5' 7\")   Wt 92.5 kg (204 lb)   LMP 2021   SpO2 98%   BMI 31.95 kg/m      Constitutional: alert and no distress, smiling  Respiratory: no cough/wheeze  Gastrointestinal: Abdomen soft, non-tender. BS normal. Gravid uterus consistent with dates,    BLE with trace edema  cvx not rechecked yet this AM, last was FT on 3/22    Lab:  Recent values reviewed    A/P:  27 year old  at 33w6d here with preE with severe features, planning for delivery at 34 wks (tomorrow) with possible cervical ripening overnight tonight.    1) preeclampsia with severe features, s/p 24h IV mag at time of diagnosis, now off magnesium sulfate intermittent mild BP elevations.   If BPs worsen requiring anti-HTN Rx, HA worsens, or labs show abnl findings, then we'll move towards delivery at that time.  Will restart Mg sulfate in that event.  MFM aware and in agreement with plan.     2).  T1 DM on insulin pump with adquate control, plan for q1h BG monitoring in active labor.     3)   LGA by [facility] U/S 3/22/2022 - EFW 2766 g (96%) w/ AC 93%.    4) Labor - will check cvx this afternoon and can begin cervical ripening either late tonight at 34 wks, or first thing early tomorrow morning if she wants to try sleep meds for overnight (has been here 3 nights and not sleeping well).  GBS neg.  She feels like this baby could be the same size as her 7 lb baby, was measuring 6 lb 2 oz on 3/22.     Marina Desai MD, MPH  [facility] OB/Gyn      Total time spent was 15 minutes; this includes pre-work time, intra-service time, and post-work time spent on chart review, patient visit, review of tests, documentation, counseling, and coordination of care which occurred on the date of service.    "

## 2022-03-25 NOTE — PLAN OF CARE
1915- No further nausea and vomiting today. Denies any headache, upper gastric pain or visual disturbances today. Patient managing own insulin pump. Todays spot check on patient's insulin pump 85- patient's meter, 86- hospital glucose meter.

## 2022-03-26 ENCOUNTER — ANESTHESIA EVENT (OUTPATIENT)
Dept: OBGYN | Facility: CLINIC | Age: 27
End: 2022-03-26
Payer: COMMERCIAL

## 2022-03-26 ENCOUNTER — ANESTHESIA (OUTPATIENT)
Dept: OBGYN | Facility: CLINIC | Age: 27
End: 2022-03-26
Payer: COMMERCIAL

## 2022-03-26 LAB
ALBUMIN SERPL-MCNC: 2.1 G/DL (ref 3.4–5)
ALP SERPL-CCNC: 150 U/L (ref 40–150)
ALT SERPL W P-5'-P-CCNC: 22 U/L (ref 0–50)
ANION GAP SERPL CALCULATED.3IONS-SCNC: 7 MMOL/L (ref 3–14)
AST SERPL W P-5'-P-CCNC: 35 U/L (ref 0–45)
BILIRUB SERPL-MCNC: 0.4 MG/DL (ref 0.2–1.3)
BUN SERPL-MCNC: 7 MG/DL (ref 7–30)
CALCIUM SERPL-MCNC: 8.6 MG/DL (ref 8.5–10.1)
CHLORIDE BLD-SCNC: 103 MMOL/L (ref 94–109)
CO2 SERPL-SCNC: 25 MMOL/L (ref 20–32)
CREAT SERPL-MCNC: 0.78 MG/DL (ref 0.52–1.04)
ERYTHROCYTE [DISTWIDTH] IN BLOOD BY AUTOMATED COUNT: 13.2 % (ref 10–15)
GFR SERPL CREATININE-BSD FRML MDRD: >90 ML/MIN/1.73M2
GLUCOSE BLD-MCNC: 157 MG/DL (ref 70–99)
GLUCOSE BLDC GLUCOMTR-MCNC: 168 MG/DL (ref 70–99)
HCT VFR BLD AUTO: 33.5 % (ref 35–47)
HGB BLD-MCNC: 10.8 G/DL (ref 11.7–15.7)
MAGNESIUM SERPL-MCNC: 4.6 MG/DL (ref 1.6–2.3)
MCH RBC QN AUTO: 29.9 PG (ref 26.5–33)
MCHC RBC AUTO-ENTMCNC: 32.2 G/DL (ref 31.5–36.5)
MCV RBC AUTO: 93 FL (ref 78–100)
PLATELET # BLD AUTO: 192 10E3/UL (ref 150–450)
POTASSIUM BLD-SCNC: 4.2 MMOL/L (ref 3.4–5.3)
PROT SERPL-MCNC: 6.5 G/DL (ref 6.8–8.8)
RBC # BLD AUTO: 3.61 10E6/UL (ref 3.8–5.2)
SODIUM SERPL-SCNC: 135 MMOL/L (ref 133–144)
WBC # BLD AUTO: 10.6 10E3/UL (ref 4–11)

## 2022-03-26 PROCEDURE — 83735 ASSAY OF MAGNESIUM: CPT | Performed by: OBSTETRICS & GYNECOLOGY

## 2022-03-26 PROCEDURE — 722N000001 HC LABOR CARE VAGINAL DELIVERY SINGLE

## 2022-03-26 PROCEDURE — 250N000011 HC RX IP 250 OP 636: Performed by: OBSTETRICS & GYNECOLOGY

## 2022-03-26 PROCEDURE — 250N000009 HC RX 250: Performed by: OBSTETRICS & GYNECOLOGY

## 2022-03-26 PROCEDURE — 258N000003 HC RX IP 258 OP 636: Performed by: ANESTHESIOLOGY

## 2022-03-26 PROCEDURE — 80053 COMPREHEN METABOLIC PANEL: CPT | Performed by: OBSTETRICS & GYNECOLOGY

## 2022-03-26 PROCEDURE — 85027 COMPLETE CBC AUTOMATED: CPT | Performed by: OBSTETRICS & GYNECOLOGY

## 2022-03-26 PROCEDURE — 0KQM0ZZ REPAIR PERINEUM MUSCLE, OPEN APPROACH: ICD-10-PCS | Performed by: OBSTETRICS & GYNECOLOGY

## 2022-03-26 PROCEDURE — 250N000011 HC RX IP 250 OP 636: Performed by: ANESTHESIOLOGY

## 2022-03-26 PROCEDURE — 250N000013 HC RX MED GY IP 250 OP 250 PS 637: Performed by: OBSTETRICS & GYNECOLOGY

## 2022-03-26 PROCEDURE — 88307 TISSUE EXAM BY PATHOLOGIST: CPT | Mod: 26 | Performed by: PATHOLOGY

## 2022-03-26 PROCEDURE — 88307 TISSUE EXAM BY PATHOLOGIST: CPT | Mod: TC | Performed by: OBSTETRICS & GYNECOLOGY

## 2022-03-26 PROCEDURE — 370N000003 HC ANESTHESIA WARD SERVICE

## 2022-03-26 PROCEDURE — 82040 ASSAY OF SERUM ALBUMIN: CPT | Performed by: OBSTETRICS & GYNECOLOGY

## 2022-03-26 PROCEDURE — 99232 SBSQ HOSP IP/OBS MODERATE 35: CPT | Performed by: INTERNAL MEDICINE

## 2022-03-26 PROCEDURE — 258N000003 HC RX IP 258 OP 636: Performed by: OBSTETRICS & GYNECOLOGY

## 2022-03-26 PROCEDURE — 120N000001 HC R&B MED SURG/OB

## 2022-03-26 PROCEDURE — 3E0R3BZ INTRODUCTION OF ANESTHETIC AGENT INTO SPINAL CANAL, PERCUTANEOUS APPROACH: ICD-10-PCS | Performed by: ANESTHESIOLOGY

## 2022-03-26 PROCEDURE — 99231 SBSQ HOSP IP/OBS SF/LOW 25: CPT | Performed by: NURSE PRACTITIONER

## 2022-03-26 PROCEDURE — 59400 OBSTETRICAL CARE: CPT | Performed by: OBSTETRICS & GYNECOLOGY

## 2022-03-26 PROCEDURE — 36415 COLL VENOUS BLD VENIPUNCTURE: CPT | Performed by: OBSTETRICS & GYNECOLOGY

## 2022-03-26 PROCEDURE — 00HU33Z INSERTION OF INFUSION DEVICE INTO SPINAL CANAL, PERCUTANEOUS APPROACH: ICD-10-PCS | Performed by: ANESTHESIOLOGY

## 2022-03-26 RX ORDER — OMEPRAZOLE
20 KIT ONCE
Status: DISCONTINUED | OUTPATIENT
Start: 2022-03-26 | End: 2022-03-26

## 2022-03-26 RX ORDER — BUPIVACAINE HYDROCHLORIDE 2.5 MG/ML
10 INJECTION, SOLUTION EPIDURAL; INFILTRATION; INTRACAUDAL ONCE
Status: DISCONTINUED | OUTPATIENT
Start: 2022-03-26 | End: 2022-03-26 | Stop reason: HOSPADM

## 2022-03-26 RX ORDER — MODIFIED LANOLIN
OINTMENT (GRAM) TOPICAL
Status: DISCONTINUED | OUTPATIENT
Start: 2022-03-26 | End: 2022-03-28 | Stop reason: HOSPADM

## 2022-03-26 RX ORDER — BISACODYL 10 MG
10 SUPPOSITORY, RECTAL RECTAL DAILY PRN
Status: DISCONTINUED | OUTPATIENT
Start: 2022-03-26 | End: 2022-03-28 | Stop reason: HOSPADM

## 2022-03-26 RX ORDER — OXYTOCIN/0.9 % SODIUM CHLORIDE 30/500 ML
340 PLASTIC BAG, INJECTION (ML) INTRAVENOUS CONTINUOUS PRN
Status: DISCONTINUED | OUTPATIENT
Start: 2022-03-26 | End: 2022-03-28 | Stop reason: HOSPADM

## 2022-03-26 RX ORDER — IBUPROFEN 800 MG/1
800 TABLET, FILM COATED ORAL EVERY 6 HOURS PRN
Status: DISCONTINUED | OUTPATIENT
Start: 2022-03-27 | End: 2022-03-28 | Stop reason: HOSPADM

## 2022-03-26 RX ORDER — MAGNESIUM SULFATE IN WATER 40 MG/ML
2 INJECTION, SOLUTION INTRAVENOUS CONTINUOUS
Status: DISPENSED | OUTPATIENT
Start: 2022-03-26 | End: 2022-03-27

## 2022-03-26 RX ORDER — HYDROCORTISONE 2.5 %
CREAM (GRAM) TOPICAL 3 TIMES DAILY PRN
Status: DISCONTINUED | OUTPATIENT
Start: 2022-03-26 | End: 2022-03-28 | Stop reason: HOSPADM

## 2022-03-26 RX ORDER — METHYLERGONOVINE MALEATE 0.2 MG/ML
200 INJECTION INTRAVENOUS
Status: DISCONTINUED | OUTPATIENT
Start: 2022-03-26 | End: 2022-03-28 | Stop reason: HOSPADM

## 2022-03-26 RX ORDER — MISOPROSTOL 200 UG/1
800 TABLET ORAL
Status: DISCONTINUED | OUTPATIENT
Start: 2022-03-26 | End: 2022-03-28 | Stop reason: HOSPADM

## 2022-03-26 RX ORDER — FENTANYL CITRATE-0.9 % NACL/PF 10 MCG/ML
100 PLASTIC BAG, INJECTION (ML) INTRAVENOUS EVERY 5 MIN PRN
Status: DISCONTINUED | OUTPATIENT
Start: 2022-03-26 | End: 2022-03-26 | Stop reason: HOSPADM

## 2022-03-26 RX ORDER — ONDANSETRON 2 MG/ML
4 INJECTION INTRAMUSCULAR; INTRAVENOUS EVERY 6 HOURS PRN
Status: DISCONTINUED | OUTPATIENT
Start: 2022-03-26 | End: 2022-03-26 | Stop reason: HOSPADM

## 2022-03-26 RX ORDER — SCOLOPAMINE TRANSDERMAL SYSTEM 1 MG/1
1 PATCH, EXTENDED RELEASE TRANSDERMAL
Status: DISCONTINUED | OUTPATIENT
Start: 2022-03-26 | End: 2022-03-26 | Stop reason: HOSPADM

## 2022-03-26 RX ORDER — NICOTINE POLACRILEX 4 MG
15-30 LOZENGE BUCCAL
Status: DISCONTINUED | OUTPATIENT
Start: 2022-03-26 | End: 2022-03-27

## 2022-03-26 RX ORDER — MAGNESIUM SULFATE HEPTAHYDRATE 40 MG/ML
4 INJECTION, SOLUTION INTRAVENOUS ONCE
Status: COMPLETED | OUTPATIENT
Start: 2022-03-26 | End: 2022-03-26

## 2022-03-26 RX ORDER — ONDANSETRON 4 MG/1
4 TABLET, ORALLY DISINTEGRATING ORAL EVERY 6 HOURS PRN
Status: DISCONTINUED | OUTPATIENT
Start: 2022-03-26 | End: 2022-03-26 | Stop reason: HOSPADM

## 2022-03-26 RX ORDER — DEXTROSE MONOHYDRATE 25 G/50ML
25-50 INJECTION, SOLUTION INTRAVENOUS
Status: DISCONTINUED | OUTPATIENT
Start: 2022-03-26 | End: 2022-03-27

## 2022-03-26 RX ORDER — DOCUSATE SODIUM 100 MG/1
100 CAPSULE, LIQUID FILLED ORAL DAILY
Status: DISCONTINUED | OUTPATIENT
Start: 2022-03-26 | End: 2022-03-28 | Stop reason: HOSPADM

## 2022-03-26 RX ORDER — CARBOPROST TROMETHAMINE 250 UG/ML
250 INJECTION, SOLUTION INTRAMUSCULAR
Status: DISCONTINUED | OUTPATIENT
Start: 2022-03-26 | End: 2022-03-28 | Stop reason: HOSPADM

## 2022-03-26 RX ORDER — MISOPROSTOL 200 UG/1
400 TABLET ORAL
Status: DISCONTINUED | OUTPATIENT
Start: 2022-03-26 | End: 2022-03-28 | Stop reason: HOSPADM

## 2022-03-26 RX ORDER — NALBUPHINE HYDROCHLORIDE 10 MG/ML
2.5-5 INJECTION, SOLUTION INTRAMUSCULAR; INTRAVENOUS; SUBCUTANEOUS EVERY 6 HOURS PRN
Status: DISCONTINUED | OUTPATIENT
Start: 2022-03-26 | End: 2022-03-26 | Stop reason: CLARIF

## 2022-03-26 RX ORDER — TRANEXAMIC ACID 10 MG/ML
1 INJECTION, SOLUTION INTRAVENOUS EVERY 30 MIN PRN
Status: DISCONTINUED | OUTPATIENT
Start: 2022-03-26 | End: 2022-03-28 | Stop reason: HOSPADM

## 2022-03-26 RX ORDER — BUPIVACAINE HYDROCHLORIDE 2.5 MG/ML
INJECTION, SOLUTION EPIDURAL; INFILTRATION; INTRACAUDAL
Status: COMPLETED | OUTPATIENT
Start: 2022-03-26 | End: 2022-03-26

## 2022-03-26 RX ORDER — DEXTROSE MONOHYDRATE 25 G/50ML
25-50 INJECTION, SOLUTION INTRAVENOUS
Status: DISCONTINUED | OUTPATIENT
Start: 2022-03-26 | End: 2022-03-26

## 2022-03-26 RX ORDER — OXYTOCIN 10 [USP'U]/ML
10 INJECTION, SOLUTION INTRAMUSCULAR; INTRAVENOUS
Status: DISCONTINUED | OUTPATIENT
Start: 2022-03-26 | End: 2022-03-28 | Stop reason: HOSPADM

## 2022-03-26 RX ORDER — ACETAMINOPHEN 325 MG/1
650 TABLET ORAL EVERY 4 HOURS PRN
Status: DISCONTINUED | OUTPATIENT
Start: 2022-03-26 | End: 2022-03-28 | Stop reason: HOSPADM

## 2022-03-26 RX ORDER — NICOTINE POLACRILEX 4 MG
15-30 LOZENGE BUCCAL
Status: DISCONTINUED | OUTPATIENT
Start: 2022-03-26 | End: 2022-03-26

## 2022-03-26 RX ADMIN — ALUMINUM HYDROXIDE, MAGNESIUM HYDROXIDE, AND SIMETHICONE 30 ML: 200; 200; 20 SUSPENSION ORAL at 02:26

## 2022-03-26 RX ADMIN — LIDOCAINE HYDROCHLORIDE 20 ML: 10 INJECTION, SOLUTION EPIDURAL; INFILTRATION; INTRACAUDAL; PERINEURAL at 18:14

## 2022-03-26 RX ADMIN — MISOPROSTOL 25 MCG: 100 TABLET ORAL at 10:15

## 2022-03-26 RX ADMIN — ALUMINUM HYDROXIDE, MAGNESIUM HYDROXIDE, AND SIMETHICONE 30 ML: 200; 200; 20 SUSPENSION ORAL at 06:28

## 2022-03-26 RX ADMIN — FENTANYL CITRATE: 0.05 INJECTION, SOLUTION INTRAMUSCULAR; INTRAVENOUS at 17:26

## 2022-03-26 RX ADMIN — IBUPROFEN 800 MG: 800 TABLET ORAL at 18:48

## 2022-03-26 RX ADMIN — Medication 340 ML/HR: at 18:12

## 2022-03-26 RX ADMIN — MISOPROSTOL 25 MCG: 100 TABLET ORAL at 06:15

## 2022-03-26 RX ADMIN — MAGNESIUM SULFATE HEPTAHYDRATE 2 G/HR: 40 INJECTION, SOLUTION INTRAVENOUS at 06:01

## 2022-03-26 RX ADMIN — ALUMINUM HYDROXIDE, MAGNESIUM HYDROXIDE, AND SIMETHICONE 30 ML: 200; 200; 20 SUSPENSION ORAL at 10:10

## 2022-03-26 RX ADMIN — SODIUM CHLORIDE, POTASSIUM CHLORIDE, SODIUM LACTATE AND CALCIUM CHLORIDE: 600; 310; 30; 20 INJECTION, SOLUTION INTRAVENOUS at 05:20

## 2022-03-26 RX ADMIN — ACETAMINOPHEN 650 MG: 325 TABLET, FILM COATED ORAL at 21:25

## 2022-03-26 RX ADMIN — BUPIVACAINE HYDROCHLORIDE 12 ML: 2.5 INJECTION, SOLUTION EPIDURAL; INFILTRATION; INTRACAUDAL at 17:21

## 2022-03-26 RX ADMIN — OMEPRAZOLE 20 MG: 20 CAPSULE, DELAYED RELEASE ORAL at 00:27

## 2022-03-26 RX ADMIN — MISOPROSTOL 25 MCG: 100 TABLET ORAL at 12:33

## 2022-03-26 RX ADMIN — MISOPROSTOL 25 MCG: 100 TABLET ORAL at 08:19

## 2022-03-26 RX ADMIN — SODIUM CHLORIDE, POTASSIUM CHLORIDE, SODIUM LACTATE AND CALCIUM CHLORIDE 500 ML: 600; 310; 30; 20 INJECTION, SOLUTION INTRAVENOUS at 16:50

## 2022-03-26 RX ADMIN — ALUMINUM HYDROXIDE, MAGNESIUM HYDROXIDE, AND SIMETHICONE 30 ML: 200; 200; 20 SUSPENSION ORAL at 14:34

## 2022-03-26 RX ADMIN — ACETAMINOPHEN 975 MG: 325 TABLET, FILM COATED ORAL at 10:14

## 2022-03-26 RX ADMIN — DOCUSATE SODIUM 100 MG: 100 CAPSULE, LIQUID FILLED ORAL at 21:25

## 2022-03-26 RX ADMIN — LORAZEPAM 1 MG: 2 INJECTION INTRAMUSCULAR; INTRAVENOUS at 00:29

## 2022-03-26 RX ADMIN — MAGNESIUM SULFATE HEPTAHYDRATE 4 G: 40 INJECTION, SOLUTION INTRAVENOUS at 05:30

## 2022-03-26 RX ADMIN — MISOPROSTOL 25 MCG: 100 TABLET ORAL at 14:34

## 2022-03-26 ASSESSMENT — ACTIVITIES OF DAILY LIVING (ADL)
ADLS_ACUITY_SCORE: 5

## 2022-03-26 NOTE — CONSULTS
"[facility]  Consult Note - Hospitalist Service  Date of Admission:  3/22/2022  Consult Requested by: Dr. Harp  Reason for Consult: Diabetic management.    Assessment & Plan   Fransisca Cabello is a 27 year old female admitted on 3/22/2022. She is currently 34 weeks pregnant.  Has a history of diabetes mellitus type 1.  Currently, with preeclampsia with severe features.    1.  Diabetes mellitus type 1.    Managed with insulin pump.  Does follow with endocrinology.  Has a plan in place from endocrinology to manage insulin pump while in labor and post labor.  -Monitor blood glucose every 1 hour starting at time of active labor and for 6 hours after delivery.  -Likely then decrease frequency of blood glucose monitoring.  Will depend upon initial readings post delivery.  -Plan to hold insulin pump after delivery.  -Plan to restart insulin pump once blood glucose is greater than 150.  -Endocrinology does have quite detailed notes on insulin dosing through insulin pump once blood glucose reaches 150.  -It is noted that she has previously been off insulin pump for a few weeks after previous pregnancy delivery.    2.  Preeclampsia with severe features.  OB managing.    -Plan for induction of labor today.     The patient's care was discussed with the patient, significant other at bedside, and Bedside Nurse.    oJsse Rocha,   [facility]  Securely message with the Vocera Web Console (learn more here)  Text page via [employer] Paging/Directory       Hospitalist Service    Clinically Significant Risk Factors Present on Admission             # Obesity: Estimated body mass index is 31.28 kg/m  as calculated from the following:    Height as of this encounter: 1.702 m (5' 7\").    Weight as of this encounter: 90.6 kg (199 lb 11.2 oz).      ______________________________________________________________________    Chief Complaint   Pregnancy.    History is obtained from the " patient    History of Present Illness   Fransisca Cabello is a 27 year old female who has a past medical history significant for diabetes mellitus type 1, preeclampsia, and currently 34 weeks pregnant.  She was brought to hospital due to findings of preeclampsia with severe features.  Plan is for induction of labor later today, 3/26.  She is having quite a bit of nausea.  No other complaints at this time.  She does normally manage her diabetes with insulin pump.    Review of Systems   The 10 point Review of Systems is negative other than noted in the HPI    Past Medical History    I have reviewed this patient's medical history and updated it with pertinent information if needed.   Past Medical History:   Diagnosis Date     Depressive disorder     post partum depression after 1st     Hypertension     hx of severe preecclampsia w first     Migraines      Postpartum depression 2014    On medication for 6 months-      Type 1 diabetes (H) 1998    three years at diagnosis     Varicella 1998       Past Surgical History   I have reviewed this patient's surgical history and updated it with pertinent information if needed.  Past Surgical History:   Procedure Laterality Date     DENTAL SURGERY       ZZC ROOT CANAL THERAPY 2 CANALS         Social History   I have reviewed this patient's social history and updated it with pertinent information if needed.  Social History     Tobacco Use     Smoking status: Never Smoker     Smokeless tobacco: Never Used   Vaping Use     Vaping Use: Never used   Substance Use Topics     Alcohol use: No     Alcohol/week: 0.0 standard drinks     Drug use: No       Family History   I have reviewed this patient's family history and updated it with pertinent information if needed.  Family History   Problem Relation Age of Onset     Family History Negative Mother      Diabetes Father 35        Type 1      Diabetes Type 1 Daughter 1       Medications   I have reviewed this patient's current  medications    Allergies   Allergies   Allergen Reactions     Cefzil [Cefprozil] Swelling     Phenergan [Promethazine] Anxiety and Swelling       Physical Exam   Vital Signs: Temp: 98.1  F (36.7  C) Temp src: Oral BP: 136/88 Pulse: 113   Resp: 20 SpO2: 95 %      Weight: 199 lbs 11.2 oz    Gen:  NAD, A&Ox3.  Eyes:  PERRL, sclera anicteric.  OP:  MMM, no lesions.  Neck:  Supple.  CV:  Regular, no murmurs.  Lung:  CTA b/l, normal effort.  Ab: Protuberant, +BS.  Skin:  Warm, dry to touch.  No rash.  Ext:  No pitting edema LE b/l.

## 2022-03-26 NOTE — PROVIDER NOTIFICATION
03/26/22 0724   Provider Notification   Provider Name/Title Dr Harp   Method of Notification At Bedside   Notification Reason Status Update   MD bedside, discussing POC, will write orders for labs and hospitalist consult.

## 2022-03-26 NOTE — CONSULTS
Neonatology Antepartum Consultation    Fransisca Cabello MRN# [id number]   YOB: 1995 Age: 27 year old   Date of Admission: 3/22/2022     Reason for consult: I was asked by Dr. Desai to evaluate this patient for delivery at 34 weeks      I was asked to provide antepartum counseling for Fransisca Cabello at the request of Dr. Desai secondary to IOL today for preecalmpsia.  She is currently 34 weeks and has a history significant for  deliveries, preeclampsia, HELLP, Type I IDDM, anemia, LGA infant. She was admitted on 3/22/3/2022 for BP is severe range. Betamethasone was NOT given.  Ms. Cabello, accompanied by her significant other, was counseled on the expected hospital course, potential risks, and outcomes associated with an infant born at approximately 34 weeks gestation. The counseling included: morbidity, initial delivery room stabilization, respiratory course, lung development, hemodynamic support, infection,  hyperbilirubinemia,  nutrition, growth and development.  Please feel free to call with any additional questions or concerns.  Face to face time 20 min  HORTENSIA Saavedra CNP   3/26/2022 , 6:05 AM.

## 2022-03-26 NOTE — PROVIDER NOTIFICATION
"   03/25/22 2320   Provider Notification   Provider Name/Title    Method of Notification Phone   Request Evaluate - Remote   Notification Reason Maternal Vital Sign Change;Status Update     called from the bedside as patient is experiencing a panic attack. Patient did receive IV Reglan at 2259 per patient request followed by IV benadryl at 2306. Patient quickly started to not feel well at 2320. Patient reports \" my chest hurts, I feel weak and dizzy, I feel shaky, I think my blood sugar is low and I just don't feel well, I'm going to throw-up, I feel my heart is racing.\" Nurse tried to repeatedly do breathing exercises, calming techniques, cold warm cloth and provided reassurance. P , oxy sat 95-97%. Patient's blood sugar according to her reader was 76. TORB by  to administer 1 mg IV Ativan once, and place EFM/toco monitors for an NST and then may remove if reactive. Patient still having frequent emesis, mostly small emesis however 1 large emesis was dark colored. Plan to administer Ativan and call MD if any changes.    "

## 2022-03-26 NOTE — PROVIDER NOTIFICATION
03/25/22 1924   Provider Notification   Provider Name/Title Dr Gertrudis Desai   Method of Notification Phone   Request Evaluate - Remote   TORB to do SVE and call MD back for IOL orders.

## 2022-03-26 NOTE — PROVIDER NOTIFICATION
03/26/22 0925   Provider Notification   Provider Name/Title Hospitalist   Method of Notification At Bedside   Discussed POC that pt, pts's  or pt's mother will monitor and adjust glucose throughout labor and in postpartum period.

## 2022-03-26 NOTE — ANESTHESIA PROCEDURE NOTES
Epidural catheter Procedure Note    Pre-Procedure   Staff -        Anesthesiologist:  Stevenson Brunner DO       Performed By: anesthesiologist       Referred By: Maksim Hills MD       Location: OB       Pre-Anesthestic Checklist: patient identified, IV checked, risks and benefits discussed, informed consent, monitors and equipment checked, pre-op evaluation and at physician/surgeon's request  Timeout:       Correct Patient: Yes        Correct Procedure: Yes        Correct Site: Yes        Correct Position: Yes   Procedure Documentation  Procedure: epidural catheter       Patient Position: sitting       Skin prep: Betadine      Local skin infiltrated with mL of 1% lidocaine.        Insertion Site: L2-3. (midline approach).       Technique: LORT saline        Needle Type: Touhy needle       Needle Gauge: 17.        Needle Length (Inches): 3.5         Catheter threaded easily.        # of attempts: 1 and  # of redirects:     Assessment/Narrative         Paresthesias: No.       Insertion/Infusion Method: LORT saline       Aspiration negative for Heme or CSF via Epidural Catheter.    Medication(s) Administered   0.25% Bupivacaine PF (Epidural), 12 mL  Medication Administration Time: 3/26/2022 5:21 PM

## 2022-03-26 NOTE — PROVIDER NOTIFICATION
03/26/22 0117   Provider Notification   Provider Name/Title    Method of Notification Phone   Request Evaluate - Remote   Notification Reason Maternal Vital Sign Change    called with an update and BP's. Patient is finally resting after receiving Ativan at 0029. Blood pressures after Ativan were 164/87 P 88, oxygen sat 96%, resp 18, repeat in 15 min 152/81 P 92, oxygen sat 96%, resp 17. TORB by  to let patient rest, no need for EFM/toco monitoring at this time, if blood pressures would become severe range then place EFM/toco monitors on; continue with current plan of care at this time and notify MD with any changes.

## 2022-03-26 NOTE — PROVIDER NOTIFICATION
03/26/22 0003   Provider Notification   Provider Name/Title    Method of Notification Phone   Request Evaluate - Remote   Notification Reason Patient Request    called from the bedside with an update and patient request. Ativan not given yet. Patient has improved since last conversation, have done a lot of breathing exercises and provided reassurance. Patient is still having frequent emesis however has been able to lay in bed and sometimes on her side without difficulty or emesis. Patient is requesting Prilosec capsule, states she has taken it in the past however nothing since her admission here. TORB by  for Prilosec 20 mg PO once. FHT category 1 tracing with irregular contractions, mild with palpation. Noted 2-4 contractions in 40 min. Patient still reporting sternal pains with each emesis, not epigastric pain.  ok with removing EFM/toco monitors until 0500 at this time, as strip is reassuring at this time. Plan to administer Prilosec medication first then Ativan.

## 2022-03-26 NOTE — ANESTHESIA PREPROCEDURE EVALUATION
Anesthesia Pre-Procedure Evaluation    Patient: Fransisca Cabello   MRN: [id number] : 1995        Procedure : * No procedures listed *          Past Medical History:   Diagnosis Date     Depressive disorder     post partum depression after 1st     Hypertension     hx of severe preecclampsia w first     Migraines      Postpartum depression     On medication for 6 months-      Type 1 diabetes (H)     three years at diagnosis     Varicella       Past Surgical History:   Procedure Laterality Date     DENTAL SURGERY       ZZC ROOT CANAL THERAPY 2 CANALS        Allergies   Allergen Reactions     Cefzil [Cefprozil] Swelling     Phenergan [Promethazine] Anxiety and Swelling      Social History     Tobacco Use     Smoking status: Never Smoker     Smokeless tobacco: Never Used   Substance Use Topics     Alcohol use: No     Alcohol/week: 0.0 standard drinks      Wt Readings from Last 1 Encounters:   22 90.6 kg (199 lb 11.2 oz)        Anesthesia Evaluation            ROS/MED HX  ENT/Pulmonary:  - neg pulmonary ROS     Neurologic:  - neg neurologic ROS     Cardiovascular:     (+) --PIH and Mg ++ gtt ---    METS/Exercise Tolerance:     Hematologic:  - neg hematologic  ROS     Musculoskeletal:       GI/Hepatic:  - neg GI/hepatic ROS     Renal/Genitourinary:       Endo:     (+) type I DM,     Psychiatric/Substance Use:  - neg psychiatric ROS     Infectious Disease:       Malignancy:       Other:            Physical Exam    Airway        Mallampati: II    Neck ROM: full     Respiratory Devices and Support         Dental           Cardiovascular   cardiovascular exam normal          Pulmonary   pulmonary exam normal            Other findings: .Lab Test        22                       0902          0922          0634          1059          1137          WBC          10.6         7.5          7.2            < >        7.8           HGB           10.8*        9.9*         9.6*           < >        14.6          MCV          93           94           92             < >        92            PLT          192          182          175            < >        279           INR           --           --           --           --          0.96           < > = values in this interval not displayed.                  Lab Test        03/26/22 03/26/22 03/25/22 03/25/22 03/24/22 03/24/22 03/23/22 03/23/22                       0902          0632          1606          0922          0934          0634          1122          0617          NA           135           --           --           --           --          138           --          135           POTASSIUM    4.2           --           --           --           --          3.8           --          3.8           CHLORIDE     103           --           --           --           --          109           --          105           CO2          25            --           --           --           --          24            --          23            BUN          7             --           --           --           --          9             --          7             CR           0.78          --           --          0.66          --          0.69          --          0.62          ANIONGAP     7             --           --           --           --          5             --          7             ROBI          8.6           --           --           --           --          8.1*          --          7.5*          GLC          157*         168*         91            --            < >        89             < >        99             < > = values in this interval not displayed.                   OUTSIDE LABS:  CBC:   Lab Results   Component Value Date    WBC 10.6 03/26/2022    WBC 7.5 03/25/2022    HGB 10.8 (L) 03/26/2022    HGB 9.9 (L) 03/25/2022    HCT 33.5 (L) 03/26/2022    HCT 31.4 (L)  03/25/2022     03/26/2022     03/25/2022     BMP:   Lab Results   Component Value Date     03/26/2022     03/24/2022    POTASSIUM 4.2 03/26/2022    POTASSIUM 3.8 03/24/2022    CHLORIDE 103 03/26/2022    CHLORIDE 109 03/24/2022    CO2 25 03/26/2022    CO2 24 03/24/2022    BUN 7 03/26/2022    BUN 9 03/24/2022    CR 0.78 03/26/2022    CR 0.66 03/25/2022     (H) 03/26/2022     (H) 03/26/2022     COAGS:   Lab Results   Component Value Date    PTT 27 03/06/2018    INR 0.96 03/06/2018     POC:   Lab Results   Component Value Date    BGM 77 03/06/2018     HEPATIC:   Lab Results   Component Value Date    ALBUMIN 2.1 (L) 03/26/2022    PROTTOTAL 6.5 (L) 03/26/2022    ALT 22 03/26/2022    AST 35 03/26/2022    ALKPHOS 150 03/26/2022    BILITOTAL 0.4 03/26/2022     OTHER:   Lab Results   Component Value Date    A1C 6.8 (H) 03/22/2022    ROBI 8.6 03/26/2022    PHOS 3.2 08/23/2018    MAG 4.6 (H) 03/26/2022    TSH 1.35 03/01/2022    T4 1.06 08/23/2018       Anesthesia Plan    ASA Status:  3   - Procedure: Procedure only, no anesthetic delivered      Anesthesia Type: Epidural.              Consents    Anesthesia Plan(s) and associated risks, benefits, and realistic alternatives discussed. Questions answered and patient/representative(s) expressed understanding.    - Discussed:     - Discussed with:  Patient         Postoperative Care            Comments:           neg OB ROS.       Stevenson Brunner DO

## 2022-03-26 NOTE — PLAN OF CARE
After patient took Maalox and her spouse arrived around 0230, patient reported her indigestion, nausea and vomiting subsided and patient slept until 0500. EFM/toco monitor applied at 0507, category 1 tracing with irregular contractions present, however patient only reporting occasional contractions, nothing painful and still denies any pain. Magnesium sulfate started with 4 gm loading dose followed by maintenance dose 2 gm. Patient tolerated loading dose, only reporting feeling nausea and flushed, fan provided. First dose of oral Cytotec administered at 0615, SVE 1/50/-3, [place] 3. Fasting blood sugar this morning 168 on hospital glucometer, however patient purposely set temporary basil rate higher today with anticipated labor presenting day, patient's [employer] pump read 158. NNP consult completed this morning-see note. Reviewed anticipated plan of care for today, all questions were answered and reassurance provided.

## 2022-03-26 NOTE — L&D DELIVERY NOTE
Delivery Summary    Fransisca Cabello MRN# [id number]   Age: 27 year old YOB: 1995       27 year old  presented at 33w3d with pre-eclampsia with severe features    GBS negative   Cervix on admission was fingertip  Underwent Magnesium sulfate IV infusion for eclampsia prophylaxis.  Pregnancy also complicated by Type 1 diabetes mellitus on an insulin pump.    ultrasound surveillance while inpatient were normal.       Induction of labor commenced at 34w0d   Cervix was checked the day prior at 33w6d at 2055, 3/25  Misoprostol PO course initiated with 3 doses given.   SROM occurred at 163, 3/26 with clear fluid   S/p epidural   Category 1 fetal heart tracing  Progressed to complete at 175, 3/26    Viable male infant delivered in cephalic presentation  No nuchal cord  Weight 7 lbs 11 oz  Apgar 7 and 9 at 1 and 5 minutes, respectively  Placenta delivered spontaneously, intact  Sustained a 2nd degree perineal laceration, repaired with 2-0 Vicryl   Right sided periurethral laceration, not repaired as it was hemostatic   mL   Infant name is Young    Pippa Harp MD  [facility]      Irineo, Valerie [[id number]]    Labor Event Times    Labor onset date: 3/26/22 Onset time:  2:37 PM   Dilation complete date: 3/26/22 Complete time:  6:00 PM   Start pushing date/time: 3/26/2022 180      Labor Events    Labor Type: Induction/Cervical ripening     Antibiotics received during labor?: No     Rupture date/time: 3/26/22 1637   Rupture type: Spontaneous rupture of membranes occuring during spontaneous labor or augmentation  Fluid color: Clear  Fluid odor: Normal     Induction: Misoprostol  Induction date/time:     Cervical ripening date/time:     Indications for induction: Severe Preeclampsia     Augmentation: None  1:1 continuous labor support provided by?: RN       Delivery/Placenta Date and Time    Delivery Date: 3/26/22 Delivery Time:  6:08 PM   Oxytocin  given at the time of delivery: after delivery of baby  Delivering clinician: Pippa Harp MD   Other personnel present at delivery:  Provider Role   Persons, Frances K, RN Registered Nurse   Kaur, Bri, RN Registered Nurse   Pippa Harp MD Obstetrician         Vaginal Counts     Initial count performed by 2 team members:  Two Team Members   Giovanna Miller       [place] Suture Needles Sponges (RETIRED) Instruments   Initial counts 2  5    Added to count  2     Relief counts       Final counts 2 2 5          Placed during labor Accounted for at the end of labor   FSE NA NA   IUPC NA NA   Cervidil NA NA              Final count performed by 2 team members:  Two Team Members   Dr. Pippa Dietrich RN         Apgars    Living status: Living   1 Minute 5 Minute 10 Minute 15 Minute 20 Minute   Skin color: 0  1       Heart rate: 2  2       Reflex irritability: 1  2       Muscle tone: 2  2       Respiratory effort: 2  2       Total: 7  9       Apgars assigned by: TONY BAZAN CNP     Cord    Vessels: 3 Vessels    Cord Complications: None               Cord Blood Disposition: Lab    Gases Sent?: No    Delayed cord clamping?: Yes    Cord Clamping Delay (seconds): 31-60 seconds    Stem cell collection?: No        Resuscitation    Methods: NCPAP, Oxygen, Oximetry, Temp Skin Control   Care at Delivery: NICU team called on behalf of      Labor Events and Shoulder Dystocia    Fetal Tracing Prior to Delivery: Category 1  Shoulder dystocia present?: Neg     Delivery (Maternal) (Provider to Complete) ([id number])    Episiotomy: None  Perineal lacerations: 2nd Repaired?: Yes   Periurethral laceration: right Repaired?: No   Repair suture: 2-0 Vicryl  Number of repair packets: 2  Genital tract inspection done: Pos     Blood Loss  Mother: Fransisca Cabello #[id number]   Start of Mother's Information    Delivery Blood Loss  22 1437 - 22 8736     Delivery QBL (mL) Hospital Encounter 142 mL    Total  142 mL         End of Mother's Information  Mother: Fransisca Cabello #[id number]          Delivery - Provider to Complete ([id number])    Delivering clinician: Pippa Harp MD  Attempted Delivery Types (Choose all that apply): Spontaneous Vaginal Delivery  Delivery Type (Choose the 1 that will go to the Birth History): Vaginal, Spontaneous                   Other personnel:  Provider Role   Persons, Frances K, RN Registered Nurse   Giovanna Dietrich RN Registered Nurse   Pippa Harp MD Obstetrician                Placenta    Removal: Spontaneous  Disposition: Pathology           Anesthesia    Method: Epidural  Cervical dilation at placement: 4-7                Presentation and Position    Presentation: Vertex    Position: Middle Occiput Anterior                 Pippa Harp MD

## 2022-03-26 NOTE — PROVIDER NOTIFICATION
03/25/22 2055   Provider Notification   Provider Name/Title Dr. Gertruids Desai   Method of Notification At Bedside   Request Evaluate in Person   Notification Reason SVE;Status Update   Dr. Gertrudis Desai at bedside to status update and SVE, 1/50/-3, ballotable. Contractions every 2-7 minutes, mild. MD and Pt agreed on plan to start cervical ripening @ 0500 after pt gets rest overnight. MD gave VORB for 50 mg IV benadryl. MD states if pt sleeping can wait to do vitals and monitoring until 0500, if pt is awake continue with Q4h vitals and NST's.

## 2022-03-26 NOTE — PROGRESS NOTES
Cvx 1/50/-3.  Pt nauseated, has been the whole day.     Has sleep meds ordered (tylenol, vistaril, zofran and reglan for nausea)    Plan for break from monitoring from now until 5am.      At 5am will restart IV magnesium, continuous fetal monitoring, and po cytotec for induction.     Marina Desai MD, MPH  [facility] OB/[facility]

## 2022-03-26 NOTE — PROGRESS NOTES
Intrapartum Note    Doing well.     Vitals:    22 0540 22 0545 22 0555 22 0605   BP: 130/78 134/83 126/79 139/71   BP Location:       Patient Position:       Cuff Size:       Pulse:       Resp:    17   Temp:    97.9  F (36.6  C)   TempSrc:    Oral   SpO2: 94% 95% 95% 95%   Weight:       Height:         General Appearance: NAD  Abdomen:Gravid, NT  Pelvic: Deferred    FHT: 150, moderate variability, accels present, no decels  [place]: q2-3 minutes     A/P: 27 year old  34w0d HD#5 admitted for pre-eclampsia with severe features; induction of labor initiated     1) pre-eclampsia with severe features  -- BP readings have been normotensive; one isolated severe range BP reading overnight but not sustained; on MagSO4 IV maintenance rate 2g/hr for eclampsia ppx; monitor for signs and symptoms of magnesium toxicity; monitor ins and outs; will repeat HELLP labs this AM and Mag level    2) induction of labor  -- will initiate via oral misoprostol 25 mcg q2 hours x 12 doses PRN for unfavorable cervix   -- transition to Pitocin IV and/or AROM when cervix is more favorable  -- GBS negative status  -- comfort care measures as needed    3) Fetal well being  -- Category 1 fetal heart tracing  -- s/p NICU consultation  -- betamethasone course held given hx of Type 1 DM and concern for uncontrolled blood glucose levels if administered    3) Type 1 DM  -- on insulin pump   -- blood glucose monitoring has been normal in the last 24 hours with exception of one elevation yesterday at 1122  -- has established endocrinology outpatient care; endocrinologist has provided instructions for insulin pump management but will consult hospitalist for assistance especially in regards to active labor insulin pump management     4) Anticipate vaginal delivery     Total time spent was 30 minutes; this includes pre-work time, intra-service time, and post-work time spent on chart review, patient visit, review of tests,  documentation, counseling, and coordination of care which occurred on the date of service.          Pippa Harp MD  [facility]

## 2022-03-26 NOTE — PLAN OF CARE
in the department and reviewed POC for tonight. VORB by  for 50 mg IV benadryl for sleep prn for tonight, patient may shower tonight if desires. Plan for oral Cytotec induction at 0500 on 3/26/22, will restart magnesium sulfate with 4 gm loading dose followed by maintenance  dose 2 gm/hr, patient may off the monitors (EFM/toco monitors until 0500) then continuous EFM/toco monitors during induction.

## 2022-03-27 PROCEDURE — 250N000013 HC RX MED GY IP 250 OP 250 PS 637: Performed by: OBSTETRICS & GYNECOLOGY

## 2022-03-27 PROCEDURE — 258N000003 HC RX IP 258 OP 636: Performed by: OBSTETRICS & GYNECOLOGY

## 2022-03-27 PROCEDURE — 250N000011 HC RX IP 250 OP 636: Performed by: OBSTETRICS & GYNECOLOGY

## 2022-03-27 PROCEDURE — 120N000001 HC R&B MED SURG/OB

## 2022-03-27 RX ORDER — NICOTINE POLACRILEX 4 MG
15-30 LOZENGE BUCCAL
Status: DISCONTINUED | OUTPATIENT
Start: 2022-03-27 | End: 2022-03-28 | Stop reason: HOSPADM

## 2022-03-27 RX ORDER — LABETALOL 200 MG/1
200 TABLET, FILM COATED ORAL EVERY 12 HOURS SCHEDULED
Status: DISCONTINUED | OUTPATIENT
Start: 2022-03-27 | End: 2022-03-28 | Stop reason: HOSPADM

## 2022-03-27 RX ORDER — DEXTROSE MONOHYDRATE 25 G/50ML
25-50 INJECTION, SOLUTION INTRAVENOUS
Status: DISCONTINUED | OUTPATIENT
Start: 2022-03-27 | End: 2022-03-28 | Stop reason: HOSPADM

## 2022-03-27 RX ORDER — SODIUM CHLORIDE, SODIUM LACTATE, POTASSIUM CHLORIDE, CALCIUM CHLORIDE 600; 310; 30; 20 MG/100ML; MG/100ML; MG/100ML; MG/100ML
INJECTION, SOLUTION INTRAVENOUS CONTINUOUS
Status: DISCONTINUED | OUTPATIENT
Start: 2022-03-27 | End: 2022-03-28 | Stop reason: HOSPADM

## 2022-03-27 RX ADMIN — HYDRALAZINE HYDROCHLORIDE 10 MG: 20 INJECTION INTRAMUSCULAR; INTRAVENOUS at 00:28

## 2022-03-27 RX ADMIN — LABETALOL HYDROCHLORIDE 200 MG: 200 TABLET, FILM COATED ORAL at 15:13

## 2022-03-27 RX ADMIN — SODIUM CHLORIDE, POTASSIUM CHLORIDE, SODIUM LACTATE AND CALCIUM CHLORIDE: 600; 310; 30; 20 INJECTION, SOLUTION INTRAVENOUS at 01:37

## 2022-03-27 RX ADMIN — ACETAMINOPHEN 650 MG: 325 TABLET, FILM COATED ORAL at 06:34

## 2022-03-27 RX ADMIN — MAGNESIUM SULFATE HEPTAHYDRATE 2 G/HR: 40 INJECTION, SOLUTION INTRAVENOUS at 01:09

## 2022-03-27 RX ADMIN — ACETAMINOPHEN 650 MG: 325 TABLET, FILM COATED ORAL at 02:22

## 2022-03-27 RX ADMIN — LABETALOL HYDROCHLORIDE 200 MG: 200 TABLET, FILM COATED ORAL at 03:19

## 2022-03-27 RX ADMIN — IBUPROFEN 800 MG: 800 TABLET, FILM COATED ORAL at 18:09

## 2022-03-27 RX ADMIN — IBUPROFEN 800 MG: 800 TABLET, FILM COATED ORAL at 23:33

## 2022-03-27 RX ADMIN — IBUPROFEN 800 MG: 800 TABLET, FILM COATED ORAL at 08:55

## 2022-03-27 RX ADMIN — DOCUSATE SODIUM 100 MG: 100 CAPSULE, LIQUID FILLED ORAL at 08:55

## 2022-03-27 RX ADMIN — IBUPROFEN 800 MG: 800 TABLET, FILM COATED ORAL at 01:05

## 2022-03-27 ASSESSMENT — ACTIVITIES OF DAILY LIVING (ADL)
ADLS_ACUITY_SCORE: 5

## 2022-03-27 NOTE — PROGRESS NOTES
Per Dr. Riddle, staff to allow patient to monitor and manage BS. No need for staff to record sugars or administer insulin. Message sent to hospitalist.

## 2022-03-27 NOTE — PLAN OF CARE
Data: Fransisca Cabello transferred to [id number] via wheelchair at 2120 after visiting baby in NICU.   Action: Receiving unit notified of transfer: Yes. Patient and family notified of room change. Report given to Taryn MATA at 2125. Belongings sent to receiving unit. Accompanied by Registered Nurse. Oriented patient to surroundings. Call light within reach.  Response: Patient tolerated transfer and is stable. Fall risk band active.    Patients mobililty level scored using the bedside mobility assistance tool (BMAT). Patient is at a mobility level test number: 4. Mobility equipment used: none. Required assist of 1 staff members. Further use of BMAT scoring required.

## 2022-03-27 NOTE — PLAN OF CARE
Patient meeting expected goals this shift. Mag turned off this AM. Blood pressures remain slightly elevated but not severe. Using tylenol and ibuprofen for pain. Patient down to NICU a few times this shift. So swelling noted to legs and feet this shift, denies any other symptoms of Pre-E. Patient monitoring BS, see previous note by this writer. Education taught to patient and patient verbalized understanding.

## 2022-03-27 NOTE — PROVIDER NOTIFICATION
03/27/22 0244   Provider Notification   Provider Name/Title Dr. Harp   Method of Notification Phone   Notified MD of 2 severe range BP's 15 minutes apart. BP's were taken after getting up to the bathroom and then approximately 5 minutes after she had finished pumping. Order received to let patient rest for 30 minutes, if next BP is in severe range give IV Labetalol. Also received order for 200mg Labetalol PO BID starting now.

## 2022-03-27 NOTE — PROGRESS NOTES
[facility] Obstetrics Post-Partum Progress Note          Assessment and Plan:    Assessment:   Post-partum day #1, HD#6  Induced vaginal delivery secondary to pre eclampsia with severe features  L&D complications: None      No excessive bleeding  Pain well-controlled.  BPs labile overnight requiring IV hydralazine x 1, now on labetalol 200mg BID  Baby in NICU  BS management per patient with pump and stable      Plan:   Ambulation encouraged  Pain control measures as needed  Reportable signs and symptoms dicussed with the patient  Close BP surveillance and BS surveillance  Possible discharge tomorrow           Interval History:   Doing well.  Pain is well-controlled.  No fevers.  No history of foul-smelling vaginal discharge.  Good appetite.  Denies chest pain, shortness of breath, nausea or vomiting.  Vaginal bleeding is similar to a heavy menstrual flow.  Ambulatory.            Significant Problems:      Past Medical History:   Diagnosis Date     Depressive disorder     post partum depression after 1st     Hypertension     hx of severe preecclampsia w first     Migraines      Postpartum depression 2014    On medication for 6 months-      Type 1 diabetes (H) 1998    three years at diagnosis     Varicella 1998                   Physical Exam:     All vitals stable  Patient Vitals for the past 24 hrs:   BP Temp Temp src Pulse Resp SpO2 Weight   03/27/22 0748 (!) 140/87 98.4  F (36.9  C) Oral 80 18 -- --   03/27/22 0636 136/86 -- -- 85 -- -- --   03/27/22 0545 121/82 -- -- 97 -- -- 87.6 kg (193 lb 2 oz)   03/27/22 0518 135/88 98  F (36.7  C) -- 97 -- -- --   03/27/22 0448 117/80 -- -- 89 -- -- --   03/27/22 0433 129/85 -- -- 91 -- -- --   03/27/22 0418 (!) 147/94 -- -- 91 -- -- --   03/27/22 0403 (!) 153/93 -- -- 84 -- -- --   03/27/22 0348 (!) 160/100 -- -- 83 -- -- --   03/27/22 0333 (!) 171/105 -- -- 97 -- -- --   03/27/22 0319 -- -- -- 88 -- -- --   03/27/22 0318 (!) 148/87 -- -- -- -- -- --   03/27/22 0234  (!) 161/99 -- -- 87 -- -- --   03/27/22 0207 (!) 165/106 -- -- 106 -- -- --   03/27/22 0152 129/76 -- -- 85 -- -- --   03/27/22 0137 (!) 141/84 -- -- 93 -- -- --   03/27/22 0127 132/77 -- -- 86 -- -- --   03/27/22 0117 132/74 -- -- 92 -- -- --   03/27/22 0107 (!) 148/101 -- -- 103 -- -- --   03/27/22 0057 (!) 150/88 -- -- 97 -- -- --   03/27/22 0047 (!) 153/95 -- -- 86 -- -- --   03/27/22 0037 (!) 154/91 -- -- 92 -- -- --   03/27/22 0016 (!) 161/96 -- -- 99 -- -- --   03/27/22 0002 (!) 165/100 -- -- 105 18 -- --   03/26/22 1955 (!) 156/87 98.4  F (36.9  C) Oral -- 18 -- --   03/26/22 1941 (!) 156/87 -- -- -- -- -- --   03/26/22 1926 (!) 160/89 -- -- -- -- -- --   03/26/22 1911 (!) 157/88 -- -- -- -- -- --   03/26/22 1856 (!) 154/86 -- -- -- -- -- --   03/26/22 1841 (!) 146/82 -- -- -- -- -- --   03/26/22 1826 (!) 154/90 -- -- -- -- -- --   03/26/22 1809 (!) 163/104 -- -- -- -- -- --   03/26/22 1805 (!) 161/96 -- -- -- -- -- --   03/26/22 1759 (!) 152/89 98.8  F (37.1  C) Oral -- -- -- --   03/26/22 1756 -- -- -- -- -- 97 % --   03/26/22 1754 (!) 157/94 -- -- -- -- -- --   03/26/22 1749 (!) 154/94 -- -- -- -- -- --   03/26/22 1741 137/69 -- -- -- -- 96 % --   03/26/22 1740 137/69 -- -- -- -- 96 % --   03/26/22 1732 135/68 -- -- -- -- -- --   03/26/22 1731 -- -- -- -- -- 96 % --   03/26/22 1730 128/65 -- -- -- -- -- --   03/26/22 1728 125/69 -- -- -- -- -- --   03/26/22 1726 133/68 -- -- -- -- 96 % --   03/26/22 1724 129/67 -- -- -- -- -- --   03/26/22 1722 129/67 -- -- -- -- -- --   03/26/22 1600 -- -- -- -- 20 -- --   03/26/22 1504 -- -- -- -- -- 94 % --   03/26/22 1459 -- -- -- -- -- 95 % --   03/26/22 1454 -- -- -- -- -- 95 % --   03/26/22 1449 -- -- -- -- -- 94 % --   03/26/22 1444 -- -- -- -- -- 95 % --   03/26/22 1439 -- -- -- -- -- 95 % --   03/26/22 1434 -- -- -- -- -- 95 % --   03/26/22 1429 -- -- -- -- -- 95 % --   03/26/22 1424 -- -- -- -- -- 95 % --   03/26/22 1422 138/77 -- -- -- -- -- --   03/26/22 1419  -- -- -- -- -- 96 % --   03/26/22 1412 -- -- -- -- -- 94 % --   03/26/22 1407 -- -- -- -- -- 94 % --   03/26/22 1402 -- -- -- -- -- 95 % --   03/26/22 1400 -- -- -- -- 18 95 % --   03/26/22 1357 -- -- -- -- -- 96 % --   03/26/22 1352 -- -- -- -- -- 94 % --   03/26/22 1347 -- -- -- -- -- 95 % --   03/26/22 1342 -- -- -- -- -- 95 % --   03/26/22 1337 -- -- -- -- -- 95 % --   03/26/22 1332 -- -- -- -- -- 94 % --   03/26/22 1327 -- -- -- -- -- 95 % --   03/26/22 1322 135/86 -- -- -- -- 94 % --   03/26/22 1317 -- -- -- -- -- 96 % --   03/26/22 1312 -- -- -- -- -- 96 % --   03/26/22 1307 -- -- -- -- -- 96 % --   03/26/22 1302 -- -- -- -- -- 96 % --   03/26/22 1257 -- -- -- -- -- 95 % --   03/26/22 1252 -- -- -- -- -- 95 % --   03/26/22 1247 -- -- -- -- -- 94 % --   03/26/22 1242 -- -- -- -- -- 95 % --   03/26/22 1237 -- -- -- -- -- 94 % --   03/26/22 1232 -- -- -- -- -- 94 % --   03/26/22 1227 -- -- -- -- -- 94 % --   03/26/22 1222 137/83 -- -- -- -- -- --   03/26/22 1220 -- -- -- -- -- 94 % --   03/26/22 1215 -- -- -- -- -- 95 % --   03/26/22 1210 -- -- -- -- -- 94 % --   03/26/22 1209 -- 98  F (36.7  C) Oral -- 21 -- --   03/26/22 1205 -- -- -- -- -- 95 % --   03/26/22 1200 -- -- -- -- -- 94 % --   03/26/22 1155 -- -- -- -- -- 94 % --   03/26/22 1150 -- -- -- -- -- 94 % --   03/26/22 1145 -- -- -- -- -- 95 % --   03/26/22 1140 -- -- -- -- -- 95 % --   03/26/22 1135 -- -- -- -- -- 94 % --   03/26/22 1130 -- -- -- -- -- 94 % --   03/26/22 1125 -- -- -- -- -- 95 % --   03/26/22 1124 -- -- -- -- -- 92 % --   03/26/22 1120 -- -- -- -- -- 94 % --   03/26/22 1115 -- -- -- -- -- 94 % --   03/26/22 1110 -- -- -- -- -- 94 % --   03/26/22 1105 -- -- -- -- -- 94 % --   03/26/22 1100 -- -- -- -- -- 95 % --   03/26/22 1055 -- -- -- -- -- 94 % --   03/26/22 1050 -- -- -- -- -- 93 % --   03/26/22 1045 -- -- -- -- -- 93 % --   03/26/22 1040 -- -- -- -- -- 94 % --   03/26/22 1035 -- -- -- -- -- 94 % --   03/26/22 1030 -- -- -- -- -- 94 %  --   03/26/22 1025 -- -- -- -- -- 94 % --   03/26/22 1023 138/76 -- -- -- -- -- --   03/26/22 1020 -- -- -- -- -- 94 % --   03/26/22 1015 -- -- -- -- -- 95 % --   03/26/22 1011 -- -- -- -- 20 95 % --   03/26/22 1005 -- -- -- -- -- 95 % --   03/26/22 1000 -- -- -- -- -- 96 % --   03/26/22 0955 -- -- -- -- -- 95 % --   03/26/22 0950 -- -- -- -- -- 96 % --   03/26/22 0939 -- -- -- -- -- 95 % --   03/26/22 0934 -- -- -- -- -- 95 % --   03/26/22 0929 -- -- -- -- -- 93 % --   03/26/22 0924 -- -- -- -- -- 95 % --   03/26/22 0919 -- -- -- -- -- 95 % --   03/26/22 0914 -- -- -- -- -- 95 % --   03/26/22 0909 -- -- -- -- -- 95 % --   03/26/22 0904 -- -- -- -- -- 95 % --   03/26/22 0900 -- -- -- -- -- 95 % --     Uterine fundus is firm, non-tender and at the level of the umbilicus  Ext NT with trace edema bilat     Alex Riddle MD  3/27/2022 8:55 AM

## 2022-03-27 NOTE — PROVIDER NOTIFICATION
03/27/22 0020   Provider Notification   Provider Name/Title Dr. Harp   Method of Notification Phone   MD updated of 2 severe range blood pressures 15 minutes apart. Order received to start Hydralazine protocol.

## 2022-03-27 NOTE — PROGRESS NOTES
Case discussed with Dr. Riddle of OB.  No need for further hospitalist assistance at this point.    Will sign off.    Contact us if we can be of help in future.

## 2022-03-27 NOTE — PLAN OF CARE
Patient continues to have occasional severe range BP's. Treated with hydralazine x1 this shift. Denies symptoms of Pre-E, edema present. Emesis/dry heaving x1 after PO Labetalol, requested crackers which seemed to be effective. Voiding adequately. Pumping overnight, expressing approximately 30ml's per pump session. Pain well managed with ibuprofen and tylenol. Ipad in room to view  in NICU which patient appreciates. SO present and supportive.

## 2022-03-27 NOTE — ANESTHESIA POSTPROCEDURE EVALUATION
Patient: Fransisca Cabello    Procedure: * No procedures listed *       Anesthesia Type:  Epidural    Note:  Disposition: Inpatient   Postop Pain Control: Uneventful            Sign Out: Well controlled pain   PONV: No   Neuro/Psych: Uneventful            Sign Out: Acceptable/Baseline neuro status   Airway/Respiratory: Uneventful            Sign Out: Acceptable/Baseline resp. status   CV/Hemodynamics: Uneventful            Sign Out: Acceptable CV status; No obvious hypovolemia; No obvious fluid overload   Other NRE: NONE   DID A NON-ROUTINE EVENT OCCUR? No    Event details/Postop Comments:  S/P epidural for labor. I or my partner remained immediately available, monitored the patient, and supervised nursing staff at key events and necessary intervals.    The patient is doing well. VSS Temp normal. Satisfactory cardiovascular and repiratory function. Neuro at baseline. Denies positional headache. Minimal side effects easily managed w/ PRN meds. No apparent anesthetic complications. No follow-up required.    [email or web address]           Last vitals:  Vitals Value Taken Time   BP     Temp     Pulse     Resp     SpO2         Electronically Signed By: Amadou Beavers MD  March 27, 2022  7:48 AM

## 2022-03-27 NOTE — PLAN OF CARE
1740: Assumed cares on patient. Report received from ADOLFO Carlos.   1743: Pt feeling a strong urge to have a BM. SVE done. SVE -/-1. Dr. Das paged to come for delivery.   1756: Dr. Miller paged to be on standby for delivery until Dr. Das arrives.   175: Dr. Miller at bedside. SVE done. SVE 10/100. Pt set up to start pushing.  team paged to attend delivery for 34w0d gestation.  1805: Dr. Das here for delivery.   1806:  team here.   1808: Delivery of baby boy.  Giovanna Dietrich RN

## 2022-03-27 NOTE — PROVIDER NOTIFICATION
03/27/22 0117   Provider Notification   Provider Name/Title Dr. Harp   Method of Notification Phone   Notification Reason Medication Request   No order for LR to infuse while on Mag. Order received for LR to equal 125ml while Mag infusing.

## 2022-03-28 VITALS
WEIGHT: 192.46 LBS | HEIGHT: 67 IN | SYSTOLIC BLOOD PRESSURE: 137 MMHG | RESPIRATION RATE: 16 BRPM | HEART RATE: 106 BPM | TEMPERATURE: 98.6 F | DIASTOLIC BLOOD PRESSURE: 86 MMHG | BODY MASS INDEX: 30.21 KG/M2 | OXYGEN SATURATION: 97 %

## 2022-03-28 PROCEDURE — 250N000013 HC RX MED GY IP 250 OP 250 PS 637: Performed by: OBSTETRICS & GYNECOLOGY

## 2022-03-28 RX ORDER — NIFEDIPINE 30 MG/1
30 TABLET, EXTENDED RELEASE ORAL DAILY
Qty: 30 TABLET | Refills: 0 | Status: SHIPPED | OUTPATIENT
Start: 2022-03-28

## 2022-03-28 RX ORDER — ACETAMINOPHEN 325 MG/1
650 TABLET ORAL EVERY 4 HOURS PRN
COMMUNITY
Start: 2022-03-28

## 2022-03-28 RX ORDER — IBUPROFEN 200 MG
800 TABLET ORAL EVERY 4 HOURS PRN
COMMUNITY
Start: 2022-03-28

## 2022-03-28 RX ADMIN — LABETALOL HYDROCHLORIDE 200 MG: 200 TABLET, FILM COATED ORAL at 03:04

## 2022-03-28 RX ADMIN — DOCUSATE SODIUM 100 MG: 100 CAPSULE, LIQUID FILLED ORAL at 08:31

## 2022-03-28 RX ADMIN — ACETAMINOPHEN 650 MG: 325 TABLET, FILM COATED ORAL at 07:24

## 2022-03-28 ASSESSMENT — ACTIVITIES OF DAILY LIVING (ADL)
ADLS_ACUITY_SCORE: 5

## 2022-03-28 NOTE — PLAN OF CARE
BP readings reviewed by DR Hayward this morning, /100 and recheck 137/86 patient denies PIH symptoms . Patient is fit for discharge to home follow up in 2 weeks in clinic and will monitor BP at home , parameters given when to call by DR Hayward. Medication changed to nifedipine for home. Patient updated. Anticipate discharge later today, baby remains in NICU  11 am ADOLFO Anderson to assume cares

## 2022-03-28 NOTE — DISCHARGE SUMMARY
[facility] Obstetrics Discharge Summary Note          Assessment and Plan:    Assessment:   Post-partum day #2  HD #7  Fransisca Cabello is a 27 year old female  at 34 weeks gestation with a hx of Type 1 DM and concerns of possible macrosomia    Induced vaginal delivery secondary to pre eclampsia with severe features  L&D complications: None      Doing well.  Clean wound without signs of infection.  No excessive bleeding  Pain well-controlled.      Plan:   Ambulation encouraged  Breast feeding strategies discussed  Monitor wound for signs of infection  Pain control measures as needed  Reportable signs and symptoms dicussed with the patient  Pt is experiencing some mildly elevated BP readings.  Risks, benefits, and alternative modes of therapy discussed at length. Pathophysiology of the disease process reviewed, all of the patients questions answered and informed consent obtained.  Will begin Procardia XL 30 mg po daily  Pt to do daily home BP checks  Parameters and sx of concern for which she should call were rev with her and she understands and accepts   RTC 2 weeks for BP check and 6 weeks for a PP visit  Take your tempature twice daily for 3 days and call if > 100.4  Nothing in vagina for 6 wks  Continue taking prenatal MVI daily for 1 month    Discharge later today           Interval History:   Doing well.  Pain is well-controlled.  No fevers.  No history of foul-smelling vaginal discharge.  Good appetite.  Denies chest pain, shortness of breath, nausea or vomiting.  Vaginal bleeding is similar to a heavy menstrual flow.  Ambulatory.  pumping goind well.          Significant Problems:      Past Medical History:   Diagnosis Date     Depressive disorder     post partum depression after 1st     Hypertension     hx of severe preecclampsia w first     Migraines      Postpartum depression     On medication for 6 months-      Type 1 diabetes (H)     three years at diagnosis      Varicella 1998             Review of Systems:    The patient denies any chest pain, shortness of breath, excessive pain, fever, chills, purulent drainage from the wound, nausea or vomiting.          Medications:     All medications related to the patient's surgery have been reviewed  Current Facility-Administered Medications   Medication     acetaminophen (TYLENOL) tablet 650 mg     benzocaine (AMERICAINE) 20 % topical spray     bisacodyl (DULCOLAX) Suppository 10 mg     calcium gluconate 10 % injection 1 g     carboprost (HEMABATE) injection 250 mcg     glucose gel 15-30 g    Or     dextrose 50 % injection 25-50 mL    Or     glucagon injection 1 mg     docusate sodium (COLACE) capsule 100 mg     hydrALAZINE (APRESOLINE) injection 10 mg     hydrocortisone 2.5 % cream     ibuprofen (ADVIL/MOTRIN) tablet 800 mg     labetalol (NORMODYNE) tablet 200 mg     labetalol (NORMODYNE/TRANDATE) algorithm-medication instruction     labetalol (NORMODYNE/TRANDATE) injection 20-80 mg     lactated ringers infusion     lanolin cream     LORazepam (ATIVAN) injection 2 mg     magnesium sulfate 2 g in water intermittent infusion     magnesium sulfate 4 g in 100 mL sterile water (premade)     magnesium sulfate injection 10 g     methylergonovine (METHERGINE) injection 200 mcg     misoprostol (CYTOTEC) tablet 400 mcg    Or     misoprostol (CYTOTEC) tablet 800 mcg     No MMR Needed - Assessment: Patient does not need MMR vaccine     No Tdap Needed - Assessment: Patient does not need Tdap vaccine     oxytocin (PITOCIN) 30 units in 500 mL 0.9% NaCl infusion     oxytocin (PITOCIN) injection 10 Units     tranexamic acid 1 g in 100 mL NS IV bag (premix)             Physical Exam:   Vitals were reviewed  All vitals stable  Temp: 98.6  F (37  C) Temp src: Oral BP: 137/86 Pulse: 106   Resp: 16        Uterine fundus is firm, non-tender and at the level of the umbilicus          Data:     All laboratory data related to this surgery  reviewed  Hemoglobin   Date Value Ref Range Status   03/26/2022 10.8 (L) 11.7 - 15.7 g/dL Final   03/25/2022 9.9 (L) 11.7 - 15.7 g/dL Final   03/24/2022 9.6 (L) 11.7 - 15.7 g/dL Final   03/23/2022 9.4 (L) 11.7 - 15.7 g/dL Final   03/22/2022 11.2 (L) 11.7 - 15.7 g/dL Final   03/06/2018 14.6 11.7 - 15.7 g/dL Final   01/07/2018 11.7 11.7 - 15.7 g/dL Final   01/04/2018 11.4 (L) 11.7 - 15.7 g/dL Final   12/31/2017 11.0 (L) 11.7 - 15.7 g/dL Final   12/22/2017 11.2 (L) 11.7 - 15.7 g/dL Final     No imaging studies have been ordered    Shailesh Hayward MD

## 2022-03-28 NOTE — DISCHARGE INSTRUCTIONS
Preeclampsia   Call your doctor right away if you have any of the following:  - Edema (swelling) in your face or hands  - Rapid weight gain-about 1 pound or more in a day  - Headache  - Abdominal pain on your right side  - Vision problems (flashes or spots)  - You have questions or concerns once you return home.    Postpartum Vaginal Delivery Instructions  Monitor BP at home call clinic as directed by the doctor.     Activity       Ask family and friends for help when you need it.    Do not place anything in your vagina for 6 weeks.    You are not restricted on other activities, but take it easy for a few weeks to allow your body to recover from delivery.  You are able to do any activities you feel up to that point.    No driving until you have stopped taking your pain medications (usually two weeks after delivery).     Call your health care provider if you have any of these symptoms:       Increased pain, swelling, redness, or fluid around your stiches from an episiotomy or perineal tear.    A fever above 100.4 F (38 C) with or without chills when placing a thermometer under your tongue.    You soak a sanitary pad with blood within 1 hour, or you see blood clots larger than a golf ball.    Bleeding that lasts more than 6 weeks.    Vaginal discharge that smells bad.    Severe pain, cramping or tenderness in your lower belly area.    A need to urinate more frequently (use the toilet more often), more urgently (use the toilet very quickly), or it burns when you urinate.    Nausea and vomiting.    Redness, swelling or pain around a vein in your leg.    Problems breastfeeding or a red or painful area on your breast.    Chest pain and cough or are gasping for air.    Problems coping with sadness, anxiety, or depression.  If you have any concerns about hurting yourself or the baby, call your provider immediately.     You have questions or concerns after you return home.     Keep your hands clean:  Always wash your hands  before touching your perineal area and stitches.  This helps reduce your risk of infection.  If your hands aren't dirty, you may use an alcohol hand-rub to clean your hands. Keep your nails clean and short.

## 2022-03-28 NOTE — PLAN OF CARE
VSS.  Denies PreE s/s. Diabetes managed by patient. Pain well controlled. Fundal checks and bleeding WNL. Voiding without difficulty, frequent voiding encouraged.  Pumping.  Went to see  in the [facility] a few times.  NICU IPAD in room. FOB at bedside and is supportive.

## 2022-03-28 NOTE — PROGRESS NOTES
[facility] Obstetrics Post-Partum Progress Note          Assessment and Plan:    Assessment:   Post-partum day #2  HD #7  Fransisca Cabello is a 27 year old female  at 34 weeks gestation with a hx of Type 1 DM and concerns of possible macrosomia    Induced vaginal delivery secondary to pre eclampsia with severe features  L&D complications: None      Doing well.  Clean wound without signs of infection.  No excessive bleeding  Pain well-controlled.      Plan:   Ambulation encouraged  Breast feeding strategies discussed  Monitor wound for signs of infection  Pain control measures as needed  Reportable signs and symptoms dicussed with the patient  Pt is experiencing some mildly elevated BP readings.  Risks, benefits, and alternative modes of therapy discussed at length. Pathophysiology of the disease process reviewed, all of the patients questions answered and informed consent obtained.  Will begin Procardia XL 30 mg po daily  Pt to do daily home BP checks  Parameters and sx of concern for which she should call were rev with her and she understands and accepts   RTC 2 weeks for BP check and 6 weeks for a PP visit  Take your tempature twice daily for 3 days and call if > 100.4  Nothing in vagina for 6 wks  Continue taking prenatal MVI daily for 1 month    Discharge later today           Interval History:   Doing well.  Pain is well-controlled.  No fevers.  No history of foul-smelling vaginal discharge.  Good appetite.  Denies chest pain, shortness of breath, nausea or vomiting.  Vaginal bleeding is similar to a heavy menstrual flow.  Ambulatory.  pumping goind well.          Significant Problems:      Past Medical History:   Diagnosis Date     Depressive disorder     post partum depression after 1st     Hypertension     hx of severe preecclampsia w first     Migraines      Postpartum depression     On medication for 6 months-      Type 1 diabetes (H)     three years at diagnosis      Varicella 1998             Review of Systems:    The patient denies any chest pain, shortness of breath, excessive pain, fever, chills, purulent drainage from the wound, nausea or vomiting.          Medications:     All medications related to the patient's surgery have been reviewed  Current Facility-Administered Medications   Medication     acetaminophen (TYLENOL) tablet 650 mg     benzocaine (AMERICAINE) 20 % topical spray     bisacodyl (DULCOLAX) Suppository 10 mg     calcium gluconate 10 % injection 1 g     carboprost (HEMABATE) injection 250 mcg     glucose gel 15-30 g    Or     dextrose 50 % injection 25-50 mL    Or     glucagon injection 1 mg     docusate sodium (COLACE) capsule 100 mg     hydrALAZINE (APRESOLINE) injection 10 mg     hydrocortisone 2.5 % cream     ibuprofen (ADVIL/MOTRIN) tablet 800 mg     labetalol (NORMODYNE) tablet 200 mg     labetalol (NORMODYNE/TRANDATE) algorithm-medication instruction     labetalol (NORMODYNE/TRANDATE) injection 20-80 mg     lactated ringers infusion     lanolin cream     LORazepam (ATIVAN) injection 2 mg     magnesium sulfate 2 g in water intermittent infusion     magnesium sulfate 4 g in 100 mL sterile water (premade)     magnesium sulfate injection 10 g     methylergonovine (METHERGINE) injection 200 mcg     misoprostol (CYTOTEC) tablet 400 mcg    Or     misoprostol (CYTOTEC) tablet 800 mcg     No MMR Needed - Assessment: Patient does not need MMR vaccine     No Tdap Needed - Assessment: Patient does not need Tdap vaccine     oxytocin (PITOCIN) 30 units in 500 mL 0.9% NaCl infusion     oxytocin (PITOCIN) injection 10 Units     tranexamic acid 1 g in 100 mL NS IV bag (premix)             Physical Exam:   Vitals were reviewed  All vitals stable  Temp: 98.6  F (37  C) Temp src: Oral BP: 137/86 Pulse: 106   Resp: 16        Uterine fundus is firm, non-tender and at the level of the umbilicus          Data:     All laboratory data related to this surgery  reviewed  Hemoglobin   Date Value Ref Range Status   03/26/2022 10.8 (L) 11.7 - 15.7 g/dL Final   03/25/2022 9.9 (L) 11.7 - 15.7 g/dL Final   03/24/2022 9.6 (L) 11.7 - 15.7 g/dL Final   03/23/2022 9.4 (L) 11.7 - 15.7 g/dL Final   03/22/2022 11.2 (L) 11.7 - 15.7 g/dL Final   03/06/2018 14.6 11.7 - 15.7 g/dL Final   01/07/2018 11.7 11.7 - 15.7 g/dL Final   01/04/2018 11.4 (L) 11.7 - 15.7 g/dL Final   12/31/2017 11.0 (L) 11.7 - 15.7 g/dL Final   12/22/2017 11.2 (L) 11.7 - 15.7 g/dL Final     No imaging studies have been ordered    Shailesh Hayward MD

## 2022-03-28 NOTE — PROVIDER NOTIFICATION
03/28/22 0830   Provider Notification   Provider Name/Title DR Hayward   Method of Notification In Department   Request Evaluate in Person   Notification Reason Status Update   Comments   Comments /100 recheck 137/86   Patient discharged to home will monitor BP at home . No further orders

## 2022-03-28 NOTE — PLAN OF CARE
Up independent in room, voiding without difficultly. Pain managed with ibuprofen and tylenol. Breastfeeding and pumping for infant in NICU. Discharged at 1350.

## 2022-03-29 ENCOUNTER — PATIENT OUTREACH (OUTPATIENT)
Dept: CARE COORDINATION | Facility: CLINIC | Age: 27
End: 2022-03-29
Payer: COMMERCIAL

## 2022-03-29 DIAGNOSIS — Z71.89 OTHER SPECIFIED COUNSELING: ICD-10-CM

## 2022-03-29 LAB
PATH REPORT.COMMENTS IMP SPEC: NORMAL
PATH REPORT.COMMENTS IMP SPEC: NORMAL
PATH REPORT.FINAL DX SPEC: NORMAL
PATH REPORT.GROSS SPEC: NORMAL
PATH REPORT.MICROSCOPIC SPEC OTHER STN: NORMAL
PATH REPORT.RELEVANT HX SPEC: NORMAL
PHOTO IMAGE: NORMAL

## 2022-04-04 ENCOUNTER — LACTATION ENCOUNTER (OUTPATIENT)
Age: 27
End: 2022-04-04
Payer: COMMERCIAL

## 2022-04-04 NOTE — LACTATION NOTE
This note was copied from a baby's chart.  Lactation visit. This is Fransisca's third child (Young). She  and pumped with her older two children and had an oversupply. Fransisca has been pumping every 3 hours since Young was born and has an oversupply (pumping 12-16 ounces each time per report).  Young was born at 34w0d and is in the NICU. He is currently 35w2d CGA. At time of visit, Young was due to feed. He was put skin to skin with Fransisca and latched on the right breast with a nipple shield. He fed for a few minutes and needed frequent tactile stimulation to continue sucking. Fransisca switched him to the left breast and he latched. Many swallows heard and pointed out to Fransisca. Young transferred 14mL at breast this feeding. Plan for lactation follow up as needed.

## 2022-05-03 ENCOUNTER — PRENATAL OFFICE VISIT (OUTPATIENT)
Dept: OBGYN | Facility: CLINIC | Age: 27
End: 2022-05-03
Payer: COMMERCIAL

## 2022-05-03 VITALS — WEIGHT: 160 LBS | DIASTOLIC BLOOD PRESSURE: 78 MMHG | SYSTOLIC BLOOD PRESSURE: 128 MMHG | BODY MASS INDEX: 25.06 KG/M2

## 2022-05-03 DIAGNOSIS — Z30.430 ENCOUNTER FOR INSERTION OF INTRAUTERINE CONTRACEPTIVE DEVICE: ICD-10-CM

## 2022-05-03 DIAGNOSIS — Z30.430 ENCOUNTER FOR IUD INSERTION: ICD-10-CM

## 2022-05-03 PROBLEM — O36.63X0 EXCESSIVE FETAL GROWTH AFFECTING MANAGEMENT OF PREGNANCY IN THIRD TRIMESTER: Status: RESOLVED | Noted: 2022-03-23 | Resolved: 2022-05-03

## 2022-05-03 PROBLEM — O99.019 ANEMIA AFFECTING THIRD PREGNANCY: Status: RESOLVED | Noted: 2022-03-23 | Resolved: 2022-05-03

## 2022-05-03 PROBLEM — O14.90 PREECLAMPSIA: Status: RESOLVED | Noted: 2022-03-22 | Resolved: 2022-05-03

## 2022-05-03 PROBLEM — O24.013 PRE-EXISTING TYPE 1 DIABETES MELLITUS DURING PREGNANCY IN THIRD TRIMESTER: Status: RESOLVED | Noted: 2017-07-12 | Resolved: 2022-05-03

## 2022-05-03 PROCEDURE — 99207 PR POST PARTUM EXAM: CPT | Mod: 25 | Performed by: OBSTETRICS & GYNECOLOGY

## 2022-05-03 PROCEDURE — 58300 INSERT INTRAUTERINE DEVICE: CPT | Performed by: OBSTETRICS & GYNECOLOGY

## 2022-05-03 ASSESSMENT — PATIENT HEALTH QUESTIONNAIRE - PHQ9: SUM OF ALL RESPONSES TO PHQ QUESTIONS 1-9: 9

## 2022-05-03 NOTE — PROGRESS NOTES
SUBJECTIVE: Fransisca is here for a 6-week postpartum checkup.    Delivery date was 3/26/22. She had a pre term  of a viable boy, weight 7 pounds 11 oz., with type I IDDM on a pump and prematurity as the only complications.  Since delivery, she has been breast feeding.  She has no signs of infection, bleeding or other complications.  She is not pregnant.  We discussed contraception and she has chosen Mirena IUD.  She  has not had intercourse since delivery and complains of No discomfort. Patient screened for postpartum depression and complaints are NEGATIVE. Screening has also been completed for intimate partner violence.    EXAM:  Today's Depression Rating was   PHQ-9 SCORE 2021   PHQ-9 Total Score MyChart 1 (Minimal depression)   PHQ-9 Total Score 1       Gen: mood: happy  Abdomen: Benign and Soft, flat, non-tender   Pelvis:  Perineum Intact.     The pelvic exam revealed normal external genitalia. A speculum was inserted into the vagina and the cervix was visualized. The cervix was prepped with Betadine . The anterior lip of the cervix was grasped with a single toothed tenaculum. The uterus sounded to 7 cm. A Mirena IUD was then inserted without difficulty. The string was cut to 3 cm.  The patient experienced a mild amount of cramping.      ASSESSMENT:   Normal postpartum exam after .    PLAN:  Return as needed or at time of next expected pap, pelvic, or breast exam.  Follow up for IUD check if any issues--she has had several IUDs in the past and is comfortable with checking for strings herself.  Has close follow up with endocrine, BS have been excellent per her report.    Cheyanne Silva MD

## 2022-09-10 ENCOUNTER — HEALTH MAINTENANCE LETTER (OUTPATIENT)
Age: 27
End: 2022-09-10

## 2022-11-23 ENCOUNTER — LAB (OUTPATIENT)
Dept: LAB | Facility: CLINIC | Age: 27
End: 2022-11-23
Attending: MEDICAL GENETICS
Payer: COMMERCIAL

## 2022-11-23 DIAGNOSIS — Z84.89 FAMILY HISTORY OF GENETIC DISEASE: Primary | ICD-10-CM

## 2022-11-23 DIAGNOSIS — O09.899 SUPERVISION OF OTHER HIGH RISK PREGNANCY, ANTEPARTUM: ICD-10-CM

## 2022-11-23 DIAGNOSIS — Z84.89 FAMILY HISTORY OF GENETIC DISEASE: ICD-10-CM

## 2022-11-23 DIAGNOSIS — O21.9 NAUSEA AND VOMITING IN PREGNANCY: ICD-10-CM

## 2022-11-23 LAB
ALT SERPL W P-5'-P-CCNC: 18 U/L (ref 0–50)
AST SERPL W P-5'-P-CCNC: 7 U/L (ref 0–45)
BASOPHILS # BLD AUTO: 0 10E3/UL (ref 0–0.2)
BASOPHILS NFR BLD AUTO: 1 %
BUN SERPL-MCNC: 11 MG/DL (ref 7–30)
CREAT SERPL-MCNC: 0.74 MG/DL (ref 0.52–1.04)
EOSINOPHIL # BLD AUTO: 0 10E3/UL (ref 0–0.7)
EOSINOPHIL NFR BLD AUTO: 0 %
ERYTHROCYTE [DISTWIDTH] IN BLOOD BY AUTOMATED COUNT: 12.5 % (ref 10–15)
GFR SERPL CREATININE-BSD FRML MDRD: >90 ML/MIN/1.73M2
HCT VFR BLD AUTO: 42.6 % (ref 35–47)
HGB BLD-MCNC: 14.2 G/DL (ref 11.7–15.7)
IMM GRANULOCYTES # BLD: 0 10E3/UL
IMM GRANULOCYTES NFR BLD: 0 %
LYMPHOCYTES # BLD AUTO: 2.4 10E3/UL (ref 0.8–5.3)
LYMPHOCYTES NFR BLD AUTO: 37 %
MCH RBC QN AUTO: 30 PG (ref 26.5–33)
MCHC RBC AUTO-ENTMCNC: 33.3 G/DL (ref 31.5–36.5)
MCV RBC AUTO: 90 FL (ref 78–100)
MONOCYTES # BLD AUTO: 0.4 10E3/UL (ref 0–1.3)
MONOCYTES NFR BLD AUTO: 5 %
NEUTROPHILS # BLD AUTO: 3.8 10E3/UL (ref 1.6–8.3)
NEUTROPHILS NFR BLD AUTO: 57 %
NRBC # BLD AUTO: 0 10E3/UL
NRBC BLD AUTO-RTO: 0 /100
PLATELET # BLD AUTO: 288 10E3/UL (ref 150–450)
RBC # BLD AUTO: 4.74 10E6/UL (ref 3.8–5.2)
URATE SERPL-MCNC: 3.8 MG/DL (ref 2.6–6)
WBC # BLD AUTO: 6.7 10E3/UL (ref 4–11)

## 2022-11-23 PROCEDURE — 84450 TRANSFERASE (AST) (SGOT): CPT

## 2022-11-23 PROCEDURE — 36415 COLL VENOUS BLD VENIPUNCTURE: CPT

## 2022-11-23 PROCEDURE — 85025 COMPLETE CBC W/AUTO DIFF WBC: CPT

## 2022-11-23 PROCEDURE — 82565 ASSAY OF CREATININE: CPT

## 2022-11-23 PROCEDURE — 84460 ALANINE AMINO (ALT) (SGPT): CPT

## 2022-11-23 PROCEDURE — 84550 ASSAY OF BLOOD/URIC ACID: CPT

## 2022-11-23 PROCEDURE — 85018 HEMOGLOBIN: CPT

## 2022-11-23 PROCEDURE — 84520 ASSAY OF UREA NITROGEN: CPT

## 2023-01-22 ENCOUNTER — HEALTH MAINTENANCE LETTER (OUTPATIENT)
Age: 28
End: 2023-01-22

## 2023-03-21 LAB
Lab: NORMAL
PERFORMING LABORATORY: NORMAL
SPECIMEN STATUS: NORMAL
TEST NAME: NORMAL

## 2023-04-30 ENCOUNTER — HEALTH MAINTENANCE LETTER (OUTPATIENT)
Age: 28
End: 2023-04-30

## 2023-07-23 ENCOUNTER — HEALTH MAINTENANCE LETTER (OUTPATIENT)
Age: 28
End: 2023-07-23

## 2024-02-18 ENCOUNTER — HEALTH MAINTENANCE LETTER (OUTPATIENT)
Age: 29
End: 2024-02-18

## 2024-07-07 ENCOUNTER — HEALTH MAINTENANCE LETTER (OUTPATIENT)
Age: 29
End: 2024-07-07

## 2024-09-15 ENCOUNTER — HEALTH MAINTENANCE LETTER (OUTPATIENT)
Age: 29
End: 2024-09-15

## 2025-01-04 NOTE — PROVIDER NOTIFICATION
11/17/17 7634   Provider Notification   Provider Name/Title Dr. Silva   Method of Notification At Bedside   MD at bedside discussing plan of care. Received orders to admit patient, start IV Magnesium, labs every 8 hrs, IM Betamethesone, BPs every 1 hour. Will move patient into a Labor room.    unk

## 2025-04-05 ENCOUNTER — HEALTH MAINTENANCE LETTER (OUTPATIENT)
Age: 30
End: 2025-04-05

## 2025-05-09 NOTE — NURSING NOTE
"Chief Complaint   Patient presents with     Prenatal Care       Initial LMP 04/21/2017 (Exact Date) Estimated body mass index is 26 kg/(m^2) as calculated from the following:    Height as of 8/25/17: 5' 7\" (1.702 m).    Weight as of 8/25/17: 166 lb (75.3 kg).  Medication Reconciliation: complete     21 1/7wks. Reports normal blood sugars.   - FM noted - anterior placenta  - cramping or bleeding  + nausea and vomiting  - headache or heartburn  María Elena Arce LPN    " Patient left in a wheelchair with his wife and EMS Transport. IV was taken out. All paperwork given to patient wife.

## 2025-07-19 ENCOUNTER — HEALTH MAINTENANCE LETTER (OUTPATIENT)
Age: 30
End: 2025-07-19